# Patient Record
Sex: FEMALE | Race: WHITE | NOT HISPANIC OR LATINO | Employment: OTHER | URBAN - METROPOLITAN AREA
[De-identification: names, ages, dates, MRNs, and addresses within clinical notes are randomized per-mention and may not be internally consistent; named-entity substitution may affect disease eponyms.]

---

## 2017-04-19 LAB
CHOLEST SERPL-MSCNC: 218 MG/DL (ref 0–200)
HDLC SERPL-MCNC: 57 MG/DL
LDLC SERPL CALC-MCNC: 123 MG/DL (ref 0–129)
TOTAL NON-HDL-C (LDL+VLDL): 161 MG/DL (ref 0–159)
TRIGL SERPL-MCNC: 190 MG/DL (ref 0–149)

## 2017-07-20 ENCOUNTER — TELEPHONE (OUTPATIENT)
Dept: PULMONOLOGY | Facility: CLINIC | Age: 67
End: 2017-07-20

## 2017-07-20 DIAGNOSIS — D86.0 PULMONARY SARCOIDOSIS: Primary | ICD-10-CM

## 2017-07-21 ENCOUNTER — PROCEDURE VISIT (OUTPATIENT)
Dept: PULMONOLOGY | Facility: CLINIC | Age: 67
End: 2017-07-21
Payer: MEDICARE

## 2017-07-21 ENCOUNTER — OFFICE VISIT (OUTPATIENT)
Dept: PULMONOLOGY | Facility: CLINIC | Age: 67
End: 2017-07-21
Payer: MEDICARE

## 2017-07-21 VITALS
HEIGHT: 63 IN | DIASTOLIC BLOOD PRESSURE: 80 MMHG | BODY MASS INDEX: 31.71 KG/M2 | HEART RATE: 67 BPM | RESPIRATION RATE: 18 BRPM | SYSTOLIC BLOOD PRESSURE: 140 MMHG | OXYGEN SATURATION: 96 % | WEIGHT: 179 LBS

## 2017-07-21 DIAGNOSIS — D86.0 PULMONARY SARCOIDOSIS: ICD-10-CM

## 2017-07-21 DIAGNOSIS — D86.0 PULMONARY SARCOIDOSIS: Chronic | ICD-10-CM

## 2017-07-21 DIAGNOSIS — J44.9 COPD, MODERATE: Primary | Chronic | ICD-10-CM

## 2017-07-21 PROCEDURE — 94060 EVALUATION OF WHEEZING: CPT | Mod: S$GLB,,, | Performed by: INTERNAL MEDICINE

## 2017-07-21 PROCEDURE — 1159F MED LIST DOCD IN RCRD: CPT | Mod: S$GLB,,, | Performed by: INTERNAL MEDICINE

## 2017-07-21 PROCEDURE — 99999 PR PBB SHADOW E&M-EST. PATIENT-LVL III: CPT | Mod: PBBFAC,,, | Performed by: INTERNAL MEDICINE

## 2017-07-21 PROCEDURE — 99214 OFFICE O/P EST MOD 30 MIN: CPT | Mod: S$GLB,,, | Performed by: INTERNAL MEDICINE

## 2017-07-21 NOTE — PROGRESS NOTES
Subjective:      Established patient    Patient ID: Eve Long is a 67 y.o. female.    Patient Active Problem List   Diagnosis    Pulmonary sarcoidosis    Hypothyroidism    COPD, moderate       Problem list has been reviewed.    Chief Complaint: Pulmonary srcodosis      HPI    CXR and Spirometry reviewed with patient who expressed and voiced understanding. All questions were answered to the patients satisfaction. The patient does not currently have symptoms / an exacerbation. She states that she is at her respiratory baseline and denies any new onset specific pulmonary complaints. She denies cough sputum, hemoptysis,  pain with breathing, wheezing, asthma. Her current respiratory therapy regimen is albuterol inhaler or albuterol nebulizer which provides  relief. She is  adherent with her regimen.    A full  review of systems, past , family  and social histories was performed except as mentioned in the note above, these are non contributory to the main issues discussed today.      Previous Report Reviewed: office notes     The following portions of the patient's history were reviewed and updated as appropriate: She  has a past medical history of Sarcoidosis and Thyroid disease.  She  has a past surgical history that includes Hysterectomy and Cholecystectomy.  Her family history is not on file.  She  reports that she has never smoked. She does not have any smokeless tobacco history on file. She reports that she does not drink alcohol. Her drug history is not on file.  She has a current medication list which includes the following prescription(s): albuterol, albuterol, amlodipine, and cyanocobalamin (vitamin b-12).  She is allergic to beclomethasone dipropionate; doxycycline; fluticasone-salmeterol; umeclidinium-vilanterol; levothyroxine; and red dye..    Review of Systems   Constitutional: Positive for fatigue. Negative for fever, chills and night sweats.   HENT: Negative for nosebleeds, rhinorrhea, sinus  "pressure, sore throat, congestion and ear pain.    Eyes: Positive for itching.   Respiratory: Positive for cough, wheezing and somnolence. Negative for snoring, choking, chest tightness, orthopnea, use of rescue inhaler and Paroxysmal Nocturnal Dyspnea.    Cardiovascular: Negative for chest pain, palpitations and leg swelling.   Genitourinary: Negative for difficulty urinating.   Endocrine: Positive for heat intolerance and polyuria. Negative for polydipsia and cold intolerance.    Musculoskeletal: Positive for arthralgias. Negative for back pain.   Skin: Negative for rash.   Gastrointestinal: Negative for nausea, vomiting, abdominal pain, abdominal distention and acid reflux.   Neurological: Negative for dizziness, syncope, light-headedness and headaches.   Hematological: Excessive bruising. Does not bruise/bleed easily.   Psychiatric/Behavioral: The patient is not nervous/anxious.    All other systems reviewed and are negative.       Objective:     BP (!) 140/80   Pulse 67   Resp 18   Ht 5' 3" (1.6 m)   Wt 81.2 kg (179 lb 0.2 oz)   SpO2 96%   BMI 31.71 kg/m²   Body mass index is 31.71 kg/m².     Physical Exam   Constitutional: She is oriented to person, place, and time. She appears well-developed and well-nourished.   HENT:   Head: Normocephalic and atraumatic.   Eyes: EOM are normal. Pupils are equal, round, and reactive to light.   Neck: Normal range of motion. Neck supple.   Cardiovascular: Normal rate and regular rhythm.    Pulmonary/Chest: Effort normal and breath sounds normal.   Abdominal: Soft. Bowel sounds are normal.   Musculoskeletal: Normal range of motion.   Neurological: She is alert and oriented to person, place, and time.   Skin: Skin is warm.   Psychiatric: She has a normal mood and affect.   Nursing note and vitals reviewed.      Personal Diagnostic Review    ABG: pH: 7.386   PaO2: 87 PaCO2 34   SaO2 97% : Mixed respiratory alkalosis / metabolic acidosis.    Pulmonary function tests: FEV1: " 1.53 (68 % predicted), FVC:  2.52 (86 % predicted), FEV1/FVC:  60%   Moderate obstruction. Airflow is not clearly improved after bronchodilator.     CT of chest performed on 06/14/16 with contrast : There is a densely calcified 15 mm pleural-based granuloma in the lateral left lower lobe.  There is focal partially calcified lobular pleural plaque measuring 5-6 mm thickness in the lateral right lower lobe, felt likely to be benign.  No suspicious pulmonary lesions are seen.  No pathologically enlarged hilar or mediastinal lymph nodes.  No pleural effusion.  No abnormality of the airway.     CXR: Stable    Assessment:     1. COPD, moderate Stable   2. Pulmonary sarcoidosis Stable     Outpatient Encounter Prescriptions as of 7/21/2017   Medication Sig Dispense Refill    albuterol (PROVENTIL) 2.5 mg /3 mL (0.083 %) nebulizer solution 2.5 mg.      albuterol (VENTOLIN HFA) 90 mcg/actuation inhaler Inhale 2 puffs into the lungs every 4 (four) hours as needed for Wheezing. 18 g 11    amlodipine (NORVASC) 5 MG tablet Take 5 mg by mouth.      CYANOCOBALAMIN, VITAMIN B-12, ORAL Take 1 tablet by mouth.      [DISCONTINUED] clonazePAM (KLONOPIN) 0.5 MG tablet Take 0.5 mg by mouth.      [DISCONTINUED] estradiol (ESTRACE) 0.5 MG tablet Take 0.5 mg by mouth.      [DISCONTINUED] levothyroxine (SYNTHROID) 50 MCG tablet        No facility-administered encounter medications on file as of 7/21/2017.      Orders Placed This Encounter   Procedures    X-Ray Chest PA And Lateral     Standing Status:   Future     Standing Expiration Date:   8/31/2018    Complete PFT with bronchodilator     Standing Status:   Future     Standing Expiration Date:   8/31/2018     Plan:     Discussed diagnosis, its evaluation, treatment and usual course. All questions answered.    Pulmonary sarcoidosis  BURNT OUT PULMONARY SARCOIDOSIS. Stable and controlled. No changes to current therapeutic plan. Continue all previously prescribed medications.        COPD, moderate  COPD ROS: taking medications as instructed, no medication side effects noted, no significant ongoing wheezing or shortness of breath, using bronchodilator MDI less than twice a week.   New concerns: None.   Exam: appears well, vitals normal, no respiratory distress, acyanotic, normal RR, chest clear, no wheezing, crepitations, rhonchi, normal symmetric air entry.   Assessment:  COPD well controlled.   Plan: ALBUTEROL PFT AND NEBS. current treatment plan is effective, no change in therapy. PFT and CXR in 1 year.       TIME SPENT WITH PATIENT: Time spent: 35 minutes in face to face  discussion concerning diagnosis, prognosis, review of lab and test results, benefits of treatment as well as management of disease, counseling of patient and coordination of care between various health  care providers . Greater than half the time spent was used for coordination of care and counseling of patient.        Return in about 1 year (around 7/21/2018) for COPD, Sarcoidosis.

## 2017-07-21 NOTE — ASSESSMENT & PLAN NOTE
BURNT OUT PULMONARY SARCOIDOSIS. Stable and controlled. No changes to current therapeutic plan. Continue all previously prescribed medications.

## 2017-07-21 NOTE — ASSESSMENT & PLAN NOTE
COPD ROS: taking medications as instructed, no medication side effects noted, no significant ongoing wheezing or shortness of breath, using bronchodilator MDI less than twice a week.   New concerns: None.   Exam: appears well, vitals normal, no respiratory distress, acyanotic, normal RR, chest clear, no wheezing, crepitations, rhonchi, normal symmetric air entry.   Assessment:  COPD well controlled.   Plan: ALBUTEROL PFT AND NEBS. current treatment plan is effective, no change in therapy. PFT and CXR in 1 year.

## 2017-07-21 NOTE — PATIENT INSTRUCTIONS
Lung Anatomy  Your lungs take air in to give your body oxygen, which the body needs to work. Your lungs, like all the tissues in your body, are made up of billions of tiny specialized cells. Old lung cells die and are replaced by new, identical lung cells. This natural process helps ensure healthy lungs.    Date Last Reviewed: 11/1/2016  © 4577-2329 [employer]. [address] [place] [address]. All rights reserved. This information is not intended as a substitute for professional medical care. Always follow your healthcare professional's instructions.

## 2017-07-23 LAB
PRE FEV1 FVC: 60.47 % (ref 66.56–86.15)
PRE FEV1: 1.53 L (ref 1.67–2.8)
PRE FVC: 2.52 L (ref 2.27–3.61)
PRE PEF: 4.27 L/S (ref 3.98–7.3)

## 2017-10-03 ENCOUNTER — OFFICE VISIT (OUTPATIENT)
Dept: FAMILY MEDICINE | Facility: CLINIC | Age: 67
End: 2017-10-03
Payer: MEDICARE

## 2017-10-03 ENCOUNTER — LAB VISIT (OUTPATIENT)
Dept: LAB | Facility: HOSPITAL | Age: 67
End: 2017-10-03
Attending: FAMILY MEDICINE
Payer: MEDICARE

## 2017-10-03 VITALS
DIASTOLIC BLOOD PRESSURE: 82 MMHG | BODY MASS INDEX: 30.88 KG/M2 | TEMPERATURE: 98 F | SYSTOLIC BLOOD PRESSURE: 130 MMHG | HEIGHT: 63 IN | OXYGEN SATURATION: 98 % | HEART RATE: 86 BPM | WEIGHT: 174.25 LBS

## 2017-10-03 DIAGNOSIS — K29.70 GASTRITIS, PRESENCE OF BLEEDING UNSPECIFIED, UNSPECIFIED CHRONICITY, UNSPECIFIED GASTRITIS TYPE: ICD-10-CM

## 2017-10-03 DIAGNOSIS — Z12.39 BREAST CANCER SCREENING: ICD-10-CM

## 2017-10-03 DIAGNOSIS — D52.8 OTHER FOLATE DEFICIENCY ANEMIAS: ICD-10-CM

## 2017-10-03 DIAGNOSIS — K29.70 GASTRITIS, PRESENCE OF BLEEDING UNSPECIFIED, UNSPECIFIED CHRONICITY, UNSPECIFIED GASTRITIS TYPE: Primary | ICD-10-CM

## 2017-10-03 LAB
BASOPHILS # BLD AUTO: 0.02 K/UL
BASOPHILS NFR BLD: 0.2 %
DIFFERENTIAL METHOD: ABNORMAL
EOSINOPHIL # BLD AUTO: 0 K/UL
EOSINOPHIL NFR BLD: 0 %
ERYTHROCYTE [DISTWIDTH] IN BLOOD BY AUTOMATED COUNT: 13.7 %
HCT VFR BLD AUTO: 41 %
HGB BLD-MCNC: 13.6 G/DL
LYMPHOCYTES # BLD AUTO: 1.4 K/UL
LYMPHOCYTES NFR BLD: 17.3 %
MCH RBC QN AUTO: 28 PG
MCHC RBC AUTO-ENTMCNC: 33.2 G/DL
MCV RBC AUTO: 85 FL
MONOCYTES # BLD AUTO: 0.7 K/UL
MONOCYTES NFR BLD: 7.9 %
NEUTROPHILS # BLD AUTO: 6.1 K/UL
NEUTROPHILS NFR BLD: 74.2 %
PLATELET # BLD AUTO: 279 K/UL
PMV BLD AUTO: 10.5 FL
RBC # BLD AUTO: 4.85 M/UL
WBC # BLD AUTO: 8.27 K/UL

## 2017-10-03 PROCEDURE — 36415 COLL VENOUS BLD VENIPUNCTURE: CPT | Mod: PO

## 2017-10-03 PROCEDURE — 86256 FLUORESCENT ANTIBODY TITER: CPT

## 2017-10-03 PROCEDURE — 82607 VITAMIN B-12: CPT

## 2017-10-03 PROCEDURE — 99204 OFFICE O/P NEW MOD 45 MIN: CPT | Mod: S$GLB,,, | Performed by: FAMILY MEDICINE

## 2017-10-03 PROCEDURE — 85025 COMPLETE CBC W/AUTO DIFF WBC: CPT

## 2017-10-03 PROCEDURE — 99999 PR PBB SHADOW E&M-EST. PATIENT-LVL III: CPT | Mod: PBBFAC,,, | Performed by: FAMILY MEDICINE

## 2017-10-03 RX ORDER — ALBUTEROL SULFATE 90 UG/1
2 AEROSOL, METERED RESPIRATORY (INHALATION) EVERY 6 HOURS PRN
COMMUNITY

## 2017-10-03 RX ORDER — ALBUTEROL SULFATE 0.83 MG/ML
2.5 SOLUTION RESPIRATORY (INHALATION) DAILY PRN
COMMUNITY
End: 2019-10-18 | Stop reason: SDUPTHER

## 2017-10-03 RX ORDER — CLONIDINE HYDROCHLORIDE 0.1 MG/1
0.1 TABLET ORAL 3 TIMES DAILY PRN
Status: ON HOLD | COMMUNITY
Start: 2017-07-18 | End: 2018-08-15 | Stop reason: HOSPADM

## 2017-10-03 NOTE — PROGRESS NOTES
Subjective:       Patient ID: Eve Long is a 67 y.o. female.    Chief Complaint: low vitamin b 12    67 y old female with abdominal bloating , epigastralgia and nausea over the last  3 m . Previously under the care of Dr Lemuel servin refer her to Gastro(Ezra Jensen)   She  Has endoscopy done on 8 /17 that  showed  findings  consistent  with metaplastic atrophic gastritis. She has not been able to tolerate PPI since some cause urinary retention ? SHe has been on oral B12 for years       Review of Systems   Constitutional: Positive for activity change. Negative for unexpected weight change.   HENT: Negative for hearing loss, rhinorrhea and trouble swallowing.    Eyes: Positive for visual disturbance. Negative for discharge.   Respiratory: Negative for chest tightness and wheezing.    Cardiovascular: Negative for chest pain and palpitations.   Gastrointestinal: Positive for abdominal distention, abdominal pain, blood in stool, diarrhea and vomiting. Negative for constipation.   Endocrine: Negative for polydipsia and polyuria.   Genitourinary: Negative.  Negative for difficulty urinating, dysuria, hematuria and menstrual problem.   Musculoskeletal: Negative for arthralgias, joint swelling and neck pain.   Neurological: Negative for weakness and headaches.   Psychiatric/Behavioral: Negative for confusion and dysphoric mood.       Objective:      Physical Exam   Constitutional: She is oriented to person, place, and time. She appears well-developed and well-nourished. No distress.   HENT:   Head: Normocephalic and atraumatic.   Right Ear: External ear normal.   Left Ear: External ear normal.   Mouth/Throat: No oropharyngeal exudate.   Eyes: Conjunctivae and EOM are normal. Pupils are equal, round, and reactive to light. Right eye exhibits no discharge. Left eye exhibits no discharge. No scleral icterus.   Neck: Normal range of motion. Neck supple. No JVD present. No tracheal deviation present. No thyromegaly  present.   Cardiovascular: Normal rate, regular rhythm and normal heart sounds.  Exam reveals no gallop and no friction rub.    No murmur heard.  Pulmonary/Chest: Effort normal and breath sounds normal. No stridor. No respiratory distress. She has no wheezes. She has no rales. She exhibits no tenderness.   Abdominal: Soft. Bowel sounds are normal. She exhibits no distension. There is tenderness in the epigastric area. There is no rebound and no guarding.   Musculoskeletal: Normal range of motion.   Lymphadenopathy:     She has no cervical adenopathy.   Neurological: She is alert and oriented to person, place, and time.   Skin: Skin is warm and dry. She is not diaphoretic.   Psychiatric: She has a normal mood and affect. Her behavior is normal. Judgment and thought content normal.       Assessment:     Eve was seen today for low vitamin b 12.    Diagnoses and all orders for this visit:    Gastritis, presence of bleeding unspecified, unspecified chronicity, unspecified gastritis type  -     ANTI-PARIETAL ANTIBODY; Future  -     Vitamin B12; Future  -     CBC auto differential; Future    Other folate deficiency anemias   -     Vitamin B12; Future    Breast cancer screening  -     Mammo Digital Screening Bilateral With CAD; Future      Plan:   We will cont to follow up  Path report was reviewed  And will be scan   Will also obtain rec from last GI visit     Time spent: 25 minutes in face to face discussion concerning diagnosis, prognosis, review of lab and test results, benefits of treatment as well as management of disease, counseling of patient and coordination of care between various health care providers . Greater than half the time spent was used for coordination of care and counseling of patient.

## 2017-10-04 ENCOUNTER — PATIENT MESSAGE (OUTPATIENT)
Dept: FAMILY MEDICINE | Facility: CLINIC | Age: 67
End: 2017-10-04

## 2017-10-04 LAB — VIT B12 SERPL-MCNC: 1483 PG/ML

## 2017-10-05 ENCOUNTER — PATIENT MESSAGE (OUTPATIENT)
Dept: FAMILY MEDICINE | Facility: CLINIC | Age: 67
End: 2017-10-05

## 2017-10-05 ENCOUNTER — TELEPHONE (OUTPATIENT)
Dept: FAMILY MEDICINE | Facility: CLINIC | Age: 67
End: 2017-10-05

## 2017-10-05 DIAGNOSIS — R74.8 ELEVATED VITAMIN B12 LEVEL: Primary | ICD-10-CM

## 2017-10-05 DIAGNOSIS — D53.9 NUTRITIONAL ANEMIA: ICD-10-CM

## 2017-10-05 LAB — PCA AB TITR SER IF: ABNORMAL {TITER}

## 2017-10-06 ENCOUNTER — PATIENT MESSAGE (OUTPATIENT)
Dept: PULMONOLOGY | Facility: CLINIC | Age: 67
End: 2017-10-06

## 2017-10-16 ENCOUNTER — HOSPITAL ENCOUNTER (OUTPATIENT)
Dept: RADIOLOGY | Facility: HOSPITAL | Age: 67
Discharge: HOME | End: 2017-10-16
Attending: FAMILY MEDICINE
Payer: MEDICARE

## 2017-10-16 DIAGNOSIS — Z12.39 BREAST CANCER SCREENING: ICD-10-CM

## 2017-10-19 ENCOUNTER — PATIENT MESSAGE (OUTPATIENT)
Dept: FAMILY MEDICINE | Facility: CLINIC | Age: 67
End: 2017-10-19

## 2017-10-19 ENCOUNTER — TELEPHONE (OUTPATIENT)
Dept: FAMILY MEDICINE | Facility: CLINIC | Age: 67
End: 2017-10-19

## 2017-10-19 DIAGNOSIS — D51.0 PERNICIOUS ANEMIA: ICD-10-CM

## 2017-10-19 DIAGNOSIS — R74.8 ELEVATED VITAMIN B12 LEVEL: Primary | ICD-10-CM

## 2017-10-31 ENCOUNTER — OFFICE VISIT (OUTPATIENT)
Dept: HEMATOLOGY/ONCOLOGY | Facility: CLINIC | Age: 67
End: 2017-10-31
Payer: MEDICARE

## 2017-10-31 ENCOUNTER — PATIENT MESSAGE (OUTPATIENT)
Dept: FAMILY MEDICINE | Facility: CLINIC | Age: 67
End: 2017-10-31

## 2017-10-31 VITALS
DIASTOLIC BLOOD PRESSURE: 86 MMHG | HEART RATE: 72 BPM | TEMPERATURE: 97 F | WEIGHT: 172.63 LBS | SYSTOLIC BLOOD PRESSURE: 153 MMHG | OXYGEN SATURATION: 96 % | BODY MASS INDEX: 30.59 KG/M2 | HEIGHT: 63 IN

## 2017-10-31 DIAGNOSIS — E53.8 VITAMIN B12 DEFICIENCY WITHOUT ANEMIA: Primary | ICD-10-CM

## 2017-10-31 PROCEDURE — 99204 OFFICE O/P NEW MOD 45 MIN: CPT | Mod: S$GLB,,, | Performed by: INTERNAL MEDICINE

## 2017-10-31 PROCEDURE — 99999 PR PBB SHADOW E&M-EST. PATIENT-LVL III: CPT | Mod: PBBFAC,,, | Performed by: INTERNAL MEDICINE

## 2017-10-31 NOTE — LETTER
November 2, 2017      Chaya Morelos MD  [address] [facility] [address]           [place] - Hematology Oncology  [id number] [place] [id number]  Phone: [phone]  Fax: [phone]          Patient: Eve Long   MR Number: [id number]   YOB: 1950   Date of Visit: 10/31/2017       Dear Dr. Chaya Morleos:    Thank you for referring Eve Long to me for evaluation. Attached you will find relevant portions of my assessment and plan of care.    If you have questions, please do not hesitate to call me. I look forward to following Eve Long along with you.    Sincerely,    Ji Duran MD    Enclosure  CC:  No Recipients    If you would like to receive this communication electronically, please contact [email or web address] or [phone] to request more information on [employer] access.    For providers and/or their staff who would like to refer a patient to [facility], please contact us through our one-stop-shop provider referral line, [facility], at [phone].    If you feel you have received this communication in error or would no longer like to receive these types of communications, please e-mail [email or web address]

## 2017-11-03 ENCOUNTER — HOSPITAL ENCOUNTER (OUTPATIENT)
Dept: RADIOLOGY | Facility: HOSPITAL | Age: 67
Discharge: HOME | End: 2017-11-03
Attending: FAMILY MEDICINE
Payer: MEDICARE

## 2017-11-03 DIAGNOSIS — Z12.39 BREAST CANCER SCREENING: ICD-10-CM

## 2017-11-03 PROCEDURE — 77063 BREAST TOMOSYNTHESIS BI: CPT | Mod: 26,,, | Performed by: RADIOLOGY

## 2017-11-03 PROCEDURE — 77067 SCR MAMMO BI INCL CAD: CPT | Mod: 26,,, | Performed by: RADIOLOGY

## 2017-11-03 PROCEDURE — 77067 SCR MAMMO BI INCL CAD: CPT | Mod: TC

## 2017-11-28 ENCOUNTER — PATIENT MESSAGE (OUTPATIENT)
Dept: FAMILY MEDICINE | Facility: CLINIC | Age: 67
End: 2017-11-28

## 2017-11-29 PROBLEM — E53.8 VITAMIN B12 DEFICIENCY WITHOUT ANEMIA: Status: ACTIVE | Noted: 2017-11-29

## 2017-11-29 NOTE — PROGRESS NOTES
Provider requesting consultation: Dr. Chaya Lora with family medicine  Reason for Consult: Vitamin B12 deficiency and positive antiparietal antibody    HPI:   The patient is a 67-year-old  female who presents to the hematology oncology clinic to discuss further evaluation and management recommendations for vitamin B12 deficiency and positive antiparietal antibody.  The patient has been referred to me by Dr. Chaya Lora with [facility] medicine.  I have reviewed all of the patient's relevant clinical history available in the medical record and have utilized this in my evaluation and management recommendations today.  Today the patient reports overall she feels well and has no specific complaints.  She denies any fevers, chills or night sweats.  She denies any loss of appetite or unintentional weight loss.  She denies any melena, hematochezia, hematemesis or hemoptysis.  She denies any chest pain or shortness of breath.  She denies any bowel or urinary complaints.    PAST MEDICAL HISTORY:   1.  Vitamin B12 deficiency/pernicious anemia/chronic autoimmune gastritis  2.  Hypertension  3.  Pulmonary sarcoidosis  4.  History of histoplasmosis  5.  Endometriosis    SURGICAL HISTORY:   1.  Hysterectomy  2.  Cholecystectomy  3.  Lung biopsy    FAMILY HISTORY: She denies any immediate family members with cancer or bleeding/clotting disorders.    SOCIAL HISTORY: She has never smoked cigarettes.  She does not drink alcohol.  She has never used any recreational drugs.  She is  and does not have any children.  She lives in [place].    ALLERGIES: Reviewed on medication card.    MEDICATIONS: [Medcard has been reviewed and/or reconciled.]    REVIEW OF SYSTEMS:   GENERAL: [No fevers, chills or sweats. No fatigue, weight loss or loss of appetite.]  HEENT: [No blurred vision, tinnitus, nasal discharge, sorethroat or dysphagia.]  HEART: [No chest pain, palpitations or shortness of breath.]     LUNGS: [No cough, hemoptysis or breathing problems.]  ABDOMEN: [No abdominal pain, nausea, vomiting, diarrhea, constipation or melena.]  GENITOURINARY: [No dysuria, bleeding or malodorous discharge.]  NEURO: [No headache, dizziness or vertigo.]  HEMATOLOGY: [No easy bruising, spontaneous bleeding or blood clots in the past].  MUSCULOSKELETAL: [No arthralgias, myalgias or bone pains.]  SKIN: [No rashes or skin lesions.]  PSYCHIATRY: [No depression or anxiety.]    PHYSICAL EXAMINATION:   VS: Reviewed on nurse's notes.  APPEARANCE: The patient is a well-developed, well-nourished and well-groomed  female who appears in no acute distress.  HEENT: No scleral icterus. Both external auditory canals clear. No oral ulcers, lesions. Throat clear  HEAD: No sinus tenderness.  NECK: Supple. No palpable lymphadenopathy. Thyroid non-tender, no palpable masses  CHEST: Breath sounds clear bilaterally. No rales. No rhonchi. Unlabored respirations.  CARDIOVASCULAR: Normal S1, S2. Normal rate. Regular rhythm.  ABDOMEN: Bowel sounds normal. No tenderness. No abdominal distention. No hepatomegaly. No splenomegaly.  LYMPHATIC: No palpable supraclavicular, axillary nodes  EXTREMITIES: No clubbing, cyanosis, edema  SKIN: No lesions. No petechiae. No ecchymoses. No induration or nodules  NEUROLOGIC: No focal findings. Alert & Oriented x 3. Mood appropriate to affect    LABS:   Reviewed    IMAGING:  Reviewed    IMPRESSION:  1.  Vitamin B12 deficiency associated with pernicious anemia/chronic autoimmune gastritis  2.  Positive antiparietal antibody    PLAN:  1.  I had a detailed discussion with the patient today with regard to her current clinical situation.  We discussed about her vitamin B12 deficiency associated with pernicious anemia/chronic autoimmune gastritis with positive) antibody in detail today.  2.  The patient appears to have had excellent response to oral vitamin B12 supplementation and will continue on this as her most  recent vitamin B12 level was normal.  This will have to be monitored periodically at close intervals going forward and she understands that she'll have to transition to vitamin B12 injections if she stops responding to oral vitamin B-12 supplementation.  3.  I have discussed with the patient positive antiparietal antibody test is nonspecific for pernicious anemia.  4.  I have recommended that she continue routine/recommended follow-up with her gastroenterologist as she may need periodic surveillance with EGD especially if she has any new symptoms.  She expressed understanding.    Follow-up in 3 months.  She knows to call sooner if any new problems or questions.    Ji Duran MD

## 2017-12-01 ENCOUNTER — OFFICE VISIT (OUTPATIENT)
Dept: FAMILY MEDICINE | Facility: CLINIC | Age: 67
End: 2017-12-01
Payer: MEDICARE

## 2017-12-01 ENCOUNTER — LAB VISIT (OUTPATIENT)
Dept: LAB | Facility: HOSPITAL | Age: 67
End: 2017-12-01
Attending: FAMILY MEDICINE
Payer: MEDICARE

## 2017-12-01 VITALS
BODY MASS INDEX: 31 KG/M2 | HEART RATE: 79 BPM | OXYGEN SATURATION: 97 % | HEIGHT: 63 IN | SYSTOLIC BLOOD PRESSURE: 138 MMHG | TEMPERATURE: 97 F | WEIGHT: 174.94 LBS | DIASTOLIC BLOOD PRESSURE: 82 MMHG

## 2017-12-01 DIAGNOSIS — R20.9 COLD HAND WITHOUT PERIPHERAL VASCULAR DISEASE: ICD-10-CM

## 2017-12-01 DIAGNOSIS — R20.0 HAND NUMBNESS: ICD-10-CM

## 2017-12-01 DIAGNOSIS — K12.1 MOUTH ULCERS: ICD-10-CM

## 2017-12-01 DIAGNOSIS — R20.0 HAND NUMBNESS: Primary | ICD-10-CM

## 2017-12-01 LAB
TSH SERPL DL<=0.005 MIU/L-ACNC: 3.33 UIU/ML
VIT B12 SERPL-MCNC: 1392 PG/ML

## 2017-12-01 PROCEDURE — 99999 PR PBB SHADOW E&M-EST. PATIENT-LVL III: CPT | Mod: PBBFAC,,, | Performed by: FAMILY MEDICINE

## 2017-12-01 PROCEDURE — 83516 IMMUNOASSAY NONANTIBODY: CPT | Mod: 59

## 2017-12-01 PROCEDURE — 83520 IMMUNOASSAY QUANT NOS NONAB: CPT | Mod: 59

## 2017-12-01 PROCEDURE — 86235 NUCLEAR ANTIGEN ANTIBODY: CPT | Mod: 59

## 2017-12-01 PROCEDURE — 86038 ANTINUCLEAR ANTIBODIES: CPT

## 2017-12-01 PROCEDURE — 99214 OFFICE O/P EST MOD 30 MIN: CPT | Mod: S$GLB,,, | Performed by: FAMILY MEDICINE

## 2017-12-01 PROCEDURE — 83520 IMMUNOASSAY QUANT NOS NONAB: CPT

## 2017-12-01 PROCEDURE — 86235 NUCLEAR ANTIGEN ANTIBODY: CPT

## 2017-12-01 PROCEDURE — 82607 VITAMIN B-12: CPT

## 2017-12-01 PROCEDURE — 84443 ASSAY THYROID STIM HORMONE: CPT

## 2017-12-01 RX ORDER — TRIAMCINOLONE ACETONIDE 1 MG/G
PASTE DENTAL
Qty: 5 G | Refills: 12 | Status: SHIPPED | OUTPATIENT
Start: 2017-12-01 | End: 2018-06-04

## 2017-12-02 LAB
ANTI SM/RNP ANTIBODY: 3.47 EU
ANTI-SM/RNP INTERPRETATION: NEGATIVE

## 2017-12-04 LAB
ANA SER QL IF: NORMAL
ENA SCL70 AB SER-ACNC: 7 UNITS
ENA SCL70 IGG SER IA-ACNC: <0.2 U

## 2017-12-06 ENCOUNTER — PATIENT MESSAGE (OUTPATIENT)
Dept: FAMILY MEDICINE | Facility: CLINIC | Age: 67
End: 2017-12-06

## 2017-12-06 LAB — RNA POLYMERASE III ANTIBODIES, IGG, SERUM: <10 U

## 2017-12-08 ENCOUNTER — PATIENT MESSAGE (OUTPATIENT)
Dept: FAMILY MEDICINE | Facility: CLINIC | Age: 67
End: 2017-12-08

## 2017-12-08 ENCOUNTER — HOSPITAL ENCOUNTER (OUTPATIENT)
Dept: RADIOLOGY | Facility: HOSPITAL | Age: 67
Discharge: HOME | End: 2017-12-08
Attending: FAMILY MEDICINE
Payer: MEDICARE

## 2017-12-08 ENCOUNTER — TELEPHONE (OUTPATIENT)
Dept: FAMILY MEDICINE | Facility: CLINIC | Age: 67
End: 2017-12-08

## 2017-12-08 ENCOUNTER — OFFICE VISIT (OUTPATIENT)
Dept: FAMILY MEDICINE | Facility: CLINIC | Age: 67
End: 2017-12-08
Payer: MEDICARE

## 2017-12-08 VITALS
TEMPERATURE: 97 F | WEIGHT: 175 LBS | HEART RATE: 75 BPM | OXYGEN SATURATION: 95 % | DIASTOLIC BLOOD PRESSURE: 78 MMHG | BODY MASS INDEX: 31.01 KG/M2 | HEIGHT: 63 IN | SYSTOLIC BLOOD PRESSURE: 148 MMHG

## 2017-12-08 DIAGNOSIS — R10.9 FLANK PAIN: Primary | ICD-10-CM

## 2017-12-08 DIAGNOSIS — R10.9 FLANK PAIN: ICD-10-CM

## 2017-12-08 DIAGNOSIS — R30.0 DYSURIA: ICD-10-CM

## 2017-12-08 PROCEDURE — 74000 XR ABDOMEN AP 1 VIEW: CPT | Mod: 26,,, | Performed by: RADIOLOGY

## 2017-12-08 PROCEDURE — 87086 URINE CULTURE/COLONY COUNT: CPT

## 2017-12-08 PROCEDURE — 99214 OFFICE O/P EST MOD 30 MIN: CPT | Mod: 25,S$GLB,, | Performed by: FAMILY MEDICINE

## 2017-12-08 PROCEDURE — 74000 XR ABDOMEN AP 1 VIEW: CPT | Mod: TC,PO

## 2017-12-08 PROCEDURE — 99999 PR PBB SHADOW E&M-EST. PATIENT-LVL III: CPT | Mod: PBBFAC,,, | Performed by: FAMILY MEDICINE

## 2017-12-08 PROCEDURE — 96372 THER/PROPH/DIAG INJ SC/IM: CPT | Mod: S$GLB,,, | Performed by: FAMILY MEDICINE

## 2017-12-08 RX ORDER — PROMETHAZINE HYDROCHLORIDE 12.5 MG/1
12.5 TABLET ORAL EVERY 6 HOURS PRN
Qty: 12 TABLET | Refills: 0 | Status: SHIPPED | OUTPATIENT
Start: 2017-12-08 | End: 2019-06-26 | Stop reason: SDUPTHER

## 2017-12-08 RX ORDER — CIPROFLOXACIN 500 MG/1
500 TABLET ORAL 2 TIMES DAILY
Qty: 20 TABLET | Refills: 0 | Status: SHIPPED | OUTPATIENT
Start: 2017-12-08 | End: 2018-06-04

## 2017-12-08 RX ORDER — CEFTRIAXONE 1 G/1
1 INJECTION, POWDER, FOR SOLUTION INTRAMUSCULAR; INTRAVENOUS
Status: DISCONTINUED | OUTPATIENT
Start: 2017-12-08 | End: 2017-12-08

## 2017-12-08 RX ORDER — CEFTRIAXONE 500 MG/1
500 INJECTION, POWDER, FOR SOLUTION INTRAMUSCULAR; INTRAVENOUS
Status: COMPLETED | OUTPATIENT
Start: 2017-12-08 | End: 2017-12-08

## 2017-12-08 RX ADMIN — CEFTRIAXONE 500 MG: 500 INJECTION, POWDER, FOR SOLUTION INTRAMUSCULAR; INTRAVENOUS at 12:12

## 2017-12-08 NOTE — TELEPHONE ENCOUNTER
----- Message from Eulogio Cordon sent at 12/8/2017 11:13 AM CST -----  Pt is requesting a call from nurse to discuss an e mail for her bladder infection.        Please call pt back at [phone]

## 2017-12-08 NOTE — PROGRESS NOTES
Subjective:       Patient ID: Eve Long is a 67 y.o. female.    Chief Complaint: No chief complaint on file.    67 y old female with Dysuria, urinary frequency and l flank pain since last night . Pain is constant , she is nauseasou , Taking Pyridium , No fever  .      Review of Systems   Constitutional: Negative.    HENT: Negative.    Respiratory: Negative.    Cardiovascular: Negative.    Gastrointestinal: Negative.    Genitourinary: Positive for decreased urine volume, dysuria, flank pain and frequency.   Hematological: Negative.        Objective:      Physical Exam   Constitutional: She is oriented to person, place, and time. She appears well-developed and well-nourished. She appears distressed.   HENT:   Head: Normocephalic and atraumatic.   Right Ear: External ear normal.   Left Ear: External ear normal.   Mouth/Throat: No oropharyngeal exudate.   Eyes: Conjunctivae and EOM are normal. Pupils are equal, round, and reactive to light. Right eye exhibits no discharge. Left eye exhibits no discharge. No scleral icterus.   Neck: Normal range of motion. Neck supple. No JVD present. No tracheal deviation present. No thyromegaly present.   Cardiovascular: Normal rate, regular rhythm and normal heart sounds.  Exam reveals no gallop and no friction rub.    No murmur heard.  Pulmonary/Chest: Effort normal and breath sounds normal. No stridor. No respiratory distress. She has no wheezes. She has no rales. She exhibits no tenderness.   Abdominal: Soft. Bowel sounds are normal. She exhibits no distension. There is tenderness in the suprapubic area. There is CVA tenderness. There is no rebound and no guarding.   Musculoskeletal: Normal range of motion.   Lymphadenopathy:     She has no cervical adenopathy.   Neurological: She is alert and oriented to person, place, and time.   Skin: Skin is warm and dry. She is not diaphoretic.   Psychiatric: She has a normal mood and affect. Her behavior is normal. Judgment and  thought content normal.       Assessment:     Diagnoses and all orders for this visit:    Flank pain  -     X-Ray Abdomen AP 1 View; Future    Dysuria  -     POCT URINE DIPSTICK WITHOUT MICROSCOPE  -     Urine culture    Other orders  -     cefTRIAXone injection 1 g; Inject 1 g into the muscle one time.  -     cefTRIAXone injection 500 mg; Inject 0.5 g (500 mg total) into the muscle one time.  -     cefTRIAXone injection 500 mg; Inject 0.5 g (500 mg total) into the muscle one time.  -     promethazine (PHENERGAN) 12.5 MG Tab; Take 1 tablet (12.5 mg total) by mouth every 6 (six) hours as needed.  -     ciprofloxacin HCl (CIPRO) 500 MG tablet; Take 1 tablet (500 mg total) by mouth 2 (two) times daily.      Plan:   Pt understands that she might have pyelonephritis which will require  inpatient management  . WS discussed.

## 2017-12-09 ENCOUNTER — PATIENT MESSAGE (OUTPATIENT)
Dept: FAMILY MEDICINE | Facility: CLINIC | Age: 67
End: 2017-12-09

## 2017-12-10 LAB — BACTERIA UR CULT: NO GROWTH

## 2017-12-11 ENCOUNTER — PATIENT MESSAGE (OUTPATIENT)
Dept: FAMILY MEDICINE | Facility: CLINIC | Age: 67
End: 2017-12-11

## 2017-12-18 ENCOUNTER — OFFICE VISIT (OUTPATIENT)
Dept: FAMILY MEDICINE | Facility: CLINIC | Age: 67
End: 2017-12-18
Payer: MEDICARE

## 2017-12-18 VITALS
WEIGHT: 173.75 LBS | HEIGHT: 63 IN | DIASTOLIC BLOOD PRESSURE: 78 MMHG | OXYGEN SATURATION: 97 % | HEART RATE: 88 BPM | BODY MASS INDEX: 30.79 KG/M2 | SYSTOLIC BLOOD PRESSURE: 126 MMHG | TEMPERATURE: 98 F

## 2017-12-18 DIAGNOSIS — J44.9 COPD, MODERATE: ICD-10-CM

## 2017-12-18 DIAGNOSIS — E53.8 VITAMIN B12 DEFICIENCY WITHOUT ANEMIA: ICD-10-CM

## 2017-12-18 DIAGNOSIS — E78.2 MIXED HYPERLIPIDEMIA: ICD-10-CM

## 2017-12-18 DIAGNOSIS — D86.0 PULMONARY SARCOIDOSIS: Primary | ICD-10-CM

## 2017-12-18 DIAGNOSIS — J84.10 CALCIFIED GRANULOMA OF LUNG: ICD-10-CM

## 2017-12-18 DIAGNOSIS — E03.9 ACQUIRED HYPOTHYROIDISM: ICD-10-CM

## 2017-12-18 PROCEDURE — 99999 PR PBB SHADOW E&M-EST. PATIENT-LVL III: CPT | Mod: PBBFAC,,, | Performed by: NURSE PRACTITIONER

## 2017-12-18 PROCEDURE — 96160 PT-FOCUSED HLTH RISK ASSMT: CPT | Mod: S$GLB,,, | Performed by: NURSE PRACTITIONER

## 2017-12-18 NOTE — PROGRESS NOTES
"Eve Long presented for a  Medicare AWV and comprehensive Health Risk Assessment today. The following components were reviewed and updated:    · Medical history  · Family History  · Social history  · Allergies and Current Medications  · Health Risk Assessment  · Health Maintenance  · Care Team     ** See Completed Assessments for Annual Wellness Visit within the encounter summary.**       The following assessments were completed:  · Living Situation  · CAGE  · Depression Screening  · Timed Get Up and Go  · Whisper Test  · Cognitive Function Screening  · Nutrition Screening  · ADL Screening  · PAQ Screening    Vitals:    12/18/17 1303   BP: 126/78   BP Location: Right arm   Patient Position: Sitting   BP Method: Large (Automatic)   Pulse: 88   Temp: 97.6 °F (36.4 °C)   TempSrc: Tympanic   SpO2: 97%   Weight: 78.8 kg (173 lb 11.6 oz)   Height: 5' 3" (1.6 m)     Body mass index is 30.77 kg/m².  Physical Exam      Diagnoses and health risks identified today and associated recommendations/orders:    1. Pulmonary sarcoidosis  Stable. Follow up with pulmo    2. Acquired hypothyroidism  Stable continue current treatment plan    3. COPD, moderate  Stable. Continue current treatment follow up with pulmo    4. Vitamin B12 deficiency without anemia  12/01/2017   Vitamin B-12 210 - 950 pg/mL 1392       Follow up with heme/onc    5. Calcified granuloma of lung  Stable. Follow up with pulmo    6. Mixed hyperlipidemia  Stable. Refuses statin therapy.  Needs low fat diet and exercsie      Provided Eve with a 5-10 year written screening schedule and personal prevention plan. Recommendations were developed using the USPSTF age appropriate recommendations. Education, counseling, and referrals were provided as needed. After Visit Summary printed and given to patient which includes a list of additional screenings\tests needed.    No Follow-up on file.    Soniya Gaffney NP  "

## 2017-12-19 ENCOUNTER — PATIENT MESSAGE (OUTPATIENT)
Dept: FAMILY MEDICINE | Facility: CLINIC | Age: 67
End: 2017-12-19

## 2017-12-21 ENCOUNTER — PATIENT MESSAGE (OUTPATIENT)
Dept: FAMILY MEDICINE | Facility: CLINIC | Age: 67
End: 2017-12-21

## 2017-12-21 LAB

## 2017-12-27 ENCOUNTER — PATIENT MESSAGE (OUTPATIENT)
Dept: HEMATOLOGY/ONCOLOGY | Facility: CLINIC | Age: 67
End: 2017-12-27

## 2017-12-29 ENCOUNTER — PATIENT MESSAGE (OUTPATIENT)
Dept: FAMILY MEDICINE | Facility: CLINIC | Age: 67
End: 2017-12-29

## 2018-01-01 ENCOUNTER — PATIENT MESSAGE (OUTPATIENT)
Dept: FAMILY MEDICINE | Facility: CLINIC | Age: 68
End: 2018-01-01

## 2018-01-17 ENCOUNTER — PATIENT MESSAGE (OUTPATIENT)
Dept: FAMILY MEDICINE | Facility: CLINIC | Age: 68
End: 2018-01-17

## 2018-01-17 LAB — TH/TO: NORMAL

## 2018-01-24 ENCOUNTER — OFFICE VISIT (OUTPATIENT)
Dept: HEMATOLOGY/ONCOLOGY | Facility: CLINIC | Age: 68
End: 2018-01-24
Payer: MEDICARE

## 2018-01-24 VITALS
TEMPERATURE: 98 F | HEART RATE: 74 BPM | DIASTOLIC BLOOD PRESSURE: 75 MMHG | HEIGHT: 63 IN | BODY MASS INDEX: 31.1 KG/M2 | WEIGHT: 175.5 LBS | OXYGEN SATURATION: 98 % | SYSTOLIC BLOOD PRESSURE: 161 MMHG

## 2018-01-24 DIAGNOSIS — E53.8 VITAMIN B12 DEFICIENCY WITHOUT ANEMIA: Primary | ICD-10-CM

## 2018-01-24 PROCEDURE — 99214 OFFICE O/P EST MOD 30 MIN: CPT | Mod: S$GLB,,, | Performed by: INTERNAL MEDICINE

## 2018-01-24 PROCEDURE — 99999 PR PBB SHADOW E&M-EST. PATIENT-LVL III: CPT | Mod: PBBFAC,,, | Performed by: INTERNAL MEDICINE

## 2018-01-24 NOTE — PROGRESS NOTES
Reason for visit: Vitamin B12 deficiency and positive antiparietal antibody    HPI:   The patient is a 67-year-old  female who presents to the hematology oncology clinic to discuss further evaluation and management recommendations for vitamin B12 deficiency and positive antiparietal antibody.  The patient has been referred to me by Dr. Chaya Lora with family medicine.  I have reviewed all of the patient's relevant clinical history available in the medical record and have utilized this in my evaluation and management recommendations today.  Today the patient reports that she has chronic episodes of muscle pains in her limbs usually more on the right side.  She did have abnormal results on myomarker panel and is awaiting rheumatology consultation.  She reports that her symptoms are currently well relieved with using Aleve as needed.  She reports that she has been taking her oral vitamin B12 supplementation and has been tolerating this well without any significant side effects. She denies any fevers, chills or night sweats.  She denies any loss of appetite or unintentional weight loss.  She denies any melena, hematochezia, hematemesis or hemoptysis.  She denies any chest pain or shortness of breath.  She denies any bowel or urinary complaints.    PAST MEDICAL HISTORY:   1.  Vitamin B12 deficiency/pernicious anemia/chronic autoimmune gastritis  2.  Hypertension  3.  Pulmonary sarcoidosis  4.  History of histoplasmosis  5.  Endometriosis    SURGICAL HISTORY:   1.  Hysterectomy  2.  Cholecystectomy  3.  Lung biopsy    FAMILY HISTORY: She denies any immediate family members with cancer or bleeding/clotting disorders.    SOCIAL HISTORY: She has never smoked cigarettes.  She does not drink alcohol.  She has never used any recreational drugs.  She is  and does not have any children.  She lives in [place].    ALLERGIES: Reviewed on medication card.    MEDICATIONS: [Medcard has been reviewed  and/or reconciled.]    REVIEW OF SYSTEMS:   GENERAL: [No fevers, chills or sweats. No fatigue, weight loss or loss of appetite.]  HEENT: [No blurred vision, tinnitus, nasal discharge, sorethroat or dysphagia.]  HEART: [No chest pain, palpitations or shortness of breath.]    LUNGS: [No cough, hemoptysis or breathing problems.]  ABDOMEN: [No abdominal pain, nausea, vomiting, diarrhea, constipation or melena.]  GENITOURINARY: [No dysuria, bleeding or malodorous discharge.]  NEURO: [No headache, dizziness or vertigo.]  HEMATOLOGY: [No easy bruising, spontaneous bleeding or blood clots in the past].  MUSCULOSKELETAL: [No arthralgias. Reports occasional myalgias. Denies bone pains.]  SKIN: [No rashes or skin lesions.]  PSYCHIATRY: [No depression or anxiety.]    PHYSICAL EXAMINATION:   VS: Reviewed on nurse's notes.  APPEARANCE: The patient is a well-developed, well-nourished and well-groomed  female who appears in no acute distress.  HEENT: No scleral icterus. Both external auditory canals clear. No oral ulcers, lesions. Throat clear  HEAD: No sinus tenderness.  NECK: Supple. No palpable lymphadenopathy. Thyroid non-tender, no palpable masses  CHEST: Breath sounds clear bilaterally. No rales. No rhonchi. Unlabored respirations.  CARDIOVASCULAR: Normal S1, S2. Normal rate. Regular rhythm.  ABDOMEN: Bowel sounds normal. No tenderness. No abdominal distention. No hepatomegaly. No splenomegaly.  LYMPHATIC: No palpable supraclavicular, axillary nodes  EXTREMITIES: No clubbing, cyanosis, edema  SKIN: No lesions. No petechiae. No ecchymoses. No induration or nodules  NEUROLOGIC: No focal findings. Alert & Oriented x 3. Mood appropriate to affect    LABS:   Reviewed    IMAGING:  Reviewed    IMPRESSION:  1.  Vitamin B12 deficiency associated with pernicious anemia/chronic autoimmune gastritis  2.  Positive antiparietal antibody    PLAN:  1.  I had a detailed discussion with the patient today with regard to her current  clinical situation.  We discussed about her vitamin B12 deficiency associated with pernicious anemia/chronic autoimmune gastritis with positive) antibody in detail today.  2.  The patient appears to have had excellent response to oral vitamin B12 supplementation and will continue on this as her most recent vitamin B12 level was normal.  This will have to be monitored periodically at close intervals going forward and she understands that she'll have to transition to vitamin B12 injections if she stops responding to oral vitamin B-12 supplementation.  3.  I have discussed with the patient positive antiparietal antibody test is nonspecific for pernicious anemia.  4.  I have recommended that she continue routine/recommended follow-up with her gastroenterologist as she may need periodic surveillance with EGD especially if she has any new symptoms.  She expressed understanding.  5.  She will continue follow-up with her primary care physician Dr. Brandon for all routine health care.    Follow-up with me as needed.  She knows to contact me if she has any additional questions.     Ji Duran MD

## 2018-02-04 ENCOUNTER — PATIENT MESSAGE (OUTPATIENT)
Dept: FAMILY MEDICINE | Facility: CLINIC | Age: 68
End: 2018-02-04

## 2018-02-05 ENCOUNTER — PATIENT MESSAGE (OUTPATIENT)
Dept: FAMILY MEDICINE | Facility: CLINIC | Age: 68
End: 2018-02-05

## 2018-02-05 DIAGNOSIS — M79.10 MYALGIA: Primary | ICD-10-CM

## 2018-03-03 ENCOUNTER — PATIENT MESSAGE (OUTPATIENT)
Dept: FAMILY MEDICINE | Facility: CLINIC | Age: 68
End: 2018-03-03

## 2018-03-08 ENCOUNTER — OFFICE VISIT (OUTPATIENT)
Dept: FAMILY MEDICINE | Facility: CLINIC | Age: 68
End: 2018-03-08
Payer: MEDICARE

## 2018-03-08 ENCOUNTER — PATIENT OUTREACH (OUTPATIENT)
Dept: ADMINISTRATIVE | Facility: HOSPITAL | Age: 68
End: 2018-03-08

## 2018-03-08 ENCOUNTER — HOSPITAL ENCOUNTER (OUTPATIENT)
Dept: RADIOLOGY | Facility: HOSPITAL | Age: 68
Discharge: HOME | End: 2018-03-08
Attending: FAMILY MEDICINE
Payer: MEDICARE

## 2018-03-08 VITALS
TEMPERATURE: 96 F | WEIGHT: 172.75 LBS | OXYGEN SATURATION: 95 % | HEART RATE: 77 BPM | BODY MASS INDEX: 30.61 KG/M2 | DIASTOLIC BLOOD PRESSURE: 82 MMHG | SYSTOLIC BLOOD PRESSURE: 136 MMHG | HEIGHT: 63 IN

## 2018-03-08 DIAGNOSIS — M25.50 ARTHRALGIA, UNSPECIFIED JOINT: ICD-10-CM

## 2018-03-08 DIAGNOSIS — Z11.59 NEED FOR HEPATITIS C SCREENING TEST: Primary | ICD-10-CM

## 2018-03-08 DIAGNOSIS — M79.10 MYALGIA: ICD-10-CM

## 2018-03-08 PROCEDURE — 73130 X-RAY EXAM OF HAND: CPT | Mod: 50,TC,PO

## 2018-03-08 PROCEDURE — 73030 X-RAY EXAM OF SHOULDER: CPT | Mod: 26,RT,, | Performed by: RADIOLOGY

## 2018-03-08 PROCEDURE — 73030 X-RAY EXAM OF SHOULDER: CPT | Mod: TC,PO,RT

## 2018-03-08 PROCEDURE — 99214 OFFICE O/P EST MOD 30 MIN: CPT | Mod: S$GLB,,, | Performed by: FAMILY MEDICINE

## 2018-03-08 PROCEDURE — 73562 X-RAY EXAM OF KNEE 3: CPT | Mod: 26,50,, | Performed by: RADIOLOGY

## 2018-03-08 PROCEDURE — 73130 X-RAY EXAM OF HAND: CPT | Mod: 26,50,, | Performed by: RADIOLOGY

## 2018-03-08 PROCEDURE — 73562 X-RAY EXAM OF KNEE 3: CPT | Mod: TC,50,PO

## 2018-03-08 PROCEDURE — 99999 PR PBB SHADOW E&M-EST. PATIENT-LVL IV: CPT | Mod: PBBFAC,,, | Performed by: FAMILY MEDICINE

## 2018-03-08 RX ORDER — IBUPROFEN 200 MG
200 TABLET ORAL EVERY 6 HOURS PRN
COMMUNITY
End: 2018-08-14 | Stop reason: ALTCHOICE

## 2018-03-08 RX ORDER — CYCLOBENZAPRINE HCL 10 MG
10 TABLET ORAL 3 TIMES DAILY PRN
Qty: 30 TABLET | Refills: 0 | Status: SHIPPED | OUTPATIENT
Start: 2018-03-08 | End: 2018-03-18

## 2018-03-08 NOTE — PROGRESS NOTES
Subjective:       Patient ID: Eve Long is a 67 y.o. female.    Chief Complaint: Generalized Body Aches    67 y old female with R Shoulder / arm  , neck and bilateral knee pain for  Days . Taking NSAID with no relief . Standing and sitting down makes knee  pain worst . Pain on R shoulder gets worst with R arm elevation . She has also  noted that R thumb gets swollen   and red at times . She feels that muscles  are weak . She feels the   same way that  she did when she was told she had viral myositis       Review of Systems   Constitutional: Positive for activity change. Negative for unexpected weight change.   HENT: Negative.  Negative for hearing loss, rhinorrhea and trouble swallowing.    Eyes: Negative.  Negative for discharge and visual disturbance.   Respiratory: Negative.  Negative for chest tightness and wheezing.    Cardiovascular: Negative.  Negative for chest pain and palpitations.   Gastrointestinal: Negative.  Negative for blood in stool, constipation, diarrhea and vomiting.   Endocrine: Negative for polydipsia and polyuria.   Genitourinary: Negative.  Negative for difficulty urinating, dysuria, hematuria and menstrual problem.   Musculoskeletal: Positive for arthralgias and joint swelling. Negative for neck pain.   Skin: Negative.    Neurological: Positive for weakness. Negative for headaches.   Hematological: Negative.    Psychiatric/Behavioral: Negative for confusion and dysphoric mood.       Objective:      Physical Exam   Constitutional: She is oriented to person, place, and time. She appears well-developed and well-nourished. No distress.   HENT:   Head: Normocephalic and atraumatic.   Right Ear: External ear normal.   Left Ear: External ear normal.   Mouth/Throat: No oropharyngeal exudate.   Eyes: Conjunctivae and EOM are normal. Pupils are equal, round, and reactive to light. Right eye exhibits no discharge. Left eye exhibits no discharge. No scleral icterus.   Neck: Normal range of  motion. Neck supple. No JVD present. No tracheal deviation present. No thyromegaly present.   Cardiovascular: Normal rate, regular rhythm and normal heart sounds.  Exam reveals no gallop and no friction rub.    No murmur heard.  Pulmonary/Chest: Effort normal and breath sounds normal. No stridor. No respiratory distress. She has no wheezes. She has no rales. She exhibits no tenderness.   Abdominal: Soft. Bowel sounds are normal. She exhibits no distension. There is no tenderness. There is no rebound and no guarding.   Musculoskeletal:        Right shoulder: She exhibits decreased range of motion and tenderness.        Right knee: She exhibits decreased range of motion. Tenderness found. Lateral joint line tenderness noted.        Left knee: She exhibits decreased range of motion. Tenderness found. Lateral joint line tenderness noted.        Right hand: She exhibits tenderness.        Hands:  Lymphadenopathy:     She has no cervical adenopathy.   Neurological: She is alert and oriented to person, place, and time.   Skin: Skin is warm and dry. She is not diaphoretic.   Psychiatric: She has a normal mood and affect. Her behavior is normal. Judgment and thought content normal.       Assessment:       Eve was seen today for generalized body aches.    Diagnoses and all orders for this visit:    Need for hepatitis C screening test    Myalgia  -     CBC auto differential; Future  -     C-reactive protein; Future  -     SANCHO; Future  -     Rheumatoid factor; Future  -     CYCLIC CITRUL PEPTIDE ANTIBODY, IGG; Future  -     CK; Future    Arthralgia, unspecified joint  -     X-ray Shoulder 2 or More Views Right; Future  -     X-ray Knee Ortho Bilateral; Future  -     X-Ray Hand 3 View Bilateral; Future    Other orders  -     Cancel: Hepatitis C antibody; Future  -     cyclobenzaprine (FLEXERIL) 10 MG tablet; Take 1 tablet (10 mg total) by mouth 3 (three) times daily as needed for Muscle spasms.      Plan:     Eve was  seen today for generalized body aches.    Diagnoses and all orders for this visit:    Need for hepatitis C screening test    Other orders  -     Cancel: Hepatitis C antibody; Future     We will cont to fjeir Millan

## 2018-03-09 ENCOUNTER — PATIENT MESSAGE (OUTPATIENT)
Dept: FAMILY MEDICINE | Facility: CLINIC | Age: 68
End: 2018-03-09

## 2018-03-21 ENCOUNTER — PATIENT MESSAGE (OUTPATIENT)
Dept: FAMILY MEDICINE | Facility: CLINIC | Age: 68
End: 2018-03-21

## 2018-03-25 ENCOUNTER — PATIENT MESSAGE (OUTPATIENT)
Dept: FAMILY MEDICINE | Facility: CLINIC | Age: 68
End: 2018-03-25

## 2018-03-26 ENCOUNTER — TELEPHONE (OUTPATIENT)
Dept: FAMILY MEDICINE | Facility: CLINIC | Age: 68
End: 2018-03-26

## 2018-03-26 ENCOUNTER — PATIENT MESSAGE (OUTPATIENT)
Dept: HEMATOLOGY/ONCOLOGY | Facility: CLINIC | Age: 68
End: 2018-03-26

## 2018-03-26 DIAGNOSIS — M79.10 MYALGIA: Primary | ICD-10-CM

## 2018-03-26 NOTE — TELEPHONE ENCOUNTER
Reg my o msg : ref to see rheum in . I don't have access to her most recent labs . Sed rate is high (per her report)

## 2018-05-11 ENCOUNTER — PATIENT MESSAGE (OUTPATIENT)
Dept: FAMILY MEDICINE | Facility: CLINIC | Age: 68
End: 2018-05-11

## 2018-06-04 ENCOUNTER — HOSPITAL ENCOUNTER (OUTPATIENT)
Dept: RADIOLOGY | Facility: HOSPITAL | Age: 68
Discharge: HOME | End: 2018-06-04
Attending: FAMILY MEDICINE
Payer: MEDICARE

## 2018-06-04 ENCOUNTER — OFFICE VISIT (OUTPATIENT)
Dept: FAMILY MEDICINE | Facility: CLINIC | Age: 68
End: 2018-06-04
Payer: MEDICARE

## 2018-06-04 ENCOUNTER — PATIENT MESSAGE (OUTPATIENT)
Dept: FAMILY MEDICINE | Facility: CLINIC | Age: 68
End: 2018-06-04

## 2018-06-04 VITALS
BODY MASS INDEX: 30.56 KG/M2 | DIASTOLIC BLOOD PRESSURE: 84 MMHG | HEART RATE: 86 BPM | HEIGHT: 63 IN | SYSTOLIC BLOOD PRESSURE: 132 MMHG | OXYGEN SATURATION: 96 % | TEMPERATURE: 97 F | RESPIRATION RATE: 20 BRPM | WEIGHT: 172.5 LBS

## 2018-06-04 DIAGNOSIS — G44.019 EPISODIC CLUSTER HEADACHE, NOT INTRACTABLE: Primary | ICD-10-CM

## 2018-06-04 DIAGNOSIS — G44.019 EPISODIC CLUSTER HEADACHE, NOT INTRACTABLE: ICD-10-CM

## 2018-06-04 DIAGNOSIS — D86.0 PULMONARY SARCOIDOSIS: ICD-10-CM

## 2018-06-04 DIAGNOSIS — J44.9 COPD, MODERATE: ICD-10-CM

## 2018-06-04 PROCEDURE — 70450 CT HEAD/BRAIN W/O DYE: CPT | Mod: TC

## 2018-06-04 PROCEDURE — 99999 PR PBB SHADOW E&M-EST. PATIENT-LVL III: CPT | Mod: PBBFAC,,, | Performed by: FAMILY MEDICINE

## 2018-06-04 PROCEDURE — 99214 OFFICE O/P EST MOD 30 MIN: CPT | Mod: S$GLB,,, | Performed by: FAMILY MEDICINE

## 2018-06-04 RX ORDER — BUTALBITAL, ACETAMINOPHEN AND CAFFEINE 50; 325; 40 MG/1; MG/1; MG/1
1 TABLET ORAL EVERY 4 HOURS PRN
Qty: 20 TABLET | Refills: 0 | Status: SHIPPED | OUTPATIENT
Start: 2018-06-04 | End: 2018-06-05 | Stop reason: SDUPTHER

## 2018-06-04 RX ORDER — CYCLOBENZAPRINE HCL 10 MG
10 TABLET ORAL
COMMUNITY
End: 2018-08-28 | Stop reason: SDUPTHER

## 2018-06-04 RX ORDER — AMLODIPINE BESYLATE 5 MG/1
5 TABLET ORAL DAILY
COMMUNITY
Start: 2018-05-12 | End: 2018-09-11

## 2018-06-04 NOTE — PROGRESS NOTES
Subjective:       Patient ID: Eve Long is a 68 y.o. female.    Chief Complaint: No chief complaint on file.    68 y old female with COPD, pulm sarcoidosis  Who had a short lasting  episode of   Numbness on L side of face over the weekend  Now with  Severe headaches since  , generalized. Throbbing . No rhinorrhea, no sob , Vision got also blurry . This lasted few minutes . No issues with balance , nausea, vomit  , no photophobia , no phonophobia . Rates it 7 /10 . Took OTC Excedrin with no relief       Review of Systems   Constitutional: Negative.    HENT: Negative.    Eyes: Negative.    Respiratory: Negative.    Cardiovascular: Negative.    Gastrointestinal: Negative.    Genitourinary: Negative.    Musculoskeletal: Negative.    Skin: Negative.    Neurological: Positive for headaches. Negative for dizziness, tremors, seizures, syncope, facial asymmetry, light-headedness and numbness.   Hematological: Negative.        Objective:      Physical Exam   Constitutional: She is oriented to person, place, and time. She appears well-developed and well-nourished. No distress.   HENT:   Head: Normocephalic and atraumatic.   Right Ear: External ear normal.   Left Ear: External ear normal.   Mouth/Throat: No oropharyngeal exudate.   Eyes: Conjunctivae and EOM are normal. Pupils are equal, round, and reactive to light. Right eye exhibits no discharge. Left eye exhibits no discharge. No scleral icterus.   Neck: Normal range of motion. Neck supple. No JVD present. No tracheal deviation present. No thyromegaly present.   Cardiovascular: Normal rate, regular rhythm and normal heart sounds.  Exam reveals no gallop and no friction rub.    No murmur heard.  Pulmonary/Chest: Effort normal and breath sounds normal. No stridor. No respiratory distress. She has no wheezes. She has no rales. She exhibits no tenderness.   Abdominal: Soft. Bowel sounds are normal. She exhibits no distension. There is no tenderness. There is no  rebound and no guarding.   Musculoskeletal: Normal range of motion.   Lymphadenopathy:     She has no cervical adenopathy.   Neurological: She is alert and oriented to person, place, and time. She has normal strength. No cranial nerve deficit or sensory deficit. She displays a negative Romberg sign. GCS eye subscore is 4. GCS verbal subscore is 5. GCS motor subscore is 6.   Skin: Skin is warm and dry. She is not diaphoretic.   Psychiatric: She has a normal mood and affect. Her behavior is normal. Judgment and thought content normal.       Assessment:       Diagnoses and all orders for this visit:    Episodic cluster headache, not intractable  -     CT Head Without Contrast; Future  -     Sedimentation rate; Future    Pulmonary sarcoidosis    COPD, moderate    Other orders  -     butalbital-acetaminophen-caffeine -40 mg (FIORICET, ESGIC) -40 mg per tablet; Take 1 tablet by mouth every 4 (four) hours as needed for Pain.      Plan:   WS discussed  We will continue to f.u

## 2018-06-05 ENCOUNTER — TELEPHONE (OUTPATIENT)
Dept: FAMILY MEDICINE | Facility: CLINIC | Age: 68
End: 2018-06-05

## 2018-06-05 ENCOUNTER — PATIENT MESSAGE (OUTPATIENT)
Dept: FAMILY MEDICINE | Facility: CLINIC | Age: 68
End: 2018-06-05

## 2018-06-05 DIAGNOSIS — R93.0 ABNORMAL HEAD CT: Primary | ICD-10-CM

## 2018-06-05 PROBLEM — I10 HTN (HYPERTENSION): Status: ACTIVE | Noted: 2018-06-05

## 2018-06-05 RX ORDER — PREDNISONE 20 MG/1
TABLET ORAL
Qty: 20 TABLET | Refills: 0 | Status: SHIPPED | OUTPATIENT
Start: 2018-06-05 | End: 2018-07-16 | Stop reason: ALTCHOICE

## 2018-06-05 RX ORDER — BUTALBITAL, ACETAMINOPHEN AND CAFFEINE 50; 325; 40 MG/1; MG/1; MG/1
1 TABLET ORAL EVERY 4 HOURS PRN
Qty: 20 TABLET | Refills: 0 | Status: SHIPPED | OUTPATIENT
Start: 2018-06-05 | End: 2018-07-05

## 2018-06-05 NOTE — TELEPHONE ENCOUNTER
----- Message from Gloria Mcmahone sent at 6/5/2018  8:18 AM CDT -----  Contact: pt  She's calling stating that her Rx was sent to [facility] yesterday after her visit but it was supposed to be sent to [facility] in [place], please advise [phone] (home)

## 2018-06-05 NOTE — TELEPHONE ENCOUNTER
Patient states med sent to [facility] and she needs med sent to [facility]. Wants to know if you can please resend med to [facility].

## 2018-06-06 ENCOUNTER — PATIENT MESSAGE (OUTPATIENT)
Dept: FAMILY MEDICINE | Facility: CLINIC | Age: 68
End: 2018-06-06

## 2018-06-07 ENCOUNTER — PATIENT MESSAGE (OUTPATIENT)
Dept: FAMILY MEDICINE | Facility: CLINIC | Age: 68
End: 2018-06-07

## 2018-06-08 ENCOUNTER — TELEPHONE (OUTPATIENT)
Dept: FAMILY MEDICINE | Facility: CLINIC | Age: 68
End: 2018-06-08

## 2018-06-08 ENCOUNTER — PATIENT MESSAGE (OUTPATIENT)
Dept: FAMILY MEDICINE | Facility: CLINIC | Age: 68
End: 2018-06-08

## 2018-06-08 ENCOUNTER — HOSPITAL ENCOUNTER (EMERGENCY)
Facility: HOSPITAL | Age: 68
Discharge: HOME | End: 2018-06-08
Payer: MEDICARE

## 2018-06-08 VITALS
SYSTOLIC BLOOD PRESSURE: 148 MMHG | BODY MASS INDEX: 30.65 KG/M2 | WEIGHT: 173 LBS | OXYGEN SATURATION: 98 % | DIASTOLIC BLOOD PRESSURE: 81 MMHG | TEMPERATURE: 98 F | RESPIRATION RATE: 18 BRPM | HEART RATE: 88 BPM | HEIGHT: 63 IN

## 2018-06-08 DIAGNOSIS — M54.2 NECK PAIN: ICD-10-CM

## 2018-06-08 DIAGNOSIS — M54.2 CERVICALGIA: Primary | ICD-10-CM

## 2018-06-08 PROCEDURE — 99284 EMERGENCY DEPT VISIT MOD MDM: CPT | Mod: 25

## 2018-06-08 PROCEDURE — 63600175 PHARM REV CODE 636 W HCPCS: Performed by: PHYSICIAN ASSISTANT

## 2018-06-08 PROCEDURE — 96372 THER/PROPH/DIAG INJ SC/IM: CPT

## 2018-06-08 RX ORDER — METHOCARBAMOL 500 MG/1
1000 TABLET, FILM COATED ORAL 3 TIMES DAILY
Qty: 30 TABLET | Refills: 0 | Status: SHIPPED | OUTPATIENT
Start: 2018-06-08 | End: 2018-06-13

## 2018-06-08 RX ORDER — HYDROMORPHONE HYDROCHLORIDE 1 MG/ML
1 INJECTION, SOLUTION INTRAMUSCULAR; INTRAVENOUS; SUBCUTANEOUS
Status: COMPLETED | OUTPATIENT
Start: 2018-06-08 | End: 2018-06-08

## 2018-06-08 RX ORDER — PROMETHAZINE HYDROCHLORIDE 25 MG/ML
12.5 INJECTION, SOLUTION INTRAMUSCULAR; INTRAVENOUS
Status: COMPLETED | OUTPATIENT
Start: 2018-06-08 | End: 2018-06-08

## 2018-06-08 RX ADMIN — PROMETHAZINE HYDROCHLORIDE 12.5 MG: 25 INJECTION INTRAMUSCULAR; INTRAVENOUS at 02:06

## 2018-06-08 RX ADMIN — HYDROMORPHONE HYDROCHLORIDE 1 MG: 1 INJECTION, SOLUTION INTRAMUSCULAR; INTRAVENOUS; SUBCUTANEOUS at 02:06

## 2018-06-08 NOTE — TELEPHONE ENCOUNTER
Spoke with patient and she states that she believes that she might have a blood clot in her neck, patient states that it is painful and would like to have an ultrasound done, explained to patient that its likely that she should go to ER to be evaluated. She states that she cannot get into neuro doctor until July. She kept saying that she doesn't want to go to the ER right away unless necessary.

## 2018-06-08 NOTE — ED PROVIDER NOTES
Encounter Date: 6/8/2018       History     Chief Complaint   Patient presents with    Neck Pain     ongoing for couple weeks. Received CTs of head/neck.      The history is provided by the patient.   Neck Pain    This is a new problem. The current episode started several weeks ago. The problem occurs most days. The problem has been unchanged. The pain is associated with nothing. The pain is present in the left side. The quality of the pain is described as shooting. The pain radiates to the left shoulder (headache). The pain is at a severity of 2/10. The symptoms are aggravated by bending. The pain is the same all the time. Pertinent negatives include no photophobia, no visual change, no chest pain, no syncope, no numbness, no weight loss, no headaches, no bowel incontinence, no bladder incontinence, no leg pain, no paresis, no tingling and no weakness. She has tried nothing for the symptoms.     Review of patient's allergies indicates:   Allergen Reactions    Beclomethasone dipropionate      Causes mouth Sores    Doxycycline Hives, Itching and Swelling    Fluticasone-salmeterol      Elevates Blood Pressure    Umeclidinium-vilanterol      bronchitis    Levothyroxine Rash    Red dye Rash     Past Medical History:   Diagnosis Date    Anemia     Asthma     Hyperlipidemia     Hypertension     Hypothyroidism     Immune deficiency disorder     Pneumonia     Sarcoidosis     Thyroid disease      Past Surgical History:   Procedure Laterality Date    CHOLECYSTECTOMY      COSMETIC SURGERY      blepharplasty    FRACTURE SURGERY Left     wrist ORIF    HYSTERECTOMY      LUNG BIOPSY      OOPHORECTOMY       Family History   Problem Relation Age of Onset    Myasthenia gravis Brother     Stroke Mother     Heart disease Father         CHF    Stroke Sister     Thyroid disease Sister      Social History   Substance Use Topics    Smoking status: Never Smoker    Smokeless tobacco: Never Used    Alcohol use 0.6  oz/week     1 Glasses of wine per week      Comment: yearly     Review of Systems   Constitutional: Negative for chills, fever and weight loss.   HENT: Negative for sore throat.    Eyes: Negative for photophobia.   Respiratory: Negative for cough and shortness of breath.    Cardiovascular: Negative for chest pain and syncope.   Gastrointestinal: Negative for abdominal pain, bowel incontinence, diarrhea and nausea.   Endocrine: Negative for polydipsia and polyphagia.   Genitourinary: Negative for bladder incontinence and dysuria.   Musculoskeletal: Positive for neck pain. Negative for arthralgias, back pain and myalgias.   Skin: Negative for rash.   Neurological: Negative for tingling, weakness, numbness and headaches.   Hematological: Does not bruise/bleed easily.   Psychiatric/Behavioral: The patient is not nervous/anxious.    All other systems reviewed and are negative.      Physical Exam     Initial Vitals [06/08/18 1231]   BP Pulse Resp Temp SpO2   (!) 169/72 91 18 98 °F (36.7 °C) 98 %      MAP       104.33         Physical Exam    Nursing note and vitals reviewed.  Constitutional: Vital signs are normal. She appears well-developed and well-nourished. No distress.   HENT:   Head: Normocephalic and atraumatic.   Right Ear: External ear normal.   Left Ear: External ear normal.   Nose: Nose normal.   Mouth/Throat: Oropharynx is clear and moist.   Eyes: Conjunctivae, EOM and lids are normal. Pupils are equal, round, and reactive to light.   Neck: Normal range of motion and full passive range of motion without pain. Neck supple.   Cardiovascular: Normal rate, regular rhythm, S1 normal, S2 normal, normal heart sounds, intact distal pulses and normal pulses.   Pulmonary/Chest: Breath sounds normal. No respiratory distress. She has no wheezes. She has no rales.   Abdominal: Soft. Normal appearance and bowel sounds are normal. She exhibits no distension. There is no tenderness.   Musculoskeletal:        Cervical back:  She exhibits decreased range of motion, tenderness, pain and spasm. She exhibits no bony tenderness, no swelling, no edema, no deformity, no laceration and normal pulse.        Back:    Lymphadenopathy:     She has no cervical adenopathy.   Neurological: She is alert and oriented to person, place, and time. She has normal strength. No cranial nerve deficit or sensory deficit. Coordination and gait normal.   Skin: Skin is warm, dry and intact.   Psychiatric: She has a normal mood and affect. Her speech is normal and behavior is normal. Judgment and thought content normal. Cognition and memory are normal.         ED Course   Procedures  Labs Reviewed - No data to display       CT Cervical Spine Without Contrast   Final Result      1. There are mild degenerative changes between C4 and C5.   2. There is a mild amount of atherosclerosis.   All CT scans at this facility use dose modulation, iterative reconstruction, and/or weight base dosing when appropriate to reduce radiation dose when appropriate to reduce radiation dose to as low as reasonably achievable.         Electronically signed by: Marlon Wiley MD   Date:    06/08/2018   Time:    14:07      US Carotid Bilateral   Final Result      No significant stenosis.      **Categories of stenosis (0-15%, 16-49%, 50-69% and 70-99% ) are based on published criteria that have been internally validated.  Degree of stenosis is based on NASCET criteria and refers to the degree of luminal diameter narrowing as a percentage of the normal ICA distal to the stenosis and any area of post stenotic dilation.         Electronically signed by: Eric Ventura MD   Date:    06/08/2018   Time:    13:37                                Clinical Impression:   The primary encounter diagnosis was Cervicalgia. A diagnosis of Neck pain was also pertinent to this visit.      Disposition:   Disposition: Discharged  Condition: Stable                        STAR Bravo  06/14/18 4951

## 2018-06-08 NOTE — TELEPHONE ENCOUNTER
Returned call. Spoke with pt. She stated that she went to ER and found out all the problems she has been having are coming from her back. Went to [facility] so all info available in chart.

## 2018-06-08 NOTE — TELEPHONE ENCOUNTER
----- Message from Karen Vazquez sent at 6/8/2018  3:46 PM CDT -----  Contact: pt  Please give pt a call at ..[phone] (home) she was returning the nurse call.

## 2018-06-08 NOTE — TELEPHONE ENCOUNTER
----- Message from Toño Cantrell sent at 6/8/2018  9:42 AM CDT -----  Contact: [phone]  Pt would like to consult with the nurse about getting a doppler done on the left side of her neck.  Please call back at [phone]. Thx-AH

## 2018-06-09 ENCOUNTER — PATIENT MESSAGE (OUTPATIENT)
Dept: FAMILY MEDICINE | Facility: CLINIC | Age: 68
End: 2018-06-09

## 2018-06-11 ENCOUNTER — TELEPHONE (OUTPATIENT)
Dept: FAMILY MEDICINE | Facility: CLINIC | Age: 68
End: 2018-06-11

## 2018-06-11 NOTE — TELEPHONE ENCOUNTER
Per pharmacy, insurance will not cover patients Fiorcet. They will cover Sumatrip, Naratrip, Zatrip, Naproxen and IBU,. Please advise.

## 2018-06-12 ENCOUNTER — NURSE TRIAGE (OUTPATIENT)
Dept: ADMINISTRATIVE | Facility: CLINIC | Age: 68
End: 2018-06-12

## 2018-06-12 NOTE — LETTER
June 13, 2018                 [facility]  [address]  [facility] [address]  Phone: [phone]  Fax: [phone] June 13, 2018     Patient: Eve Long    YOB: 1950   Date of Visit: 6/12/2018       To whom It May Concern     We are seeing Eve Long, 1950, at [facility]. JAZZY CALABRESE MD is their primary care physician. To help with our clarissa maintenance records could you please send the following:        Please send office visit from 6/13/2018.         Please send fax to [phone], Attention Maya Jaime LPN.    Thank-you in advance for your assistance in the care of our mutual patient. If you have any questions or concerns, please don't hesitate to contact our office by phone at [phone] ext. [phone] or by fax at [phone].         Sincerely,      STEPHANIE Eng Dr.  [facility]

## 2018-06-12 NOTE — TELEPHONE ENCOUNTER
"    Reason for Disposition   [1] SEVERE headache AND [2] fever     Head pain (she will not call it a headache), throbbing, all on the left side, and rated 10/10.  Oral temp of 100.3, age 68. She states "I can't stand the pain anymore." Nothing relieves the pain.  She also states she now has "sores all down my neck and in my mouth."   Recommended she go to the ED now for evaluation.  Her  is with her.  She will ask him to take her now.  Message to JAZZY CALABRESE, pcp.  Please contact caller directly with any additional care advice.    Protocols used: ST HEADACHE-A-AH      "

## 2018-06-13 NOTE — TELEPHONE ENCOUNTER
Called and spoke with pt. She states that she seen Dr. Jaguar Macias today and she has shingles on the side of her face now. He gave her pain medication to help with the pain. Requested office notes from her visit with them today as well.

## 2018-06-13 NOTE — TELEPHONE ENCOUNTER
"Soniya Gaffney, NP   You 2 hours ago (11:21 AM)      Please call and check on patient. Did she go to ER (Routing comment)          You routed conversation to Chaya Morelos MD 3 hours ago (10:29 AM)      KELLIE Harrington Staff 19 hours ago (6:10 PM)      Head pain (she will not call it a headache), throbbing, all on the left side, and rated 10/10.  Oral temp of 100.3, age 68. She states "I can't stand the pain anymore." Nothing relieves the pain.  She also states she now has "sores all down my neck and in my mouth."   Recommended she go to the ED now for evaluation.  Her  is with her.  She will ask him to take her now.  Message to CHAYA MORELOS, pcp.  Please contact caller directly with any additional care advice. (Routing comment)          "

## 2018-06-15 ENCOUNTER — PATIENT MESSAGE (OUTPATIENT)
Dept: HEMATOLOGY/ONCOLOGY | Facility: CLINIC | Age: 68
End: 2018-06-15

## 2018-06-18 ENCOUNTER — PATIENT MESSAGE (OUTPATIENT)
Dept: FAMILY MEDICINE | Facility: CLINIC | Age: 68
End: 2018-06-18

## 2018-06-18 DIAGNOSIS — E53.8 VITAMIN B12 DEFICIENCY WITHOUT ANEMIA: Primary | ICD-10-CM

## 2018-06-19 ENCOUNTER — LAB VISIT (OUTPATIENT)
Dept: LAB | Facility: HOSPITAL | Age: 68
End: 2018-06-19
Attending: INTERNAL MEDICINE
Payer: MEDICARE

## 2018-06-19 DIAGNOSIS — E53.8 VITAMIN B12 DEFICIENCY WITHOUT ANEMIA: ICD-10-CM

## 2018-06-19 LAB
ALBUMIN SERPL BCP-MCNC: 3.9 G/DL
ALP SERPL-CCNC: 75 U/L
ALT SERPL W/O P-5'-P-CCNC: 12 U/L
ANION GAP SERPL CALC-SCNC: 4 MMOL/L
AST SERPL-CCNC: 12 U/L
BASOPHILS # BLD AUTO: 0.02 K/UL
BASOPHILS NFR BLD: 0.2 %
BILIRUB SERPL-MCNC: 0.2 MG/DL
BUN SERPL-MCNC: 16 MG/DL
CALCIUM SERPL-MCNC: 9.3 MG/DL
CHLORIDE SERPL-SCNC: 100 MMOL/L
CO2 SERPL-SCNC: 29 MMOL/L
CREAT SERPL-MCNC: 0.8 MG/DL
DIFFERENTIAL METHOD: ABNORMAL
EOSINOPHIL # BLD AUTO: 0 K/UL
EOSINOPHIL NFR BLD: 0 %
ERYTHROCYTE [DISTWIDTH] IN BLOOD BY AUTOMATED COUNT: 13.5 %
EST. GFR  (AFRICAN AMERICAN): >60 ML/MIN/1.73 M^2
EST. GFR  (NON AFRICAN AMERICAN): >60 ML/MIN/1.73 M^2
GLUCOSE SERPL-MCNC: 117 MG/DL
HCT VFR BLD AUTO: 40.4 %
HGB BLD-MCNC: 13.1 G/DL
LYMPHOCYTES # BLD AUTO: 1.3 K/UL
LYMPHOCYTES NFR BLD: 13.6 %
MCH RBC QN AUTO: 28.5 PG
MCHC RBC AUTO-ENTMCNC: 32.4 G/DL
MCV RBC AUTO: 88 FL
MONOCYTES # BLD AUTO: 0.7 K/UL
MONOCYTES NFR BLD: 7.8 %
NEUTROPHILS # BLD AUTO: 7.3 K/UL
NEUTROPHILS NFR BLD: 78.1 %
NRBC BLD-RTO: 0 /100 WBC
PLATELET # BLD AUTO: 214 K/UL
PMV BLD AUTO: 12.2 FL
POTASSIUM SERPL-SCNC: 4.2 MMOL/L
PROT SERPL-MCNC: 7.2 G/DL
RBC # BLD AUTO: 4.59 M/UL
SODIUM SERPL-SCNC: 133 MMOL/L
VIT B12 SERPL-MCNC: >2000 PG/ML
WBC # BLD AUTO: 9.37 K/UL

## 2018-06-19 PROCEDURE — 36415 COLL VENOUS BLD VENIPUNCTURE: CPT | Mod: PO

## 2018-06-19 PROCEDURE — 82607 VITAMIN B-12: CPT

## 2018-06-19 PROCEDURE — 80053 COMPREHEN METABOLIC PANEL: CPT

## 2018-06-19 PROCEDURE — 85025 COMPLETE CBC W/AUTO DIFF WBC: CPT

## 2018-06-24 ENCOUNTER — PATIENT MESSAGE (OUTPATIENT)
Dept: PULMONOLOGY | Facility: CLINIC | Age: 68
End: 2018-06-24

## 2018-07-05 ENCOUNTER — PATIENT MESSAGE (OUTPATIENT)
Dept: FAMILY MEDICINE | Facility: CLINIC | Age: 68
End: 2018-07-05

## 2018-07-16 ENCOUNTER — OFFICE VISIT (OUTPATIENT)
Dept: HEMATOLOGY/ONCOLOGY | Facility: CLINIC | Age: 68
End: 2018-07-16
Payer: MEDICARE

## 2018-07-16 VITALS
BODY MASS INDEX: 29.49 KG/M2 | DIASTOLIC BLOOD PRESSURE: 76 MMHG | OXYGEN SATURATION: 97 % | WEIGHT: 166.44 LBS | HEART RATE: 75 BPM | SYSTOLIC BLOOD PRESSURE: 158 MMHG | HEIGHT: 63 IN | TEMPERATURE: 99 F

## 2018-07-16 DIAGNOSIS — E53.8 VITAMIN B12 DEFICIENCY WITHOUT ANEMIA: Primary | ICD-10-CM

## 2018-07-16 PROCEDURE — 99214 OFFICE O/P EST MOD 30 MIN: CPT | Mod: S$GLB,,, | Performed by: INTERNAL MEDICINE

## 2018-07-16 PROCEDURE — 99999 PR PBB SHADOW E&M-EST. PATIENT-LVL III: CPT | Mod: PBBFAC,,, | Performed by: INTERNAL MEDICINE

## 2018-07-16 RX ORDER — CYANOCOBALAMIN 1000 UG/ML
1000 INJECTION, SOLUTION INTRAMUSCULAR; SUBCUTANEOUS
Qty: 10 ML | Refills: 11 | Status: SHIPPED | OUTPATIENT
Start: 2018-07-16 | End: 2019-07-26

## 2018-07-16 NOTE — PROGRESS NOTES
Reason for visit: Vitamin B12 deficiency and positive antiparietal antibody    HPI:   The patient is a 68-year-old  female who presents to the hematology oncology clinic to discuss further evaluation and management recommendations for vitamin B12 deficiency and positive antiparietal antibody.  The patient has been referred to me by Dr. Chaya Lora with family medicine.  I have reviewed all of the patient's relevant clinical history available in the medical record and have utilized this in my evaluation and management recommendations today.  Today the patient reports that she has been feeling poorly for the past several weeks.  Extensive evaluation for workup of this including imaging studies were reviewed in the medical record.  She reports multiple symptoms including fatigue, shortness of breath with exertion, dizziness, pale skin, irregular heartbeats with episodes of low blood pressure, weight loss, numbness or tingling in the hands and feet, muscle weakness, personality changes, iron study movements and mental confusion or forgetfulness.  She is concerned that her oral vitamin B12 is not working and would like to be switched to monthly vitamin B12 injections.  She reports that she continues to follow up with multiple specialists including her cardiologist and rheumatologist.  She reports that she is planning to see a neurologist in the near future as well for further evaluation.     PAST MEDICAL HISTORY:   1.  Vitamin B12 deficiency/pernicious anemia/chronic autoimmune gastritis  2.  Hypertension  3.  Pulmonary sarcoidosis  4.  History of histoplasmosis  5.  Endometriosis  6.  Herpes zoster    SURGICAL HISTORY:   1.  Hysterectomy  2.  Cholecystectomy  3.  Lung biopsy    FAMILY HISTORY: She denies any immediate family members with cancer or bleeding/clotting disorders.    SOCIAL HISTORY: She has never smoked cigarettes.  She does not drink alcohol.  She has never used any recreational drugs.   She is  and does not have any children.  She lives in [place].    ALLERGIES: Reviewed on medication card.    MEDICATIONS: [Medcard has been reviewed and/or reconciled.]    REVIEW OF SYSTEMS:   GENERAL: [No fevers, chills or sweats. Reports fatigue, weight loss and loss of appetite.]  HEENT: [No blurred vision, tinnitus, nasal discharge, sorethroat or dysphagia.]  HEART: [No chest pain, palpitations. Reports shortness of breath.]    LUNGS: [No cough, hemoptysis or breathing problems.]  ABDOMEN: [No abdominal pain, nausea, vomiting, diarrhea, constipation or melena.]  GENITOURINARY: [No dysuria, bleeding or malodorous discharge.]  NEURO: [No headache, dizziness or vertigo.]  HEMATOLOGY: [No easy bruising, spontaneous bleeding or blood clots in the past].  MUSCULOSKELETAL: [No arthralgias. Reports occasional myalgias. Denies bone pains.]  SKIN: [No rashes or skin lesions.]  PSYCHIATRY: [No depression. Reports anxiety.]    PHYSICAL EXAMINATION:   VS: Reviewed on nurse's notes.  APPEARANCE: The patient is a well-developed, well-nourished and well-groomed  female who appears in no acute distress. She appears anxious.  She was accompanied to this clinic visit by her .  HEENT: No scleral icterus. Both external auditory canals clear. No oral ulcers, lesions. Throat clear  HEAD: No sinus tenderness.  NECK: Supple. No palpable lymphadenopathy. Thyroid non-tender, no palpable masses  CHEST: Breath sounds clear bilaterally. No rales. No rhonchi. Unlabored respirations.  CARDIOVASCULAR: Normal S1, S2. Normal rate. Regular rhythm.  ABDOMEN: Bowel sounds normal. No tenderness. No abdominal distention. No hepatomegaly. No splenomegaly.  LYMPHATIC: No palpable supraclavicular, axillary nodes  EXTREMITIES: No clubbing, cyanosis, edema  SKIN: No lesions. No petechiae. No ecchymoses. No induration or nodules  NEUROLOGIC: No focal findings. Alert & Oriented x 3. Mood appropriate to affect    LABS:    Reviewed    IMAGING:  Reviewed    IMPRESSION:  1.  Vitamin B12 deficiency associated with pernicious anemia/chronic autoimmune gastritis  2.  Positive antiparietal antibody    PLAN:  1.  I had a detailed discussion with the patient today with regard to her current clinical situation.  We discussed about her vitamin B12 deficiency associated with pernicious anemia/chronic autoimmune gastritis with positive) antibody in detail today.  2.  I have discussed with the patient positive antiparietal antibody test is nonspecific for pernicious anemia.  3.  I have recommended that she continue routine/recommended follow-up with her gastroenterologist as she may need periodic surveillance with EGD especially if she has any new symptoms.  She expressed understanding.  4. She will continue follow-up with her primary care physician Dr. Brandon for all routine health care.  5.  We discussed the results of all of her recent lab work.  No acute abnormalities are noted. Vitamin B12 levels appear to be adequate with oral repletion.  However at this time because of the patient's concerns I will stop oral vitamin B12 and transition the patient to Q 4 weekly vitamin B12 injections.  The patient stated that she can self administer these injections.  Prescription has been sent to her requested pharmacy.    Follow-up with me as needed.  She knows to contact me if she has any additional questions or if any new problems.     Ji Duran MD

## 2018-08-08 ENCOUNTER — OFFICE VISIT (OUTPATIENT)
Dept: FAMILY MEDICINE | Facility: CLINIC | Age: 68
End: 2018-08-08
Payer: MEDICARE

## 2018-08-08 ENCOUNTER — LAB VISIT (OUTPATIENT)
Dept: LAB | Facility: HOSPITAL | Age: 68
End: 2018-08-08
Attending: FAMILY MEDICINE
Payer: MEDICARE

## 2018-08-08 VITALS
HEIGHT: 63 IN | HEART RATE: 90 BPM | BODY MASS INDEX: 29.12 KG/M2 | DIASTOLIC BLOOD PRESSURE: 80 MMHG | WEIGHT: 164.38 LBS | OXYGEN SATURATION: 95 % | TEMPERATURE: 96 F | SYSTOLIC BLOOD PRESSURE: 130 MMHG

## 2018-08-08 DIAGNOSIS — R61 UNEXPLAINED NIGHT SWEATS: ICD-10-CM

## 2018-08-08 DIAGNOSIS — R61 UNEXPLAINED NIGHT SWEATS: Primary | ICD-10-CM

## 2018-08-08 DIAGNOSIS — Z72.89 OTHER PROBLEMS RELATED TO LIFESTYLE: ICD-10-CM

## 2018-08-08 LAB
ALBUMIN SERPL BCP-MCNC: 4.3 G/DL
ALP SERPL-CCNC: 89 U/L
ALT SERPL W/O P-5'-P-CCNC: 22 U/L
ANION GAP SERPL CALC-SCNC: 10 MMOL/L
AST SERPL-CCNC: 20 U/L
BASOPHILS # BLD AUTO: 0.03 K/UL
BASOPHILS NFR BLD: 0.4 %
BILIRUB SERPL-MCNC: 0.4 MG/DL
BUN SERPL-MCNC: 20 MG/DL
CALCIUM SERPL-MCNC: 10 MG/DL
CHLORIDE SERPL-SCNC: 104 MMOL/L
CO2 SERPL-SCNC: 26 MMOL/L
CREAT SERPL-MCNC: 0.8 MG/DL
DIFFERENTIAL METHOD: NORMAL
EOSINOPHIL # BLD AUTO: 0 K/UL
EOSINOPHIL NFR BLD: 0 %
ERYTHROCYTE [DISTWIDTH] IN BLOOD BY AUTOMATED COUNT: 13.3 %
EST. GFR  (AFRICAN AMERICAN): >60 ML/MIN/1.73 M^2
EST. GFR  (NON AFRICAN AMERICAN): >60 ML/MIN/1.73 M^2
GLUCOSE SERPL-MCNC: 94 MG/DL
HCT VFR BLD AUTO: 43 %
HGB BLD-MCNC: 13.9 G/DL
IMM GRANULOCYTES # BLD AUTO: 0.02 K/UL
IMM GRANULOCYTES NFR BLD AUTO: 0.3 %
LYMPHOCYTES # BLD AUTO: 1.5 K/UL
LYMPHOCYTES NFR BLD: 20.5 %
MCH RBC QN AUTO: 28.3 PG
MCHC RBC AUTO-ENTMCNC: 32.3 G/DL
MCV RBC AUTO: 88 FL
MONOCYTES # BLD AUTO: 0.6 K/UL
MONOCYTES NFR BLD: 7.7 %
NEUTROPHILS # BLD AUTO: 5.1 K/UL
NEUTROPHILS NFR BLD: 71.1 %
NRBC BLD-RTO: 0 /100 WBC
PLATELET # BLD AUTO: 265 K/UL
PMV BLD AUTO: 12.2 FL
POTASSIUM SERPL-SCNC: 4.6 MMOL/L
PROT SERPL-MCNC: 7.5 G/DL
RBC # BLD AUTO: 4.91 M/UL
SODIUM SERPL-SCNC: 140 MMOL/L
TSH SERPL DL<=0.005 MIU/L-ACNC: 3.44 UIU/ML
WBC # BLD AUTO: 7.23 K/UL

## 2018-08-08 PROCEDURE — 80053 COMPREHEN METABOLIC PANEL: CPT

## 2018-08-08 PROCEDURE — 86703 HIV-1/HIV-2 1 RESULT ANTBDY: CPT

## 2018-08-08 PROCEDURE — 99214 OFFICE O/P EST MOD 30 MIN: CPT | Mod: S$GLB,,, | Performed by: FAMILY MEDICINE

## 2018-08-08 PROCEDURE — 85025 COMPLETE CBC W/AUTO DIFF WBC: CPT

## 2018-08-08 PROCEDURE — 99999 PR PBB SHADOW E&M-EST. PATIENT-LVL III: CPT | Mod: PBBFAC,,, | Performed by: FAMILY MEDICINE

## 2018-08-08 PROCEDURE — 84443 ASSAY THYROID STIM HORMONE: CPT

## 2018-08-08 NOTE — PROGRESS NOTES
Subjective:       Patient ID: Eve Long is a 68 y.o. female.    Chief Complaint: BP Medication Questions and Night Sweats    68 y old female with sarcoidosis , surgical menopause , copd and hypothyroidism  With short lasting episodes of profuse sweats , losing weight also  About 11lbs  (has started exercising) . Not worsening  cough , no abdominal pain .She has checked  Temp and has noted it stays at 99 F .       Review of Systems   Constitutional: Positive for unexpected weight change. Negative for activity change.   HENT: Negative.  Negative for hearing loss, rhinorrhea and trouble swallowing.    Eyes: Negative.  Negative for discharge and visual disturbance.   Respiratory: Negative.  Negative for chest tightness and wheezing.    Cardiovascular: Negative.  Negative for chest pain and palpitations.   Gastrointestinal: Negative.  Negative for blood in stool, constipation, diarrhea and vomiting.   Endocrine: Negative for polydipsia and polyuria.   Genitourinary: Negative.  Negative for difficulty urinating, dysuria, hematuria and menstrual problem.   Musculoskeletal: Negative.  Negative for arthralgias, joint swelling and neck pain.   Skin: Negative.    Neurological: Negative for weakness and headaches.   Hematological: Negative.    Psychiatric/Behavioral: Negative for confusion and dysphoric mood.       Objective:      Physical Exam   Constitutional: She is oriented to person, place, and time. She appears well-developed and well-nourished. No distress.   HENT:   Head: Normocephalic and atraumatic.   Right Ear: External ear normal.   Left Ear: External ear normal.   Mouth/Throat: No oropharyngeal exudate.   Eyes: Conjunctivae and EOM are normal. Pupils are equal, round, and reactive to light. Right eye exhibits no discharge. Left eye exhibits no discharge. No scleral icterus.   Neck: Normal range of motion. Neck supple. No JVD present. No tracheal deviation present. No thyromegaly present.    Cardiovascular: Normal rate, regular rhythm and normal heart sounds.  Exam reveals no gallop and no friction rub.    No murmur heard.  Pulmonary/Chest: Effort normal and breath sounds normal. No stridor. No respiratory distress. She has no wheezes. She has no rales. She exhibits no tenderness.   Abdominal: Soft. Bowel sounds are normal. She exhibits no distension. There is no tenderness. There is no rebound and no guarding.   Musculoskeletal: Normal range of motion.   Lymphadenopathy:     She has no cervical adenopathy.   Neurological: She is alert and oriented to person, place, and time.   Skin: Skin is warm and dry. She is not diaphoretic.   Psychiatric: She has a normal mood and affect. Her behavior is normal. Judgment and thought content normal.       Assessment:       1. Unexplained night sweats    2. Other problems related to lifestyle         Plan:     Eve was seen today for bp medication questions and night sweats.    Diagnoses and all orders for this visit:    Unexplained night sweats  -     CBC auto differential; Future  -     Comprehensive metabolic panel; Future  -     QUANTIFERON GOLD TB; Future  -     HIV-1 and HIV-2 antibodies; Future  -     TSH; Future    Other problems related to lifestyle   -     HIV-1 and HIV-2 antibodies; Future

## 2018-08-09 LAB — HIV 1+2 AB+HIV1 P24 AG SERPL QL IA: NEGATIVE

## 2018-08-10 ENCOUNTER — PATIENT MESSAGE (OUTPATIENT)
Dept: FAMILY MEDICINE | Facility: CLINIC | Age: 68
End: 2018-08-10

## 2018-08-14 ENCOUNTER — PATIENT MESSAGE (OUTPATIENT)
Dept: FAMILY MEDICINE | Facility: CLINIC | Age: 68
End: 2018-08-14

## 2018-08-14 ENCOUNTER — HOSPITAL ENCOUNTER (OUTPATIENT)
Facility: HOSPITAL | Age: 68
Discharge: HOME | End: 2018-08-15
Attending: EMERGENCY MEDICINE | Admitting: INTERNAL MEDICINE
Payer: MEDICARE

## 2018-08-14 DIAGNOSIS — G45.9 TRANSIENT CEREBRAL ISCHEMIA, UNSPECIFIED TYPE: Primary | ICD-10-CM

## 2018-08-14 DIAGNOSIS — R55 NEAR SYNCOPE: ICD-10-CM

## 2018-08-14 DIAGNOSIS — I63.9 STROKE: ICD-10-CM

## 2018-08-14 DIAGNOSIS — R51.9 NONINTRACTABLE EPISODIC HEADACHE, UNSPECIFIED HEADACHE TYPE: ICD-10-CM

## 2018-08-14 DIAGNOSIS — R26.89 IMBALANCE: ICD-10-CM

## 2018-08-14 DIAGNOSIS — G45.9 TIA (TRANSIENT ISCHEMIC ATTACK): ICD-10-CM

## 2018-08-14 LAB
ALBUMIN SERPL BCP-MCNC: 4 G/DL
ALP SERPL-CCNC: 75 U/L
ALT SERPL W/O P-5'-P-CCNC: 15 U/L
ANION GAP SERPL CALC-SCNC: 8 MMOL/L
APTT BLDCRRT: 25.8 SEC
AST SERPL-CCNC: 16 U/L
BASOPHILS # BLD AUTO: 0.02 K/UL
BASOPHILS NFR BLD: 0.3 %
BILIRUB SERPL-MCNC: 0.4 MG/DL
BUN SERPL-MCNC: 23 MG/DL
CALCIUM SERPL-MCNC: 9.5 MG/DL
CHLORIDE SERPL-SCNC: 107 MMOL/L
CHOLEST SERPL-MCNC: 190 MG/DL
CHOLEST/HDLC SERPL: 3.5 {RATIO}
CO2 SERPL-SCNC: 23 MMOL/L
CREAT SERPL-MCNC: 0.9 MG/DL
DIFFERENTIAL METHOD: ABNORMAL
EOSINOPHIL # BLD AUTO: 0 K/UL
EOSINOPHIL NFR BLD: 0 %
ERYTHROCYTE [DISTWIDTH] IN BLOOD BY AUTOMATED COUNT: 13.8 %
EST. GFR  (AFRICAN AMERICAN): >60 ML/MIN/1.73 M^2
EST. GFR  (NON AFRICAN AMERICAN): >60 ML/MIN/1.73 M^2
GLUCOSE SERPL-MCNC: 112 MG/DL
HCT VFR BLD AUTO: 39 %
HDLC SERPL-MCNC: 54 MG/DL
HDLC SERPL: 28.4 %
HGB BLD-MCNC: 13.4 G/DL
INR PPP: 1
LDLC SERPL CALC-MCNC: 108.6 MG/DL
LYMPHOCYTES # BLD AUTO: 2 K/UL
LYMPHOCYTES NFR BLD: 24.8 %
MCH RBC QN AUTO: 28.8 PG
MCHC RBC AUTO-ENTMCNC: 34.4 G/DL
MCV RBC AUTO: 84 FL
MONOCYTES # BLD AUTO: 0.6 K/UL
MONOCYTES NFR BLD: 7.5 %
NEUTROPHILS # BLD AUTO: 5.4 K/UL
NEUTROPHILS NFR BLD: 67.4 %
NONHDLC SERPL-MCNC: 136 MG/DL
PLATELET # BLD AUTO: 214 K/UL
PMV BLD AUTO: 9 FL
POCT GLUCOSE: 109 MG/DL (ref 70–110)
POTASSIUM SERPL-SCNC: 4.1 MMOL/L
PROT SERPL-MCNC: 7 G/DL
PROTHROMBIN TIME: 10.3 SEC
RBC # BLD AUTO: 4.66 M/UL
SODIUM SERPL-SCNC: 138 MMOL/L
TRIGL SERPL-MCNC: 137 MG/DL
TROPONIN I SERPL DL<=0.01 NG/ML-MCNC: <0.006 NG/ML
TSH SERPL DL<=0.005 MIU/L-ACNC: 2.62 UIU/ML
WBC # BLD AUTO: 7.95 K/UL

## 2018-08-14 PROCEDURE — 84443 ASSAY THYROID STIM HORMONE: CPT

## 2018-08-14 PROCEDURE — 80053 COMPREHEN METABOLIC PANEL: CPT

## 2018-08-14 PROCEDURE — 99285 EMERGENCY DEPT VISIT HI MDM: CPT | Mod: 25

## 2018-08-14 PROCEDURE — G0378 HOSPITAL OBSERVATION PER HR: HCPCS

## 2018-08-14 PROCEDURE — 85730 THROMBOPLASTIN TIME PARTIAL: CPT

## 2018-08-14 PROCEDURE — 36000 PLACE NEEDLE IN VEIN: CPT

## 2018-08-14 PROCEDURE — 84484 ASSAY OF TROPONIN QUANT: CPT

## 2018-08-14 PROCEDURE — 80061 LIPID PANEL: CPT

## 2018-08-14 PROCEDURE — 25000003 PHARM REV CODE 250: Performed by: EMERGENCY MEDICINE

## 2018-08-14 PROCEDURE — 82962 GLUCOSE BLOOD TEST: CPT

## 2018-08-14 PROCEDURE — 25500020 PHARM REV CODE 255: Performed by: INTERNAL MEDICINE

## 2018-08-14 PROCEDURE — 25000003 PHARM REV CODE 250: Performed by: INTERNAL MEDICINE

## 2018-08-14 PROCEDURE — 85610 PROTHROMBIN TIME: CPT

## 2018-08-14 PROCEDURE — 93010 ELECTROCARDIOGRAM REPORT: CPT | Mod: ,,, | Performed by: INTERNAL MEDICINE

## 2018-08-14 PROCEDURE — 85025 COMPLETE CBC W/AUTO DIFF WBC: CPT

## 2018-08-14 RX ORDER — AMLODIPINE BESYLATE 5 MG/1
5 TABLET ORAL NIGHTLY
Status: DISCONTINUED | OUTPATIENT
Start: 2018-08-14 | End: 2018-08-15 | Stop reason: HOSPADM

## 2018-08-14 RX ORDER — CLONIDINE HYDROCHLORIDE 0.1 MG/1
0.1 TABLET ORAL 3 TIMES DAILY
Status: DISCONTINUED | OUTPATIENT
Start: 2018-08-14 | End: 2018-08-14

## 2018-08-14 RX ORDER — ONDANSETRON 2 MG/ML
4 INJECTION INTRAMUSCULAR; INTRAVENOUS EVERY 8 HOURS PRN
Status: DISCONTINUED | OUTPATIENT
Start: 2018-08-14 | End: 2018-08-15 | Stop reason: HOSPADM

## 2018-08-14 RX ORDER — SODIUM CHLORIDE 9 MG/ML
INJECTION, SOLUTION INTRAVENOUS CONTINUOUS
Status: DISCONTINUED | OUTPATIENT
Start: 2018-08-14 | End: 2018-08-15 | Stop reason: HOSPADM

## 2018-08-14 RX ORDER — AMLODIPINE BESYLATE 5 MG/1
5 TABLET ORAL DAILY
Status: DISCONTINUED | OUTPATIENT
Start: 2018-08-15 | End: 2018-08-14

## 2018-08-14 RX ORDER — FAMOTIDINE 20 MG/1
20 TABLET, FILM COATED ORAL 2 TIMES DAILY
Status: DISCONTINUED | OUTPATIENT
Start: 2018-08-14 | End: 2018-08-15 | Stop reason: HOSPADM

## 2018-08-14 RX ORDER — ASPIRIN 325 MG
325 TABLET ORAL DAILY
Status: DISCONTINUED | OUTPATIENT
Start: 2018-08-15 | End: 2018-08-15 | Stop reason: HOSPADM

## 2018-08-14 RX ADMIN — SODIUM CHLORIDE: 0.9 INJECTION, SOLUTION INTRAVENOUS at 09:08

## 2018-08-14 RX ADMIN — IOHEXOL 100 ML: 350 INJECTION, SOLUTION INTRAVENOUS at 11:08

## 2018-08-14 RX ADMIN — AMLODIPINE BESYLATE 5 MG: 5 TABLET ORAL at 09:08

## 2018-08-14 RX ADMIN — FAMOTIDINE 20 MG: 20 TABLET ORAL at 09:08

## 2018-08-14 NOTE — ED PROVIDER NOTES
"SCRIBE #1 NOTE: I, Claudia Moya, am scribing for, and in the presence of, Micheal Graff MD. I have scribed the entire note.      History      Chief Complaint   Patient presents with    Weakness     Pt reports hx of TIA and reports feeling foggy and unsteady gait and left arm weakness when waking up around 7 this morning; symptoms have improved but "still doesnt feel normal"       Review of patient's allergies indicates:   Allergen Reactions    Beclomethasone dipropionate      Causes mouth Sores    Doxycycline Hives, Itching and Swelling    Fluticasone-salmeterol      Elevates Blood Pressure    Umeclidinium-vilanterol      bronchitis    Levothyroxine Rash    Red dye Rash        HPI   HPI    8/14/2018, 4:04 PM   History obtained from the patient      History of Present Illness: Eve Long is a 68 y.o. female patient who presents to the Emergency Department for loss of balance which onset when pt woke up this AM. Pt states she woke up and her R eye was hurting. She attempted to get out of bed but was very unbalanced and felt "foggy." Symptoms have resolved and were moderate in severity. No mitigating or exacerbating factors reported. Associated sxs include focal LUE weakness, R eye pain, and being disoriented. Patient denies any fever, chills, N/V, LOC, slurred speech, facial assymetry, and all other sxs at this time. Prior Tx includes one dose of ASA. No further complaints or concerns at this time.       Arrival mode: AASI    PCP: Chaya Morelos MD       Past Medical History:  Past Medical History:   Diagnosis Date    Anemia     Asthma     Hyperlipidemia     Hypertension     Hypothyroidism     Immune deficiency disorder     Pneumonia     Sarcoidosis     Thyroid disease     TIA (transient ischemic attack)        Past Surgical History:  Past Surgical History:   Procedure Laterality Date    CHOLECYSTECTOMY      COSMETIC SURGERY      blepharplasty    FRACTURE SURGERY Left     wrist " ORIF    HYSTERECTOMY      LUNG BIOPSY      OOPHORECTOMY           Family History:  Family History   Problem Relation Age of Onset    Myasthenia gravis Brother     Stroke Mother     Heart disease Father         CHF    Stroke Sister     Thyroid disease Sister        Social History:  Social History     Tobacco Use    Smoking status: Never Smoker    Smokeless tobacco: Never Used   Substance and Sexual Activity    Alcohol use: Yes     Alcohol/week: 0.6 oz     Types: 1 Glasses of wine per week     Comment: yearly    Drug use: No    Sexual activity: Not Currently       ROS   Review of Systems   Constitutional: Negative for chills, diaphoresis and fever.   HENT: Negative for congestion, rhinorrhea and sore throat.    Eyes: Positive for pain (R).   Respiratory: Negative for cough and shortness of breath.    Cardiovascular: Negative for chest pain.   Gastrointestinal: Negative for abdominal pain, diarrhea, nausea and vomiting.   Genitourinary: Negative for dysuria, flank pain and hematuria.   Musculoskeletal: Positive for gait problem (Unsteady). Negative for back pain and neck pain.   Skin: Negative for rash.   Neurological: Positive for weakness (LUE). Negative for syncope, facial asymmetry and speech difficulty.   Hematological: Does not bruise/bleed easily.   Psychiatric/Behavioral: Positive for confusion.     Physical Exam      Initial Vitals [08/14/18 1552]   BP Pulse Resp Temp SpO2   (!) 173/93 85 18 98.6 °F (37 °C) 96 %      MAP       --          Physical Exam  Nursing Notes and Vital Signs Reviewed.  Constitutional: Patient is in no acute distress. Well-developed and well-nourished.  Head: Atraumatic. Normocephalic.  Eyes: PERRL. EOM intact. Conjunctivae are not pale. No scleral icterus.  ENT: Mucous membranes are moist. Oropharynx is clear and symmetric.    Neck: Supple. Full ROM. No lymphadenopathy.  Cardiovascular: Regular rate. Regular rhythm. No murmurs, rubs, or gallops. Distal pulses are 2+ and  symmetric.  Pulmonary/Chest: No respiratory distress. Clear to auscultation bilaterally. No wheezing or rales.  Abdominal: Soft and non-distended.  There is no tenderness.  No rebound, guarding, or rigidity.  Musculoskeletal: Moves all extremities. No obvious deformities. No edema.  Skin: Warm and dry.  Neurological: Patient is alert and oriented to person, place and time. Pupils ERRL and EOM normal. Cranial nerves II-XII are intact. Strength is full bilaterally; it is equal and 5/5 in bilateral upper and lower extremities. There is no pronator drift of outstretched arms. Light touch sense is intact. Speech is clear and normal. No acute focal neurological deficits noted.  Psychiatric: Normal affect. Good eye contact. Appropriate in content.    ED Course    Procedures  ED Vital Signs:  Vitals:    08/14/18 1552 08/14/18 1617 08/14/18 1734 08/14/18 1818   BP: (!) 173/93  (!) 182/80    Pulse: 85 86 78 69   Resp: 18   17   Temp: 98.6 °F (37 °C)      TempSrc: Oral      SpO2: 96%  98% 97%   Weight: 75 kg (165 lb 3.8 oz)          Abnormal Lab Results:  Labs Reviewed   CBC W/ AUTO DIFFERENTIAL - Abnormal; Notable for the following components:       Result Value    MPV 9.0 (*)     All other components within normal limits   COMPREHENSIVE METABOLIC PANEL - Abnormal; Notable for the following components:    Glucose 112 (*)     All other components within normal limits   PROTIME-INR   TSH   APTT   TROPONIN I   LIPID PANEL   POCT GLUCOSE   POCT GLUCOSE        All Lab Results:  Results for orders placed or performed during the hospital encounter of 08/14/18   CBC W/ AUTO DIFFERENTIAL   Result Value Ref Range    WBC 7.95 3.90 - 12.70 K/uL    RBC 4.66 4.00 - 5.40 M/uL    Hemoglobin 13.4 12.0 - 16.0 g/dL    Hematocrit 39.0 37.0 - 48.5 %    MCV 84 82 - 98 fL    MCH 28.8 27.0 - 31.0 pg    MCHC 34.4 32.0 - 36.0 g/dL    RDW 13.8 11.5 - 14.5 %    Platelets 214 150 - 350 K/uL    MPV 9.0 (L) 9.2 - 12.9 fL    Gran # (ANC) 5.4 1.8 - 7.7 K/uL     Lymph # 2.0 1.0 - 4.8 K/uL    Mono # 0.6 0.3 - 1.0 K/uL    Eos # 0.0 0.0 - 0.5 K/uL    Baso # 0.02 0.00 - 0.20 K/uL    Gran% 67.4 38.0 - 73.0 %    Lymph% 24.8 18.0 - 48.0 %    Mono% 7.5 4.0 - 15.0 %    Eosinophil% 0.0 0.0 - 8.0 %    Basophil% 0.3 0.0 - 1.9 %    Differential Method Automated    Comprehensive metabolic panel   Result Value Ref Range    Sodium 138 136 - 145 mmol/L    Potassium 4.1 3.5 - 5.1 mmol/L    Chloride 107 95 - 110 mmol/L    CO2 23 23 - 29 mmol/L    Glucose 112 (H) 70 - 110 mg/dL    BUN, Bld 23 8 - 23 mg/dL    Creatinine 0.9 0.5 - 1.4 mg/dL    Calcium 9.5 8.7 - 10.5 mg/dL    Total Protein 7.0 6.0 - 8.4 g/dL    Albumin 4.0 3.5 - 5.2 g/dL    Total Bilirubin 0.4 0.1 - 1.0 mg/dL    Alkaline Phosphatase 75 55 - 135 U/L    AST 16 10 - 40 U/L    ALT 15 10 - 44 U/L    Anion Gap 8 8 - 16 mmol/L    eGFR if African American >60 >60 mL/min/1.73 m^2    eGFR if non African American >60 >60 mL/min/1.73 m^2   Protime-INR   Result Value Ref Range    Prothrombin Time 10.3 9.0 - 12.5 sec    INR 1.0 0.8 - 1.2   TSH   Result Value Ref Range    TSH 2.615 0.400 - 4.000 uIU/mL   APTT   Result Value Ref Range    aPTT 25.8 21.0 - 32.0 sec   Troponin I   Result Value Ref Range    Troponin I <0.006 0.000 - 0.026 ng/mL   POCT glucose   Result Value Ref Range    POCT Glucose 109 70 - 110 mg/dL         Imaging Results:  Imaging Results          X-Ray Chest AP Portable (Final result)  Result time 08/14/18 16:42:20    Final result by Feroz Muller MD (08/14/18 16:42:20)                 Impression:      Suggestion of small left-sided pleural effusion.      Electronically signed by: Feroz Muller  Date:    08/14/2018  Time:    16:42             Narrative:    EXAMINATION:  XR CHEST AP PORTABLE    CLINICAL HISTORY:  Stroke;    TECHNIQUE:  Single frontal view of the chest was performed.    COMPARISON:  06/14/2016 chest CT    FINDINGS:  Calcified nodule left peripheral lower lobe again noted.  Suggestion of small left-sided  pleural effusion.  Otherwise no focus of consolidation or pneumothorax.  Cardiomediastinal silhouette within normal limits.  Cholecystectomy clips present.                               CT Head Without Contrast (Final result)  Result time 08/14/18 16:09:30    Final result by Feroz Muller MD (08/14/18 16:09:30)                 Impression:      No acute abnormality.  Stable exam.      Electronically signed by: Feroz Muller  Date:    08/14/2018  Time:    16:09             Narrative:    EXAMINATION:  CT HEAD WITHOUT CONTRAST    CLINICAL HISTORY:  weakness;    TECHNIQUE:  Low dose axial CT images obtained throughout the head without intravenous contrast. Sagittal and coronal reconstructions were performed.    COMPARISON:  06/04/2018    FINDINGS:  Intracranial compartment:    Ventricles and sulci are normal in size for age without evidence of hydrocephalus. No extra-axial blood or fluid collections.    The brain parenchyma appears normal. No parenchymal mass, hemorrhage, edema or major vascular distribution infarct.    Skull/extracranial contents (limited evaluation): No fracture. Mastoid air cells and paranasal sinuses are essentially clear.                                        The EKG was ordered, reviewed, and independently interpreted by the ED provider.  Interpretation time: 16:22  Rate: 76 BPM  Rhythm: normal sinus rhythm with occasional PVC  Interpretation: No ST&T abnormalities. No STEMI. Normal ECG.      The Emergency Provider reviewed the vital signs and test results, which are outlined above.    ED Discussion     7:31 PM: Re-evaluated pt. Pt's  states he wants to leave with the pt. Upon further discussion, pt would like to remain overnight for further evaluation in the morning. I have discussed test results, shared treatment plan, and the need for admission with patient and family at bedside. Pt and family express understanding at this time and agree with all information. All questions answered. Pt  and family have no further questions or concerns at this time. Pt is ready for admit.    7:32 PM: Discussed case with Susanne Najera NP (Hospital Medicine). Dr. Rangel agrees with current care and management of pt and accepts admission.   Admitting Service: Hospital medicine   Admitting Physician: Dr. Rangel  Admit to: Obs-Tele    ED Medication(s):  Medications - No data to display    New Prescriptions    No medications on file             Medical Decision Making    Medical Decision Making:   Clinical Tests:   Lab Tests: Reviewed and Ordered  Radiological Study: Reviewed and Ordered  Medical Tests: Reviewed and Ordered           Scribe Attestation:   Scribe #1: I performed the above scribed service and the documentation accurately describes the services I performed. I attest to the accuracy of the note.    Attending:   Physician Attestation Statement for Scribe #1: I, Micheal Graff MD, personally performed the services described in this documentation, as scribed by Claudia Moya, in my presence, and it is both accurate and complete.          Clinical Impression       ICD-10-CM ICD-9-CM   1. Stroke I63.9 434.91       ICD-10-CM ICD-9-CM   1. Transient cerebral ischemia, unspecified type G45.9 435.9   2. Stroke I63.9 434.91   3. TIA (transient ischemic attack) G45.9 435.9   4. Near syncope R55 780.2   5. Nonintractable episodic headache, unspecified headache type R51 784.0   6. Imbalance R26.89 781.2       Disposition:   Disposition: Placed in Observation  Condition: Fair         Micheal Graff MD  08/16/18 [id number]

## 2018-08-15 VITALS
WEIGHT: 165.25 LBS | RESPIRATION RATE: 18 BRPM | HEART RATE: 61 BPM | TEMPERATURE: 96 F | DIASTOLIC BLOOD PRESSURE: 75 MMHG | SYSTOLIC BLOOD PRESSURE: 140 MMHG | BODY MASS INDEX: 29.27 KG/M2 | OXYGEN SATURATION: 95 %

## 2018-08-15 PROBLEM — R26.89 IMBALANCE: Status: ACTIVE | Noted: 2018-08-14

## 2018-08-15 LAB
ALBUMIN SERPL BCP-MCNC: 3.7 G/DL
ALP SERPL-CCNC: 72 U/L
ALT SERPL W/O P-5'-P-CCNC: 13 U/L
ANION GAP SERPL CALC-SCNC: 7 MMOL/L
AST SERPL-CCNC: 13 U/L
BASOPHILS # BLD AUTO: 0.01 K/UL
BASOPHILS NFR BLD: 0.1 %
BILIRUB SERPL-MCNC: 0.5 MG/DL
BUN SERPL-MCNC: 17 MG/DL
CALCIUM SERPL-MCNC: 9.3 MG/DL
CHLORIDE SERPL-SCNC: 105 MMOL/L
CO2 SERPL-SCNC: 27 MMOL/L
CREAT SERPL-MCNC: 0.7 MG/DL
DIASTOLIC DYSFUNCTION: NO
DIFFERENTIAL METHOD: NORMAL
EOSINOPHIL # BLD AUTO: 0 K/UL
EOSINOPHIL NFR BLD: 0 %
ERYTHROCYTE [DISTWIDTH] IN BLOOD BY AUTOMATED COUNT: 13.8 %
EST. GFR  (AFRICAN AMERICAN): >60 ML/MIN/1.73 M^2
EST. GFR  (NON AFRICAN AMERICAN): >60 ML/MIN/1.73 M^2
ESTIMATED PA SYSTOLIC PRESSURE: 31.3
GLUCOSE SERPL-MCNC: 102 MG/DL
HCT VFR BLD AUTO: 39 %
HGB BLD-MCNC: 13.2 G/DL
LYMPHOCYTES # BLD AUTO: 1.7 K/UL
LYMPHOCYTES NFR BLD: 25.3 %
MCH RBC QN AUTO: 28.8 PG
MCHC RBC AUTO-ENTMCNC: 33.8 G/DL
MCV RBC AUTO: 85 FL
MONOCYTES # BLD AUTO: 0.5 K/UL
MONOCYTES NFR BLD: 7.9 %
NEUTROPHILS # BLD AUTO: 4.6 K/UL
NEUTROPHILS NFR BLD: 66.7 %
PLATELET # BLD AUTO: 202 K/UL
PMV BLD AUTO: 10 FL
POTASSIUM SERPL-SCNC: 4 MMOL/L
PROT SERPL-MCNC: 6.5 G/DL
RBC # BLD AUTO: 4.59 M/UL
RETIRED EF AND QEF - SEE NOTES: 60 (ref 55–65)
SODIUM SERPL-SCNC: 139 MMOL/L
WBC # BLD AUTO: 6.87 K/UL

## 2018-08-15 PROCEDURE — 25000003 PHARM REV CODE 250: Performed by: INTERNAL MEDICINE

## 2018-08-15 PROCEDURE — 36415 COLL VENOUS BLD VENIPUNCTURE: CPT

## 2018-08-15 PROCEDURE — 85025 COMPLETE CBC W/AUTO DIFF WBC: CPT

## 2018-08-15 PROCEDURE — 80053 COMPREHEN METABOLIC PANEL: CPT

## 2018-08-15 PROCEDURE — 25000003 PHARM REV CODE 250: Performed by: EMERGENCY MEDICINE

## 2018-08-15 PROCEDURE — 93307 TTE W/O DOPPLER COMPLETE: CPT

## 2018-08-15 PROCEDURE — 93307 TTE W/O DOPPLER COMPLETE: CPT | Mod: 26,,, | Performed by: INTERNAL MEDICINE

## 2018-08-15 PROCEDURE — G0378 HOSPITAL OBSERVATION PER HR: HCPCS

## 2018-08-15 RX ORDER — NAPROXEN SODIUM 220 MG/1
81 TABLET, FILM COATED ORAL DAILY
Qty: 30 TABLET | Refills: 0 | Status: ON HOLD | COMMUNITY
Start: 2018-08-15 | End: 2018-11-20 | Stop reason: HOSPADM

## 2018-08-15 RX ADMIN — SODIUM CHLORIDE: 0.9 INJECTION, SOLUTION INTRAVENOUS at 08:08

## 2018-08-15 RX ADMIN — FAMOTIDINE 20 MG: 20 TABLET ORAL at 08:08

## 2018-08-15 RX ADMIN — ASPIRIN 325 MG ORAL TABLET 325 MG: 325 PILL ORAL at 08:08

## 2018-08-15 NOTE — PROGRESS NOTES
Discharge instructions given, and patient verbalized understanding.  Patient remained free of falls, accidents, and trama during the day shift.  IV and cardiac monitor has been d/c.  Cardiac monitor returned to the cardiac monitoring room.  Patient discharged to a private vehicle via a wheel chair.

## 2018-08-15 NOTE — ASSESSMENT & PLAN NOTE
Controlled. Will continue Amlodipine. Ratient states she stopped taking the twice daily clonidine

## 2018-08-15 NOTE — ASSESSMENT & PLAN NOTE
Shows signs suggesting large vessel with the combined right ocular and left upper extremity symptoms today and the prior symptoms 4 months ago. Will get CTA with BUN/Cr normal. To complete CVA workup and consultation with neurology

## 2018-08-15 NOTE — SUBJECTIVE & OBJECTIVE
"Past Medical History:   Diagnosis Date    Anemia     Asthma     Hyperlipidemia     Hypertension     Hypothyroidism     Immune deficiency disorder     Pneumonia     Sarcoidosis     Thyroid disease     TIA (transient ischemic attack)        Past Surgical History:   Procedure Laterality Date    CHOLECYSTECTOMY      COSMETIC SURGERY      blepharplasty    FRACTURE SURGERY Left     wrist ORIF    HYSTERECTOMY      LUNG BIOPSY      OOPHORECTOMY         Review of patient's allergies indicates:   Allergen Reactions    Beclomethasone dipropionate      Causes mouth Sores    Doxycycline Hives, Itching and Swelling    Fluticasone-salmeterol      Elevates Blood Pressure    Umeclidinium-vilanterol      bronchitis    Levothyroxine Rash    Red dye Rash       No current facility-administered medications on file prior to encounter.      Current Outpatient Medications on File Prior to Encounter   Medication Sig    amLODIPine (NORVASC) 5 MG tablet     cyanocobalamin 1,000 mcg/mL injection Inject 1 mL (1,000 mcg total) into the skin every 28 days.    syringe with needle (SYRINGE 3CC/25GX1") 3 mL 25 gauge x 1" Syrg For monthly Vit b12 injections    albuterol (PROVENTIL) 2.5 mg /3 mL (0.083 %) nebulizer solution 2.5 mg daily as needed. Every 6 hours inhale 2 puffs as needed for  wheezing    albuterol 90 mcg/actuation inhaler Inhale 2 puffs into the lungs every 6 (six) hours as needed.     aspirin-acetaminophen-caffeine 250-250-65 mg (EXCEDRIN MIGRAINE) 250-250-65 mg per tablet     cloNIDine (CATAPRES) 0.1 MG tablet Take 0.1 mg by mouth 3 (three) times daily as needed.     cyclobenzaprine (FLEXERIL) 10 MG tablet Take 10 mg by mouth.    promethazine (PHENERGAN) 12.5 MG Tab Take 1 tablet (12.5 mg total) by mouth every 6 (six) hours as needed.    [DISCONTINUED] ibuprofen (ADVIL,MOTRIN) 200 MG tablet Take 200 mg by mouth every 6 (six) hours as needed for Pain.    [DISCONTINUED] sodium chloride (CHILDREN'S SALINE " NASAL SPRAY NASL)      Family History     Problem Relation (Age of Onset)    Heart disease Father    Myasthenia gravis Brother    Stroke Mother, Sister    Thyroid disease Sister        Tobacco Use    Smoking status: Never Smoker    Smokeless tobacco: Never Used   Substance and Sexual Activity    Alcohol use: Yes     Alcohol/week: 0.6 oz     Types: 1 Glasses of wine per week     Comment: yearly    Drug use: No    Sexual activity: Not Currently     Review of Systems   Constitutional: Negative for activity change, appetite change, chills, diaphoresis, fatigue, fever and unexpected weight change.   HENT: Negative for congestion, dental problem, ear discharge, ear pain, hearing loss, sinus pressure, sinus pain, sore throat, trouble swallowing and voice change.    Eyes: Positive for visual disturbance. Negative for photophobia, pain, discharge, redness and itching.   Respiratory: Negative for apnea, choking, chest tightness, shortness of breath and wheezing.    Cardiovascular: Negative for chest pain, palpitations and leg swelling.   Gastrointestinal: Positive for abdominal distention. Negative for anal bleeding, constipation, diarrhea, nausea and vomiting.   Genitourinary: Negative for decreased urine volume, difficulty urinating, dysuria, flank pain and hematuria.   Musculoskeletal: Negative for arthralgias, back pain, gait problem, joint swelling, myalgias, neck pain and neck stiffness.   Skin: Negative for color change, pallor, rash and wound.   Allergic/Immunologic: Negative for environmental allergies, food allergies and immunocompromised state.   Neurological: Positive for weakness. Negative for dizziness, tremors, seizures, syncope, facial asymmetry, speech difficulty, light-headedness, numbness and headaches.   Hematological: Negative for adenopathy. Does not bruise/bleed easily.   Psychiatric/Behavioral: Negative for agitation, behavioral problems, confusion, decreased concentration, dysphoric mood,  self-injury, sleep disturbance and suicidal ideas.     Objective:     Vital Signs (Most Recent):  Temp: 98.5 °F (36.9 °C) (08/14/18 2101)  Pulse: 74 (08/14/18 2131)  Resp: (!) 22 (08/14/18 2131)  BP: (!) 163/75 (08/14/18 2131)  SpO2: 96 % (08/14/18 2131) Vital Signs (24h Range):  Temp:  [98.5 °F (36.9 °C)-98.6 °F (37 °C)] 98.5 °F (36.9 °C)  Pulse:  [69-86] 74  Resp:  [17-22] 22  SpO2:  [96 %-98 %] 96 %  BP: (160-190)/(72-93) 163/75     Weight: 75 kg (165 lb 3.8 oz)  Body mass index is 29.27 kg/m².    Physical Exam   Constitutional: She is oriented to person, place, and time. She appears well-developed and well-nourished. No distress.   HENT:   Head: Normocephalic and atraumatic.   Right Ear: External ear normal.   Left Ear: External ear normal.   Nose: Nose normal.   Mouth/Throat: Oropharynx is clear and moist. No oropharyngeal exudate.   Eyes: Conjunctivae and EOM are normal. Pupils are equal, round, and reactive to light. Right eye exhibits no discharge. Left eye exhibits no discharge. No scleral icterus.   Neck: Normal range of motion. Neck supple. No JVD present. No tracheal deviation present. No thyromegaly present.   Cardiovascular: Normal rate, regular rhythm, normal heart sounds and intact distal pulses. Exam reveals no gallop and no friction rub.   No murmur heard.  Pulmonary/Chest: Effort normal and breath sounds normal. No stridor. No respiratory distress. She has no wheezes. She has no rales. She exhibits no tenderness.   Abdominal: Soft. Bowel sounds are normal. She exhibits no distension and no mass. There is no tenderness. There is no rebound and no guarding. No hernia.   Musculoskeletal: Normal range of motion. She exhibits no edema, tenderness or deformity.   Lymphadenopathy:     She has no cervical adenopathy.   Neurological: She is alert and oriented to person, place, and time. She displays normal reflexes. No cranial nerve deficit or sensory deficit. She exhibits normal muscle tone. Coordination  normal.   Skin: Skin is warm and dry. Capillary refill takes less than 2 seconds. No rash noted. She is not diaphoretic. No erythema.   Psychiatric: She has a normal mood and affect. Her behavior is normal. Judgment and thought content normal.   Nursing note and vitals reviewed.        CRANIAL NERVES     CN III, IV, VI   Pupils are equal, round, and reactive to light.  Extraocular motions are normal.        Significant Labs:   Recent Lab Results       08/14/18  1615 08/14/18  1611      Albumin 4.0      Alkaline Phosphatase 75      ALT 15      Anion Gap 8      aPTT 25.8  Comment:  aPTT therapeutic range = 39-69 seconds      AST 16      Baso # 0.02      Basophil% 0.3      Total Bilirubin 0.4  Comment:  For infants and newborns, interpretation of results should be based  on gestational age, weight and in agreement with clinical  observations.  Premature Infant recommended reference ranges:  Up to 24 hours.............<8.0 mg/dL  Up to 48 hours............<12.0 mg/dL  3-5 days..................<15.0 mg/dL  6-29 days.................<15.0 mg/dL        BUN, Bld 23      Calcium 9.5      Chloride 107      CO2 23      Creatinine 0.9      Differential Method Automated      eGFR if  >60      eGFR if non  >60  Comment:  Calculation used to obtain the estimated glomerular filtration  rate (eGFR) is the CKD-EPI equation.         Eos # 0.0      Eosinophil% 0.0      Glucose 112      Gran # (ANC) 5.4      Gran% 67.4      Hematocrit 39.0      Hemoglobin 13.4      Coumadin Monitoring INR 1.0  Comment:  Coumadin Therapy:  2.0 - 3.0 for INR for all indicators except mechanical heart valves  and antiphospholipid syndromes which should use 2.5 - 3.5.        Lymph # 2.0      Lymph% 24.8      MCH 28.8      MCHC 34.4      MCV 84      Mono # 0.6      Mono% 7.5      MPV 9.0      Platelets 214      POCT Glucose  109     Potassium 4.1      Total Protein 7.0      Protime 10.3      RBC 4.66      RDW 13.8      Sodium  138      Troponin I <0.006  Comment:  The reference interval for Troponin I represents the 99th percentile   cutoff   for our facility and is consistent with 3rd generation assay   performance.        TSH 2.615      WBC 7.95            Significant Imaging:   Imaging Results          X-Ray Chest AP Portable (Final result)  Result time 08/14/18 16:42:20    Final result by Feroz Muller MD (08/14/18 16:42:20)                 Impression:      Suggestion of small left-sided pleural effusion.      Electronically signed by: Feroz Muller  Date:    08/14/2018  Time:    16:42             Narrative:    EXAMINATION:  XR CHEST AP PORTABLE    CLINICAL HISTORY:  Stroke;    TECHNIQUE:  Single frontal view of the chest was performed.    COMPARISON:  06/14/2016 chest CT    FINDINGS:  Calcified nodule left peripheral lower lobe again noted.  Suggestion of small left-sided pleural effusion.  Otherwise no focus of consolidation or pneumothorax.  Cardiomediastinal silhouette within normal limits.  Cholecystectomy clips present.                               CT Head Without Contrast (Final result)  Result time 08/14/18 16:09:30    Final result by Feroz Muller MD (08/14/18 16:09:30)                 Impression:      No acute abnormality.  Stable exam.      Electronically signed by: Feroz Muller  Date:    08/14/2018  Time:    16:09             Narrative:    EXAMINATION:  CT HEAD WITHOUT CONTRAST    CLINICAL HISTORY:  weakness;    TECHNIQUE:  Low dose axial CT images obtained throughout the head without intravenous contrast. Sagittal and coronal reconstructions were performed.    COMPARISON:  06/04/2018    FINDINGS:  Intracranial compartment:    Ventricles and sulci are normal in size for age without evidence of hydrocephalus. No extra-axial blood or fluid collections.    The brain parenchyma appears normal. No parenchymal mass, hemorrhage, edema or major vascular distribution infarct.    Skull/extracranial contents (limited evaluation):  No fracture. Mastoid air cells and paranasal sinuses are essentially clear.

## 2018-08-15 NOTE — PLAN OF CARE
Problem: Patient Care Overview  Goal: Plan of Care Review  Outcome: Outcome(s) achieved Date Met: 08/15/18  Patient is ready for discharge - preparing discharge paperwork.

## 2018-08-15 NOTE — PLAN OF CARE
Problem: Patient Care Overview  Goal: Plan of Care Review  Pt  denies and demonstrated no difficulty with motor function, mobility, and use of extremities at present time. Pt gait is steady with equal coordination, ROM, and no changes in vision. Pt denies numbness, tingling, or loss of sensation. Pt states she feels well. VS stable, with continuous telemetry monitoring. Will continue to monitor. Ayesha Chi RN

## 2018-08-15 NOTE — HOSPITAL COURSE
"The pt reports she presented to ED for episode she had after waking up in am with right eye pain, imbalance, and left arm "feeling funny"-no obvious weakness. Symptoms lasted approximately 20 minutes and resolved. Pt reports during episode, she checked her BP and noted . Pt notified PCP of symptoms and was told to go to ED. Pt arrived to ED at approx 8pm. Symptoms were resolved. CT head showed nothing acute. BP in the /81. The pt was placed in observation on ASA. Differential diagnosis was TIA, Near syncope, Hypertensive encephalopathy, or migraine symptoms. CTA head and neck showed  bilateral common carotid artery calcified plaque, more extensive on the right than left-no significant stenosis. ICA calcified plaque.  No intracranial vascular occlusion or large vessel occlusion.  Pt restarted on amlodipine. BP improved. Lipid panel done, however, pt reports she can not take Statins due to allergy. Instructed pt to follow up with Neurology. She also needs to follow up with Pulmonology for outpatient sleep study. Pt will need to follow up with cardiology for holter monitor. Pt will need BP recheck with PCP. Pt did not require Rehab due to resolution of symptoms.   "

## 2018-08-15 NOTE — H&P
"[facility] Hospital Medicine  History & Physical    Patient Name: Eve Long  MRN: [id number]  Admission Date: 8/14/2018  Attending Physician: Nishant Rangel MD  Primary Care Provider: Chaya Morelos MD         Patient information was obtained from patient and ER records.     Subjective:     Principal Problem:Transient cerebral ischemia    Chief Complaint:   Chief Complaint   Patient presents with    Weakness     Pt reports hx of TIA and reports feeling foggy and unsteady gait and left arm weakness when waking up around 7 this morning; symptoms have improved but "still doesnt feel normal"        HPI: The patient is a 68-year old patient with a histoy of hypertension and significant family history of CVA who woke up this morning feeling foggy with poor vision in the right eye and left forearm weakness about 7am. She states she went back to sleep after the symptoms persisted for about 20 minutes and woke up about 8:30am by which time the symptoms of visual fogginess and left forearm weakness had resolved. Patient states that four months ago she had symptoms related to extremely high blood pressure with an occipital headache and vomiting and [place] 230/100+. She had a cardiac evaluation at the time that was negative.     Past Medical History:   Diagnosis Date    Anemia     Asthma     Hyperlipidemia     Hypertension     Hypothyroidism     Immune deficiency disorder     Pneumonia     Sarcoidosis     Thyroid disease     TIA (transient ischemic attack)        Past Surgical History:   Procedure Laterality Date    CHOLECYSTECTOMY      COSMETIC SURGERY      blepharplasty    FRACTURE SURGERY Left     wrist ORIF    HYSTERECTOMY      LUNG BIOPSY      OOPHORECTOMY         Review of patient's allergies indicates:   Allergen Reactions    Beclomethasone dipropionate      Causes mouth Sores    Doxycycline Hives, Itching and Swelling    " "Fluticasone-salmeterol      Elevates Blood Pressure    Umeclidinium-vilanterol      bronchitis    Levothyroxine Rash    Red dye Rash       No current facility-administered medications on file prior to encounter.      Current Outpatient Medications on File Prior to Encounter   Medication Sig    amLODIPine (NORVASC) 5 MG tablet     cyanocobalamin 1,000 mcg/mL injection Inject 1 mL (1,000 mcg total) into the skin every 28 days.    syringe with needle (SYRINGE 3CC/25GX1") 3 mL 25 gauge x 1" Syrg For monthly Vit b12 injections    albuterol (PROVENTIL) 2.5 mg /3 mL (0.083 %) nebulizer solution 2.5 mg daily as needed. Every 6 hours inhale 2 puffs as needed for  wheezing    albuterol 90 mcg/actuation inhaler Inhale 2 puffs into the lungs every 6 (six) hours as needed.     aspirin-acetaminophen-caffeine 250-250-65 mg (EXCEDRIN MIGRAINE) 250-250-65 mg per tablet     cloNIDine (CATAPRES) 0.1 MG tablet Take 0.1 mg by mouth 3 (three) times daily as needed.     cyclobenzaprine (FLEXERIL) 10 MG tablet Take 10 mg by mouth.    promethazine (PHENERGAN) 12.5 MG Tab Take 1 tablet (12.5 mg total) by mouth every 6 (six) hours as needed.    [DISCONTINUED] ibuprofen (ADVIL,MOTRIN) 200 MG tablet Take 200 mg by mouth every 6 (six) hours as needed for Pain.    [DISCONTINUED] sodium chloride (CHILDREN'S SALINE NASAL SPRAY NASL)      Family History     Problem Relation (Age of Onset)    Heart disease Father    Myasthenia gravis Brother    Stroke Mother, Sister    Thyroid disease Sister        Tobacco Use    Smoking status: Never Smoker    Smokeless tobacco: Never Used   Substance and Sexual Activity    Alcohol use: Yes     Alcohol/week: 0.6 oz     Types: 1 Glasses of wine per week     Comment: yearly    Drug use: No    Sexual activity: Not Currently     Review of Systems   Constitutional: Negative for activity change, appetite change, chills, diaphoresis, fatigue, fever and unexpected weight change.   HENT: Negative for " congestion, dental problem, ear discharge, ear pain, hearing loss, sinus pressure, sinus pain, sore throat, trouble swallowing and voice change.    Eyes: Positive for visual disturbance. Negative for photophobia, pain, discharge, redness and itching.   Respiratory: Negative for apnea, choking, chest tightness, shortness of breath and wheezing.    Cardiovascular: Negative for chest pain, palpitations and leg swelling.   Gastrointestinal: Positive for abdominal distention. Negative for anal bleeding, constipation, diarrhea, nausea and vomiting.   Genitourinary: Negative for decreased urine volume, difficulty urinating, dysuria, flank pain and hematuria.   Musculoskeletal: Negative for arthralgias, back pain, gait problem, joint swelling, myalgias, neck pain and neck stiffness.   Skin: Negative for color change, pallor, rash and wound.   Allergic/Immunologic: Negative for environmental allergies, food allergies and immunocompromised state.   Neurological: Positive for weakness. Negative for dizziness, tremors, seizures, syncope, facial asymmetry, speech difficulty, light-headedness, numbness and headaches.   Hematological: Negative for adenopathy. Does not bruise/bleed easily.   Psychiatric/Behavioral: Negative for agitation, behavioral problems, confusion, decreased concentration, dysphoric mood, self-injury, sleep disturbance and suicidal ideas.     Objective:     Vital Signs (Most Recent):  Temp: 98.5 °F (36.9 °C) (08/14/18 2101)  Pulse: 74 (08/14/18 2131)  Resp: (!) 22 (08/14/18 2131)  BP: (!) 163/75 (08/14/18 2131)  SpO2: 96 % (08/14/18 2131) Vital Signs (24h Range):  Temp:  [98.5 °F (36.9 °C)-98.6 °F (37 °C)] 98.5 °F (36.9 °C)  Pulse:  [69-86] 74  Resp:  [17-22] 22  SpO2:  [96 %-98 %] 96 %  BP: (160-190)/(72-93) 163/75     Weight: 75 kg (165 lb 3.8 oz)  Body mass index is 29.27 kg/m².    Physical Exam   Constitutional: She is oriented to person, place, and time. She appears well-developed and well-nourished. No  distress.   HENT:   Head: Normocephalic and atraumatic.   Right Ear: External ear normal.   Left Ear: External ear normal.   Nose: Nose normal.   Mouth/Throat: Oropharynx is clear and moist. No oropharyngeal exudate.   Eyes: Conjunctivae and EOM are normal. Pupils are equal, round, and reactive to light. Right eye exhibits no discharge. Left eye exhibits no discharge. No scleral icterus.   Neck: Normal range of motion. Neck supple. No JVD present. No tracheal deviation present. No thyromegaly present.   Cardiovascular: Normal rate, regular rhythm, normal heart sounds and intact distal pulses. Exam reveals no gallop and no friction rub.   No murmur heard.  Pulmonary/Chest: Effort normal and breath sounds normal. No stridor. No respiratory distress. She has no wheezes. She has no rales. She exhibits no tenderness.   Abdominal: Soft. Bowel sounds are normal. She exhibits no distension and no mass. There is no tenderness. There is no rebound and no guarding. No hernia.   Musculoskeletal: Normal range of motion. She exhibits no edema, tenderness or deformity.   Lymphadenopathy:     She has no cervical adenopathy.   Neurological: She is alert and oriented to person, place, and time. She displays normal reflexes. No cranial nerve deficit or sensory deficit. She exhibits normal muscle tone. Coordination normal.   Skin: Skin is warm and dry. Capillary refill takes less than 2 seconds. No rash noted. She is not diaphoretic. No erythema.   Psychiatric: She has a normal mood and affect. Her behavior is normal. Judgment and thought content normal.   Nursing note and vitals reviewed.        CRANIAL NERVES     CN III, IV, VI   Pupils are equal, round, and reactive to light.  Extraocular motions are normal.        Significant Labs:   Recent Lab Results       08/14/18  1615 08/14/18  1611      Albumin 4.0      Alkaline Phosphatase 75      ALT 15      Anion Gap 8      aPTT 25.8  Comment:  aPTT therapeutic range = 39-69 seconds       AST 16      Baso # 0.02      Basophil% 0.3      Total Bilirubin 0.4  Comment:  For infants and newborns, interpretation of results should be based  on gestational age, weight and in agreement with clinical  observations.  Premature Infant recommended reference ranges:  Up to 24 hours.............<8.0 mg/dL  Up to 48 hours............<12.0 mg/dL  3-5 days..................<15.0 mg/dL  6-29 days.................<15.0 mg/dL        BUN, Bld 23      Calcium 9.5      Chloride 107      CO2 23      Creatinine 0.9      Differential Method Automated      eGFR if  >60      eGFR if non  >60  Comment:  Calculation used to obtain the estimated glomerular filtration  rate (eGFR) is the CKD-EPI equation.         Eos # 0.0      Eosinophil% 0.0      Glucose 112      Gran # (ANC) 5.4      Gran% 67.4      Hematocrit 39.0      Hemoglobin 13.4      Coumadin Monitoring INR 1.0  Comment:  Coumadin Therapy:  2.0 - 3.0 for INR for all indicators except mechanical heart valves  and antiphospholipid syndromes which should use 2.5 - 3.5.        Lymph # 2.0      Lymph% 24.8      MCH 28.8      MCHC 34.4      MCV 84      Mono # 0.6      Mono% 7.5      MPV 9.0      Platelets 214      POCT Glucose  109     Potassium 4.1      Total Protein 7.0      Protime 10.3      RBC 4.66      RDW 13.8      Sodium 138      Troponin I <0.006  Comment:  The reference interval for Troponin I represents the 99th percentile   cutoff   for our facility and is consistent with 3rd generation assay   performance.        TSH 2.615      WBC 7.95            Significant Imaging:   Imaging Results          X-Ray Chest AP Portable (Final result)  Result time 08/14/18 16:42:20    Final result by Feroz Muller MD (08/14/18 16:42:20)                 Impression:      Suggestion of small left-sided pleural effusion.      Electronically signed by: Feroz Muller  Date:    08/14/2018  Time:    16:42             Narrative:    EXAMINATION:  XR CHEST AP  PORTABLE    CLINICAL HISTORY:  Stroke;    TECHNIQUE:  Single frontal view of the chest was performed.    COMPARISON:  06/14/2016 chest CT    FINDINGS:  Calcified nodule left peripheral lower lobe again noted.  Suggestion of small left-sided pleural effusion.  Otherwise no focus of consolidation or pneumothorax.  Cardiomediastinal silhouette within normal limits.  Cholecystectomy clips present.                               CT Head Without Contrast (Final result)  Result time 08/14/18 16:09:30    Final result by Feroz Muller MD (08/14/18 16:09:30)                 Impression:      No acute abnormality.  Stable exam.      Electronically signed by: Feroz Muller  Date:    08/14/2018  Time:    16:09             Narrative:    EXAMINATION:  CT HEAD WITHOUT CONTRAST    CLINICAL HISTORY:  weakness;    TECHNIQUE:  Low dose axial CT images obtained throughout the head without intravenous contrast. Sagittal and coronal reconstructions were performed.    COMPARISON:  06/04/2018    FINDINGS:  Intracranial compartment:    Ventricles and sulci are normal in size for age without evidence of hydrocephalus. No extra-axial blood or fluid collections.    The brain parenchyma appears normal. No parenchymal mass, hemorrhage, edema or major vascular distribution infarct.    Skull/extracranial contents (limited evaluation): No fracture. Mastoid air cells and paranasal sinuses are essentially clear.                              Assessment/Plan:     * Transient cerebral ischemia    Shows signs suggesting large vessel with the combined right ocular and left upper extremity symptoms today and the prior symptoms 4 months ago. Will get CTA with BUN/Cr normal. To complete CVA workup and consultation with neurology          HTN (hypertension)    Controlled. Will continue Amlodipine. Ratient states she stopped taking the twice daily clonidine              Mixed hyperlipidemia    To recheck lipid panel. Iniiate statins if no contraindications.              VTE Risk Mitigation (From admission, onward)        Ordered     IP VTE LOW RISK PATIENT  Once      08/14/18 2040     Place LIUDMILA hose  Until discontinued      08/14/18 2040     Place sequential compression device  Until discontinued      08/14/18 2040             Nishant Rangel MD  Department of Hospital Medicine   [facility]

## 2018-08-15 NOTE — HPI
The patient is a 68-year old patient with a histoy of hypertension and significant family history of CVA who woke up this morning feeling foggy with poor vision in the right eye and left forearm weakness about 7am. She states she went back to sleep after the symptoms persisted for about 20 minutes and woke up about 8:30am by which time the symptoms of visual fogginess and left forearm weakness had resolved. Patient states that four months ago she had symptoms related to extremely high blood pressure with an occipital headache and vomiting and found her bp [facility] to be 230/100+. She had a cardiac evaluation at the time that was negative.

## 2018-08-15 NOTE — DISCHARGE SUMMARY
"[facility] Hospital Medicine  Discharge Summary      Patient Name: Eve Long  MRN: [id number]  Admission Date: 8/14/2018  Hospital Length of Stay: 0 days  Discharge Date and Time:  08/15/2018 2:51 PM  Attending Physician:Dr. Pacheco  Discharging Provider: Vianey Marsh NP  Primary Care Provider: Chaya Morelos MD      HPI:   The patient is a 68-year old patient with a histoy of hypertension and significant family history of CVA who woke up this morning feeling foggy with poor vision in the right eye and left forearm weakness about 7am. She states she went back to sleep after the symptoms persisted for about 20 minutes and woke up about 8:30am by which time the symptoms of visual fogginess and left forearm weakness had resolved. Patient states that four months ago she had symptoms related to extremely high blood pressure with an occipital headache and vomiting and found her bp [facility] to be 230/100+. She had a cardiac evaluation at the time that was negative.     * No surgery found *      Hospital Course:   The pt reports she presented to ED for episode she had after waking up in am with right eye pain, imbalance, and left arm "feeling funny"-no obvious weakness. Symptoms lasted approximately 20 minutes and resolved. Pt reports during episode, she checked her BP and noted . Pt notified PCP of symptoms and was told to go to ED. Pt arrived to ED at approx 8pm. Symptoms were resolved. CT head showed nothing acute. BP in the /81. The pt was placed in observation on ASA. Differential diagnosis was TIA, Near syncope, Hypertensive encephalopathy, or migraine symptoms. CTA head and neck showed  bilateral common carotid artery calcified plaque, more extensive on the right than left-no significant stenosis. ICA calcified plaque.  No intracranial vascular occlusion or large vessel occlusion.  Pt restarted on amlodipine. BP improved. Lipid panel done, however, pt reports she " can not take Statins due to allergy. Instructed pt to follow up with Neurology. She also needs to follow up with Pulmonology for outpatient sleep study. Pt will need to follow up with cardiology for holter monitor. Pt will need BP recheck with PCP. Pt did not require Rehab due to resolution of symptoms.        Final Active Diagnoses:    Diagnosis Date Noted POA    PRINCIPAL PROBLEM:  Imbalance [R26.89] 08/14/2018 Yes    HTN (hypertension) [I10] 06/05/2018 Yes    Mixed hyperlipidemia [E78.2] 12/18/2017 Yes    Hypothyroidism [E03.9]  Yes      Problems Resolved During this Admission:       Discharged Condition: stable    Disposition: Home or Self Care    Follow Up:  Follow-up Information     Chaya Morelos MD In 5 days.    Specialty:  Family Medicine  Contact information:  [address] [facility] [address] [phone]             Prabhjot Bolaños MD.    Specialty:  Neurology  Why:  as scheduled  Contact information:  [address]  [place] [address] [phone]             Please follow up.    Why:  pt needs outpatient sleep study            [facility].    Specialty:  Cardiology  Why:  Pt request cardiac screening   Contact information:  [address] [facility] [address] [phone]               Patient Instructions:      Ambulatory Referral to Cardiology   Referral Priority: Routine Referral Type: Consultation   Referral Reason: Specialty Services Required   Requested Specialty: Cardiology   Number of Visits Requested: 1     Diet Cardiac     Activity as tolerated       Significant Diagnostic Studies:   Imaging Results          CTA Head and Neck (xpd) (Final result)  Result time 08/15/18 08:40:29    Final result by Kale Mercer MD (08/15/18 08:40:29)                 Impression:      1. Tortuous left common carotid artery.  Bilateral common carotid artery calcified plaque, more extensive on the right than left.  However no significant  stenosis.  Dominant left vertebral artery.  2. Bilateral skullbase ICA calcified plaque.  No intracranial vascular occlusion or large vessel occlusion.  All CT scans at this facility use dose modulation, iterative reconstruction and/or weight based dosing when appropriate to reduce radiation dose to as low as reasonably achievable.      Electronically signed by: Kale Mercer MD  Date:    08/15/2018  Time:    08:40             Narrative:    EXAMINATION:  CTA HEAD AND NECK (XPD)    CLINICAL HISTORY:  Visual disturbance.  Extremity weakness.  TIA.  Acute visual deficit;Neuro deficit(s), subacute;    TECHNIQUE:  Standard contrast enhanced CTA of brain with 100ml of Omnipaque 350 with 3D MIP reformations.    COMPARISON:  Noncontrast CT brain 08/14/2018    FINDINGS:  CTA neck: The aortic arch is normal.  The right brachiocephalic artery is patent.  Portions of the right common carotid artery are obscured by adjacent high dense contrast in the venous system.  The right common carotid artery bifurcation reveals calcified plaque without significant stenosis.  The left common carotid artery is tortuous.  The left bifurcation reveals minimal calcified plaque without significant stenosis.    The vertebral arteries are patent with the left larger than the right.    CTA brain: In the posterior circulation the left vertebral artery is dominant.  The basilar artery is patent.  There are posterior communicating arteries bilaterally partly supplying the posterior cerebral arteries.  The anterior circulation reveals mild calcified plaque of the cavernous sinus segment of the internal carotid arteries.  The anterior and middle cerebral artery branches appear normal with no large vessel occlusion.  The dural venous sinuses are patent.    Incidentally cervical spondylosis is noted.    Measurements based on NASCET criteria.                               X-Ray Chest AP Portable (Final result)  Result time 08/14/18 16:42:20    Final  result by Feroz Muller MD (08/14/18 16:42:20)                 Impression:      Suggestion of small left-sided pleural effusion.      Electronically signed by: Feroz Muller  Date:    08/14/2018  Time:    16:42             Narrative:    EXAMINATION:  XR CHEST AP PORTABLE    CLINICAL HISTORY:  Stroke;    TECHNIQUE:  Single frontal view of the chest was performed.    COMPARISON:  06/14/2016 chest CT    FINDINGS:  Calcified nodule left peripheral lower lobe again noted.  Suggestion of small left-sided pleural effusion.  Otherwise no focus of consolidation or pneumothorax.  Cardiomediastinal silhouette within normal limits.  Cholecystectomy clips present.                               CT Head Without Contrast (Final result)  Result time 08/14/18 16:09:30    Final result by Feroz Muller MD (08/14/18 16:09:30)                 Impression:      No acute abnormality.  Stable exam.      Electronically signed by: Feroz Muller  Date:    08/14/2018  Time:    16:09             Narrative:    EXAMINATION:  CT HEAD WITHOUT CONTRAST    CLINICAL HISTORY:  weakness;    TECHNIQUE:  Low dose axial CT images obtained throughout the head without intravenous contrast. Sagittal and coronal reconstructions were performed.    COMPARISON:  06/04/2018    FINDINGS:  Intracranial compartment:    Ventricles and sulci are normal in size for age without evidence of hydrocephalus. No extra-axial blood or fluid collections.    The brain parenchyma appears normal. No parenchymal mass, hemorrhage, edema or major vascular distribution infarct.    Skull/extracranial contents (limited evaluation): No fracture. Mastoid air cells and paranasal sinuses are essentially clear.                                Pending Diagnostic Studies:     None         Medications:  Reconciled Home Medications:      Medication List      START taking these medications    aspirin 81 MG Chew  Take 1 tablet (81 mg total) by mouth once daily.        CONTINUE taking these medications  "   * albuterol 2.5 mg /3 mL (0.083 %) nebulizer solution  Commonly known as:  PROVENTIL  2.5 mg daily as needed. Every 6 hours inhale 2 puffs as needed for  wheezing     * albuterol 90 mcg/actuation inhaler  Inhale 2 puffs into the lungs every 6 (six) hours as needed.     amLODIPine 5 MG tablet  Commonly known as:  NORVASC     aspirin-acetaminophen-caffeine 250-250-65 mg 250-250-65 mg per tablet  Commonly known as:  EXCEDRIN MIGRAINE     cyanocobalamin 1,000 mcg/mL injection  Inject 1 mL (1,000 mcg total) into the skin every 28 days.     cyclobenzaprine 10 MG tablet  Commonly known as:  FLEXERIL  Take 10 mg by mouth.     promethazine 12.5 MG Tab  Commonly known as:  PHENERGAN  Take 1 tablet (12.5 mg total) by mouth every 6 (six) hours as needed.         * This list has 2 medication(s) that are the same as other medications prescribed for you. Read the directions carefully, and ask your doctor or other care provider to review them with you.            STOP taking these medications    cloNIDine 0.1 MG tablet  Commonly known as:  CATAPRES     syringe with needle 3 mL 25 gauge x 1" Syrg  Commonly known as:  SYRINGE 3CC/25GX1"            Indwelling Lines/Drains at time of discharge:   Lines/Drains/Airways          None          Time spent on the discharge of patient: 42 minutes  Patient was seen and examined on the date of discharge and determined to be suitable for discharge.         Vianey Marsh NP  Department of Hospital Medicine  [facility]  "

## 2018-08-16 ENCOUNTER — OFFICE VISIT (OUTPATIENT)
Dept: CARDIOLOGY | Facility: CLINIC | Age: 68
End: 2018-08-16
Payer: MEDICARE

## 2018-08-16 VITALS
HEIGHT: 63 IN | WEIGHT: 166 LBS | SYSTOLIC BLOOD PRESSURE: 144 MMHG | DIASTOLIC BLOOD PRESSURE: 88 MMHG | HEART RATE: 76 BPM | BODY MASS INDEX: 29.41 KG/M2

## 2018-08-16 DIAGNOSIS — G45.9 TRANSIENT CEREBRAL ISCHEMIA, UNSPECIFIED TYPE: Primary | ICD-10-CM

## 2018-08-16 DIAGNOSIS — J44.9 COPD, MODERATE: ICD-10-CM

## 2018-08-16 DIAGNOSIS — I10 HYPERTENSION, UNSPECIFIED TYPE: ICD-10-CM

## 2018-08-16 DIAGNOSIS — E78.2 MIXED HYPERLIPIDEMIA: ICD-10-CM

## 2018-08-16 DIAGNOSIS — E03.9 ACQUIRED HYPOTHYROIDISM: ICD-10-CM

## 2018-08-16 PROCEDURE — 99204 OFFICE O/P NEW MOD 45 MIN: CPT | Mod: S$GLB,,, | Performed by: INTERNAL MEDICINE

## 2018-08-16 PROCEDURE — 99999 PR PBB SHADOW E&M-EST. PATIENT-LVL III: CPT | Mod: PBBFAC,,, | Performed by: INTERNAL MEDICINE

## 2018-08-16 RX ORDER — EZETIMIBE 10 MG/1
10 TABLET ORAL DAILY
Qty: 90 TABLET | Refills: 3 | Status: SHIPPED | OUTPATIENT
Start: 2018-08-16 | End: 2018-10-18

## 2018-08-16 NOTE — PROGRESS NOTES
"Subjective:   Patient ID:  Eve Long is a 68 y.o. female who presents for cardiac consult of Hyperlipidemia and Hypertension      HPI  The patient came in today for cardiac consult of Hyperlipidemia and Hypertension    Referring provider: Vianey Marsh NP  Reason for consult: TIA, CV workup    This is a 68 year old female pt with recently diagnosed TIA x 2, HTN, HLD, COPD, pulm sarcoid presents for initial CV evaluation after hospital DC after TIA episode.     8/16/18  68-year old patient with a histoy of hypertension and significant family history of CVA who woke up yesterday morning feeling foggy with poor vision in the right eye and left forearm weakness about 7am. She states she went back to sleep after the symptoms persisted for about 20 minutes and woke up about 8:30am by which time the symptoms of visual fogginess and left forearm weakness had resolved. Patient states that four months ago she had symptoms related to extremely high blood pressure with an occipital headache and vomiting and found her bp [facility] to be 230/100+. She had a cardiac evaluation at the time that was negative.     She presented to ED for episode she had after waking up in am with right eye pain, imbalance, and left arm "feeling funny"-no obvious weakness. Symptoms lasted approximately 20 minutes and resolved. Pt reports during episode, she checked her BP and noted . Pt notified PCP of symptoms and was told to go to ED. Pt arrived to ED at approx 8pm. Symptoms were resolved. CT head showed nothing acute. BP in the /81. The pt was placed in observation on ASA. Differential diagnosis was TIA, Near syncope, Hypertensive encephalopathy, or migraine symptoms. CTA head and neck showed  bilateral common carotid artery calcified plaque, more extensive on the right than left-no significant stenosis. ICA calcified plaque.  No intracranial vascular occlusion or large vessel occlusion.    Pt restarted on amlodipine. BP " improved. Lipid panel done, however, pt reports she can not take Statins due to allergy. Instructed pt to follow up with Neurology. She also needs to follow up with Pulmonology for outpatient sleep study.  Pt had a 2D ECHO which revealed normal Bi V function. She will need a Holter/Zio patch to r/o Afib.     This is the second time she had TIA episodes. Bp at times 99/55 but now mildly elevated, overall -140/79-80.     Patient feels no chest pain, no sob, no leg swelling, no PND, no palpitation.     Patient has fairly good exercise tolerance.    Patient is compliant with medications.    2D ECHO  CONCLUSIONS     1 - Concentric hypertrophy.     2 - No wall motion abnormalities.     3 - Normal left ventricular systolic function (EF 60-65%).     4 - Normal left ventricular diastolic function.     5 - Normal right ventricular systolic function .     6 - The estimated PA systolic pressure is 31 mmHg.     This document has been electronically    SIGNED BY: Judy Rodríguez MD On: 08/15/2018 14:59    Past Medical History:   Diagnosis Date    Anemia     Asthma     Hyperlipidemia     Hypertension     Hypothyroidism     Immune deficiency disorder     Pneumonia     Sarcoidosis     Thyroid disease     TIA (transient ischemic attack)        Past Surgical History:   Procedure Laterality Date    CHOLECYSTECTOMY      COSMETIC SURGERY      blepharplasty    FRACTURE SURGERY Left     wrist ORIF    HYSTERECTOMY      LUNG BIOPSY      OOPHORECTOMY         Social History     Tobacco Use    Smoking status: Never Smoker    Smokeless tobacco: Never Used   Substance Use Topics    Alcohol use: Yes     Alcohol/week: 0.6 oz     Types: 1 Glasses of wine per week     Comment: yearly    Drug use: No       Family History   Problem Relation Age of Onset    Myasthenia gravis Brother     Stroke Mother     Heart disease Father         CHF    Stroke Sister     Thyroid disease Sister        Patient's Medications   New  Prescriptions    EZETIMIBE (ZETIA) 10 MG TABLET    Take 1 tablet (10 mg total) by mouth once daily.   Previous Medications    ALBUTEROL (PROVENTIL) 2.5 MG /3 ML (0.083 %) NEBULIZER SOLUTION    2.5 mg daily as needed. Every 6 hours inhale 2 puffs as needed for  wheezing    ALBUTEROL 90 MCG/ACTUATION INHALER    Inhale 2 puffs into the lungs every 6 (six) hours as needed.     AMLODIPINE (NORVASC) 5 MG TABLET    Take 5 mg by mouth once daily.     ASPIRIN 81 MG CHEW    Take 1 tablet (81 mg total) by mouth once daily.    ASPIRIN-ACETAMINOPHEN-CAFFEINE 250-250-65 MG (EXCEDRIN MIGRAINE) 250-250-65 MG PER TABLET        CYANOCOBALAMIN 1,000 MCG/ML INJECTION    Inject 1 mL (1,000 mcg total) into the skin every 28 days.    CYCLOBENZAPRINE (FLEXERIL) 10 MG TABLET    Take 10 mg by mouth.    PROMETHAZINE (PHENERGAN) 12.5 MG TAB    Take 1 tablet (12.5 mg total) by mouth every 6 (six) hours as needed.   Modified Medications    No medications on file   Discontinued Medications    No medications on file       Review of Systems   Constitutional: Positive for malaise/fatigue.   HENT: Negative.    Eyes: Negative.    Respiratory: Negative.    Cardiovascular: Negative.    Gastrointestinal: Negative.    Genitourinary: Negative.    Musculoskeletal: Negative.    Skin: Negative.    Neurological: Positive for tingling, sensory change, focal weakness and weakness.   Endo/Heme/Allergies: Negative.    Psychiatric/Behavioral: Negative.    All 12 systems otherwise negative.      Wt Readings from Last 3 Encounters:   08/16/18 75.3 kg (166 lb 0.1 oz)   08/14/18 75 kg (165 lb 3.8 oz)   08/08/18 74.6 kg (164 lb 5.7 oz)     Temp Readings from Last 3 Encounters:   08/15/18 96.3 °F (35.7 °C) (Axillary)   08/08/18 96.2 °F (35.7 °C) (Tympanic)   07/16/18 98.5 °F (36.9 °C) (Oral)     BP Readings from Last 3 Encounters:   08/16/18 (!) 144/88   08/15/18 (!) 140/75   08/08/18 130/80     Pulse Readings from Last 3 Encounters:   08/16/18 76   08/15/18 61  "  08/08/18 90       BP (!) 144/88 (BP Method: Small (Manual))   Pulse 76   Ht 5' 3" (1.6 m)   Wt 75.3 kg (166 lb 0.1 oz)   BMI 29.41 kg/m²     Objective:   Physical Exam   Constitutional: She is oriented to person, place, and time. She appears well-developed and well-nourished. No distress.   HENT:   Head: Normocephalic and atraumatic.   Nose: Nose normal.   Mouth/Throat: Oropharynx is clear and moist.   Eyes: Conjunctivae and EOM are normal. No scleral icterus.   Neck: Normal range of motion. Neck supple. No JVD present. No thyromegaly present.   Cardiovascular: Normal rate, regular rhythm, S1 normal and S2 normal. Exam reveals no gallop, no S3, no S4 and no friction rub.   No murmur heard.  Pulmonary/Chest: Effort normal and breath sounds normal. No stridor. No respiratory distress. She has no wheezes. She has no rales. She exhibits no tenderness.   Abdominal: Soft. Bowel sounds are normal. She exhibits no distension and no mass. There is no tenderness. There is no rebound.   Genitourinary:   Genitourinary Comments: Deferred   Musculoskeletal: Normal range of motion. She exhibits no edema, tenderness or deformity.   Lymphadenopathy:     She has no cervical adenopathy.   Neurological: She is alert and oriented to person, place, and time. She exhibits normal muscle tone. Coordination normal.   Skin: Skin is warm and dry. No rash noted. She is not diaphoretic. No erythema. No pallor.   Psychiatric: She has a normal mood and affect. Her behavior is normal. Judgment and thought content normal.   Nursing note and vitals reviewed.      Lab Results   Component Value Date     08/15/2018    K 4.0 08/15/2018     08/15/2018    CO2 27 08/15/2018    BUN 17 08/15/2018    CREATININE 0.7 08/15/2018     08/15/2018    AST 13 08/15/2018    ALT 13 08/15/2018    ALBUMIN 3.7 08/15/2018    PROT 6.5 08/15/2018    BILITOT 0.5 08/15/2018    WBC 6.87 08/15/2018    HGB 13.2 08/15/2018    HCT 39.0 08/15/2018    MCV 85 " 08/15/2018     08/15/2018    INR 1.0 08/14/2018    TSH 2.615 08/14/2018    CHOL 190 08/14/2018    HDL 54 08/14/2018    HDL 57 04/19/2017    LDLCALC 108.6 08/14/2018    TRIG 137 08/14/2018    TRIG 190 (H) 04/19/2017     Assessment:      1. Transient cerebral ischemia, unspecified type    2. COPD, moderate    3. Mixed hyperlipidemia    4. Hypertension, unspecified type    5. Acquired hypothyroidism        Plan:   1. TIA x 2  - will need Event monitor x 30 days  - will discuss ILR as well if negative or recurrent TIA  - cont asa, zetia    2. HTN - BP mildly elevated today but overall normal at home  - cont meds    3. HLD  - low aga diet  - add Zetia  - cannot tolerate statin due to myopathy    4. COPD  - cont meds per PCP    Thank you for allowing me to participate in this patient's care. Please do not hesitate to contact me with any questions or concerns. Consult note has been forwarded to the referral physician.

## 2018-08-16 NOTE — LETTER
August 16, 2018      [place]  [address] [place] [address]           [facility]  [address] [place] [address]  Phone: [phone]  Fax: [phone]          Patient: Eve Long   MR Number: [id number]   YOB: 1950   Date of Visit: 8/16/2018       Dear Vianey Marsh:    Thank you for referring Eve Long to me for evaluation. Attached you will find relevant portions of my assessment and plan of care.    If you have questions, please do not hesitate to call me. I look forward to following Eve Long along with you.    Sincerely,    Lebron Jorge MD    Enclosure  CC:  No Recipients    If you would like to receive this communication electronically, please contact [email or web address] or [phone] to request more information on [employer] access.    For providers and/or their staff who would like to refer a patient to [facility], please contact us through our one-stop-shop provider referral line, [facility], at [phone].    If you feel you have received this communication in error or would no longer like to receive these types of communications, please e-mail [email or web address]

## 2018-08-26 ENCOUNTER — PATIENT MESSAGE (OUTPATIENT)
Dept: HEMATOLOGY/ONCOLOGY | Facility: CLINIC | Age: 68
End: 2018-08-26

## 2018-08-28 ENCOUNTER — TELEPHONE (OUTPATIENT)
Dept: FAMILY MEDICINE | Facility: CLINIC | Age: 68
End: 2018-08-28

## 2018-08-28 ENCOUNTER — PATIENT MESSAGE (OUTPATIENT)
Dept: FAMILY MEDICINE | Facility: CLINIC | Age: 68
End: 2018-08-28

## 2018-08-28 RX ORDER — CYCLOBENZAPRINE HCL 10 MG
10 TABLET ORAL 3 TIMES DAILY PRN
Qty: 30 TABLET | Refills: 0 | Status: SHIPPED | OUTPATIENT
Start: 2018-08-28 | End: 2018-11-26

## 2018-08-29 ENCOUNTER — HOSPITAL ENCOUNTER (OUTPATIENT)
Dept: RADIOLOGY | Facility: HOSPITAL | Age: 68
Discharge: HOME | End: 2018-08-29
Attending: INTERNAL MEDICINE
Payer: MEDICARE

## 2018-08-29 ENCOUNTER — OFFICE VISIT (OUTPATIENT)
Dept: PULMONOLOGY | Facility: CLINIC | Age: 68
End: 2018-08-29
Payer: MEDICARE

## 2018-08-29 ENCOUNTER — PROCEDURE VISIT (OUTPATIENT)
Dept: PULMONOLOGY | Facility: CLINIC | Age: 68
End: 2018-08-29
Payer: MEDICARE

## 2018-08-29 ENCOUNTER — PATIENT MESSAGE (OUTPATIENT)
Dept: ADMINISTRATIVE | Facility: OTHER | Age: 68
End: 2018-08-29

## 2018-08-29 VITALS
HEIGHT: 63 IN | SYSTOLIC BLOOD PRESSURE: 132 MMHG | DIASTOLIC BLOOD PRESSURE: 76 MMHG | BODY MASS INDEX: 29.61 KG/M2 | RESPIRATION RATE: 20 BRPM | HEART RATE: 67 BPM | OXYGEN SATURATION: 96 % | WEIGHT: 167.13 LBS

## 2018-08-29 DIAGNOSIS — D86.0 PULMONARY SARCOIDOSIS: ICD-10-CM

## 2018-08-29 DIAGNOSIS — J44.9 COPD, MODERATE: Chronic | ICD-10-CM

## 2018-08-29 DIAGNOSIS — J84.10 CALCIFIED GRANULOMA OF LUNG: ICD-10-CM

## 2018-08-29 DIAGNOSIS — D86.0 PULMONARY SARCOIDOSIS: Chronic | ICD-10-CM

## 2018-08-29 DIAGNOSIS — J44.9 COPD, MODERATE: Primary | ICD-10-CM

## 2018-08-29 LAB
BRPFT: ABNORMAL
DLCO ADJ PRE: 16.31 ML/(MIN*MMHG) (ref 15.07–26.54)
DLCO PRE: 16.2 ML/(MIN*MMHG) (ref 15.07–26.54)
DLCO SINGLE BREATH LLN: 15.07
DLCO SINGLE BREATH PRE REF: 77.9 %
DLCO SINGLE BREATH REF: 20.8
DLCOC SBVA LLN: 2.86
DLCOC SBVA PRE REF: 114.8 %
DLCOC SBVA REF: 4.36
DLCOC SINGLE BREATH LLN: 15.07
DLCOC SINGLE BREATH PRE REF: 78.4 %
DLCOC SINGLE BREATH REF: 20.8
DLCOVA LLN: 2.86
DLCOVA PRE REF: 114.1 %
DLCOVA PRE: 4.98 ML/(MIN*MMHG*L) (ref 2.86–5.87)
DLCOVA REF: 4.36
DLVAADJ PRE: 5.01 ML/(MIN*MMHG*L) (ref 2.86–5.87)
ERV LLN: 0.65
ERV PRE REF: 26 %
ERV PRE: 0.17 L (ref 0.65–0.65)
ERV REF: 0.65
ERVN2 LLN: 0.65
ERVN2 REF: 0.65
FEF 25 75 CHG: -15.7 %
FEF 25 75 LLN: 0.87
FEF 25 75 POST REF: 37.1 %
FEF 25 75 POST: 0.69 L/S (ref 0.87–2.85)
FEF 25 75 PRE REF: 44 %
FEF 25 75 PRE: 0.82 L/S (ref 0.87–2.85)
FEF 25 75 REF: 1.86
FET100 CHG: -3.7 %
FET100 POST: 11.14 SEC
FET100 PRE: 11.57 SEC
FEV1 CHG: -9.6 %
FEV1 FVC CHG: -0.5 %
FEV1 FVC LLN: 65
FEV1 FVC POST REF: 78.1 %
FEV1 FVC POST: 61.22 % (ref 65.15–91.55)
FEV1 FVC PRE REF: 78.6 %
FEV1 FVC PRE: 61.55 % (ref 65.15–91.55)
FEV1 FVC REF: 78
FEV1 LLN: 1.57
FEV1 POST REF: 71.6 %
FEV1 POST: 1.54 L (ref 1.57–2.74)
FEV1 PRE REF: 79.2 %
FEV1 PRE: 1.71 L (ref 1.57–2.74)
FEV1 REF: 2.16
FEV6 CHG: -12.6 %
FEV6 LLN: 2.12
FEV6 POST REF: 82.2 %
FEV6 POST: 2.29 L (ref 2.12–3.44)
FEV6 PRE REF: 94 %
FEV6 PRE: 2.62 L (ref 2.12–3.44)
FEV6 REF: 2.78
FRCN2 LLN: 1.83
FRCN2 REF: 2.65
FRCPL PRE: 1.81 L
FRCPLETH LLN: 1.83
FRCPLETH PREREF: 68.3 %
FRCPLETH REF: 2.65
FVC CHG: -9.1 %
FVC LLN: 2.03
FVC POST REF: 91 %
FVC POST: 2.52 L (ref 2.03–3.51)
FVC PRE REF: 100.2 %
FVC PRE: 2.77 L (ref 2.03–3.51)
FVC REF: 2.77
IVC PRE: 2.28 L (ref 2.03–3.51)
IVC SINGLE BREATH LLN: 2.03
IVC SINGLE BREATH PRE REF: 82.3 %
IVC SINGLE BREATH REF: 2.77
MVV LLN: 68
MVV PRE REF: 86.5 %
MVV PRE: 69 L/MIN (ref 67.83–91.77)
MVV REF: 80
PEF CHG: -14.9 %
PEF LLN: 3.91
PEF POST REF: 85.9 %
PEF POST: 4.78 L/S (ref 3.91–7.22)
PEF PRE REF: 100.9 %
PEF PRE: 5.62 L/S (ref 3.91–7.22)
PEF REF: 5.57
RAW LLN: 3.06
RAW PRE REF: 215.1 %
RAW PRE: 6.58 CMH2O*S/L (ref 3.06–3.06)
RAW REF: 3.06
RV LLN: 1.42
RV PRE REF: 79.4 %
RV PRE: 1.59 L (ref 1.42–2.58)
RV REF: 2
RVN2 LLN: 1.42
RVN2 REF: 2
RVN2TLCN2 LLN: 33
RVN2TLCN2 REF: 42
RVTLC LLN: 33
RVTLC PRE REF: 85.8 %
RVTLC PRE: 36.4 % (ref 32.83–52.01)
RVTLC REF: 42
TLC LLN: 3.78
TLC PRE REF: 91.5 %
TLC PRE: 4.36 L (ref 3.78–5.76)
TLC REF: 4.77
TLCN2 LLN: 3.78
TLCN2 REF: 4.77
VA PRE: 3.26 L (ref 4.62–4.62)
VA SINGLE BREATH LLN: 4.62
VA SINGLE BREATH PRE REF: 70.5 %
VA SINGLE BREATH REF: 4.62
VC LLN: 2.03
VC PRE REF: 100.2 %
VC PRE: 2.77 L (ref 2.03–3.51)
VC REF: 2.77
VCMAXN2 LLN: 2.03
VCMAXN2 REF: 2.77
VTGRAWPRE: 1.94 L

## 2018-08-29 PROCEDURE — 99215 OFFICE O/P EST HI 40 MIN: CPT | Mod: 25,S$GLB,, | Performed by: INTERNAL MEDICINE

## 2018-08-29 PROCEDURE — 71046 X-RAY EXAM CHEST 2 VIEWS: CPT | Mod: 26,,, | Performed by: RADIOLOGY

## 2018-08-29 PROCEDURE — 71046 X-RAY EXAM CHEST 2 VIEWS: CPT | Mod: TC

## 2018-08-29 PROCEDURE — 99999 PR PBB SHADOW E&M-EST. PATIENT-LVL III: CPT | Mod: PBBFAC,,, | Performed by: INTERNAL MEDICINE

## 2018-08-29 RX ORDER — PNV NO.95/FERROUS FUM/FOLIC AC 28MG-0.8MG
1 TABLET ORAL
COMMUNITY
End: 2018-11-13

## 2018-08-29 RX ORDER — CLONAZEPAM 0.5 MG/1
0.5 TABLET ORAL
COMMUNITY
End: 2018-11-26

## 2018-08-29 NOTE — PROGRESS NOTES
Subjective:      Established patient    Patient ID: Eve Long is a 68 y.o. female.    Patient Active Problem List   Diagnosis    Pulmonary sarcoidosis    Hypothyroidism    COPD, moderate    Vitamin B12 deficiency without anemia    Calcified granuloma of lung    Mixed hyperlipidemia    HTN (hypertension)    Imbalance    TIA (transient ischemic attack)       Problem list has been reviewed.      Group Spirometric Classification Exacerbations / year mMR CAT   Group A: Low risk; less symptom   GOLD 1- 2  < = 1 0 - 1 <10     FEV1 = > 1.71 L (79.2 % predicted)       Chief Complaint: COPD      HPI    PFT reviewed with patient who expressed and voiced understanding. All questions were answered to the patients satisfaction.     Patients reports NO dyspnea    The patient does not have currently have symptoms / an exacerbation.     COPD QUESTIONNAIRE  How often do you cough?: Almost never  How often do you have phlegm (mucus) in your chest?: Never  How often does your chest feel tight?: Almost never  When you walk up a hill or one flight of stairs, how often are you breathless?: Never  How often are you limited doing any activities at home?: Some of the time  How often are you confident leaving the house despite your lung condition?: Most of the time  How often do you sleep soundly?: All of the time  How often do you have energy?: All of the time  Total score: 6     Her current respiratory therapy regimen is albuterol inhaler or albuterol nebulizer which provides  relief. She is  adherent with her regimen.      No recent change in breathing.         Previous Report Reviewed: office notes     The following portions of the patient's history were reviewed and updated as appropriate: She  has a past medical history of Anemia, Asthma, Hyperlipidemia, Hypertension, Hypothyroidism, Immune deficiency disorder, Pneumonia, Sarcoidosis, Thyroid disease, and TIA (transient ischemic attack).  She  has a past surgical  history that includes Hysterectomy; Cholecystectomy; Lung biopsy; Oophorectomy; Cosmetic surgery; and Fracture surgery (Left).  Her family history includes Heart disease in her father; Myasthenia gravis in her brother; Stroke in her mother and sister; Thyroid disease in her sister.  She  reports that  has never smoked. she has never used smokeless tobacco. She reports that she drinks about 0.6 oz of alcohol per week. She reports that she does not use drugs.  She has a current medication list which includes the following prescription(s): umeclidinium-vilanterol, albuterol, albuterol, amlodipine, aspirin, aspirin-acetaminophen-caffeine 250-250-65 mg, aspirin-acetaminophen-caffeine 250-250-65 mg, clonazepam, cyanocobalamin, cyanocobalamin, cyclobenzaprine, ezetimibe, and promethazine.  She is allergic to beclomethasone dipropionate; danazol; doxycycline; fluticasone-salmeterol; statins-hmg-coa reductase inhibitors; umeclidinium-vilanterol; influenza virus vaccines; levothyroxine; and red dye..    Review of Systems   Constitutional: Positive for fatigue. Negative for fever, chills and night sweats.   HENT: Negative for nosebleeds, rhinorrhea, sinus pressure, sore throat, congestion and ear pain.    Eyes: Positive for itching.   Respiratory: Positive for cough, wheezing and somnolence. Negative for snoring, choking, chest tightness, orthopnea, use of rescue inhaler and Paroxysmal Nocturnal Dyspnea.    Cardiovascular: Negative for chest pain, palpitations and leg swelling.   Genitourinary: Negative for difficulty urinating.   Endocrine: Positive for heat intolerance and polyuria. Negative for polydipsia and cold intolerance.    Musculoskeletal: Positive for arthralgias. Negative for back pain.   Skin: Negative for rash.   Gastrointestinal: Negative for nausea, vomiting, abdominal pain, abdominal distention and acid reflux.   Neurological: Negative for dizziness, syncope, light-headedness and headaches.   Hematological:  "Excessive bruising. Does not bruise/bleed easily.   Psychiatric/Behavioral: The patient is not nervous/anxious.    All other systems reviewed and are negative.       Objective:     /76   Pulse 67   Resp 20   Ht 5' 3" (1.6 m)   Wt 75.8 kg (167 lb 1.6 oz)   SpO2 96%   BMI 29.60 kg/m²   Body mass index is 29.6 kg/m².     Physical Exam   Constitutional: She is oriented to person, place, and time. She appears well-developed and well-nourished.   HENT:   Head: Normocephalic and atraumatic.   Eyes: EOM are normal. Pupils are equal, round, and reactive to light.   Neck: Normal range of motion. Neck supple.   Cardiovascular: Normal rate and regular rhythm.   Pulmonary/Chest: Effort normal and breath sounds normal.   Abdominal: Soft. Bowel sounds are normal.   Musculoskeletal: Normal range of motion.   Neurological: She is alert and oriented to person, place, and time.   Skin: Skin is warm.   Psychiatric: She has a normal mood and affect.   Nursing note and vitals reviewed.      Personal Diagnostic Review      Pulmonary function tests: FEV1: 1.71  (79.2 % predicted), FVC:  2.78 (100.2 % predicted), FEV1/FVC:  62, T.36 (91.5 % predicted), RV/TLVC: 36 (85.8 % predicted), DLCO: 16.20 (77.9 % predicted)  MIld obstruction. Normla lung volumes. Diffusion capacity is mildly reduced.     CXR: Cardiac silhouette and mediastinal contours are normal.  Lungs demonstrate no acute opacity.  Calcified left lung base nodule noted.  There is resolution of the small left-sided pleural effusion.  Osseous structures are intact      Assessment / plan :     Discussed diagnosis, its evaluation, treatment and usual course. All questions answered.    Problem List Items Addressed This Visit        Pulmonary    COPD, moderate - Primary    Current Assessment & Plan     COPD ROS: taking medications as instructed, no medication side effects noted, no significant ongoing wheezing or shortness of breath, using bronchodilator MDI less than " twice a week.   New concerns: None.   Exam: appears well, vitals normal, no respiratory distress, acyanotic, normal RR, chest clear, no wheezing, crepitations, rhonchi, normal symmetric air entry.   Assessment:  COPD well controlled.   Plan: ALBUTEROL PFT AND NEBS. current treatment plan is effective, no change in therapy. PFT and CXR in 1 year.          Relevant Orders    Complete PFT with bronchodilator    X-Ray Chest PA And Lateral    Calcified granuloma of lung    Overview     calcified granuloma in the left lower lobe.         Current Assessment & Plan     Stable and controlled. Continued radiologic monitoring.               Immunology/Multi System    Pulmonary sarcoidosis    Current Assessment & Plan     BURNT OUT PULMONARY SARCOIDOSIS. Stable and controlled. No changes to current therapeutic plan. Continue all previously prescribed medications.                TIME SPENT WITH PATIENT: Time spent: 40 minutes in face to face  discussion concerning diagnosis, prognosis, review of lab and test results, benefits of treatment as well as management of disease, counseling of patient and coordination of care between various health  care providers . Greater than half the time spent was used for coordination of care and counseling of patient.        Follow-up in about 1 year (around 8/29/2019) for Sarcoidosis, COPD.

## 2018-08-29 NOTE — PATIENT INSTRUCTIONS
Lung Anatomy  Your lungs take air in to give your body oxygen, which the body needs to work. Your lungs, like all the tissues in your body, are made up of billions of tiny specialized cells. Old lung cells die and are replaced by new, identical lung cells. This natural process helps ensure healthy lungs.    Date Last Reviewed: 11/1/2016  © 0352-5494 [employer]. [address] [place] [address], [place] [address]. All rights reserved. This information is not intended as a substitute for professional medical care. Always follow your healthcare professional's instructions.

## 2018-08-29 NOTE — PROGRESS NOTES
Subjective:      Established patient    Patient ID: Eve Long is a 68 y.o. female.    Patient Active Problem List   Diagnosis    Pulmonary sarcoidosis    Hypothyroidism    COPD, moderate    Vitamin B12 deficiency without anemia    Calcified granuloma of lung    Mixed hyperlipidemia    HTN (hypertension)    Imbalance    TIA (transient ischemic attack)       Problem list has been reviewed.    Chief Complaint: COPD      COPD   Associated symptoms include arthralgias, coughing and fatigue. Pertinent negatives include no abdominal pain, chest pain, chills, congestion, fever, headaches, nausea, rash, sore throat or vomiting.       CXR and Spirometry reviewed with patient who expressed and voiced understanding. All questions were answered to the patients satisfaction. The patient does not currently have symptoms / an exacerbation. She states that she is at her respiratory baseline and denies any new onset specific pulmonary complaints. She denies cough sputum, hemoptysis,  pain with breathing, wheezing, asthma. Her current respiratory therapy regimen is albuterol inhaler or albuterol nebulizer which provides  relief. She is  adherent with her regimen.    A full  review of systems, past , family  and social histories was performed except as mentioned in the note above, these are non contributory to the main issues discussed today.      Previous Report Reviewed: office notes     The following portions of the patient's history were reviewed and updated as appropriate: She  has a past medical history of Anemia, Asthma, Hyperlipidemia, Hypertension, Hypothyroidism, Immune deficiency disorder, Pneumonia, Sarcoidosis, Thyroid disease, and TIA (transient ischemic attack).  She  has a past surgical history that includes Hysterectomy; Cholecystectomy; Lung biopsy; Oophorectomy; Cosmetic surgery; and Fracture surgery (Left).  Her family history includes Heart disease in her father; Myasthenia gravis in her  brother; Stroke in her mother and sister; Thyroid disease in her sister.  She  reports that  has never smoked. she has never used smokeless tobacco. She reports that she drinks about 0.6 oz of alcohol per week. She reports that she does not use drugs.  She has a current medication list which includes the following prescription(s): umeclidinium-vilanterol, albuterol, albuterol, amlodipine, aspirin, aspirin-acetaminophen-caffeine 250-250-65 mg, aspirin-acetaminophen-caffeine 250-250-65 mg, clonazepam, cyanocobalamin, cyanocobalamin, cyclobenzaprine, ezetimibe, and promethazine.  She is allergic to beclomethasone dipropionate; danazol; doxycycline; fluticasone-salmeterol; statins-hmg-coa reductase inhibitors; umeclidinium-vilanterol; influenza virus vaccines; levothyroxine; and red dye..    Review of Systems   Constitutional: Positive for fatigue. Negative for fever, chills and night sweats.   HENT: Negative for nosebleeds, rhinorrhea, sinus pressure, sore throat, congestion and ear pain.    Eyes: Positive for itching.   Respiratory: Positive for cough, wheezing and somnolence. Negative for snoring, choking, chest tightness, orthopnea, use of rescue inhaler and Paroxysmal Nocturnal Dyspnea.    Cardiovascular: Negative for chest pain, palpitations and leg swelling.   Genitourinary: Negative for difficulty urinating.   Endocrine: Positive for heat intolerance and polyuria. Negative for polydipsia and cold intolerance.    Musculoskeletal: Positive for arthralgias. Negative for back pain.   Skin: Negative for rash.   Gastrointestinal: Negative for nausea, vomiting, abdominal pain, abdominal distention and acid reflux.   Neurological: Negative for dizziness, syncope, light-headedness and headaches.   Hematological: Excessive bruising. Does not bruise/bleed easily.   Psychiatric/Behavioral: The patient is not nervous/anxious.    All other systems reviewed and are negative.       Objective:     /76   Pulse 67   Resp  "20   Ht 5' 3" (1.6 m)   Wt 75.8 kg (167 lb 1.6 oz)   SpO2 96%   BMI 29.60 kg/m²   Body mass index is 29.6 kg/m².     Physical Exam   Constitutional: She is oriented to person, place, and time. She appears well-developed and well-nourished.   HENT:   Head: Normocephalic and atraumatic.   Eyes: EOM are normal. Pupils are equal, round, and reactive to light.   Neck: Normal range of motion. Neck supple.   Cardiovascular: Normal rate and regular rhythm.   Pulmonary/Chest: Effort normal and breath sounds normal.   Abdominal: Soft. Bowel sounds are normal.   Musculoskeletal: Normal range of motion.   Neurological: She is alert and oriented to person, place, and time.   Skin: Skin is warm.   Psychiatric: She has a normal mood and affect.   Nursing note and vitals reviewed.      Personal Diagnostic Review    ABG: pH: 7.386   PaO2: 87 PaCO2 34   SaO2 97% : Mixed respiratory alkalosis / metabolic acidosis.    Pulmonary function tests: FEV1: 1.53 (68 % predicted), FVC:  2.52 (86 % predicted), FEV1/FVC:  60%   Moderate obstruction. Airflow is not clearly improved after bronchodilator.     CT of chest performed on 06/14/16 with contrast : There is a densely calcified 15 mm pleural-based granuloma in the lateral left lower lobe.  There is focal partially calcified lobular pleural plaque measuring 5-6 mm thickness in the lateral right lower lobe, felt likely to be benign.  No suspicious pulmonary lesions are seen.  No pathologically enlarged hilar or mediastinal lymph nodes.  No pleural effusion.  No abnormality of the airway.     CXR: Stable    Assessment:     No diagnosis found.  Outpatient Encounter Medications as of 8/29/2018   Medication Sig Dispense Refill    umeclidinium-vilanterol 62.5-25 mcg/actuation DsDv Inhale 1 puff into the lungs.      albuterol (PROVENTIL) 2.5 mg /3 mL (0.083 %) nebulizer solution 2.5 mg daily as needed. Every 6 hours inhale 2 puffs as needed for  wheezing      albuterol 90 mcg/actuation " "inhaler Inhale 2 puffs into the lungs every 6 (six) hours as needed.       amLODIPine (NORVASC) 5 MG tablet Take 5 mg by mouth once daily.       aspirin 81 MG Chew Take 1 tablet (81 mg total) by mouth once daily. 30 tablet 0    aspirin-acetaminophen-caffeine 250-250-65 mg (EXCEDRIN MIGRAINE) 250-250-65 mg per tablet       aspirin-acetaminophen-caffeine 250-250-65 mg (EXCEDRIN MIGRAINE) 250-250-65 mg per tablet Take 1 tablet by mouth.      clonazePAM (KLONOPIN) 0.5 MG tablet Take 0.5 mg by mouth.      cyanocobalamin (VITAMIN B-12) 100 MCG tablet Take 1 tablet by mouth.      cyanocobalamin 1,000 mcg/mL injection Inject 1 mL (1,000 mcg total) into the skin every 28 days. 10 mL 11    cyclobenzaprine (FLEXERIL) 10 MG tablet Take 1 tablet (10 mg total) by mouth 3 (three) times daily as needed for Muscle spasms. 30 tablet 0    ezetimibe (ZETIA) 10 mg tablet Take 1 tablet (10 mg total) by mouth once daily. 90 tablet 3    promethazine (PHENERGAN) 12.5 MG Tab Take 1 tablet (12.5 mg total) by mouth every 6 (six) hours as needed. 12 tablet 0    [DISCONTINUED] cloNIDine (CATAPRES) 0.1 MG tablet Take 0.1 mg by mouth 3 (three) times daily as needed.       [DISCONTINUED] cyclobenzaprine (FLEXERIL) 10 MG tablet Take 10 mg by mouth.      [DISCONTINUED] syringe with needle (SYRINGE 3CC/25GX1") 3 mL 25 gauge x 1" Syrg For monthly Vit b12 injections 100 Syringe 0    [DISCONTINUED] 0.9%  NaCl infusion       [DISCONTINUED] amLODIPine tablet 5 mg       [DISCONTINUED] aspirin tablet 325 mg       [DISCONTINUED] famotidine tablet 20 mg       [DISCONTINUED] ondansetron injection 4 mg        No facility-administered encounter medications on file as of 8/29/2018.      No orders of the defined types were placed in this encounter.    Plan:     Discussed diagnosis, its evaluation, treatment and usual course. All questions answered.           TIME SPENT WITH PATIENT: Time spent: 35 minutes in face to face  discussion concerning " diagnosis, prognosis, review of lab and test results, benefits of treatment as well as management of disease, counseling of patient and coordination of care between various health  care providers . Greater than half the time spent was used for coordination of care and counseling of patient.        No Follow-up on file.

## 2018-09-04 ENCOUNTER — CLINICAL SUPPORT (OUTPATIENT)
Dept: CARDIOLOGY | Facility: CLINIC | Age: 68
End: 2018-09-04
Attending: INTERNAL MEDICINE
Payer: MEDICARE

## 2018-09-04 DIAGNOSIS — G45.9 TRANSIENT CEREBRAL ISCHEMIA, UNSPECIFIED TYPE: ICD-10-CM

## 2018-09-04 DIAGNOSIS — E03.9 ACQUIRED HYPOTHYROIDISM: ICD-10-CM

## 2018-09-04 DIAGNOSIS — I10 HYPERTENSION, UNSPECIFIED TYPE: ICD-10-CM

## 2018-09-04 PROCEDURE — 93270 REMOTE 30 DAY ECG REV/REPORT: CPT | Mod: PBBFAC,PO | Performed by: INTERNAL MEDICINE

## 2018-09-11 ENCOUNTER — PATIENT MESSAGE (OUTPATIENT)
Dept: CARDIOLOGY | Facility: CLINIC | Age: 68
End: 2018-09-11

## 2018-09-11 RX ORDER — AMLODIPINE BESYLATE 2.5 MG/1
2.5 TABLET ORAL DAILY
Qty: 90 TABLET | Refills: 3 | Status: SHIPPED | OUTPATIENT
Start: 2018-09-11 | End: 2018-10-18

## 2018-10-05 ENCOUNTER — TELEPHONE (OUTPATIENT)
Dept: FAMILY MEDICINE | Facility: CLINIC | Age: 68
End: 2018-10-05

## 2018-10-05 DIAGNOSIS — Z12.39 BREAST CANCER SCREENING: Primary | ICD-10-CM

## 2018-10-09 ENCOUNTER — LAB VISIT (OUTPATIENT)
Dept: LAB | Facility: HOSPITAL | Age: 68
End: 2018-10-09
Attending: INTERNAL MEDICINE
Payer: MEDICARE

## 2018-10-09 DIAGNOSIS — G45.9 TRANSIENT CEREBRAL ISCHEMIA, UNSPECIFIED TYPE: ICD-10-CM

## 2018-10-09 LAB
ALBUMIN SERPL BCP-MCNC: 3.8 G/DL
ALP SERPL-CCNC: 85 U/L
ALT SERPL W/O P-5'-P-CCNC: 20 U/L
ANION GAP SERPL CALC-SCNC: 5 MMOL/L
AST SERPL-CCNC: 21 U/L
BILIRUB SERPL-MCNC: 0.5 MG/DL
BUN SERPL-MCNC: 20 MG/DL
CALCIUM SERPL-MCNC: 9.7 MG/DL
CHLORIDE SERPL-SCNC: 107 MMOL/L
CHOLEST SERPL-MCNC: 202 MG/DL
CHOLEST/HDLC SERPL: 3.3 {RATIO}
CO2 SERPL-SCNC: 26 MMOL/L
CREAT SERPL-MCNC: 0.8 MG/DL
EST. GFR  (AFRICAN AMERICAN): >60 ML/MIN/1.73 M^2
EST. GFR  (NON AFRICAN AMERICAN): >60 ML/MIN/1.73 M^2
GLUCOSE SERPL-MCNC: 127 MG/DL
HDLC SERPL-MCNC: 62 MG/DL
HDLC SERPL: 30.7 %
LDLC SERPL CALC-MCNC: 112.8 MG/DL
NONHDLC SERPL-MCNC: 140 MG/DL
POTASSIUM SERPL-SCNC: 4.5 MMOL/L
PROT SERPL-MCNC: 6.8 G/DL
SODIUM SERPL-SCNC: 138 MMOL/L
TRIGL SERPL-MCNC: 136 MG/DL

## 2018-10-09 PROCEDURE — 80053 COMPREHEN METABOLIC PANEL: CPT

## 2018-10-09 PROCEDURE — 36415 COLL VENOUS BLD VENIPUNCTURE: CPT

## 2018-10-09 PROCEDURE — 80061 LIPID PANEL: CPT

## 2018-10-18 ENCOUNTER — OFFICE VISIT (OUTPATIENT)
Dept: CARDIOLOGY | Facility: CLINIC | Age: 68
End: 2018-10-18
Payer: MEDICARE

## 2018-10-18 VITALS
HEART RATE: 72 BPM | SYSTOLIC BLOOD PRESSURE: 158 MMHG | BODY MASS INDEX: 29.38 KG/M2 | DIASTOLIC BLOOD PRESSURE: 94 MMHG | WEIGHT: 165.81 LBS | HEIGHT: 63 IN

## 2018-10-18 DIAGNOSIS — I10 ESSENTIAL HYPERTENSION: ICD-10-CM

## 2018-10-18 DIAGNOSIS — D86.0 PULMONARY SARCOIDOSIS: ICD-10-CM

## 2018-10-18 DIAGNOSIS — E03.9 ACQUIRED HYPOTHYROIDISM: ICD-10-CM

## 2018-10-18 DIAGNOSIS — G45.9 TIA (TRANSIENT ISCHEMIC ATTACK): Primary | ICD-10-CM

## 2018-10-18 DIAGNOSIS — J44.9 COPD, MODERATE: ICD-10-CM

## 2018-10-18 DIAGNOSIS — E78.2 MIXED HYPERLIPIDEMIA: ICD-10-CM

## 2018-10-18 PROCEDURE — 99999 PR PBB SHADOW E&M-EST. PATIENT-LVL III: CPT | Mod: PBBFAC,,, | Performed by: INTERNAL MEDICINE

## 2018-10-18 PROCEDURE — 99214 OFFICE O/P EST MOD 30 MIN: CPT | Mod: S$PBB,,, | Performed by: INTERNAL MEDICINE

## 2018-10-18 PROCEDURE — 99213 OFFICE O/P EST LOW 20 MIN: CPT | Mod: PBBFAC | Performed by: INTERNAL MEDICINE

## 2018-10-18 PROCEDURE — 1101F PT FALLS ASSESS-DOCD LE1/YR: CPT | Mod: CPTII,,, | Performed by: INTERNAL MEDICINE

## 2018-10-18 RX ORDER — AMLODIPINE BESYLATE 5 MG/1
5 TABLET ORAL DAILY
Qty: 90 TABLET | Refills: 3 | Status: ON HOLD | OUTPATIENT
Start: 2018-10-18 | End: 2018-11-20 | Stop reason: HOSPADM

## 2018-10-18 NOTE — PROGRESS NOTES
"Subjective:   Patient ID:  Eve Long is a 68 y.o. female who presents for cardiac consult of Hyperlipidemia; Hypertension; and Transient Ischemic Attack      HPI  The patient came in today for cardiac consult of Hyperlipidemia; Hypertension; and Transient Ischemic Attack    Referring provider: Vianey Marsh NP  Reason for consult: TIA, CV workup    This is a 68 year old female pt with recently diagnosed TIA x 2, HTN, HLD, COPD, pulm sarcoid presents for initial CV evaluation after hospital DC after TIA episode.     8/16/18  68-year old patient with a histoy of hypertension and significant family history of CVA who woke up yesterday morning feeling foggy with poor vision in the right eye and left forearm weakness about 7am. She states she went back to sleep after the symptoms persisted for about 20 minutes and woke up about 8:30am by which time the symptoms of visual fogginess and left forearm weakness had resolved. Patient states that four months ago she had symptoms related to extremely high blood pressure with an occipital headache and vomiting and [place] 230/100+. She had a cardiac evaluation at the time that was negative.     She presented to ED for episode she had after waking up in am with right eye pain, imbalance, and left arm "feeling funny"-no obvious weakness. Symptoms lasted approximately 20 minutes and resolved. Pt reports during episode, she checked her BP and noted . Pt notified PCP of symptoms and was told to go to ED. Pt arrived to ED at approx 8pm. Symptoms were resolved. CT head showed nothing acute. BP in the /81. The pt was placed in observation on ASA. Differential diagnosis was TIA, Near syncope, Hypertensive encephalopathy, or migraine symptoms. CTA head and neck showed  bilateral common carotid artery calcified plaque, more extensive on the right than left-no significant stenosis. ICA calcified plaque.  No intracranial vascular occlusion or " large vessel occlusion.  Pt restarted on amlodipine. BP improved. Lipid panel done, however, pt reports she can not take Statins due to allergy. Instructed pt to follow up with Neurology. She also needs to follow up with Pulmonology for outpatient sleep study.  Pt had a 2D ECHO which revealed normal Bi V function. She will need a Holter/Zio patch to r/o Afib.   This is the second time she had TIA episodes. Bp at times 99/55 but now mildly elevated, overall -140/79-80.     10/18/18  PT had event monitor which was neg for arrhythmias, no AFib detected. Added Zetia after last visit - could not tolerate it due to muscle spasm in neck. Today Bp was 120s/70 around 11 AM. In evenings a bit higher 142/81. Occ BP has been low 104/71. Has been doing ok with low salt intake. Has intermittent chest pain, dull pain, once a month, pain is tolerable. Discussed stress test if symptoms worse.     Patient feels no sob, no leg swelling, no PND, no palpitation.     Patient has fairly good exercise tolerance.    Patient is compliant with medications.    Event monitor  CONCLUSIONS   30 DAY EVENT MONITOR 9/4/18 - 10/4/18:    Baseline:  NSR, PVCs.    No activations noted during study.    This document has been electronically    SIGNED BY: Olivier Leach MD On: 10/09/2018 17:50    2D ECHO  CONCLUSIONS     1 - Concentric hypertrophy.     2 - No wall motion abnormalities.     3 - Normal left ventricular systolic function (EF 60-65%).     4 - Normal left ventricular diastolic function.     5 - Normal right ventricular systolic function .     6 - The estimated PA systolic pressure is 31 mmHg.     This document has been electronically    SIGNED BY: Judy Rodríguez MD On: 08/15/2018 14:59    Past Medical History:   Diagnosis Date    Anemia     Asthma     Hyperlipidemia     Hypertension     Hypothyroidism     Immune deficiency disorder     Pneumonia     Sarcoidosis     Thyroid disease     TIA (transient ischemic attack)        Past  Surgical History:   Procedure Laterality Date    CHOLECYSTECTOMY      COSMETIC SURGERY      blepharplasty    FRACTURE SURGERY Left     wrist ORIF    HYSTERECTOMY      LUNG BIOPSY      OOPHORECTOMY         Social History     Tobacco Use    Smoking status: Never Smoker    Smokeless tobacco: Never Used   Substance Use Topics    Alcohol use: Yes     Alcohol/week: 0.6 oz     Types: 1 Glasses of wine per week     Comment: yearly    Drug use: No       Family History   Problem Relation Age of Onset    Myasthenia gravis Brother     Stroke Mother     Heart disease Father         CHF    Stroke Sister     Thyroid disease Sister           Medication List           Accurate as of 10/18/18  1:39 PM. If you have any questions, ask your nurse or doctor.               CHANGE how you take these medications    amLODIPine 5 MG tablet  Commonly known as:  NORVASC  Take 1 tablet (5 mg total) by mouth once daily.  What changed:    · medication strength  · how much to take  Changed by:  Lebron Jorge Md, MD        CONTINUE taking these medications    * albuterol 2.5 mg /3 mL (0.083 %) nebulizer solution  Commonly known as:  PROVENTIL     * albuterol 90 mcg/actuation inhaler  Commonly known as:  PROVENTIL/VENTOLIN HFA     aspirin 81 MG Chew  Take 1 tablet (81 mg total) by mouth once daily.     aspirin-acetaminophen-caffeine 250-250-65 mg 250-250-65 mg per tablet  Commonly known as:  EXCEDRIN MIGRAINE     clonazePAM 0.5 MG tablet  Commonly known as:  KLONOPIN     * cyanocobalamin 100 MCG tablet  Commonly known as:  VITAMIN B-12     * cyanocobalamin 1,000 mcg/mL injection  Inject 1 mL (1,000 mcg total) into the skin every 28 days.     cyclobenzaprine 10 MG tablet  Commonly known as:  FLEXERIL  Take 1 tablet (10 mg total) by mouth 3 (three) times daily as needed for Muscle spasms.     promethazine 12.5 MG Tab  Commonly known as:  PHENERGAN  Take 1 tablet (12.5 mg total) by mouth every 6 (six) hours as needed.    "  umeclidinium-vilanterol 62.5-25 mcg/actuation Dsdv  Commonly known as:  ANORO ELLIPTA         * This list has 4 medication(s) that are the same as other medications prescribed for you. Read the directions carefully, and ask your doctor or other care provider to review them with you.            STOP taking these medications    ezetimibe 10 mg tablet  Commonly known as:  ZETIA  Stopped by:  Lebron Jorge Md, MD           Where to Get Your Medications      These medications were sent to [facility] [place], [place] - [address]  [address], [place] [address]    Phone:  [phone]   · amLODIPine 5 MG tablet         Review of Systems   Constitutional: Positive for malaise/fatigue.   HENT: Negative.    Eyes: Negative.    Respiratory: Negative.    Cardiovascular: Negative.    Gastrointestinal: Negative.    Genitourinary: Negative.    Musculoskeletal: Negative.    Skin: Negative.    Neurological: Positive for tingling, sensory change, focal weakness and weakness.   Endo/Heme/Allergies: Negative.    Psychiatric/Behavioral: Negative.    All 12 systems otherwise negative.      Wt Readings from Last 3 Encounters:   10/18/18 75.2 kg (165 lb 12.6 oz)   08/29/18 75.8 kg (167 lb 1.6 oz)   08/16/18 75.3 kg (166 lb 0.1 oz)     Temp Readings from Last 3 Encounters:   08/15/18 96.3 °F (35.7 °C) (Axillary)   08/08/18 96.2 °F (35.7 °C) (Tympanic)   07/16/18 98.5 °F (36.9 °C) (Oral)     BP Readings from Last 3 Encounters:   10/18/18 (!) 158/94   08/29/18 132/76   08/16/18 (!) 144/88     Pulse Readings from Last 3 Encounters:   10/18/18 72   08/29/18 67   08/16/18 76       BP (!) 158/94 (BP Location: Right arm, Patient Position: Sitting, BP Method: Large (Manual))   Pulse 72   Ht 5' 3" (1.6 m)   Wt 75.2 kg (165 lb 12.6 oz)   BMI 29.37 kg/m²     Objective:   Physical Exam   Constitutional: She is oriented to person, place, and time. She appears well-developed and well-nourished. No distress.   HENT:   Head: " Normocephalic and atraumatic.   Nose: Nose normal.   Mouth/Throat: Oropharynx is clear and moist.   Eyes: Conjunctivae and EOM are normal. No scleral icterus.   Neck: Normal range of motion. Neck supple. No JVD present. No thyromegaly present.   Cardiovascular: Normal rate, regular rhythm, S1 normal and S2 normal. Exam reveals no gallop, no S3, no S4 and no friction rub.   No murmur heard.  Pulmonary/Chest: Effort normal and breath sounds normal. No stridor. No respiratory distress. She has no wheezes. She has no rales. She exhibits no tenderness.   Abdominal: Soft. Bowel sounds are normal. She exhibits no distension and no mass. There is no tenderness. There is no rebound.   Genitourinary:   Genitourinary Comments: Deferred   Musculoskeletal: Normal range of motion. She exhibits no edema, tenderness or deformity.   Lymphadenopathy:     She has no cervical adenopathy.   Neurological: She is alert and oriented to person, place, and time. She exhibits normal muscle tone. Coordination normal.   Skin: Skin is warm and dry. No rash noted. She is not diaphoretic. No erythema. No pallor.   Psychiatric: She has a normal mood and affect. Her behavior is normal. Judgment and thought content normal.   Nursing note and vitals reviewed.      Lab Results   Component Value Date     10/09/2018    K 4.5 10/09/2018     10/09/2018    CO2 26 10/09/2018    BUN 20 10/09/2018    CREATININE 0.8 10/09/2018     (H) 10/09/2018    AST 21 10/09/2018    ALT 20 10/09/2018    ALBUMIN 3.8 10/09/2018    PROT 6.8 10/09/2018    BILITOT 0.5 10/09/2018    WBC 6.87 08/15/2018    HGB 13.2 08/15/2018    HCT 39.0 08/15/2018    MCV 85 08/15/2018     08/15/2018    INR 1.0 08/14/2018    TSH 2.615 08/14/2018    CHOL 202 (H) 10/09/2018    HDL 62 10/09/2018    HDL 57 04/19/2017    LDLCALC 112.8 10/09/2018    TRIG 136 10/09/2018    TRIG 190 (H) 04/19/2017     Assessment:      1. TIA (transient ischemic attack)    2. COPD, moderate    3.  Mixed hyperlipidemia    4. Essential hypertension    5. Pulmonary sarcoidosis    6. Acquired hypothyroidism        Plan:   1. TIA x 2  - Event monitor x 30 days - negative? Will order Zio patch  - will discuss ILR as well if negative or recurrent TIA  - cont asa    2. HTN - BP elevated today  - increase Norvasc to 5 mg    3. HLD  - low aga diet  - cannot tolerate statin due to myopathy  - cant tolerate Zetia    4. COPD/Sarcoid  - cont meds per PCP/Pulm    5. Hypothyroidism  - cont synthroid     Thank you for allowing me to participate in this patient's care. Please do not hesitate to contact me with any questions or concerns. Consult note has been forwarded to the referral physician.

## 2018-10-22 ENCOUNTER — IMMUNIZATION (OUTPATIENT)
Dept: FAMILY MEDICINE | Facility: CLINIC | Age: 68
End: 2018-10-22
Payer: MEDICARE

## 2018-10-22 PROCEDURE — 99999 PR PBB SHADOW E&M-EST. PATIENT-LVL II: CPT | Mod: PBBFAC,,,

## 2018-10-22 PROCEDURE — 99212 OFFICE O/P EST SF 10 MIN: CPT | Mod: PBBFAC,PO,25

## 2018-10-22 PROCEDURE — 90686 IIV4 VACC NO PRSV 0.5 ML IM: CPT | Mod: PBBFAC,PO

## 2018-11-05 ENCOUNTER — HOSPITAL ENCOUNTER (OUTPATIENT)
Dept: RADIOLOGY | Facility: HOSPITAL | Age: 68
Discharge: HOME | End: 2018-11-05
Attending: FAMILY MEDICINE
Payer: MEDICARE

## 2018-11-05 ENCOUNTER — PATIENT MESSAGE (OUTPATIENT)
Dept: FAMILY MEDICINE | Facility: CLINIC | Age: 68
End: 2018-11-05

## 2018-11-05 DIAGNOSIS — Z12.39 BREAST CANCER SCREENING: ICD-10-CM

## 2018-11-05 PROCEDURE — 77063 BREAST TOMOSYNTHESIS BI: CPT | Mod: TC,HCNC

## 2018-11-05 PROCEDURE — 77067 SCR MAMMO BI INCL CAD: CPT | Mod: 26,HCNC,, | Performed by: RADIOLOGY

## 2018-11-05 PROCEDURE — 77067 SCR MAMMO BI INCL CAD: CPT | Mod: TC,HCNC

## 2018-11-05 PROCEDURE — 77063 BREAST TOMOSYNTHESIS BI: CPT | Mod: 26,HCNC,, | Performed by: RADIOLOGY

## 2018-11-13 ENCOUNTER — HOSPITAL ENCOUNTER (INPATIENT)
Facility: HOSPITAL | Age: 68
LOS: 7 days | Discharge: HOME HEALTH | DRG: 233 | End: 2018-11-20
Attending: EMERGENCY MEDICINE | Admitting: INTERNAL MEDICINE
Payer: MEDICARE

## 2018-11-13 DIAGNOSIS — Z98.890 POST-OPERATIVE STATE: ICD-10-CM

## 2018-11-13 DIAGNOSIS — Z99.11 ON MECHANICALLY ASSISTED VENTILATION: ICD-10-CM

## 2018-11-13 DIAGNOSIS — I10 ESSENTIAL HYPERTENSION: ICD-10-CM

## 2018-11-13 DIAGNOSIS — I25.10 CAD (CORONARY ARTERY DISEASE): ICD-10-CM

## 2018-11-13 DIAGNOSIS — D86.0 SARCOIDOSIS OF LUNG: ICD-10-CM

## 2018-11-13 DIAGNOSIS — R07.9 CHEST PAIN: ICD-10-CM

## 2018-11-13 DIAGNOSIS — I25.110 CORONARY ARTERY DISEASE INVOLVING NATIVE CORONARY ARTERY OF NATIVE HEART WITH UNSTABLE ANGINA PECTORIS: ICD-10-CM

## 2018-11-13 DIAGNOSIS — Z01.810 PRE-OPERATIVE CARDIOVASCULAR EXAMINATION: ICD-10-CM

## 2018-11-13 DIAGNOSIS — I21.4 NSTEMI (NON-ST ELEVATED MYOCARDIAL INFARCTION): ICD-10-CM

## 2018-11-13 DIAGNOSIS — Z95.1 S/P CABG X 5: Primary | ICD-10-CM

## 2018-11-13 DIAGNOSIS — E03.9 ACQUIRED HYPOTHYROIDISM: ICD-10-CM

## 2018-11-13 DIAGNOSIS — J44.9 COPD, MODERATE: ICD-10-CM

## 2018-11-13 LAB
ALBUMIN SERPL BCP-MCNC: 3.9 G/DL
ALP SERPL-CCNC: 85 U/L
ALT SERPL W/O P-5'-P-CCNC: 16 U/L
ANION GAP SERPL CALC-SCNC: 9 MMOL/L
APTT BLDCRRT: 28 SEC
AST SERPL-CCNC: 22 U/L
BASOPHILS # BLD AUTO: 0.02 K/UL
BASOPHILS NFR BLD: 0.2 %
BILIRUB SERPL-MCNC: 0.2 MG/DL
BUN SERPL-MCNC: 14 MG/DL
CALCIUM SERPL-MCNC: 9.4 MG/DL
CHLORIDE SERPL-SCNC: 108 MMOL/L
CO2 SERPL-SCNC: 24 MMOL/L
CREAT SERPL-MCNC: 0.8 MG/DL
DIFFERENTIAL METHOD: NORMAL
EOSINOPHIL # BLD AUTO: 0 K/UL
EOSINOPHIL NFR BLD: 0.1 %
ERYTHROCYTE [DISTWIDTH] IN BLOOD BY AUTOMATED COUNT: 13.9 %
EST. GFR  (AFRICAN AMERICAN): >60 ML/MIN/1.73 M^2
EST. GFR  (NON AFRICAN AMERICAN): >60 ML/MIN/1.73 M^2
GLUCOSE SERPL-MCNC: 141 MG/DL
HCT VFR BLD AUTO: 42.3 %
HGB BLD-MCNC: 14.2 G/DL
INR PPP: 0.9
LYMPHOCYTES # BLD AUTO: 1.7 K/UL
LYMPHOCYTES NFR BLD: 18.4 %
MCH RBC QN AUTO: 28.3 PG
MCHC RBC AUTO-ENTMCNC: 33.6 G/DL
MCV RBC AUTO: 84 FL
MONOCYTES # BLD AUTO: 0.8 K/UL
MONOCYTES NFR BLD: 8.7 %
NEUTROPHILS # BLD AUTO: 6.8 K/UL
NEUTROPHILS NFR BLD: 72.6 %
PLATELET # BLD AUTO: 271 K/UL
PMV BLD AUTO: 10 FL
POTASSIUM SERPL-SCNC: 3.7 MMOL/L
PROT SERPL-MCNC: 7.4 G/DL
PROTHROMBIN TIME: 9.8 SEC
RBC # BLD AUTO: 5.02 M/UL
SODIUM SERPL-SCNC: 141 MMOL/L
TROPONIN I SERPL DL<=0.01 NG/ML-MCNC: 0.24 NG/ML
WBC # BLD AUTO: 9.34 K/UL

## 2018-11-13 PROCEDURE — 25000003 PHARM REV CODE 250: Mod: HCNC | Performed by: NURSE PRACTITIONER

## 2018-11-13 PROCEDURE — 85730 THROMBOPLASTIN TIME PARTIAL: CPT | Mod: HCNC

## 2018-11-13 PROCEDURE — 21400001 HC TELEMETRY ROOM: Mod: HCNC

## 2018-11-13 PROCEDURE — 96375 TX/PRO/DX INJ NEW DRUG ADDON: CPT | Mod: HCNC

## 2018-11-13 PROCEDURE — 96374 THER/PROPH/DIAG INJ IV PUSH: CPT | Mod: HCNC

## 2018-11-13 PROCEDURE — 80053 COMPREHEN METABOLIC PANEL: CPT | Mod: HCNC

## 2018-11-13 PROCEDURE — 84484 ASSAY OF TROPONIN QUANT: CPT | Mod: HCNC

## 2018-11-13 PROCEDURE — 84484 ASSAY OF TROPONIN QUANT: CPT | Mod: 91,HCNC

## 2018-11-13 PROCEDURE — 63600175 PHARM REV CODE 636 W HCPCS: Mod: HCNC | Performed by: EMERGENCY MEDICINE

## 2018-11-13 PROCEDURE — 25000003 PHARM REV CODE 250: Mod: HCNC | Performed by: EMERGENCY MEDICINE

## 2018-11-13 PROCEDURE — 36415 COLL VENOUS BLD VENIPUNCTURE: CPT | Mod: HCNC

## 2018-11-13 PROCEDURE — 85025 COMPLETE CBC W/AUTO DIFF WBC: CPT | Mod: HCNC

## 2018-11-13 PROCEDURE — 94761 N-INVAS EAR/PLS OXIMETRY MLT: CPT | Mod: HCNC

## 2018-11-13 PROCEDURE — 99285 EMERGENCY DEPT VISIT HI MDM: CPT | Mod: 25,HCNC

## 2018-11-13 PROCEDURE — 93005 ELECTROCARDIOGRAM TRACING: CPT | Mod: 59,HCNC

## 2018-11-13 PROCEDURE — 85610 PROTHROMBIN TIME: CPT | Mod: HCNC

## 2018-11-13 PROCEDURE — 83036 HEMOGLOBIN GLYCOSYLATED A1C: CPT | Mod: HCNC

## 2018-11-13 RX ORDER — IBUPROFEN 200 MG
16 TABLET ORAL
Status: DISCONTINUED | OUTPATIENT
Start: 2018-11-13 | End: 2018-11-15

## 2018-11-13 RX ORDER — NAPROXEN SODIUM 220 MG/1
81 TABLET, FILM COATED ORAL DAILY
Status: DISCONTINUED | OUTPATIENT
Start: 2018-11-14 | End: 2018-11-15

## 2018-11-13 RX ORDER — GLUCAGON 1 MG
1 KIT INJECTION
Status: DISCONTINUED | OUTPATIENT
Start: 2018-11-13 | End: 2018-11-15

## 2018-11-13 RX ORDER — SODIUM CHLORIDE 0.9 % (FLUSH) 0.9 %
5 SYRINGE (ML) INJECTION
Status: DISCONTINUED | OUTPATIENT
Start: 2018-11-13 | End: 2018-11-15

## 2018-11-13 RX ORDER — HEPARIN SODIUM 5000 [USP'U]/ML
4000 INJECTION, SOLUTION INTRAVENOUS; SUBCUTANEOUS
Status: COMPLETED | OUTPATIENT
Start: 2018-11-13 | End: 2018-11-13

## 2018-11-13 RX ORDER — ONDANSETRON 2 MG/ML
4 INJECTION INTRAMUSCULAR; INTRAVENOUS EVERY 8 HOURS PRN
Status: DISCONTINUED | OUTPATIENT
Start: 2018-11-13 | End: 2018-11-15

## 2018-11-13 RX ORDER — IPRATROPIUM BROMIDE AND ALBUTEROL SULFATE 2.5; .5 MG/3ML; MG/3ML
3 SOLUTION RESPIRATORY (INHALATION) EVERY 4 HOURS PRN
Status: DISCONTINUED | OUTPATIENT
Start: 2018-11-13 | End: 2018-11-15

## 2018-11-13 RX ORDER — NITROGLYCERIN 0.4 MG/1
0.4 TABLET SUBLINGUAL
Status: COMPLETED | OUTPATIENT
Start: 2018-11-13 | End: 2018-11-13

## 2018-11-13 RX ORDER — METOPROLOL TARTRATE 25 MG/1
25 TABLET, FILM COATED ORAL EVERY 12 HOURS
Status: DISCONTINUED | OUTPATIENT
Start: 2018-11-13 | End: 2018-11-15

## 2018-11-13 RX ORDER — METOPROLOL TARTRATE 1 MG/ML
2.5 INJECTION, SOLUTION INTRAVENOUS
Status: COMPLETED | OUTPATIENT
Start: 2018-11-13 | End: 2018-11-13

## 2018-11-13 RX ORDER — ACETAMINOPHEN 325 MG/1
650 TABLET ORAL EVERY 6 HOURS PRN
Status: DISCONTINUED | OUTPATIENT
Start: 2018-11-13 | End: 2018-11-14

## 2018-11-13 RX ORDER — AMLODIPINE BESYLATE 5 MG/1
5 TABLET ORAL DAILY
Status: DISCONTINUED | OUTPATIENT
Start: 2018-11-14 | End: 2018-11-15

## 2018-11-13 RX ORDER — IBUPROFEN 200 MG
24 TABLET ORAL
Status: DISCONTINUED | OUTPATIENT
Start: 2018-11-13 | End: 2018-11-15

## 2018-11-13 RX ORDER — ASPIRIN 325 MG
325 TABLET ORAL
Status: COMPLETED | OUTPATIENT
Start: 2018-11-13 | End: 2018-11-13

## 2018-11-13 RX ADMIN — HEPARIN SODIUM 4000 UNITS: 5000 INJECTION, SOLUTION INTRAVENOUS; SUBCUTANEOUS at 05:11

## 2018-11-13 RX ADMIN — ASPIRIN 325 MG ORAL TABLET 325 MG: 325 PILL ORAL at 04:11

## 2018-11-13 RX ADMIN — NITROGLYCERIN 1 INCH: 20 OINTMENT TOPICAL at 05:11

## 2018-11-13 RX ADMIN — METOPROLOL TARTRATE 2.5 MG: 5 INJECTION, SOLUTION INTRAVENOUS at 05:11

## 2018-11-13 RX ADMIN — NITROGLYCERIN 0.4 MG: 0.4 TABLET, ORALLY DISINTEGRATING SUBLINGUAL at 04:11

## 2018-11-13 RX ADMIN — METOPROLOL TARTRATE 25 MG: 25 TABLET ORAL at 09:11

## 2018-11-13 NOTE — ED PROVIDER NOTES
SCRIBE #1 NOTE: I, Eliezer Yusuf, am scribing for, and in the presence of, Eric Johns MD. I have scribed the entire note.       History     Chief Complaint   Patient presents with    Chest Pain     chest pain x3 days, radiates to left arm and back     Review of patient's allergies indicates:   Allergen Reactions    Beclomethasone dipropionate      Causes mouth Sores    Danazol Hives    Doxycycline Hives, Itching and Swelling    Fluticasone-salmeterol      Elevates Blood Pressure    Statins-hmg-coa reductase inhibitors      Muscle weakness    Umeclidinium-vilanterol      bronchitis    Influenza virus vaccines Rash     Reaction per patient.       Levothyroxine Rash    Red dye Rash         History of Present Illness     HPI    11/13/2018, 4:20 PM  History obtained from the patient      History of Present Illness: Eve Long is a 68 y.o. female patient with a PMHx of anemia, HLD, HTN, pneumonia, and sarcoidosis who presents to the Emergency Department for evaluation of constant left sided chest pain which onset suddenly x2-4 days ago. Pt characterizes her pain as a pressure. Pt reports a strong FMHx of cardiac disease and reports her last stress test was done in 2006. Symptoms are constant and moderate in severity. Exacerbated by exertion and relieved by anti-hypertensive medications. Associated sxs include diaphoresis, left arm pain radiating from chest and lef jaw pain radiating from chest. Patient denies any fever, chills, SOB, leg swelling, palpitations, N/V, dysuria, hematuria, HA, weakness, and all other sxs at this time. No further complaints or concerns at this time.     Arrival mode: Personal vehicle    PCP: Chaya Morelos MD        Past Medical History:  Past Medical History:   Diagnosis Date    Anemia     Asthma     Basal cell carcinoma     COPD (chronic obstructive pulmonary disease)     Hyperlipidemia     Hypertension     Immune deficiency disorder     NSTEMI  (non-ST elevated myocardial infarction) 11/13/2018    Pneumonia     Sarcoidosis     TIA (transient ischemic attack)        Past Surgical History:  Past Surgical History:   Procedure Laterality Date    CHOLECYSTECTOMY      COSMETIC SURGERY      blepharplasty    FRACTURE SURGERY Left     wrist ORIF    HYSTERECTOMY      LUNG BIOPSY      OOPHORECTOMY      SKIN BIOPSY      TONSILLECTOMY           Family History:  Family History   Problem Relation Age of Onset    Myasthenia gravis Brother     Stroke Mother     Heart disease Father         CHF    Stroke Sister     Thyroid disease Sister        Social History:  Social History     Tobacco Use    Smoking status: Never Smoker    Smokeless tobacco: Never Used   Substance and Sexual Activity    Alcohol use: Yes     Alcohol/week: 0.6 oz     Types: 1 Glasses of wine per week     Comment: yearly    Drug use: No    Sexual activity: Not Currently        Review of Systems     Review of Systems   Constitutional: Positive for diaphoresis. Negative for chills, fatigue and fever.        (-) Recent travel  (-) Long car trips   HENT: Negative for congestion, sore throat and trouble swallowing.         (+) Left jaw pain radiating from chest   Respiratory: Negative for cough, chest tightness and shortness of breath.    Cardiovascular: Positive for chest pain (Left sided with radiation). Negative for palpitations and leg swelling.   Gastrointestinal: Negative for abdominal pain, nausea and vomiting.   Genitourinary: Negative for dysuria and hematuria.   Musculoskeletal: Negative for back pain and neck pain.        (+) Left arm pain radiating from chest   Skin: Negative for rash.   Neurological: Negative for dizziness, weakness, light-headedness and headaches.   Hematological: Does not bruise/bleed easily.   All other systems reviewed and are negative.     Physical Exam     Initial Vitals [11/13/18 1544]   BP Pulse Resp Temp SpO2   (!) 157/79 77 17 98.3 °F (36.8 °C) 96 %       MAP       --          Physical Exam  Nursing Notes and Vital Signs Reviewed.  Constitutional: Patient is in no acute distress. Well-developed and well-nourished.  Head: Atraumatic. Normocephalic.  Eyes: PERRL. EOM intact. Conjunctivae are not pale. No scleral icterus.  ENT: Mucous membranes are moist. Oropharynx is clear and symmetric.    Neck: Supple. Full ROM. No lymphadenopathy.  Cardiovascular: Regular rate. Regular rhythm. No murmurs, rubs, or gallops. Distal pulses are 2+ and symmetric.  Pulmonary/Chest: No respiratory distress. Clear to auscultation bilaterally. No wheezing or rales.  Abdominal: Soft and non-distended.  There is no tenderness.  Musculoskeletal: Moves all extremities. No obvious deformities. No edema. No calf tenderness. No unilateral leg width discrepancy/.   Skin: Warm and dry.  Neurological:  Alert, awake, and appropriate.  Normal speech.  No acute focal neurological deficits are appreciated.  Psychiatric: Normal affect. Good eye contact. Appropriate in content.     ED Course   Critical Care  Date/Time: 11/13/2018 5:26 PM  Performed by: Eric Johns MD  Authorized by: Eric Johns MD   Direct patient critical care time: 10 minutes  Additional history critical care time: 5 minutes  Ordering / reviewing critical care time: 5 minutes  Documentation critical care time: 5 minutes  Consulting other physicians critical care time: 5 minutes  Consult with family critical care time: 5 minutes  Total critical care time (exclusive of procedural time) : 35 minutes  Critical care time was exclusive of separately billable procedures and treating other patients.  Critical care was necessary to treat or prevent imminent or life-threatening deterioration of the following conditions: NSTEMI.  Critical care was time spent personally by me on the following activities: blood draw for specimens, development of treatment plan with patient or surrogate, discussions with consultants, interpretation of  "cardiac output measurements, evaluation of patient's response to treatment, examination of patient, obtaining history from patient or surrogate, ordering and performing treatments and interventions, ordering and review of laboratory studies, ordering and review of radiographic studies, pulse oximetry, re-evaluation of patient's condition and review of old charts.        ED Vital Signs:  Vitals:    11/13/18 1544 11/13/18 1641 11/13/18 1715   BP: (!) 157/79 (!) 157/82 135/65   Pulse: 77 87 83   Resp: 17 18 18   Temp: 98.3 °F (36.8 °C)     TempSrc: Oral     SpO2: 96% (!) 94% 96%   Weight: 75.3 kg (166 lb 0.1 oz)     Height: 5' 3" (1.6 m)         Abnormal Lab Results:  Labs Reviewed   COMPREHENSIVE METABOLIC PANEL - Abnormal; Notable for the following components:       Result Value    Glucose 141 (*)     All other components within normal limits   TROPONIN I - Abnormal; Notable for the following components:    Troponin I 0.241 (*)     All other components within normal limits   CBC W/ AUTO DIFFERENTIAL   APTT   PROTIME-INR        All Lab Results:  Results for orders placed or performed during the hospital encounter of 11/13/18   CBC auto differential   Result Value Ref Range    WBC 9.34 3.90 - 12.70 K/uL    RBC 5.02 4.00 - 5.40 M/uL    Hemoglobin 14.2 12.0 - 16.0 g/dL    Hematocrit 42.3 37.0 - 48.5 %    MCV 84 82 - 98 fL    MCH 28.3 27.0 - 31.0 pg    MCHC 33.6 32.0 - 36.0 g/dL    RDW 13.9 11.5 - 14.5 %    Platelets 271 150 - 350 K/uL    MPV 10.0 9.2 - 12.9 fL    Gran # (ANC) 6.8 1.8 - 7.7 K/uL    Lymph # 1.7 1.0 - 4.8 K/uL    Mono # 0.8 0.3 - 1.0 K/uL    Eos # 0.0 0.0 - 0.5 K/uL    Baso # 0.02 0.00 - 0.20 K/uL    Gran% 72.6 38.0 - 73.0 %    Lymph% 18.4 18.0 - 48.0 %    Mono% 8.7 4.0 - 15.0 %    Eosinophil% 0.1 0.0 - 8.0 %    Basophil% 0.2 0.0 - 1.9 %    Differential Method Automated    Comprehensive metabolic panel   Result Value Ref Range    Sodium 141 136 - 145 mmol/L    Potassium 3.7 3.5 - 5.1 mmol/L    Chloride 108 " 95 - 110 mmol/L    CO2 24 23 - 29 mmol/L    Glucose 141 (H) 70 - 110 mg/dL    BUN, Bld 14 8 - 23 mg/dL    Creatinine 0.8 0.5 - 1.4 mg/dL    Calcium 9.4 8.7 - 10.5 mg/dL    Total Protein 7.4 6.0 - 8.4 g/dL    Albumin 3.9 3.5 - 5.2 g/dL    Total Bilirubin 0.2 0.1 - 1.0 mg/dL    Alkaline Phosphatase 85 55 - 135 U/L    AST 22 10 - 40 U/L    ALT 16 10 - 44 U/L    Anion Gap 9 8 - 16 mmol/L    eGFR if African American >60 >60 mL/min/1.73 m^2    eGFR if non African American >60 >60 mL/min/1.73 m^2   Troponin I   Result Value Ref Range    Troponin I 0.241 (H) 0.000 - 0.026 ng/mL         Imaging Results:  Imaging Results          X-Ray Chest PA And Lateral (Final result)  Result time 11/13/18 16:13:43    Final result by Jose Alberto Lang MD (11/13/18 16:13:43)                 Impression:      No acute findings.      Electronically signed by: Jose Alberto Lang MD  Date:    11/13/2018  Time:    16:13             Narrative:    EXAMINATION:  XR CHEST PA AND LATERAL    CLINICAL HISTORY:  Chest pain, unspecified    TECHNIQUE:  PA and lateral views of the chest were performed.    COMPARISON:  08/29/2018    FINDINGS:  The cardiac and mediastinal silhouettes appear within normal limits. Stable calcified granuloma in the left lung base.  Stable pleural parenchymal scarring in the right lower lobe.  Cholecystectomy clips in the right upper quadrant.  No acute osseous findings demonstrated.                                 The EKG was ordered, reviewed, and independently interpreted by the ED provider.    The EKG was ordered, reviewed, and independently interpreted by the ED provider.  Interpretation time: 15:53  Rate: 95 BPM  Rhythm: Normal sinus rhythm with sinus arrhythmia  Interpretation: Normal axis, normal AL interval, normal QRS interval, QTC interval 495, significant T-wave inversions in V1 through V 5, no ST depression, no ST elevation.  . No STEMI.  T-wave inversions are new when compared to prior EKG on August 14, 2018                 The  Emergency Provider reviewed the vital signs and test results, which are outlined above.     ED Discussion     4:50 PM: Re-evaluated pt. Pt is resting comfortably and is in no acute distress.  Pt states she is feeling much better following nitro in ED.  D/w pt all pertinent results. D/w pt any concerns expressed at this time. Answered all questions. Pt expresses understanding at this time.    5:17 PM: Dr. Johns discussed the pt's case with Dr. Rodríguez (Cardiology) who recommends nitro paste, IV heparin, and a beta-blocker. He also recommends admitting to hospital medicine.     5:28 PM: Discussed case with Glenna Capps NP (Hospital Medicine). Dr. White agrees with current care and management of pt and accepts admission.   Admitting Service: Hospital medicine   Admitting Physician: Dr. White  Admit to: Telemetry    5:38 PM: Re-evaluated pt. I have discussed test results, shared treatment plan, and the need for admission with patient and family at bedside. Pt and family express understanding at this time and agree with all information. All questions answered. Pt and family have no further questions or concerns at this time. Pt is ready for admit.        ED Medication(s):  Medications   nitroGLYCERIN 2% TD oint ointment 1 inch (not administered)   metoprolol injection 2.5 mg (not administered)   heparin (porcine) injection 4,000 Units (not administered)   aspirin tablet 325 mg (325 mg Oral Given 11/13/18 1639)   nitroGLYCERIN SL tablet 0.4 mg (0.4 mg Sublingual Given 11/13/18 1640)                   Medical Decision Making:   Clinical Tests:   Lab Tests: Ordered and Reviewed  Radiological Study: Ordered and Reviewed  Medical Tests: Ordered and Reviewed  NSTEMI; treated in consultation with Cardiology with aspirin, nitro paste, beta blockers, heparin; patient admitted to Medicine             Scribe Attestation:   Scribe #1: I performed the above scribed service and the documentation accurately describes the  services I performed. I attest to the accuracy of the note.     Attending:   Physician Attestation Statement for Scribe #1: I, Eric Johns MD, personally performed the services described in this documentation, as scribed by Eliezer Yusuf, in my presence, and it is both accurate and complete.           Clinical Impression       ICD-10-CM ICD-9-CM   1. NSTEMI (non-ST elevated myocardial infarction) I21.4 410.70   2. Chest pain R07.9 786.50       Disposition:   Disposition: Admitted  Condition: Stable         Eric Johns MD  11/14/18 1040

## 2018-11-13 NOTE — HPI
Eve Long is a 68 y.o. female patient with a PMHx of anemia, HLD, HTN, pneumonia, sarcoidosis, who presented to the ER for evaluation of constant left sided chest pain which onset suddenly x2-4 days ago. Pt characterizes her pain as a pressure 10/10 at its worse. Pt reports a strong FMHx of cardiac disease (Father with CABG/MI, all materal uncles with CAD) and reports her last stress test was done in 2006. Symptoms are constant and moderate in severity. Exacerbated by nothing and relieved by anti-hypertensive medications. Associated sxs included diaphoresis, left arm pain radiating from chest and lef jaw pain radiating from chest. Patient denied any fever, chills, SOB, leg swelling, palpitations, N/V, dysuria, hematuria, HA, weakness, and all other sxs at this time. In ER, troponin 0.241. Cardiology was consulted and Dr. Rodríguez recommended IV Heparin Drip. Spouse, Bravo Long, is the surrogate decision maker, HOME [phone]. She was admitted to telemetry Dx NSTEMI.

## 2018-11-14 PROBLEM — I25.110 CORONARY ARTERY DISEASE INVOLVING NATIVE CORONARY ARTERY OF NATIVE HEART WITH UNSTABLE ANGINA PECTORIS: Status: ACTIVE | Noted: 2018-11-14

## 2018-11-14 LAB
ABO + RH BLD: NORMAL
ALBUMIN SERPL BCP-MCNC: 3.4 G/DL
ALBUMIN SERPL BCP-MCNC: 3.4 G/DL
ALP SERPL-CCNC: 71 U/L
ALP SERPL-CCNC: 74 U/L
ALT SERPL W/O P-5'-P-CCNC: 14 U/L
ALT SERPL W/O P-5'-P-CCNC: 15 U/L
ANION GAP SERPL CALC-SCNC: 8 MMOL/L
ANION GAP SERPL CALC-SCNC: 9 MMOL/L
APTT BLDCRRT: 136.9 SEC
APTT BLDCRRT: 27.3 SEC
APTT BLDCRRT: 31 SEC
APTT BLDCRRT: >150 SEC
AST SERPL-CCNC: 20 U/L
AST SERPL-CCNC: 21 U/L
BASOPHILS # BLD AUTO: 0.02 K/UL
BASOPHILS NFR BLD: 0.2 %
BILIRUB SERPL-MCNC: 0.3 MG/DL
BILIRUB SERPL-MCNC: 0.3 MG/DL
BILIRUB UR QL STRIP: NEGATIVE
BLD GP AB SCN CELLS X3 SERPL QL: NORMAL
BNP SERPL-MCNC: 352 PG/ML
BUN SERPL-MCNC: 15 MG/DL
BUN SERPL-MCNC: 19 MG/DL
CALCIUM SERPL-MCNC: 8.7 MG/DL
CALCIUM SERPL-MCNC: 9.3 MG/DL
CHLORIDE SERPL-SCNC: 107 MMOL/L
CHLORIDE SERPL-SCNC: 109 MMOL/L
CLARITY UR: CLEAR
CO2 SERPL-SCNC: 20 MMOL/L
CO2 SERPL-SCNC: 22 MMOL/L
COLOR UR: YELLOW
CORONARY STENOSIS: ABNORMAL
CREAT SERPL-MCNC: 0.7 MG/DL
CREAT SERPL-MCNC: 0.8 MG/DL
DIASTOLIC DYSFUNCTION: YES
DIFFERENTIAL METHOD: ABNORMAL
DIFFERENTIAL METHOD: ABNORMAL
DIFFERENTIAL METHOD: NORMAL
EOSINOPHIL # BLD AUTO: 0 K/UL
EOSINOPHIL NFR BLD: 0 %
ERYTHROCYTE [DISTWIDTH] IN BLOOD BY AUTOMATED COUNT: 14 %
EST. GFR  (AFRICAN AMERICAN): >60 ML/MIN/1.73 M^2
EST. GFR  (AFRICAN AMERICAN): >60 ML/MIN/1.73 M^2
EST. GFR  (NON AFRICAN AMERICAN): >60 ML/MIN/1.73 M^2
EST. GFR  (NON AFRICAN AMERICAN): >60 ML/MIN/1.73 M^2
ESTIMATED AVG GLUCOSE: 111 MG/DL
GLUCOSE SERPL-MCNC: 104 MG/DL
GLUCOSE SERPL-MCNC: 185 MG/DL
GLUCOSE UR QL STRIP: NEGATIVE
HBA1C MFR BLD HPLC: 5.5 %
HCT VFR BLD AUTO: 36.9 %
HCT VFR BLD AUTO: 36.9 %
HCT VFR BLD AUTO: 38.2 %
HGB BLD-MCNC: 12.2 G/DL
HGB BLD-MCNC: 12.2 G/DL
HGB BLD-MCNC: 12.6 G/DL
HGB UR QL STRIP: ABNORMAL
INR PPP: 1
KETONES UR QL STRIP: NEGATIVE
LEUKOCYTE ESTERASE UR QL STRIP: NEGATIVE
LYMPHOCYTES # BLD AUTO: 2 K/UL
LYMPHOCYTES # BLD AUTO: 2 K/UL
LYMPHOCYTES # BLD AUTO: 2.2 K/UL
LYMPHOCYTES NFR BLD: 23.3 %
LYMPHOCYTES NFR BLD: 23.3 %
LYMPHOCYTES NFR BLD: 25.8 %
MAGNESIUM SERPL-MCNC: 2.2 MG/DL
MCH RBC QN AUTO: 28.2 PG
MCH RBC QN AUTO: 28.2 PG
MCH RBC QN AUTO: 28.5 PG
MCHC RBC AUTO-ENTMCNC: 33 G/DL
MCHC RBC AUTO-ENTMCNC: 33.1 G/DL
MCHC RBC AUTO-ENTMCNC: 33.1 G/DL
MCV RBC AUTO: 85 FL
MCV RBC AUTO: 85 FL
MCV RBC AUTO: 86 FL
MONOCYTES # BLD AUTO: 0.7 K/UL
MONOCYTES # BLD AUTO: 0.8 K/UL
MONOCYTES # BLD AUTO: 0.8 K/UL
MONOCYTES NFR BLD: 8.6 %
MONOCYTES NFR BLD: 8.9 %
MONOCYTES NFR BLD: 8.9 %
NEUTROPHILS # BLD AUTO: 5.6 K/UL
NEUTROPHILS # BLD AUTO: 5.8 K/UL
NEUTROPHILS # BLD AUTO: 5.8 K/UL
NEUTROPHILS NFR BLD: 65.4 %
NEUTROPHILS NFR BLD: 67.6 %
NEUTROPHILS NFR BLD: 67.6 %
NITRITE UR QL STRIP: NEGATIVE
PERIPHERAL STENOSIS: ABNORMAL
PH UR STRIP: 5 [PH] (ref 5–8)
PLATELET # BLD AUTO: 224 K/UL
PLATELET # BLD AUTO: 236 K/UL
PLATELET # BLD AUTO: 236 K/UL
PMV BLD AUTO: 9.5 FL
PMV BLD AUTO: 9.8 FL
PMV BLD AUTO: 9.8 FL
POC ACTIVATED CLOTTING TIME K: 197 SEC (ref 74–137)
POTASSIUM SERPL-SCNC: 4.2 MMOL/L
POTASSIUM SERPL-SCNC: 4.2 MMOL/L
PREALB SERPL-MCNC: 15 MG/DL
PROT SERPL-MCNC: 6.2 G/DL
PROT SERPL-MCNC: 6.3 G/DL
PROT UR QL STRIP: NEGATIVE
PROTHROMBIN TIME: 10.7 SEC
RBC # BLD AUTO: 4.32 M/UL
RBC # BLD AUTO: 4.32 M/UL
RBC # BLD AUTO: 4.42 M/UL
RETIRED EF AND QEF - SEE NOTES: 40 (ref 55–65)
SAMPLE: ABNORMAL
SODIUM SERPL-SCNC: 136 MMOL/L
SODIUM SERPL-SCNC: 139 MMOL/L
SP GR UR STRIP: 1.02 (ref 1–1.03)
TROPONIN I SERPL DL<=0.01 NG/ML-MCNC: 0.29 NG/ML
TROPONIN I SERPL DL<=0.01 NG/ML-MCNC: 0.3 NG/ML
TSH SERPL DL<=0.005 MIU/L-ACNC: 3.34 UIU/ML
URN SPEC COLLECT METH UR: ABNORMAL
UROBILINOGEN UR STRIP-ACNC: NEGATIVE EU/DL
WBC # BLD AUTO: 8.52 K/UL
WBC # BLD AUTO: 8.64 K/UL
WBC # BLD AUTO: 8.64 K/UL

## 2018-11-14 PROCEDURE — 25000003 PHARM REV CODE 250: Mod: HCNC | Performed by: NURSE PRACTITIONER

## 2018-11-14 PROCEDURE — B2151ZZ FLUOROSCOPY OF LEFT HEART USING LOW OSMOLAR CONTRAST: ICD-10-PCS | Performed by: INTERNAL MEDICINE

## 2018-11-14 PROCEDURE — 93458 L HRT ARTERY/VENTRICLE ANGIO: CPT | Mod: HCNC

## 2018-11-14 PROCEDURE — 25000003 PHARM REV CODE 250: Mod: HCNC

## 2018-11-14 PROCEDURE — 4A023N7 MEASUREMENT OF CARDIAC SAMPLING AND PRESSURE, LEFT HEART, PERCUTANEOUS APPROACH: ICD-10-PCS | Performed by: INTERNAL MEDICINE

## 2018-11-14 PROCEDURE — 36415 COLL VENOUS BLD VENIPUNCTURE: CPT | Mod: HCNC

## 2018-11-14 PROCEDURE — 93458 L HRT ARTERY/VENTRICLE ANGIO: CPT | Mod: 26,51,HCNC, | Performed by: INTERNAL MEDICINE

## 2018-11-14 PROCEDURE — 83880 ASSAY OF NATRIURETIC PEPTIDE: CPT | Mod: HCNC

## 2018-11-14 PROCEDURE — 25000003 PHARM REV CODE 250: Mod: HCNC | Performed by: INTERNAL MEDICINE

## 2018-11-14 PROCEDURE — 63600175 PHARM REV CODE 636 W HCPCS: Mod: HCNC

## 2018-11-14 PROCEDURE — 94761 N-INVAS EAR/PLS OXIMETRY MLT: CPT | Mod: HCNC

## 2018-11-14 PROCEDURE — 84134 ASSAY OF PREALBUMIN: CPT | Mod: HCNC

## 2018-11-14 PROCEDURE — 94799 UNLISTED PULMONARY SVC/PX: CPT | Mod: HCNC

## 2018-11-14 PROCEDURE — 93010 ELECTROCARDIOGRAM REPORT: CPT | Mod: HCNC,,, | Performed by: INTERNAL MEDICINE

## 2018-11-14 PROCEDURE — 93306 TTE W/DOPPLER COMPLETE: CPT | Mod: HCNC

## 2018-11-14 PROCEDURE — 85025 COMPLETE CBC W/AUTO DIFF WBC: CPT | Mod: HCNC

## 2018-11-14 PROCEDURE — B3121ZZ FLUOROSCOPY OF LEFT SUBCLAVIAN ARTERY USING LOW OSMOLAR CONTRAST: ICD-10-PCS | Performed by: INTERNAL MEDICINE

## 2018-11-14 PROCEDURE — 85730 THROMBOPLASTIN TIME PARTIAL: CPT | Mod: 91,HCNC

## 2018-11-14 PROCEDURE — 93306 TTE W/DOPPLER COMPLETE: CPT | Mod: 26,HCNC,, | Performed by: INTERNAL MEDICINE

## 2018-11-14 PROCEDURE — B41D0ZZ FLUOROSCOPY OF AORTA AND BILATERAL LOWER EXTREMITY ARTERIES USING HIGH OSMOLAR CONTRAST: ICD-10-PCS | Performed by: INTERNAL MEDICINE

## 2018-11-14 PROCEDURE — 92978 ENDOLUMINL IVUS OCT C 1ST: CPT | Mod: 26,HCNC,, | Performed by: INTERNAL MEDICINE

## 2018-11-14 PROCEDURE — 33967 INSERT I-AORT PERCUT DEVICE: CPT | Mod: HCNC,,, | Performed by: INTERNAL MEDICINE

## 2018-11-14 PROCEDURE — 86920 COMPATIBILITY TEST SPIN: CPT | Mod: HCNC

## 2018-11-14 PROCEDURE — 80053 COMPREHEN METABOLIC PANEL: CPT | Mod: HCNC

## 2018-11-14 PROCEDURE — 85610 PROTHROMBIN TIME: CPT | Mod: HCNC

## 2018-11-14 PROCEDURE — 99291 CRITICAL CARE FIRST HOUR: CPT | Mod: HCNC,,, | Performed by: NURSE PRACTITIONER

## 2018-11-14 PROCEDURE — C1887 CATHETER, GUIDING: HCPCS | Mod: HCNC

## 2018-11-14 PROCEDURE — 87081 CULTURE SCREEN ONLY: CPT | Mod: HCNC

## 2018-11-14 PROCEDURE — 80053 COMPREHEN METABOLIC PANEL: CPT | Mod: 91,HCNC

## 2018-11-14 PROCEDURE — 83735 ASSAY OF MAGNESIUM: CPT | Mod: HCNC

## 2018-11-14 PROCEDURE — 93005 ELECTROCARDIOGRAM TRACING: CPT | Mod: HCNC

## 2018-11-14 PROCEDURE — 85347 COAGULATION TIME ACTIVATED: CPT | Mod: HCNC

## 2018-11-14 PROCEDURE — 99152 MOD SED SAME PHYS/QHP 5/>YRS: CPT | Mod: HCNC,,, | Performed by: INTERNAL MEDICINE

## 2018-11-14 PROCEDURE — B240ZZ3 ULTRASONOGRAPHY OF SINGLE CORONARY ARTERY, INTRAVASCULAR: ICD-10-PCS | Performed by: INTERNAL MEDICINE

## 2018-11-14 PROCEDURE — 20000000 HC ICU ROOM: Mod: HCNC

## 2018-11-14 PROCEDURE — 86850 RBC ANTIBODY SCREEN: CPT | Mod: HCNC

## 2018-11-14 PROCEDURE — 5A02210 ASSISTANCE WITH CARDIAC OUTPUT USING BALLOON PUMP, CONTINUOUS: ICD-10-PCS | Performed by: INTERNAL MEDICINE

## 2018-11-14 PROCEDURE — 84443 ASSAY THYROID STIM HORMONE: CPT | Mod: HCNC

## 2018-11-14 PROCEDURE — B2111ZZ FLUOROSCOPY OF MULTIPLE CORONARY ARTERIES USING LOW OSMOLAR CONTRAST: ICD-10-PCS | Performed by: INTERNAL MEDICINE

## 2018-11-14 PROCEDURE — 84484 ASSAY OF TROPONIN QUANT: CPT | Mod: HCNC

## 2018-11-14 PROCEDURE — 99223 1ST HOSP IP/OBS HIGH 75: CPT | Mod: 25,HCNC,, | Performed by: INTERNAL MEDICINE

## 2018-11-14 PROCEDURE — 85730 THROMBOPLASTIN TIME PARTIAL: CPT | Mod: HCNC

## 2018-11-14 PROCEDURE — 63600175 PHARM REV CODE 636 W HCPCS: Mod: HCNC | Performed by: NURSE PRACTITIONER

## 2018-11-14 PROCEDURE — 81003 URINALYSIS AUTO W/O SCOPE: CPT | Mod: HCNC

## 2018-11-14 RX ORDER — NITROGLYCERIN 20 MG/100ML
10 INJECTION INTRAVENOUS CONTINUOUS
Status: DISCONTINUED | OUTPATIENT
Start: 2018-11-14 | End: 2018-11-15

## 2018-11-14 RX ORDER — TRAMADOL HYDROCHLORIDE 50 MG/1
50 TABLET ORAL EVERY 6 HOURS PRN
Status: DISCONTINUED | OUTPATIENT
Start: 2018-11-14 | End: 2018-11-15

## 2018-11-14 RX ORDER — ACETAMINOPHEN 325 MG/1
650 TABLET ORAL EVERY 6 HOURS PRN
Status: DISCONTINUED | OUTPATIENT
Start: 2018-11-14 | End: 2018-11-15

## 2018-11-14 RX ORDER — METOPROLOL TARTRATE 25 MG/1
25 TABLET, FILM COATED ORAL
Status: DISCONTINUED | OUTPATIENT
Start: 2018-11-14 | End: 2018-11-15

## 2018-11-14 RX ORDER — HYDROCODONE BITARTRATE AND ACETAMINOPHEN 500; 5 MG/1; MG/1
TABLET ORAL
Status: DISCONTINUED | OUTPATIENT
Start: 2018-11-14 | End: 2018-11-15

## 2018-11-14 RX ORDER — ALBUTEROL SULFATE 0.83 MG/ML
2.5 SOLUTION RESPIRATORY (INHALATION) EVERY 8 HOURS
Status: DISCONTINUED | OUTPATIENT
Start: 2018-11-15 | End: 2018-11-15

## 2018-11-14 RX ORDER — CHLORHEXIDINE GLUCONATE ORAL RINSE 1.2 MG/ML
10 SOLUTION DENTAL
Status: DISCONTINUED | OUTPATIENT
Start: 2018-11-14 | End: 2018-11-15

## 2018-11-14 RX ORDER — SODIUM CHLORIDE 9 MG/ML
INJECTION, SOLUTION INTRAVENOUS CONTINUOUS
Status: ACTIVE | OUTPATIENT
Start: 2018-11-14 | End: 2018-11-15

## 2018-11-14 RX ORDER — FAMOTIDINE 20 MG/1
20 TABLET, FILM COATED ORAL 2 TIMES DAILY
Status: DISCONTINUED | OUTPATIENT
Start: 2018-11-14 | End: 2018-11-15

## 2018-11-14 RX ORDER — ONDANSETRON 2 MG/ML
4 INJECTION INTRAMUSCULAR; INTRAVENOUS EVERY 12 HOURS PRN
Status: DISCONTINUED | OUTPATIENT
Start: 2018-11-14 | End: 2018-11-14 | Stop reason: SDUPTHER

## 2018-11-14 RX ORDER — ACETAMINOPHEN 325 MG/1
650 TABLET ORAL EVERY 6 HOURS PRN
Status: DISCONTINUED | OUTPATIENT
Start: 2018-11-14 | End: 2018-11-14

## 2018-11-14 RX ORDER — HEPARIN SODIUM,PORCINE/D5W 25000/250
12 INTRAVENOUS SOLUTION INTRAVENOUS CONTINUOUS
Status: DISCONTINUED | OUTPATIENT
Start: 2018-11-14 | End: 2018-11-15

## 2018-11-14 RX ORDER — CEFAZOLIN SODIUM 2 G/50ML
2 SOLUTION INTRAVENOUS
Status: COMPLETED | OUTPATIENT
Start: 2018-11-14 | End: 2018-11-15

## 2018-11-14 RX ADMIN — HEPARIN SODIUM 15 UNITS/KG/HR: 10000 INJECTION, SOLUTION INTRAVENOUS at 07:11

## 2018-11-14 RX ADMIN — NITROGLYCERIN 0.5 INCH: 20 OINTMENT TOPICAL at 12:11

## 2018-11-14 RX ADMIN — FAMOTIDINE 20 MG: 20 TABLET ORAL at 08:11

## 2018-11-14 RX ADMIN — TRAMADOL HYDROCHLORIDE 50 MG: 50 TABLET, COATED ORAL at 05:11

## 2018-11-14 RX ADMIN — SODIUM CHLORIDE: 0.9 INJECTION, SOLUTION INTRAVENOUS at 01:11

## 2018-11-14 RX ADMIN — HEPARIN SODIUM 12 UNITS/KG/HR: 10000 INJECTION, SOLUTION INTRAVENOUS at 12:11

## 2018-11-14 RX ADMIN — METOPROLOL TARTRATE 25 MG: 25 TABLET ORAL at 08:11

## 2018-11-14 RX ADMIN — ACETAMINOPHEN 650 MG: 325 TABLET, FILM COATED ORAL at 10:11

## 2018-11-14 RX ADMIN — ASPIRIN 81 MG CHEWABLE TABLET 81 MG: 81 TABLET CHEWABLE at 09:11

## 2018-11-14 RX ADMIN — METOPROLOL TARTRATE 25 MG: 25 TABLET ORAL at 09:11

## 2018-11-14 RX ADMIN — SODIUM CHLORIDE: 0.9 INJECTION, SOLUTION INTRAVENOUS at 08:11

## 2018-11-14 RX ADMIN — NITROGLYCERIN 0.5 INCH: 20 OINTMENT TOPICAL at 07:11

## 2018-11-14 RX ADMIN — NITROGLYCERIN 10 MCG/MIN: 20 INJECTION INTRAVENOUS at 01:11

## 2018-11-14 RX ADMIN — ACETAMINOPHEN 650 MG: 325 TABLET ORAL at 07:11

## 2018-11-14 NOTE — HPI
The patient is a 68-year-old female who was admitted with an NSTEMI.  The patient was in her usual state of health until about 3 days ago when she developed retrosternal chest pain at low exertion and at rest.  The patient presented to the emergency room and was admitted with a diagnosis of NSTEMI.  The patient denies shortness of breath, nausea, vomiting, palpitations PND orthopnea, cough, fever or chills or ankle swelling. Cardiac catheterization today shows severe multivessel coronary artery disease.

## 2018-11-14 NOTE — NURSING
Started Heparin gtt low intensity per MD order. Next Ptt to be drawn in 6 hours. Will continue to monitor. No order for bolus will start  gtt at 12 units/kg/ml.

## 2018-11-14 NOTE — ASSESSMENT & PLAN NOTE
Chest pain and elevated troponin.  Case Discussed with subspecialities: Cardiology Consulted: Dr. Rodríguez.  Heparin and Nitroglycerin infusions.  [facility] on 14 November showing multi-vessel disease.  IABP placed and post-procedure in ICU.  Evaluated by Cardiac surgery and plan for CABG Thursday 15 November.  Aspirin, Metoprolol, and Supplemental Oxygen.  Cardiac echo pending.

## 2018-11-14 NOTE — SUBJECTIVE & OBJECTIVE
Interval History:  Hemodynamically stable with IABP.  Complains of left chest pressure pulsating and radiating down the left arm.  Denies shortness of breath.    Review of Systems   Constitutional: Negative for chills and fever.   HENT: Negative for congestion and sore throat.    Eyes: Negative for visual disturbance.   Respiratory: Positive for chest tightness. Negative for cough, shortness of breath and wheezing.    Cardiovascular: Negative for chest pain, palpitations and leg swelling.   Gastrointestinal: Negative for abdominal pain, blood in stool, constipation, diarrhea, nausea and vomiting.   Genitourinary: Negative for dysuria and hematuria.   Musculoskeletal: Negative for arthralgias and back pain.   Skin: Negative for rash and wound.   Neurological: Negative for dizziness, weakness, light-headedness and numbness.   Hematological: Negative for adenopathy.     Objective:     Vital Signs (Most Recent):  Temp: 97.9 °F (36.6 °C) (11/14/18 1515)  Pulse: (!) 59 (11/14/18 1600)  Resp: 14 (11/14/18 1600)  BP: (!) 118/45 (11/14/18 1600)  SpO2: 98 % (11/14/18 1600) Vital Signs (24h Range):  Temp:  [97 °F (36.1 °C)-98.4 °F (36.9 °C)] 97.9 °F (36.6 °C)  Pulse:  [52-75] 59  Resp:  [13-22] 14  SpO2:  [93 %-98 %] 98 %  BP: (114-160)/(45-93) 118/45     Weight: 80.2 kg (176 lb 12.9 oz)  Body mass index is 31.32 kg/m².    Intake/Output Summary (Last 24 hours) at 11/14/2018 3006  Last data filed at 11/14/2018 1500  Gross per 24 hour   Intake --   Output 325 ml   Net -325 ml      Physical Exam   Constitutional: She is oriented to person, place, and time. She appears well-developed and well-nourished. No distress.   HENT:   Head: Normocephalic and atraumatic.   Mouth/Throat: Oropharynx is clear and moist.   Eyes: Conjunctivae and EOM are normal. Pupils are equal, round, and reactive to light.   Neck: Neck supple. No JVD present. No thyromegaly present.   Cardiovascular: Normal rate and regular rhythm. Exam reveals no gallop and  no friction rub.   No murmur heard.  Right groin femoral artery access point clean and intact.  IABP catheter in place.   Pulmonary/Chest: Effort normal and breath sounds normal. She has no wheezes. She has no rales.   Abdominal: Soft. Bowel sounds are normal. She exhibits no distension. There is no tenderness. There is no rebound and no guarding.   Musculoskeletal: Normal range of motion. She exhibits no edema or deformity.   Lymphadenopathy:     She has no cervical adenopathy.   Neurological: She is alert and oriented to person, place, and time. She has normal reflexes.   Skin: Skin is warm and dry. No rash noted.   Psychiatric: She has a normal mood and affect. Her behavior is normal. Judgment and thought content normal.   Nursing note and vitals reviewed.      Significant Labs: All pertinent labs within the past 24 hours have been reviewed.    Significant Imaging: I have reviewed all pertinent imaging results/findings within the past 24 hours.

## 2018-11-14 NOTE — HOSPITAL COURSE
11/14/18: Admitted to ICU post [facility] with IABP support, heparin infusion, nitro infusion, and nasal cannula oxygen; awake, alert, oriented and able to verbalize plan for CABG tomorrow after consult with CTS    11/15/18: Seen and examined at bedside. Hospital chart reviewed. No acute interval detrimental events noted. To OR today for CAB.    11/16/18: Seen and examined at bedside. Hospital chart reviewed. No acute interval detrimental events noted. Extubated. IABP discontinued. Leg drains discontinues. Paced  - AAI.  Chest drains to be discontinued later.     11/17/18: Seen and examined at bedside. Hospital chart reviewed. No acute interval detrimental events noted. Extubated. Drains discontinued. Paced  - AAI.

## 2018-11-14 NOTE — SUBJECTIVE & OBJECTIVE
"Past Medical History:   Diagnosis Date    Anemia     Asthma     Basal cell carcinoma     COPD (chronic obstructive pulmonary disease)     Coronary artery disease involving native coronary artery of native heart with unstable angina pectoris 11/14/2018    Hyperlipidemia     Hypertension     Immune deficiency disorder     NSTEMI (non-ST elevated myocardial infarction) 11/13/2018    Pneumonia     Sarcoidosis     TIA (transient ischemic attack)        Past Surgical History:   Procedure Laterality Date    CHOLECYSTECTOMY      COSMETIC SURGERY      blepharplasty    FRACTURE SURGERY Left     wrist ORIF    HYSTERECTOMY      LUNG BIOPSY      OOPHORECTOMY      SKIN BIOPSY      TONSILLECTOMY         Review of patient's allergies indicates:   Allergen Reactions    Beclomethasone dipropionate      Causes mouth Sores    Danazol Hives    Doxycycline Hives, Itching and Swelling    Fluticasone-salmeterol      Elevates Blood Pressure    Statins-hmg-coa reductase inhibitors      Muscle weakness    Umeclidinium-vilanterol      bronchitis    Influenza virus vaccines Rash     Reaction per patient.       Levothyroxine Rash    Red dye Rash       Family History     Problem Relation (Age of Onset)    Heart disease Father    Myasthenia gravis Brother    Stroke Mother, Sister    Thyroid disease Sister        Tobacco Use    Smoking status: Never Smoker    Smokeless tobacco: Never Used   Substance and Sexual Activity    Alcohol use: Yes     Alcohol/week: 0.6 oz     Types: 1 Glasses of wine per week     Comment: yearly    Drug use: No    Sexual activity: Not Currently         Review of Systems   Constitutional: Positive for activity change.   HENT: Negative for congestion.    Respiratory: Negative for cough, shortness of breath and wheezing.    Cardiovascular: Positive for chest pain (currently conrolled) and palpitations ("feel pump (IABP)"). Negative for leg swelling.   Gastrointestinal: Negative.  "   Genitourinary: Negative.    Musculoskeletal: Positive for myalgias (discomfort from lying flat).   Skin: Negative.    Neurological: Negative for tremors and syncope.   Psychiatric/Behavioral: Negative.      Objective:     Vital Signs (Most Recent):  Temp: 97.9 °F (36.6 °C) (11/14/18 1515)  Pulse: (!) 59 (11/14/18 1600)  Resp: 14 (11/14/18 1600)  BP: (!) 118/45 (11/14/18 1600)  SpO2: 98 % (11/14/18 1600) Vital Signs (24h Range):  Temp:  [97 °F (36.1 °C)-98.4 °F (36.9 °C)] 97.9 °F (36.6 °C)  Pulse:  [52-75] 59  Resp:  [13-22] 14  SpO2:  [93 %-98 %] 98 %  BP: (114-160)/(45-93) 118/45     Weight: 80.2 kg (176 lb 12.9 oz)  Body mass index is 31.32 kg/m².      Intake/Output Summary (Last 24 hours) at 11/14/2018 1744  Last data filed at 11/14/2018 1500  Gross per 24 hour   Intake --   Output 325 ml   Net -325 ml       Physical Exam   Constitutional: She is oriented to person, place, and time. She appears well-developed. No distress.   HENT:   Head: Atraumatic.   Eyes: Conjunctivae are normal. Pupils are equal, round, and reactive to light.   Neck: No JVD present. No tracheal deviation present.   Pulmonary/Chest: Effort normal and breath sounds normal. No respiratory distress.   Abdominal: Soft. Bowel sounds are normal. She exhibits no distension.   Musculoskeletal: She exhibits no edema.   Neurological: She is alert and oriented to person, place, and time. GCS eye subscore is 4. GCS verbal subscore is 5. GCS motor subscore is 6.   Skin: Skin is warm and dry. Capillary refill takes 2 to 3 seconds. No cyanosis.            Vents:  Oxygen Concentration (%): 21 (11/14/18 0822)    Lines/Drains/Airways     Drain                 Urethral Catheter 11/14/18 1315 Non-latex 16 Fr. less than 1 day          Line                 IABP 11/14/18 1300 7.5 Fr. 30 mL less than 1 day          Peripheral Intravenous Line                 Peripheral IV - Single Lumen 11/13/18 1635 Left Antecubital 1 day         Peripheral IV - Single Lumen  11/14/18 0920 Left;Posterior Hand less than 1 day                Significant Labs:    CBC/Anemia Profile:  Recent Labs   Lab 11/13/18  1600 11/14/18  0507 11/14/18  1615   WBC 9.34 8.64  8.64 8.52   HGB 14.2 12.2  12.2 12.6   HCT 42.3 36.9*  36.9* 38.2    236  236 224   MCV 84 85  85 86   RDW 13.9 14.0  14.0 14.0        Chemistries:  Recent Labs   Lab 11/13/18  1600 11/14/18  0507 11/14/18  1621    139 136   K 3.7 4.2 4.2    109 107   CO2 24 22* 20*   BUN 14 19 15   CREATININE 0.8 0.7 0.8   CALCIUM 9.4 9.3 8.7   ALBUMIN 3.9 3.4* 3.4*   PROT 7.4 6.2 6.3   BILITOT 0.2 0.3 0.3   ALKPHOS 85 71 74   ALT 16 14 15   AST 22 20 21   MG  --  2.2  --        All pertinent labs within the past 24 hours have been reviewed.    Significant Imaging:   I have reviewed all pertinent imaging results/findings within the past 24 hours.

## 2018-11-14 NOTE — NURSING
Pt called out at this time complaining of pain in her chest. Upon entering pt room she was laying in the bed and NAD noted. I asked pt if she was still having chest pain and she stated that is was starting to subside. Pt stated that when she got up to go the bathroom and was walking back she started feeling pressure on the left side of her chest . And that is was radiating down her arm. Pt rated the pain an 8 out of 10. Pt vitals are stable and she is now rating pain a 3 . Will notify hospital medicine . Awaiting results from 2nd troponin . Will continue to monitor.

## 2018-11-14 NOTE — CONSULTS
[facility] -   Cardiothoracic Surgery  Consult Note    Patient Name: Eve Long  MRN: [id number]  Admission Date: 11/13/2018  Attending Physician: Lane White MD  Referring Provider: Self, Aaareferral    Patient information was obtained from patient and ER records.     Consults  Subjective:     Principal Problem: NSTEMI (non-ST elevated myocardial infarction)    History of Present Illness: The patient is a 68-year-old female who was admitted with an NSTEMI.  The patient was in her usual state of health until about 3 days ago when she developed retrosternal chest pain at low exertion and at rest.  The patient presented to the emergency room and was admitted with a diagnosis of NSTEMI.  The patient denies shortness of breath, nausea, vomiting, palpitations PND orthopnea, cough, fever or chills or ankle swelling. Cardiac catheterization today shows severe multivessel coronary artery disease.    No current facility-administered medications on file prior to encounter.      Current Outpatient Medications on File Prior to Encounter   Medication Sig    albuterol (PROVENTIL) 2.5 mg /3 mL (0.083 %) nebulizer solution 2.5 mg daily as needed. Every 6 hours inhale 2 puffs as needed for  wheezing    albuterol 90 mcg/actuation inhaler Inhale 2 puffs into the lungs every 6 (six) hours as needed.     amLODIPine (NORVASC) 5 MG tablet Take 1 tablet (5 mg total) by mouth once daily.    aspirin 81 MG Chew Take 1 tablet (81 mg total) by mouth once daily.    clonazePAM (KLONOPIN) 0.5 MG tablet Take 0.5 mg by mouth.    cyanocobalamin 1,000 mcg/mL injection Inject 1 mL (1,000 mcg total) into the skin every 28 days.    cyclobenzaprine (FLEXERIL) 10 MG tablet Take 1 tablet (10 mg total) by mouth 3 (three) times daily as needed for Muscle spasms.    promethazine (PHENERGAN) 12.5 MG Tab Take 1 tablet (12.5 mg total) by mouth every 6 (six) hours as needed.       Review of patient's allergies indicates:    Allergen Reactions    Beclomethasone dipropionate      Causes mouth Sores    Danazol Hives    Doxycycline Hives, Itching and Swelling    Fluticasone-salmeterol      Elevates Blood Pressure    Statins-hmg-coa reductase inhibitors      Muscle weakness    Umeclidinium-vilanterol      bronchitis    Influenza virus vaccines Rash     Reaction per patient.       Levothyroxine Rash    Red dye Rash       Past Medical History:   Diagnosis Date    Anemia     Asthma     Basal cell carcinoma     COPD (chronic obstructive pulmonary disease)     Hyperlipidemia     Hypertension     Immune deficiency disorder     NSTEMI (non-ST elevated myocardial infarction) 11/13/2018    Pneumonia     Sarcoidosis     TIA (transient ischemic attack)      Past Surgical History:   Procedure Laterality Date    CHOLECYSTECTOMY      COSMETIC SURGERY      blepharplasty    FRACTURE SURGERY Left     wrist ORIF    HYSTERECTOMY      LUNG BIOPSY      OOPHORECTOMY      SKIN BIOPSY      TONSILLECTOMY       Family History     Problem Relation (Age of Onset)    Heart disease Father    Myasthenia gravis Brother    Stroke Mother, Sister    Thyroid disease Sister        Tobacco Use    Smoking status: Never Smoker    Smokeless tobacco: Never Used   Substance and Sexual Activity    Alcohol use: Yes     Alcohol/week: 0.6 oz     Types: 1 Glasses of wine per week     Comment: yearly    Drug use: No    Sexual activity: Not Currently     Review of Systems   Constitutional: Positive for activity change. Negative for appetite change, chills, diaphoresis and fever.   HENT: Negative for congestion.    Eyes: Negative for visual disturbance.   Respiratory: Negative for apnea, cough and shortness of breath.    Cardiovascular: Positive for chest pain. Negative for palpitations and leg swelling.   Gastrointestinal: Positive for blood in stool. Negative for abdominal distention and abdominal pain.   Endocrine: Negative for cold intolerance and  polyuria.   Genitourinary: Negative for difficulty urinating and dysuria.   Musculoskeletal: Positive for neck pain. Negative for arthralgias and back pain.   Allergic/Immunologic: Positive for environmental allergies.   Neurological: Negative for dizziness, facial asymmetry and headaches.   Hematological: Does not bruise/bleed easily.   Psychiatric/Behavioral: Negative for agitation and behavioral problems.     Objective:     Vital Signs (Most Recent):  Temp: 97.5 °F (36.4 °C) (11/14/18 1400)  Pulse: 61 (11/14/18 1500)  Resp: 16 (11/14/18 1500)  BP: 137/70 (11/14/18 1445)  SpO2: 96 % (11/14/18 1500) Vital Signs (24h Range):  Temp:  [97 °F (36.1 °C)-98.4 °F (36.9 °C)] 97.5 °F (36.4 °C)  Pulse:  [52-87] 61  Resp:  [13-22] 16  SpO2:  [93 %-98 %] 96 %  BP: (114-160)/(51-93) 137/70     Weight: 80.2 kg (176 lb 12.9 oz)  Body mass index is 31.32 kg/m².    SpO2: 96 %  O2 Device (Oxygen Therapy): nasal cannula     Intake/Output - Last 3 Shifts       11/12 0700 - 11/13 0659 11/13 0700 - 11/14 0659 11/14 0700 - 11/15 0659    Urine (mL/kg/hr)   325 (0.5)    Total Output   325    Net   -325           Urine Occurrence  1 x            Lines/Drains/Airways     Drain                 Urethral Catheter 11/14/18 1315 Non-latex 16 Fr. less than 1 day          Line                 IABP 11/14/18 1300 7.5 Fr. 30 mL less than 1 day          Peripheral Intravenous Line                 Peripheral IV - Single Lumen 11/13/18 1635 Left Antecubital less than 1 day         Peripheral IV - Single Lumen 11/14/18 0920 Left;Posterior Hand less than 1 day                 STS Risk Score:  Mortality rate 1.7% and mortality or morbidity 18%    Physical Exam   Constitutional: She is oriented to person, place, and time. She appears well-developed and well-nourished. No distress.   HENT:   Head: Normocephalic and atraumatic.   Mouth/Throat: Oropharynx is clear and moist.   Eyes: EOM are normal. Pupils are equal, round, and reactive to light.   Neck: No JVD  present.   Cardiovascular: Normal rate, regular rhythm and normal heart sounds.   Pulmonary/Chest: Effort normal and breath sounds normal.   Abdominal: Soft. Bowel sounds are normal.   Musculoskeletal: She exhibits no edema or deformity.   Neurological: She is alert and oriented to person, place, and time. No cranial nerve deficit.   Skin: Skin is warm and dry. She is not diaphoretic.   Psychiatric: She has a normal mood and affect.       Significant Labs:  All pertinent labs from the last 24 hours have been reviewed.    Significant Diagnostics:  I have reviewed all pertinent imaging results/findings within the past 24 hours.    Assessment/Plan:     NYHA Score: NYHA IV: inability to carry on any physical activity without discomfort    Coronary artery disease involving native coronary artery of native heart with unstable angina pectoris    The patient is a 68-year-old female with past medical history of COPD, hypertension, pulmonary sarcoidosis who presents with an NSTEMI.  Cardiac catheterization shows severe multivessel coronary artery disease.  An intra-aortic balloon pump was inserted in the cath lab.  The patient is a candidate for urgent coronary artery bypass grafting which has been scheduled for tomorrow 11/15/2018.  The risks and benefits of the procedure were explained to the patient and her .  The patient understands and has agreed to proceed with surgery.         Thank you for your consult. I will follow-up with patient. Please contact us if you have any additional questions.    Ricardo Dorsey MD  Cardiothoracic Surgery  [facility] -

## 2018-11-14 NOTE — HPI
68 year old female with PMH including moderate COPD, sarcoid lung disease, HTN, anemia, HLD, and TIA  Presented to ED on 11/13 with complaint of cp/pressure x 3d with radiation to lt arm and back with associated diaphoresis and jaw pain  Evaluation revealed troponin 0.241  She was admitted with monitoring and heparin infusion for NSTEMI    Troponin jac to 0.297 and she was taken on 11/14 for LHC which revealed subtotal diffuse LAD disease along with multivessel non occlusive disease; IABP was placed for coronary support

## 2018-11-14 NOTE — ASSESSMENT & PLAN NOTE
-Trop 0.241, 0.297, 0.288  -EKG with anterior lead changes   -Chest pain all night improved with nitro paste. Last episode of pain this morning around 9am  -Has been started on Heparin gtt  -Continue ASA, BB.   -No statin due to history of intolerance(myalgia). Had side effects to Zetia when tried   -Recommendation has been made for patient to undergo LHC today   -Dr. Rodríguez explained the risks, benefits and alternatives of the procedure in detail. The patient voices understanding and all questions have been answered. Patient agrees to proceed as planned.   -Keep NPO   -NS at 100/hr   -Further recommendations to follow cath

## 2018-11-14 NOTE — OP NOTE
INPATIENT Operative Note         SUMMARY     Surgery Date: 11/14/2018     Surgeon(s) and Role:     * Judy Rodríguez MD - Primary    ASSISTANT:none    Pre-op Diagnosis:  nstemi      Post-op Diagnosis:  Multivessel cad nstemi    Procedure(s) (LRB):  CATHETERIZATION, HEART, LEFT (Left)  iabp   ivus rca   Iliac angio   subcalavina angio  COMPLICATION:none    Anesthesia: RN IV Sedation    Findings/Key Components:  rca 40-50% distal csa 3.5 mm2 could not perform ifr due to technical issues with console.  Lm ulcerated plaque extending into the lad 50%   Lad subtotal with diffuse disease and timi1 flow.   lcx om1 30% om2 subtotal.  Normal iliac and normal subclavian and lima.     Estimated Blood Loss: < 50 ML.         SPECIMEN: NONE    Devices/Prostetics: None    PLAN:  discsussed case with cardiac surgery will plan in am.

## 2018-11-14 NOTE — HOSPITAL COURSE
Admitted for evaluation and treatment of chest pain with elevated troponin.  Seen by Cardiology and had a left heart angiogram by Dr. Rodríguez 14 November.  Procedure revealed multivessel disease.  IABP placed and post-procedure in the ICU.  Consultation to Cardiac Surgery to plan for CABG.  11/15- pt underwent 5 vessel CABG by Dr. Castellanos today on IABP 1:1, seen post op in the ICU on vent, sedated with Precedex. Getting a dose of Albumin and Ca and is on Insulin drip, remains on IABP along with CT to water seal. Cardiac Index 2.0-- may need a little Epi drip to improve CI.   11/16- doing much better, alert and awake and talking. Extubated around 9 pm last evening. Had to be placed on Epinephrine gtt and Vasopressin to augment CO/CI and both were d/james this am. IABP removed by Dr. Castellanos this am and his Cordis and Chest tubes are coming off soon today. Also got a unit of blood post op. No obvious hemorrhage anywhere, H/H stable. Good urine output.   11/17- looks a little better after all the chest tubes, cordis, chest tubes removed yesterday. Got OT/Pt earlier and then got up by herself and did well. Also has some CHF and getting Lasix, K is being replaced. Also developed Afib w RVR-- getting BB.   11/18- doing much better, sitting up in chair, SOB, leg swelling resolved, still with transvenous pacemaker and occasionally feels the palpitations. Still in apace @90, controlled, now on oral amiodarone.   11/19- sitting up in chair, feels better but still weak overall, needs a little assistance in rising and walking. occasional CP, no SOB, leg swelling improved.   11/20- she has continued to improved, walked with me unassisted in the hallway yesterday and did well, eating drinking well. This am her pacer wire and chest drain were removed by Dr. Dorsey and her chest is clear she is NSR @79 and looks good. She was offered SNF vs Home with Health and she prefers to go home with HH which has been arranged. She was seen and  examined and deemed stable for discharge today.

## 2018-11-14 NOTE — H&P
[facility], [place]   Psychiatric Progress Note      Impression:   This is a 23 year old male admitted for SI, out of control behaviors, aggression and s/p suicide attempt. After he was brought to ER, he became violent, and wrapped bedding around his neck.  We are adjusting medications to target mood.  We are also working with the patient on therapeutic skill building.             Diagnoses and Plan:   Principal Diagnosis: Polysubstance use,   Unspecified Depressive disorder, rule out substance-induced mood disorder  Unit: Station 10  Attending: Memo  Medications: risks/benefits discussed with patient  - Lexapro 10mg daily  -Start buspar 10mg TID for anxiety.   Laboratory/Imaging:  - UDS neg, COMP wnl, CBC wnl, TSH wnl and lipids wnl   - HepC Ab neg, HIV Antigen antibody combo neg, Treponema ab neg, HepB surface Ab neg  -ROBBIE 0.235  Consults  - CD assessment ( finished on 8/20)  - Internal medicine - to assess the rash on right elbow -  Patient will be treated in therapeutic milieu with appropriate individual and group therapies as described.  Secondary psychiatric diagnoses of concern this admission:  Rule out Cluster B personality      Medical diagnoses to be addressed this admission:   Asthma       Legal Status: Voluntary      Safety Assessment:   Checks: Status 15  Precautions: Suicide  Pt has not required locked seclusion or restraints in the past 24 hours to maintain safety, please refer to RN documentation for further details.    The risks, benefits, alternatives and side effects have been discussed and are understood by the patient and other caregivers.   Target symptoms to stabilize: SI, aggression and disordered eating  Target disposition: We are currently awaiting the placement at [facility].     Attestation:  Patient has been seen and evaluated by me,  Wilmer Hampton MD          Interim History:   The patient's care was discussed with the treatment team and chart notes  [facility]  History & Physical    Patient Name: Eve Long  MRN: [id number]  Admission Date: 11/13/2018  Attending Physician: Dr. Lane White   Primary Care Provider: Chaya Morelos MD    Patient information was obtained from patient, past medical records and ER records.     Subjective:     Principal Problem:NSTEMI (non-ST elevated myocardial infarction)    Chief Complaint:   Chief Complaint   Patient presents with    Chest Pain     chest pain x3 days, radiates to left arm and back      HPI: Eve Long is a 68 y.o. female patient with a PMHx of anemia, HLD, HTN, pneumonia, sarcoidosis, who presented to the ER for evaluation of constant left sided chest pain which onset suddenly x2-4 days ago. Pt characterizes her pain as a pressure 10/10 at its worse. Pt reports a strong FMHx of cardiac disease (Father with CABG/MI, all materal uncles with CAD) and reports her last stress test was done in 2006. Symptoms are constant and moderate in severity. Exacerbated by nothing and relieved by anti-hypertensive medications. Associated sxs included diaphoresis, left arm pain radiating from chest and lef jaw pain radiating from chest. Patient denied any fever, chills, SOB, leg swelling, palpitations, N/V, dysuria, hematuria, HA, weakness, and all other sxs at this time. In ER, troponin 0.241. Cardiology was consulted and Dr. Rodríguez recommended IV Heparin Drip. Spouse, Bravo Long, is the surrogate decision maker, HOME [phone]. She was admitted to telemetry Dx NSTEMI.         Past Medical History:   Diagnosis Date    Anemia     Asthma     Basal cell carcinoma     COPD (chronic obstructive pulmonary disease)     Hyperlipidemia     Hypertension     Immune deficiency disorder     NSTEMI (non-ST elevated myocardial infarction) 11/13/2018    Pneumonia     Sarcoidosis     TIA (transient ischemic attack)        Past Surgical History:   Procedure  "were reviewed.    Pt reports that he continues to have anxiety and hydroxyzine is not helping. He request different meds. We discussed Seroquel and buspar, and the patient agreed to try buspar for his remaining anxiety.   He remains cooperative on the unit and awaiting to go to [facility] For substance use  treatment.   He denies SI.  The 10 point Review of Systems is negative other than noted in the HPI         Medications:       busPIRone  10 mg Oral TID     escitalopram  10 mg Oral Daily     haloperidol lactate  5 mg Intramuscular Once     hydrocortisone   Topical BID             Allergies:     Allergies   Allergen Reactions     Animal Dander      Other [Seasonal Allergies]             Psychiatric Examination:   BP (!) 136/91 (BP Location: Left arm)  Pulse 94  Temp 96.5  F (35.8  C) (Oral)  Resp 16  Ht 1.727 m (5' 8\")  Wt 59.6 kg (131 lb 6.4 oz)  SpO2 97%  BMI 19.98 kg/m2  Weight is 131 lbs 6.4 oz  Body mass index is 19.98 kg/(m^2).    Appearance:  awake, alert, adequately groomed, cooperative and no apparent distress  Attitude:  cooperative  Eye Contact:  poor   Mood:  anxious  Affect:  mood congruent  Speech:  clear, coherent  Psychomotor Behavior:  no evidence of tardive dyskinesia, dystonia, or tics and intact station, gait and muscle tone  Thought Process:  linear  Associations:  no loose associations  Thought Content:  no evidence of suicidal ideation or homicidal ideation, no evidence of psychotic thought, no auditory hallucinations present and no visual hallucinations present  Insight:  fair  Judgment:  fair  Oriented to:  time, person, and place  Attention Span and Concentration:  fair  Recent and Remote Memory:  fair  Language: Able to name objects  Fund of Knowledge: appropriate  Muscle Strength and Tone: normal  Gait and Station: Normal         Labs:   No results found for this or any previous visit (from the past 24 hour(s)).  " Laterality Date    CHOLECYSTECTOMY      COSMETIC SURGERY      blepharplasty    FRACTURE SURGERY Left     wrist ORIF    HYSTERECTOMY      LUNG BIOPSY      OOPHORECTOMY      SKIN BIOPSY      TONSILLECTOMY         Review of patient's allergies indicates:   Allergen Reactions    Beclomethasone dipropionate      Causes mouth Sores    Danazol Hives    Doxycycline Hives, Itching and Swelling    Fluticasone-salmeterol      Elevates Blood Pressure    Statins-hmg-coa reductase inhibitors      Muscle weakness    Umeclidinium-vilanterol      bronchitis    Influenza virus vaccines Rash     Reaction per patient.       Levothyroxine Rash    Red dye Rash       No current facility-administered medications on file prior to encounter.      Current Outpatient Medications on File Prior to Encounter   Medication Sig    albuterol (PROVENTIL) 2.5 mg /3 mL (0.083 %) nebulizer solution 2.5 mg daily as needed. Every 6 hours inhale 2 puffs as needed for  wheezing    albuterol 90 mcg/actuation inhaler Inhale 2 puffs into the lungs every 6 (six) hours as needed.     amLODIPine (NORVASC) 5 MG tablet Take 1 tablet (5 mg total) by mouth once daily.    aspirin 81 MG Chew Take 1 tablet (81 mg total) by mouth once daily.    clonazePAM (KLONOPIN) 0.5 MG tablet Take 0.5 mg by mouth.    cyanocobalamin 1,000 mcg/mL injection Inject 1 mL (1,000 mcg total) into the skin every 28 days.    cyclobenzaprine (FLEXERIL) 10 MG tablet Take 1 tablet (10 mg total) by mouth 3 (three) times daily as needed for Muscle spasms.    promethazine (PHENERGAN) 12.5 MG Tab Take 1 tablet (12.5 mg total) by mouth every 6 (six) hours as needed.    [DISCONTINUED] aspirin-acetaminophen-caffeine 250-250-65 mg (EXCEDRIN MIGRAINE) 250-250-65 mg per tablet Take 1 tablet by mouth.    [DISCONTINUED] cyanocobalamin (VITAMIN B-12) 100 MCG tablet Take 1 tablet by mouth.    [DISCONTINUED] umeclidinium-vilanterol 62.5-25 mcg/actuation DsDv Inhale 1 puff into the  lungs.     Family History     Problem Relation (Age of Onset)    Heart disease Father    Myasthenia gravis Brother    Stroke Mother, Sister    Thyroid disease Sister        Tobacco Use    Smoking status: Never Smoker    Smokeless tobacco: Never Used   Substance and Sexual Activity    Alcohol use: Yes     Alcohol/week: 0.6 oz     Types: 1 Glasses of wine per week     Comment: yearly    Drug use: No    Sexual activity: Not Currently     Review of Systems   Constitutional: Positive for fatigue. Negative for appetite change, chills and fever.   HENT: Negative.  Negative for congestion, ear discharge, facial swelling, sore throat and trouble swallowing.    Eyes: Negative.  Negative for photophobia, pain, discharge, redness and visual disturbance.   Respiratory: Positive for wheezing. Negative for cough, chest tightness, shortness of breath and stridor.    Cardiovascular: Positive for chest pain. Negative for palpitations and leg swelling.   Gastrointestinal: Negative for abdominal distention, abdominal pain, anal bleeding, blood in stool, constipation, diarrhea, nausea, rectal pain and vomiting.   Endocrine: Negative.  Negative for cold intolerance, heat intolerance, polydipsia, polyphagia and polyuria.   Genitourinary: Negative.  Negative for difficulty urinating, dysuria, flank pain, frequency, hematuria, pelvic pain, urgency, vaginal bleeding and vaginal discharge.   Musculoskeletal: Negative.  Negative for arthralgias, back pain, gait problem, joint swelling, myalgias and neck pain.   Skin: Negative for color change, pallor, rash and wound.   Allergic/Immunologic: Negative.  Negative for food allergies and immunocompromised state.   Neurological: Positive for weakness. Negative for dizziness, tremors, seizures, syncope, facial asymmetry, speech difficulty, light-headedness, numbness and headaches.   Hematological: Negative.  Negative for adenopathy. Does not bruise/bleed easily.   Psychiatric/Behavioral:  Negative.  Negative for agitation, confusion, hallucinations, sleep disturbance and suicidal ideas. The patient is not nervous/anxious.    All other systems reviewed and are negative.    Objective:     Vital Signs (Most Recent):  Temp: 98.3 °F (36.8 °C) (11/13/18 1544)  Pulse: 75 (11/13/18 1746)  Resp: 18 (11/13/18 1746)  BP: (!) 160/72 (11/13/18 1746)  SpO2: 96 % (11/13/18 1715) Vital Signs (24h Range):  Temp:  [98.3 °F (36.8 °C)] 98.3 °F (36.8 °C)  Pulse:  [75-87] 75  Resp:  [17-18] 18  SpO2:  [94 %-96 %] 96 %  BP: (135-160)/(65-82) 160/72     Weight: 75.3 kg (166 lb 0.1 oz)  Body mass index is 29.41 kg/m².    Physical Exam   Constitutional: She is oriented to person, place, and time. She appears well-developed and well-nourished. No distress.   HENT:   Head: Normocephalic and atraumatic.   Nose: Nose normal.   Eyes: Conjunctivae and EOM are normal. Pupils are equal, round, and reactive to light. Right eye exhibits no discharge. Left eye exhibits no discharge.   Neck: Normal range of motion. Neck supple. No JVD present. No tracheal deviation present. No thyromegaly present.   Cardiovascular: Normal rate, regular rhythm and normal heart sounds. Exam reveals no gallop and no friction rub.   No murmur heard.  Pulmonary/Chest: Effort normal and breath sounds normal. No stridor. No respiratory distress. She has no wheezes. She has no rales. She exhibits no tenderness.   Abdominal: Soft. Bowel sounds are normal. She exhibits no distension and no mass. There is no tenderness. There is no guarding.   Musculoskeletal: Normal range of motion. She exhibits no edema, tenderness or deformity.   Lymphadenopathy:     She has no cervical adenopathy.   Neurological: She is alert and oriented to person, place, and time. She has normal reflexes. No cranial nerve deficit. Coordination normal.   Skin: Skin is warm and dry. No rash noted. She is not diaphoretic. No erythema. No pallor.   Psychiatric: She has a normal mood and affect.  Her behavior is normal. Judgment and thought content normal.   Nursing note and vitals reviewed.        CRANIAL NERVES     CN III, IV, VI   Pupils are equal, round, and reactive to light.  Extraocular motions are normal.        Significant Labs:   CBC:   Recent Labs   Lab 11/13/18  1600   WBC 9.34   HGB 14.2   HCT 42.3        CMP:   Recent Labs   Lab 11/13/18  1600      K 3.7      CO2 24   *   BUN 14   CREATININE 0.8   CALCIUM 9.4   PROT 7.4   ALBUMIN 3.9   BILITOT 0.2   ALKPHOS 85   AST 22   ALT 16   ANIONGAP 9   EGFRNONAA >60     Cardiac Markers: No results for input(s): CKMB, MYOGLOBIN, BNP, TROPISTAT in the last 48 hours.    Significant Imaging: I have reviewed all pertinent imaging results/findings within the past 24 hours.   Imaging Results          X-Ray Chest PA And Lateral (Final result)  Result time 11/13/18 16:13:43    Final result by Jose Alberto Lang MD (11/13/18 16:13:43)                 Impression:      No acute findings.      Electronically signed by: Jose Alberto Lang MD  Date:    11/13/2018  Time:    16:13             Narrative:    EXAMINATION:  XR CHEST PA AND LATERAL    CLINICAL HISTORY:  Chest pain, unspecified    TECHNIQUE:  PA and lateral views of the chest were performed.    COMPARISON:  08/29/2018    FINDINGS:  The cardiac and mediastinal silhouettes appear within normal limits. Stable calcified granuloma in the left lung base.  Stable pleural parenchymal scarring in the right lower lobe.  Cholecystectomy clips in the right upper quadrant.  No acute osseous findings demonstrated.                                  Assessment/Plan:     * NSTEMI (non-ST elevated myocardial infarction)    NSTEMI/Chest Pain  -Case Discussed with subspecialities: Cardiology Consulted: Dr. Rodríguez  -IV Heparin  -ASA  -metoprolol  -O2   -lipid panel and Statin  -ECHO  -Troponin q 6 hours       HTN (hypertension)    Resume home meds       COPD, moderate    PRN Duonebs       Hypothyroidism    Resume Home  Meds         VTE Risk Mitigation (From admission, onward)    None        Ryann Capps, NP  Department of Hospital Medicine   [facility]

## 2018-11-14 NOTE — PLAN OF CARE
Problem: Patient Care Overview  Goal: Plan of Care Review  Outcome: Ongoing (interventions implemented as appropriate)  RT educated patient about IS and its uses. IS X 10 every hour  per patient tolerance at this time. Please reinforce patient education with continued attempts.

## 2018-11-14 NOTE — SUBJECTIVE & OBJECTIVE
Past Medical History:   Diagnosis Date    Anemia     Asthma     Basal cell carcinoma     COPD (chronic obstructive pulmonary disease)     Hyperlipidemia     Hypertension     Immune deficiency disorder     NSTEMI (non-ST elevated myocardial infarction) 11/13/2018    Pneumonia     Sarcoidosis     TIA (transient ischemic attack)        Past Surgical History:   Procedure Laterality Date    CHOLECYSTECTOMY      COSMETIC SURGERY      blepharplasty    FRACTURE SURGERY Left     wrist ORIF    HYSTERECTOMY      LUNG BIOPSY      OOPHORECTOMY      SKIN BIOPSY      TONSILLECTOMY         Review of patient's allergies indicates:   Allergen Reactions    Beclomethasone dipropionate      Causes mouth Sores    Danazol Hives    Doxycycline Hives, Itching and Swelling    Fluticasone-salmeterol      Elevates Blood Pressure    Statins-hmg-coa reductase inhibitors      Muscle weakness    Umeclidinium-vilanterol      bronchitis    Influenza virus vaccines Rash     Reaction per patient.       Levothyroxine Rash    Red dye Rash       No current facility-administered medications on file prior to encounter.      Current Outpatient Medications on File Prior to Encounter   Medication Sig    albuterol (PROVENTIL) 2.5 mg /3 mL (0.083 %) nebulizer solution 2.5 mg daily as needed. Every 6 hours inhale 2 puffs as needed for  wheezing    albuterol 90 mcg/actuation inhaler Inhale 2 puffs into the lungs every 6 (six) hours as needed.     amLODIPine (NORVASC) 5 MG tablet Take 1 tablet (5 mg total) by mouth once daily.    aspirin 81 MG Chew Take 1 tablet (81 mg total) by mouth once daily.    clonazePAM (KLONOPIN) 0.5 MG tablet Take 0.5 mg by mouth.    cyanocobalamin 1,000 mcg/mL injection Inject 1 mL (1,000 mcg total) into the skin every 28 days.    cyclobenzaprine (FLEXERIL) 10 MG tablet Take 1 tablet (10 mg total) by mouth 3 (three) times daily as needed for Muscle spasms.    promethazine (PHENERGAN) 12.5 MG Tab Take 1  tablet (12.5 mg total) by mouth every 6 (six) hours as needed.    [DISCONTINUED] aspirin-acetaminophen-caffeine 250-250-65 mg (EXCEDRIN MIGRAINE) 250-250-65 mg per tablet Take 1 tablet by mouth.    [DISCONTINUED] cyanocobalamin (VITAMIN B-12) 100 MCG tablet Take 1 tablet by mouth.    [DISCONTINUED] umeclidinium-vilanterol 62.5-25 mcg/actuation DsDv Inhale 1 puff into the lungs.     Family History     Problem Relation (Age of Onset)    Heart disease Father    Myasthenia gravis Brother    Stroke Mother, Sister    Thyroid disease Sister        Tobacco Use    Smoking status: Never Smoker    Smokeless tobacco: Never Used   Substance and Sexual Activity    Alcohol use: Yes     Alcohol/week: 0.6 oz     Types: 1 Glasses of wine per week     Comment: yearly    Drug use: No    Sexual activity: Not Currently     Review of Systems   Constitutional: Positive for fatigue. Negative for appetite change, chills and fever.   HENT: Negative.  Negative for congestion, ear discharge, facial swelling, sore throat and trouble swallowing.    Eyes: Negative.  Negative for photophobia, pain, discharge, redness and visual disturbance.   Respiratory: Positive for wheezing. Negative for cough, chest tightness, shortness of breath and stridor.    Cardiovascular: Positive for chest pain. Negative for palpitations and leg swelling.   Gastrointestinal: Negative for abdominal distention, abdominal pain, anal bleeding, blood in stool, constipation, diarrhea, nausea, rectal pain and vomiting.   Endocrine: Negative.  Negative for cold intolerance, heat intolerance, polydipsia, polyphagia and polyuria.   Genitourinary: Negative.  Negative for difficulty urinating, dysuria, flank pain, frequency, hematuria, pelvic pain, urgency, vaginal bleeding and vaginal discharge.   Musculoskeletal: Negative.  Negative for arthralgias, back pain, gait problem, joint swelling, myalgias and neck pain.   Skin: Negative for color change, pallor, rash and wound.    Allergic/Immunologic: Negative.  Negative for food allergies and immunocompromised state.   Neurological: Positive for weakness. Negative for dizziness, tremors, seizures, syncope, facial asymmetry, speech difficulty, light-headedness, numbness and headaches.   Hematological: Negative.  Negative for adenopathy. Does not bruise/bleed easily.   Psychiatric/Behavioral: Negative.  Negative for agitation, confusion, hallucinations, sleep disturbance and suicidal ideas. The patient is not nervous/anxious.    All other systems reviewed and are negative.    Objective:     Vital Signs (Most Recent):  Temp: 98.3 °F (36.8 °C) (11/13/18 1544)  Pulse: 75 (11/13/18 1746)  Resp: 18 (11/13/18 1746)  BP: (!) 160/72 (11/13/18 1746)  SpO2: 96 % (11/13/18 1715) Vital Signs (24h Range):  Temp:  [98.3 °F (36.8 °C)] 98.3 °F (36.8 °C)  Pulse:  [75-87] 75  Resp:  [17-18] 18  SpO2:  [94 %-96 %] 96 %  BP: (135-160)/(65-82) 160/72     Weight: 75.3 kg (166 lb 0.1 oz)  Body mass index is 29.41 kg/m².    Physical Exam   Constitutional: She is oriented to person, place, and time. She appears well-developed and well-nourished. No distress.   HENT:   Head: Normocephalic and atraumatic.   Nose: Nose normal.   Eyes: Conjunctivae and EOM are normal. Pupils are equal, round, and reactive to light. Right eye exhibits no discharge. Left eye exhibits no discharge.   Neck: Normal range of motion. Neck supple. No JVD present. No tracheal deviation present. No thyromegaly present.   Cardiovascular: Normal rate, regular rhythm and normal heart sounds. Exam reveals no gallop and no friction rub.   No murmur heard.  Pulmonary/Chest: Effort normal and breath sounds normal. No stridor. No respiratory distress. She has no wheezes. She has no rales. She exhibits no tenderness.   Abdominal: Soft. Bowel sounds are normal. She exhibits no distension and no mass. There is no tenderness. There is no guarding.   Musculoskeletal: Normal range of motion. She exhibits no  edema, tenderness or deformity.   Lymphadenopathy:     She has no cervical adenopathy.   Neurological: She is alert and oriented to person, place, and time. She has normal reflexes. No cranial nerve deficit. Coordination normal.   Skin: Skin is warm and dry. No rash noted. She is not diaphoretic. No erythema. No pallor.   Psychiatric: She has a normal mood and affect. Her behavior is normal. Judgment and thought content normal.   Nursing note and vitals reviewed.        CRANIAL NERVES     CN III, IV, VI   Pupils are equal, round, and reactive to light.  Extraocular motions are normal.        Significant Labs:   CBC:   Recent Labs   Lab 11/13/18  1600   WBC 9.34   HGB 14.2   HCT 42.3        CMP:   Recent Labs   Lab 11/13/18  1600      K 3.7      CO2 24   *   BUN 14   CREATININE 0.8   CALCIUM 9.4   PROT 7.4   ALBUMIN 3.9   BILITOT 0.2   ALKPHOS 85   AST 22   ALT 16   ANIONGAP 9   EGFRNONAA >60     Cardiac Markers: No results for input(s): CKMB, MYOGLOBIN, BNP, TROPISTAT in the last 48 hours.    Significant Imaging: I have reviewed all pertinent imaging results/findings within the past 24 hours.   Imaging Results          X-Ray Chest PA And Lateral (Final result)  Result time 11/13/18 16:13:43    Final result by Jose Alberto Lang MD (11/13/18 16:13:43)                 Impression:      No acute findings.      Electronically signed by: Jose Alberto Lang MD  Date:    11/13/2018  Time:    16:13             Narrative:    EXAMINATION:  XR CHEST PA AND LATERAL    CLINICAL HISTORY:  Chest pain, unspecified    TECHNIQUE:  PA and lateral views of the chest were performed.    COMPARISON:  08/29/2018    FINDINGS:  The cardiac and mediastinal silhouettes appear within normal limits. Stable calcified granuloma in the left lung base.  Stable pleural parenchymal scarring in the right lower lobe.  Cholecystectomy clips in the right upper quadrant.  No acute osseous findings demonstrated.

## 2018-11-14 NOTE — PROGRESS NOTES
[facility] Hospital Medicine  Progress Note    Patient Name: Eve Long  MRN: [id number]  Patient Class: IP- Inpatient   Admission Date: 11/13/2018  Length of Stay: 1 days  Attending Physician: Lane White MD  Primary Care Provider: Chaya Morelos MD        Subjective:     Principal Problem:NSTEMI (non-ST elevated myocardial infarction)    HPI:  Eve Long is a 68 y.o. female patient with a PMHx of anemia, HLD, HTN, pneumonia, sarcoidosis, who presented to the ER for evaluation of constant left sided chest pain which onset suddenly x2-4 days ago. Pt characterizes her pain as a pressure 10/10 at its worse. Pt reports a strong FMHx of cardiac disease (Father with CABG/MI, all materal uncles with CAD) and reports her last stress test was done in 2006. Symptoms are constant and moderate in severity. Exacerbated by nothing and relieved by anti-hypertensive medications. Associated sxs included diaphoresis, left arm pain radiating from chest and lef jaw pain radiating from chest. Patient denied any fever, chills, SOB, leg swelling, palpitations, N/V, dysuria, hematuria, HA, weakness, and all other sxs at this time. In ER, troponin 0.241. Cardiology was consulted and Dr. Rodríguez recommended IV Heparin Drip. Spouse, Bravo Long, is the surrogate decision maker, HOME [phone]. She was admitted to telemetry Dx NSTEMI.         Hospital Course:  Admitted for evaluation and treatment of chest pain with elevated troponin.  Seen by Cardiology and had a left heart angiogram by Dr. Rodríguez 14 November.  Procedure revealed multivessel disease.  IABP placed and post-procedure in the ICU.  Consultation to Cardiac Surgery to plan for CABG.    Interval History:  Hemodynamically stable with IABP.  Complains of left chest pressure pulsating and radiating down the left arm.  Denies shortness of breath.    Review of Systems   Constitutional: Negative for chills and fever.   HENT:  Negative for congestion and sore throat.    Eyes: Negative for visual disturbance.   Respiratory: Positive for chest tightness. Negative for cough, shortness of breath and wheezing.    Cardiovascular: Negative for chest pain, palpitations and leg swelling.   Gastrointestinal: Negative for abdominal pain, blood in stool, constipation, diarrhea, nausea and vomiting.   Genitourinary: Negative for dysuria and hematuria.   Musculoskeletal: Negative for arthralgias and back pain.   Skin: Negative for rash and wound.   Neurological: Negative for dizziness, weakness, light-headedness and numbness.   Hematological: Negative for adenopathy.     Objective:     Vital Signs (Most Recent):  Temp: 97.9 °F (36.6 °C) (11/14/18 1515)  Pulse: (!) 59 (11/14/18 1600)  Resp: 14 (11/14/18 1600)  BP: (!) 118/45 (11/14/18 1600)  SpO2: 98 % (11/14/18 1600) Vital Signs (24h Range):  Temp:  [97 °F (36.1 °C)-98.4 °F (36.9 °C)] 97.9 °F (36.6 °C)  Pulse:  [52-75] 59  Resp:  [13-22] 14  SpO2:  [93 %-98 %] 98 %  BP: (114-160)/(45-93) 118/45     Weight: 80.2 kg (176 lb 12.9 oz)  Body mass index is 31.32 kg/m².    Intake/Output Summary (Last 24 hours) at 11/14/2018 6434  Last data filed at 11/14/2018 1500  Gross per 24 hour   Intake --   Output 325 ml   Net -325 ml      Physical Exam   Constitutional: She is oriented to person, place, and time. She appears well-developed and well-nourished. No distress.   HENT:   Head: Normocephalic and atraumatic.   Mouth/Throat: Oropharynx is clear and moist.   Eyes: Conjunctivae and EOM are normal. Pupils are equal, round, and reactive to light.   Neck: Neck supple. No JVD present. No thyromegaly present.   Cardiovascular: Normal rate and regular rhythm. Exam reveals no gallop and no friction rub.   No murmur heard.  Right groin femoral artery access point clean and intact.  IABP catheter in place.   Pulmonary/Chest: Effort normal and breath sounds normal. She has no wheezes. She has no rales.   Abdominal: Soft.  Bowel sounds are normal. She exhibits no distension. There is no tenderness. There is no rebound and no guarding.   Musculoskeletal: Normal range of motion. She exhibits no edema or deformity.   Lymphadenopathy:     She has no cervical adenopathy.   Neurological: She is alert and oriented to person, place, and time. She has normal reflexes.   Skin: Skin is warm and dry. No rash noted.   Psychiatric: She has a normal mood and affect. Her behavior is normal. Judgment and thought content normal.   Nursing note and vitals reviewed.      Significant Labs: All pertinent labs within the past 24 hours have been reviewed.    Significant Imaging: I have reviewed all pertinent imaging results/findings within the past 24 hours.    Assessment/Plan:      * NSTEMI (non-ST elevated myocardial infarction)    Chest pain and elevated troponin.  Case Discussed with subspecialities: Cardiology Consulted: Dr. Rodríguez.  Heparin and Nitroglycerin infusions.  [facility] on 14 November showing multi-vessel disease.  IABP placed and post-procedure in ICU.  Evaluated by Cardiac surgery and plan for CABG Thursday 15 November.  Aspirin, Metoprolol, and Supplemental Oxygen.  Cardiac echo pending.     Coronary artery disease involving native coronary artery of native heart with unstable angina pectoris    Multi vessel disease.  Cardiac surgery to perform CABG 15 November.     HTN (hypertension)    Resume home meds.     COPD, moderate    PRN Duonebs and supplemental oxygen.     Hypothyroidism    Resume Home Meds.  TSH pending.       VTE Risk Mitigation (From admission, onward)        Ordered     IP VTE LOW RISK PATIENT  Once      11/14/18 1529     Place LIUDMILA hose  Until discontinued      11/14/18 1529     Place sequential compression device  Until discontinued      11/14/18 1529     heparin 25,000 units in dextrose 5% 250 mL (100 units/mL) infusion LOW INTENSITY nomogram - [facility]  Continuous      11/14/18 0005     heparin 25,000 units in dextrose 5% (100  units/ml) IV bolus from bag - ADDITIONAL PRN BOLUS - 60 units/kg  As needed (PRN)      11/14/18 0005     heparin 25,000 units in dextrose 5% (100 units/ml) IV bolus from bag - ADDITIONAL PRN BOLUS - 30 units/kg  As needed (PRN)      11/14/18 0005     Place sequential compression device  Until discontinued      11/13/18 9453          Critical care time spent on the evaluation and treatment of severe organ dysfunction, review of pertinent labs and imaging studies, discussions with consulting providers and discussions with patient/family: 30 minutes.    Lane White MD  Department of Hospital Medicine   [facility]

## 2018-11-14 NOTE — ASSESSMENT & PLAN NOTE
IABP support with plan CABG tomorrow  CTS following  Continue heparin and nitro infusions overnight

## 2018-11-14 NOTE — PLAN OF CARE
11/14/18 1558   Medicare Message   Important Message from Medicare regarding Discharge Appeal Rights Given to patient/caregiver;Explained to patient/caregiver;Signed/date by patient/caregiver   Date IMM was signed 11/14/18   Time IMM was signed 1245

## 2018-11-14 NOTE — PLAN OF CARE
Problem: Patient Care Overview  Goal: Plan of Care Review  Outcome: Ongoing (interventions implemented as appropriate)  POC reviewed with patient, verbalized understanding. Pt remains free from falls. Fall precautions in place. SB -NS on cardiac monitor. VSS. Pt complaining of intermediate chest pain on exertion. Heparin gtt started at midnight , rate changed after 6 hour ptt . Heparin now at 15units/kg/hr . Next ptt in 6hours. Echo to be done this am.  Call bell w/in reach. Reminded to call for assistance. Arun paste administered every 6 hours for chest pain.

## 2018-11-14 NOTE — SUBJECTIVE & OBJECTIVE
No current facility-administered medications on file prior to encounter.      Current Outpatient Medications on File Prior to Encounter   Medication Sig    albuterol (PROVENTIL) 2.5 mg /3 mL (0.083 %) nebulizer solution 2.5 mg daily as needed. Every 6 hours inhale 2 puffs as needed for  wheezing    albuterol 90 mcg/actuation inhaler Inhale 2 puffs into the lungs every 6 (six) hours as needed.     amLODIPine (NORVASC) 5 MG tablet Take 1 tablet (5 mg total) by mouth once daily.    aspirin 81 MG Chew Take 1 tablet (81 mg total) by mouth once daily.    clonazePAM (KLONOPIN) 0.5 MG tablet Take 0.5 mg by mouth.    cyanocobalamin 1,000 mcg/mL injection Inject 1 mL (1,000 mcg total) into the skin every 28 days.    cyclobenzaprine (FLEXERIL) 10 MG tablet Take 1 tablet (10 mg total) by mouth 3 (three) times daily as needed for Muscle spasms.    promethazine (PHENERGAN) 12.5 MG Tab Take 1 tablet (12.5 mg total) by mouth every 6 (six) hours as needed.       Review of patient's allergies indicates:   Allergen Reactions    Beclomethasone dipropionate      Causes mouth Sores    Danazol Hives    Doxycycline Hives, Itching and Swelling    Fluticasone-salmeterol      Elevates Blood Pressure    Statins-hmg-coa reductase inhibitors      Muscle weakness    Umeclidinium-vilanterol      bronchitis    Influenza virus vaccines Rash     Reaction per patient.       Levothyroxine Rash    Red dye Rash       Past Medical History:   Diagnosis Date    Anemia     Asthma     Basal cell carcinoma     COPD (chronic obstructive pulmonary disease)     Hyperlipidemia     Hypertension     Immune deficiency disorder     NSTEMI (non-ST elevated myocardial infarction) 11/13/2018    Pneumonia     Sarcoidosis     TIA (transient ischemic attack)      Past Surgical History:   Procedure Laterality Date    CHOLECYSTECTOMY      COSMETIC SURGERY      blepharplasty    FRACTURE SURGERY Left     wrist ORIF    HYSTERECTOMY      LUNG  BIOPSY      OOPHORECTOMY      SKIN BIOPSY      TONSILLECTOMY       Family History     Problem Relation (Age of Onset)    Heart disease Father    Myasthenia gravis Brother    Stroke Mother, Sister    Thyroid disease Sister        Tobacco Use    Smoking status: Never Smoker    Smokeless tobacco: Never Used   Substance and Sexual Activity    Alcohol use: Yes     Alcohol/week: 0.6 oz     Types: 1 Glasses of wine per week     Comment: yearly    Drug use: No    Sexual activity: Not Currently     Review of Systems   Constitutional: Positive for activity change. Negative for appetite change, chills, diaphoresis and fever.   HENT: Negative for congestion.    Eyes: Negative for visual disturbance.   Respiratory: Negative for apnea, cough and shortness of breath.    Cardiovascular: Positive for chest pain. Negative for palpitations and leg swelling.   Gastrointestinal: Positive for blood in stool. Negative for abdominal distention and abdominal pain.   Endocrine: Negative for cold intolerance and polyuria.   Genitourinary: Negative for difficulty urinating and dysuria.   Musculoskeletal: Positive for neck pain. Negative for arthralgias and back pain.   Allergic/Immunologic: Positive for environmental allergies.   Neurological: Negative for dizziness, facial asymmetry and headaches.   Hematological: Does not bruise/bleed easily.   Psychiatric/Behavioral: Negative for agitation and behavioral problems.     Objective:     Vital Signs (Most Recent):  Temp: 97.5 °F (36.4 °C) (11/14/18 1400)  Pulse: 61 (11/14/18 1500)  Resp: 16 (11/14/18 1500)  BP: 137/70 (11/14/18 1445)  SpO2: 96 % (11/14/18 1500) Vital Signs (24h Range):  Temp:  [97 °F (36.1 °C)-98.4 °F (36.9 °C)] 97.5 °F (36.4 °C)  Pulse:  [52-87] 61  Resp:  [13-22] 16  SpO2:  [93 %-98 %] 96 %  BP: (114-160)/(51-93) 137/70     Weight: 80.2 kg (176 lb 12.9 oz)  Body mass index is 31.32 kg/m².    SpO2: 96 %  O2 Device (Oxygen Therapy): nasal cannula     Intake/Output - Last  3 Shifts       11/12 0700 - 11/13 0659 11/13 0700 - 11/14 0659 11/14 0700 - 11/15 0659    Urine (mL/kg/hr)   325 (0.5)    Total Output   325    Net   -325           Urine Occurrence  1 x            Lines/Drains/Airways     Drain                 Urethral Catheter 11/14/18 1315 Non-latex 16 Fr. less than 1 day          Line                 IABP 11/14/18 1300 7.5 Fr. 30 mL less than 1 day          Peripheral Intravenous Line                 Peripheral IV - Single Lumen 11/13/18 1635 Left Antecubital less than 1 day         Peripheral IV - Single Lumen 11/14/18 0920 Left;Posterior Hand less than 1 day                 STS Risk Score:  Mortality rate 1.7% and mortality or morbidity 18%    Physical Exam   Constitutional: She is oriented to person, place, and time. She appears well-developed and well-nourished. No distress.   HENT:   Head: Normocephalic and atraumatic.   Mouth/Throat: Oropharynx is clear and moist.   Eyes: EOM are normal. Pupils are equal, round, and reactive to light.   Neck: No JVD present.   Cardiovascular: Normal rate, regular rhythm and normal heart sounds.   Pulmonary/Chest: Effort normal and breath sounds normal.   Abdominal: Soft. Bowel sounds are normal.   Musculoskeletal: She exhibits no edema or deformity.   Neurological: She is alert and oriented to person, place, and time. No cranial nerve deficit.   Skin: Skin is warm and dry. She is not diaphoretic.   Psychiatric: She has a normal mood and affect.       Significant Labs:  All pertinent labs from the last 24 hours have been reviewed.    Significant Diagnostics:  I have reviewed all pertinent imaging results/findings within the past 24 hours.

## 2018-11-14 NOTE — SUBJECTIVE & OBJECTIVE
Past Medical History:   Diagnosis Date    Anemia     Asthma     Basal cell carcinoma     COPD (chronic obstructive pulmonary disease)     Hyperlipidemia     Hypertension     Immune deficiency disorder     NSTEMI (non-ST elevated myocardial infarction) 11/13/2018    Pneumonia     Sarcoidosis     TIA (transient ischemic attack)        Past Surgical History:   Procedure Laterality Date    CHOLECYSTECTOMY      COSMETIC SURGERY      blepharplasty    FRACTURE SURGERY Left     wrist ORIF    HYSTERECTOMY      LUNG BIOPSY      OOPHORECTOMY      SKIN BIOPSY      TONSILLECTOMY         Review of patient's allergies indicates:   Allergen Reactions    Beclomethasone dipropionate      Causes mouth Sores    Danazol Hives    Doxycycline Hives, Itching and Swelling    Fluticasone-salmeterol      Elevates Blood Pressure    Statins-hmg-coa reductase inhibitors      Muscle weakness    Umeclidinium-vilanterol      bronchitis    Influenza virus vaccines Rash     Reaction per patient.       Levothyroxine Rash    Red dye Rash       No current facility-administered medications on file prior to encounter.      Current Outpatient Medications on File Prior to Encounter   Medication Sig    albuterol (PROVENTIL) 2.5 mg /3 mL (0.083 %) nebulizer solution 2.5 mg daily as needed. Every 6 hours inhale 2 puffs as needed for  wheezing    albuterol 90 mcg/actuation inhaler Inhale 2 puffs into the lungs every 6 (six) hours as needed.     amLODIPine (NORVASC) 5 MG tablet Take 1 tablet (5 mg total) by mouth once daily.    aspirin 81 MG Chew Take 1 tablet (81 mg total) by mouth once daily.    clonazePAM (KLONOPIN) 0.5 MG tablet Take 0.5 mg by mouth.    cyanocobalamin 1,000 mcg/mL injection Inject 1 mL (1,000 mcg total) into the skin every 28 days.    cyclobenzaprine (FLEXERIL) 10 MG tablet Take 1 tablet (10 mg total) by mouth 3 (three) times daily as needed for Muscle spasms.    promethazine (PHENERGAN) 12.5 MG Tab Take 1  tablet (12.5 mg total) by mouth every 6 (six) hours as needed.     Family History     Problem Relation (Age of Onset)    Heart disease Father    Myasthenia gravis Brother    Stroke Mother, Sister    Thyroid disease Sister        Tobacco Use    Smoking status: Never Smoker    Smokeless tobacco: Never Used   Substance and Sexual Activity    Alcohol use: Yes     Alcohol/week: 0.6 oz     Types: 1 Glasses of wine per week     Comment: yearly    Drug use: No    Sexual activity: Not Currently     Review of Systems   Constitution: Negative for diaphoresis, weakness, malaise/fatigue, weight gain and weight loss.   HENT: Negative for congestion and nosebleeds.    Cardiovascular: Positive for chest pain ( with radiation to neck, jaw, back and left arm ). Negative for claudication, cyanosis, dyspnea on exertion, irregular heartbeat, leg swelling, near-syncope, orthopnea, palpitations, paroxysmal nocturnal dyspnea and syncope.   Respiratory: Positive for shortness of breath. Negative for cough, hemoptysis, sleep disturbances due to breathing, snoring, sputum production and wheezing.    Hematologic/Lymphatic: Negative for bleeding problem. Does not bruise/bleed easily.   Skin: Negative for rash.   Musculoskeletal: Negative for arthritis, back pain, falls, joint pain, muscle cramps and muscle weakness.   Gastrointestinal: Negative for abdominal pain, constipation, diarrhea, heartburn, hematemesis, hematochezia, melena and nausea.   Genitourinary: Negative for dysuria, hematuria and nocturia.   Neurological: Negative for excessive daytime sleepiness, dizziness, headaches, light-headedness, loss of balance, numbness and vertigo.     Objective:     Vital Signs (Most Recent):  Temp: 97 °F (36.1 °C) (11/14/18 0734)  Pulse: (!) 52 (11/14/18 0950)  Resp: 18 (11/14/18 0734)  BP: 132/67 (11/14/18 0734)  SpO2: (S) 97 %(ambuulating ) (11/14/18 0822) Vital Signs (24h Range):  Temp:  [97 °F (36.1 °C)-98.4 °F (36.9 °C)] 97 °F (36.1  °C)  Pulse:  [52-87] 52  Resp:  [16-18] 18  SpO2:  [93 %-97 %] 97 %  BP: (114-160)/(56-82) 132/67     Weight: 80.2 kg (176 lb 12.9 oz)  Body mass index is 31.32 kg/m².    SpO2: (S) 97 %(ambuulating )  O2 Device (Oxygen Therapy): room air    No intake or output data in the 24 hours ending 11/14/18 1121    Lines/Drains/Airways     Peripheral Intravenous Line                 Peripheral IV - Single Lumen 11/13/18 1635 Left Antecubital less than 1 day         Peripheral IV - Single Lumen 11/14/18 0920 Left;Posterior Hand less than 1 day                Physical Exam   Constitutional: She is oriented to person, place, and time. She appears well-developed and well-nourished.   Neck: Neck supple. No JVD present.   Cardiovascular: Normal rate, regular rhythm, normal heart sounds and normal pulses. Exam reveals no friction rub.   No murmur heard.  Pulmonary/Chest: Effort normal and breath sounds normal. No respiratory distress. She has no wheezes. She has no rales.   Abdominal: Soft. Bowel sounds are normal. She exhibits no distension.   Musculoskeletal: She exhibits no edema or tenderness.   Neurological: She is alert and oriented to person, place, and time.   Skin: Skin is warm and dry. No rash noted.   Psychiatric: She has a normal mood and affect. Her behavior is normal.   Nursing note and vitals reviewed.      Significant Labs:   All pertinent lab results from the last 24 hours have been reviewed. and   Recent Lab Results       11/14/18  0508   11/14/18  0507   11/13/18  2350   11/13/18  1600        Albumin   3.4   3.9     Alkaline Phosphatase   71   85     ALT   14   16     Anion Gap   8   9     aPTT 31.0  Comment:  aPTT therapeutic range = 39-69 seconds     28.0  Comment:  aPTT therapeutic range = 39-69 seconds     AST   20   22     Baso #   0.02   0.02        0.02         Basophil%   0.2   0.2        0.2         Total Bilirubin   0.3  Comment:  For infants and newborns, interpretation of results should be based  on  gestational age, weight and in agreement with clinical  observations.  Premature Infant recommended reference ranges:  Up to 24 hours.............<8.0 mg/dL  Up to 48 hours............<12.0 mg/dL  3-5 days..................<15.0 mg/dL  6-29 days.................<15.0 mg/dL     0.2  Comment:  For infants and newborns, interpretation of results should be based  on gestational age, weight and in agreement with clinical  observations.  Premature Infant recommended reference ranges:  Up to 24 hours.............<8.0 mg/dL  Up to 48 hours............<12.0 mg/dL  3-5 days..................<15.0 mg/dL  6-29 days.................<15.0 mg/dL       BUN, Bld   19   14     Calcium   9.3   9.4     Chloride   109   108     CO2   22   24     Creatinine   0.7   0.8     Differential Method   Automated   Automated        Automated         eGFR if    >60   >60     eGFR if non    >60  Comment:  Calculation used to obtain the estimated glomerular filtration  rate (eGFR) is the CKD-EPI equation.      >60  Comment:  Calculation used to obtain the estimated glomerular filtration  rate (eGFR) is the CKD-EPI equation.        Eos #   0.0   0.0        0.0         Eosinophil%   0.0   0.1        0.0         Estimated Avg Glucose       111     Glucose   104   141     Gran # (ANC)   5.8   6.8        5.8         Gran%   67.6   72.6        67.6         Hematocrit   36.9   42.3        36.9         Hemoglobin   12.2   14.2        12.2         Hemoglobin A1C       5.5  Comment:  ADA Screening Guidelines:  5.7-6.4%  Consistent with prediabetes  >or=6.5%  Consistent with diabetes  High levels of fetal hemoglobin interfere with the HbA1C  assay. Heterozygous hemoglobin variants (HbS, HgC, etc)do  not significantly interfere with this assay.   However, presence of multiple variants may affect accuracy.       Coumadin Monitoring INR       0.9  Comment:  Coumadin Therapy:  2.0 - 3.0 for INR for all indicators except mechanical  heart valves  and antiphospholipid syndromes which should use 2.5 - 3.5.       Lymph #   2.0   1.7        2.0         Lymph%   23.3   18.4        23.3         Magnesium   2.2         MCH   28.2   28.3        28.2         MCHC   33.1   33.6        33.1         MCV   85   84        85         Mono #   0.8   0.8        0.8         Mono%   8.9   8.7        8.9         MPV   9.8   10.0        9.8         Platelets   236   271        236         Potassium   4.2   3.7     Total Protein   6.2   7.4     Protime       9.8     RBC   4.32   5.02        4.32         RDW   14.0   13.9        14.0         Sodium   139   141     Troponin I   0.288  Comment:  The reference interval for Troponin I represents the 99th percentile   cutoff   for our facility and is consistent with 3rd generation assay   performance.   0.297  Comment:  The reference interval for Troponin I represents the 99th percentile   cutoff   for our facility and is consistent with 3rd generation assay   performance.   0.241  Comment:  The reference interval for Troponin I represents the 99th percentile   cutoff   for our facility and is consistent with 3rd generation assay   performance.       WBC   8.64   9.34        8.64               Significant Imaging: Echocardiogram: 2D echo with color flow doppler: No results found for this or any previous visit. and X-Ray: CXR:   Results for orders placed or performed during the hospital encounter of 11/13/18   X-Ray Chest PA And Lateral    Narrative    EXAMINATION:  XR CHEST PA AND LATERAL    CLINICAL HISTORY:  Chest pain, unspecified    TECHNIQUE:  PA and lateral views of the chest were performed.    COMPARISON:  08/29/2018    FINDINGS:  The cardiac and mediastinal silhouettes appear within normal limits. Stable calcified granuloma in the left lung base.  Stable pleural parenchymal scarring in the right lower lobe.  Cholecystectomy clips in the right upper quadrant.  No acute osseous findings demonstrated.      Impression     No acute findings.      Electronically signed by: Jose Alberto Lang MD  Date:    11/13/2018  Time:    16:13

## 2018-11-14 NOTE — ASSESSMENT & PLAN NOTE
NSTEMI/Chest Pain  -Case Discussed with subspecialities: Cardiology Consulted: Dr. Rodríguez  -IV Heparin  -ASA  -metoprolol  -O2   -lipid panel and Statin  -ECHO  -Troponin q 6 hours

## 2018-11-14 NOTE — ASSESSMENT & PLAN NOTE
The patient is a 68-year-old female with past medical history of COPD, hypertension, pulmonary sarcoidosis who presents with an NSTEMI.  Cardiac catheterization shows severe multivessel coronary artery disease.  An intra-aortic balloon pump was inserted in the cath lab.  The patient is a candidate for urgent coronary artery bypass grafting which has been scheduled for tomorrow 11/15/2018.  The risks and benefits of the procedure were explained to the patient and her .  The patient understands and has agreed to proceed with surgery.

## 2018-11-14 NOTE — CONSULTS
[facility] -   Cardiology  Consult Note    Patient Name: Eve Long  MRN: [id number]  Admission Date: 11/13/2018  Hospital Length of Stay: 1 days  Code Status: Full Code   Attending Provider: Lane White MD   Consulting Provider: DAVID Mcclure  Primary Care Physician: Chaya Morelos MD  Principal Problem:NSTEMI (non-ST elevated myocardial infarction)    Patient information was obtained from patient and ER records.     Inpatient consult to Cardiology  Consult performed by: DAVID Mcclure  Consult ordered by: Ryann Capps NP  Reason for consult: NSTEMI        Subjective:     Chief Complaint:  Chest pain      HPI:    Eve Long is a 68 y.o. female patient with a PMHx of anemia, HLD, HTN, TIA,  pneumonia, sarcoidosis, Statin intolerant due to myalgias. Tried Zetia and still had myalgia. Patient presented to the ER for evaluation of constant left sided chest pain which onset approximately 4 days ago. Pt characterizes her pain as a pressure 10/10 at its worse. Pt reports a strong FMHx of cardiac disease (Father with CABG/MI, all materal uncles with CAD) and reports her last stress test was done in 2006.  patient states that she tried to take a walk around her neighborhood and chest pain worsened. Pain associated with diaphoresis, and radiated from chest and left jaw, arm and back.  Patient denied any fever, chills, SOB, leg swelling, palpitations, N/V, dysuria, hematuria, HA, weakness, and all other sxs at this time. In ER, troponin 0.241, 0.297, 0.288. EKG with + anterior changes.  Echo in Aug 2018 showed normal LVF.  Patient has been started on Heparin Drip. Last episode of chest pain was this morning. Had chest pain all night improved with nitro paste.         Past Medical History:   Diagnosis Date    Anemia     Asthma     Basal cell carcinoma     COPD (chronic obstructive pulmonary disease)     Hyperlipidemia     Hypertension      Immune deficiency disorder     NSTEMI (non-ST elevated myocardial infarction) 11/13/2018    Pneumonia     Sarcoidosis     TIA (transient ischemic attack)        Past Surgical History:   Procedure Laterality Date    CHOLECYSTECTOMY      COSMETIC SURGERY      blepharplasty    FRACTURE SURGERY Left     wrist ORIF    HYSTERECTOMY      LUNG BIOPSY      OOPHORECTOMY      SKIN BIOPSY      TONSILLECTOMY         Review of patient's allergies indicates:   Allergen Reactions    Beclomethasone dipropionate      Causes mouth Sores    Danazol Hives    Doxycycline Hives, Itching and Swelling    Fluticasone-salmeterol      Elevates Blood Pressure    Statins-hmg-coa reductase inhibitors      Muscle weakness    Umeclidinium-vilanterol      bronchitis    Influenza virus vaccines Rash     Reaction per patient.       Levothyroxine Rash    Red dye Rash       No current facility-administered medications on file prior to encounter.      Current Outpatient Medications on File Prior to Encounter   Medication Sig    albuterol (PROVENTIL) 2.5 mg /3 mL (0.083 %) nebulizer solution 2.5 mg daily as needed. Every 6 hours inhale 2 puffs as needed for  wheezing    albuterol 90 mcg/actuation inhaler Inhale 2 puffs into the lungs every 6 (six) hours as needed.     amLODIPine (NORVASC) 5 MG tablet Take 1 tablet (5 mg total) by mouth once daily.    aspirin 81 MG Chew Take 1 tablet (81 mg total) by mouth once daily.    clonazePAM (KLONOPIN) 0.5 MG tablet Take 0.5 mg by mouth.    cyanocobalamin 1,000 mcg/mL injection Inject 1 mL (1,000 mcg total) into the skin every 28 days.    cyclobenzaprine (FLEXERIL) 10 MG tablet Take 1 tablet (10 mg total) by mouth 3 (three) times daily as needed for Muscle spasms.    promethazine (PHENERGAN) 12.5 MG Tab Take 1 tablet (12.5 mg total) by mouth every 6 (six) hours as needed.     Family History     Problem Relation (Age of Onset)    Heart disease Father    Myasthenia gravis Brother    Stroke  Mother, Sister    Thyroid disease Sister        Tobacco Use    Smoking status: Never Smoker    Smokeless tobacco: Never Used   Substance and Sexual Activity    Alcohol use: Yes     Alcohol/week: 0.6 oz     Types: 1 Glasses of wine per week     Comment: yearly    Drug use: No    Sexual activity: Not Currently     Review of Systems   Constitution: Negative for diaphoresis, weakness, malaise/fatigue, weight gain and weight loss.   HENT: Negative for congestion and nosebleeds.    Cardiovascular: Positive for chest pain ( with radiation to neck, jaw, back and left arm ). Negative for claudication, cyanosis, dyspnea on exertion, irregular heartbeat, leg swelling, near-syncope, orthopnea, palpitations, paroxysmal nocturnal dyspnea and syncope.   Respiratory: Positive for shortness of breath. Negative for cough, hemoptysis, sleep disturbances due to breathing, snoring, sputum production and wheezing.    Hematologic/Lymphatic: Negative for bleeding problem. Does not bruise/bleed easily.   Skin: Negative for rash.   Musculoskeletal: Negative for arthritis, back pain, falls, joint pain, muscle cramps and muscle weakness.   Gastrointestinal: Negative for abdominal pain, constipation, diarrhea, heartburn, hematemesis, hematochezia, melena and nausea.   Genitourinary: Negative for dysuria, hematuria and nocturia.   Neurological: Negative for excessive daytime sleepiness, dizziness, headaches, light-headedness, loss of balance, numbness and vertigo.     Objective:     Vital Signs (Most Recent):  Temp: 97 °F (36.1 °C) (11/14/18 0734)  Pulse: (!) 52 (11/14/18 0950)  Resp: 18 (11/14/18 0734)  BP: 132/67 (11/14/18 0734)  SpO2: (S) 97 %(ambuulating ) (11/14/18 0822) Vital Signs (24h Range):  Temp:  [97 °F (36.1 °C)-98.4 °F (36.9 °C)] 97 °F (36.1 °C)  Pulse:  [52-87] 52  Resp:  [16-18] 18  SpO2:  [93 %-97 %] 97 %  BP: (114-160)/(56-82) 132/67     Weight: 80.2 kg (176 lb 12.9 oz)  Body mass index is 31.32 kg/m².    SpO2: (S) 97  %(ambuulating )  O2 Device (Oxygen Therapy): room air    No intake or output data in the 24 hours ending 11/14/18 1121    Lines/Drains/Airways     Peripheral Intravenous Line                 Peripheral IV - Single Lumen 11/13/18 1635 Left Antecubital less than 1 day         Peripheral IV - Single Lumen 11/14/18 0920 Left;Posterior Hand less than 1 day                Physical Exam   Constitutional: She is oriented to person, place, and time. She appears well-developed and well-nourished.   Neck: Neck supple. No JVD present.   Cardiovascular: Normal rate, regular rhythm, normal heart sounds and normal pulses. Exam reveals no friction rub.   No murmur heard.  Pulmonary/Chest: Effort normal and breath sounds normal. No respiratory distress. She has no wheezes. She has no rales.   Abdominal: Soft. Bowel sounds are normal. She exhibits no distension.   Musculoskeletal: She exhibits no edema or tenderness.   Neurological: She is alert and oriented to person, place, and time.   Skin: Skin is warm and dry. No rash noted.   Psychiatric: She has a normal mood and affect. Her behavior is normal.   Nursing note and vitals reviewed.      Significant Labs:   All pertinent lab results from the last 24 hours have been reviewed. and   Recent Lab Results       11/14/18  0508   11/14/18  0507   11/13/18  2350   11/13/18  1600        Albumin   3.4   3.9     Alkaline Phosphatase   71   85     ALT   14   16     Anion Gap   8   9     aPTT 31.0  Comment:  aPTT therapeutic range = 39-69 seconds     28.0  Comment:  aPTT therapeutic range = 39-69 seconds     AST   20   22     Baso #   0.02   0.02        0.02         Basophil%   0.2   0.2        0.2         Total Bilirubin   0.3  Comment:  For infants and newborns, interpretation of results should be based  on gestational age, weight and in agreement with clinical  observations.  Premature Infant recommended reference ranges:  Up to 24 hours.............<8.0 mg/dL  Up to 48  hours............<12.0 mg/dL  3-5 days..................<15.0 mg/dL  6-29 days.................<15.0 mg/dL     0.2  Comment:  For infants and newborns, interpretation of results should be based  on gestational age, weight and in agreement with clinical  observations.  Premature Infant recommended reference ranges:  Up to 24 hours.............<8.0 mg/dL  Up to 48 hours............<12.0 mg/dL  3-5 days..................<15.0 mg/dL  6-29 days.................<15.0 mg/dL       BUN, Bld   19   14     Calcium   9.3   9.4     Chloride   109   108     CO2   22   24     Creatinine   0.7   0.8     Differential Method   Automated   Automated        Automated         eGFR if    >60   >60     eGFR if non    >60  Comment:  Calculation used to obtain the estimated glomerular filtration  rate (eGFR) is the CKD-EPI equation.      >60  Comment:  Calculation used to obtain the estimated glomerular filtration  rate (eGFR) is the CKD-EPI equation.        Eos #   0.0   0.0        0.0         Eosinophil%   0.0   0.1        0.0         Estimated Avg Glucose       111     Glucose   104   141     Gran # (ANC)   5.8   6.8        5.8         Gran%   67.6   72.6        67.6         Hematocrit   36.9   42.3        36.9         Hemoglobin   12.2   14.2        12.2         Hemoglobin A1C       5.5  Comment:  ADA Screening Guidelines:  5.7-6.4%  Consistent with prediabetes  >or=6.5%  Consistent with diabetes  High levels of fetal hemoglobin interfere with the HbA1C  assay. Heterozygous hemoglobin variants (HbS, HgC, etc)do  not significantly interfere with this assay.   However, presence of multiple variants may affect accuracy.       Coumadin Monitoring INR       0.9  Comment:  Coumadin Therapy:  2.0 - 3.0 for INR for all indicators except mechanical heart valves  and antiphospholipid syndromes which should use 2.5 - 3.5.       Lymph #   2.0   1.7        2.0         Lymph%   23.3   18.4        23.3         Magnesium    2.2         MCH   28.2   28.3        28.2         MCHC   33.1   33.6        33.1         MCV   85   84        85         Mono #   0.8   0.8        0.8         Mono%   8.9   8.7        8.9         MPV   9.8   10.0        9.8         Platelets   236   271        236         Potassium   4.2   3.7     Total Protein   6.2   7.4     Protime       9.8     RBC   4.32   5.02        4.32         RDW   14.0   13.9        14.0         Sodium   139   141     Troponin I   0.288  Comment:  The reference interval for Troponin I represents the 99th percentile   cutoff   for our facility and is consistent with 3rd generation assay   performance.   0.297  Comment:  The reference interval for Troponin I represents the 99th percentile   cutoff   for our facility and is consistent with 3rd generation assay   performance.   0.241  Comment:  The reference interval for Troponin I represents the 99th percentile   cutoff   for our facility and is consistent with 3rd generation assay   performance.       WBC   8.64   9.34        8.64               Significant Imaging: Echocardiogram: 2D echo with color flow doppler: No results found for this or any previous visit. and X-Ray: CXR:   Results for orders placed or performed during the hospital encounter of 11/13/18   X-Ray Chest PA And Lateral    Narrative    EXAMINATION:  XR CHEST PA AND LATERAL    CLINICAL HISTORY:  Chest pain, unspecified    TECHNIQUE:  PA and lateral views of the chest were performed.    COMPARISON:  08/29/2018    FINDINGS:  The cardiac and mediastinal silhouettes appear within normal limits. Stable calcified granuloma in the left lung base.  Stable pleural parenchymal scarring in the right lower lobe.  Cholecystectomy clips in the right upper quadrant.  No acute osseous findings demonstrated.      Impression    No acute findings.      Electronically signed by: Jose Alberto Lang MD  Date:    11/13/2018  Time:    16:13     Assessment and Plan:     * NSTEMI (non-ST elevated  myocardial infarction)    -Trop 0.241, 0.297, 0.288  -EKG with anterior lead changes   -Chest pain all night improved with nitro paste. Last episode of pain this morning around 9am  -Has been started on Heparin gtt  -Continue ASA, BB.   -No statin due to history of intolerance(myalgia). Had side effects to Zetia when tried   -Recommendation has been made for patient to undergo LHC today   -Dr. Rodríguez explained the risks, benefits and alternatives of the procedure in detail. The patient voices understanding and all questions have been answered. Patient agrees to proceed as planned.   -Keep NPO   -NS at 100/hr   -Further recommendations to follow cath     HTN (hypertension)    -Continue amlodipine and metoprolol          VTE Risk Mitigation (From admission, onward)        Ordered     heparin 25,000 units in dextrose 5% 250 mL (100 units/mL) infusion LOW INTENSITY nomogram - [facility]  Continuous      11/14/18 0005     heparin 25,000 units in dextrose 5% (100 units/ml) IV bolus from bag - ADDITIONAL PRN BOLUS - 60 units/kg  As needed (PRN)      11/14/18 0005     heparin 25,000 units in dextrose 5% (100 units/ml) IV bolus from bag - ADDITIONAL PRN BOLUS - 30 units/kg  As needed (PRN)      11/14/18 0005     IP VTE HIGH RISK PATIENT  Once      11/13/18 1848     Place sequential compression device  Until discontinued      11/13/18 1848        Chart reviewed. Patient examined by Dr. Rodríguez and agrees with plan that has been outlined.     Thank you for your consult. I will follow-up with patient. Please contact us if you have any additional questions.    DAVID Mcclure  Cardiology   [facility] [place]

## 2018-11-14 NOTE — CONSULTS
[facility] -   Critical Care Medicine  Consult Note    Patient Name: Eve Long  MRN: [id number]  Admission Date: 11/13/2018  Hospital Length of Stay: 1 days  Code Status: Full Code  Attending Physician: Lane White MD   Primary Care Provider: Chaya Morelos MD   Principal Problem: NSTEMI (non-ST elevated myocardial infarction)      Subjective:     HPI:  68 year old female with PMH including moderate COPD, sarcoid lung disease, HTN, anemia, HLD, and TIA  Presented to ED on 11/13 with complaint of cp/pressure x 3d with radiation to lt arm and back with associated diaphoresis and jaw pain  Evaluation revealed troponin 0.241  She was admitted with monitoring and heparin infusion for NSTEMI    Troponin jac to 0.297 and she was taken on 11/14 for LHC which revealed subtotal diffuse LAD disease along with multivessel non occlusive disease; IABP was placed for coronary support     Hospital/ICU Course:  Admitted to ICU post LHC with IABP support, heparin infusion, nitro infusion, and nasal cannula oxygen; awake, alert, oriented and able to verbalize plan for CABG tomorrow after consult with CTS    Past Medical History:   Diagnosis Date    Anemia     Asthma     Basal cell carcinoma     COPD (chronic obstructive pulmonary disease)     Coronary artery disease involving native coronary artery of native heart with unstable angina pectoris 11/14/2018    Hyperlipidemia     Hypertension     Immune deficiency disorder     NSTEMI (non-ST elevated myocardial infarction) 11/13/2018    Pneumonia     Sarcoidosis     TIA (transient ischemic attack)        Past Surgical History:   Procedure Laterality Date    CHOLECYSTECTOMY      COSMETIC SURGERY      blepharplasty    FRACTURE SURGERY Left     wrist ORIF    HYSTERECTOMY      LUNG BIOPSY      OOPHORECTOMY      SKIN BIOPSY      TONSILLECTOMY         Review of patient's allergies indicates:   Allergen Reactions    Beclomethasone  "dipropionate      Causes mouth Sores    Danazol Hives    Doxycycline Hives, Itching and Swelling    Fluticasone-salmeterol      Elevates Blood Pressure    Statins-hmg-coa reductase inhibitors      Muscle weakness    Umeclidinium-vilanterol      bronchitis    Influenza virus vaccines Rash     Reaction per patient.       Levothyroxine Rash    Red dye Rash       Family History     Problem Relation (Age of Onset)    Heart disease Father    Myasthenia gravis Brother    Stroke Mother, Sister    Thyroid disease Sister        Tobacco Use    Smoking status: Never Smoker    Smokeless tobacco: Never Used   Substance and Sexual Activity    Alcohol use: Yes     Alcohol/week: 0.6 oz     Types: 1 Glasses of wine per week     Comment: yearly    Drug use: No    Sexual activity: Not Currently         Review of Systems   Constitutional: Positive for activity change.   HENT: Negative for congestion.    Respiratory: Negative for cough, shortness of breath and wheezing.    Cardiovascular: Positive for chest pain (currently conrolled) and palpitations ("feel pump (IABP)"). Negative for leg swelling.   Gastrointestinal: Negative.    Genitourinary: Negative.    Musculoskeletal: Positive for myalgias (discomfort from lying flat).   Skin: Negative.    Neurological: Negative for tremors and syncope.   Psychiatric/Behavioral: Negative.      Objective:     Vital Signs (Most Recent):  Temp: 97.9 °F (36.6 °C) (11/14/18 1515)  Pulse: (!) 59 (11/14/18 1600)  Resp: 14 (11/14/18 1600)  BP: (!) 118/45 (11/14/18 1600)  SpO2: 98 % (11/14/18 1600) Vital Signs (24h Range):  Temp:  [97 °F (36.1 °C)-98.4 °F (36.9 °C)] 97.9 °F (36.6 °C)  Pulse:  [52-75] 59  Resp:  [13-22] 14  SpO2:  [93 %-98 %] 98 %  BP: (114-160)/(45-93) 118/45     Weight: 80.2 kg (176 lb 12.9 oz)  Body mass index is 31.32 kg/m².      Intake/Output Summary (Last 24 hours) at 11/14/2018 7858  Last data filed at 11/14/2018 1500  Gross per 24 hour   Intake --   Output 325 ml   Net " -325 ml       Physical Exam   Constitutional: She is oriented to person, place, and time. She appears well-developed. No distress.   HENT:   Head: Atraumatic.   Eyes: Conjunctivae are normal. Pupils are equal, round, and reactive to light.   Neck: No JVD present. No tracheal deviation present.   Pulmonary/Chest: Effort normal and breath sounds normal. No respiratory distress.   Abdominal: Soft. Bowel sounds are normal. She exhibits no distension.   Musculoskeletal: She exhibits no edema.   Neurological: She is alert and oriented to person, place, and time. GCS eye subscore is 4. GCS verbal subscore is 5. GCS motor subscore is 6.   Skin: Skin is warm and dry. Capillary refill takes 2 to 3 seconds. No cyanosis.            Vents:  Oxygen Concentration (%): 21 (11/14/18 0822)    Lines/Drains/Airways     Drain                 Urethral Catheter 11/14/18 1315 Non-latex 16 Fr. less than 1 day          Line                 IABP 11/14/18 1300 7.5 Fr. 30 mL less than 1 day          Peripheral Intravenous Line                 Peripheral IV - Single Lumen 11/13/18 1635 Left Antecubital 1 day         Peripheral IV - Single Lumen 11/14/18 0920 Left;Posterior Hand less than 1 day                Significant Labs:    CBC/Anemia Profile:  Recent Labs   Lab 11/13/18  1600 11/14/18  0507 11/14/18  1615   WBC 9.34 8.64  8.64 8.52   HGB 14.2 12.2  12.2 12.6   HCT 42.3 36.9*  36.9* 38.2    236  236 224   MCV 84 85  85 86   RDW 13.9 14.0  14.0 14.0        Chemistries:  Recent Labs   Lab 11/13/18  1600 11/14/18  0507 11/14/18  1621    139 136   K 3.7 4.2 4.2    109 107   CO2 24 22* 20*   BUN 14 19 15   CREATININE 0.8 0.7 0.8   CALCIUM 9.4 9.3 8.7   ALBUMIN 3.9 3.4* 3.4*   PROT 7.4 6.2 6.3   BILITOT 0.2 0.3 0.3   ALKPHOS 85 71 74   ALT 16 14 15   AST 22 20 21   MG  --  2.2  --        All pertinent labs within the past 24 hours have been reviewed.    Significant Imaging:   I have reviewed all pertinent imaging  results/findings within the past 24 hours.    Assessment/Plan:     Pulmonary   COPD, moderate    Well controlled  Followed by Dr Keenan in clinic  JENA iyer     Cardiac/Vascular   Coronary artery disease involving native coronary artery of native heart with unstable angina pectoris    IABP support with plan CABG tomorrow  CTS following  Continue heparin and nitro infusions overnight     HTN (hypertension)    Amlodipine, metoprolol per cardiology         Critical Care Daily Checklist:    A: Awake: RASS Goal/Actual Goal:    Actual:     B: Spontaneous Breathing Trial Performed?     C: SAT & SBT Coordinated?  n/a                      D: Delirium: CAM-ICU     E: Early Mobility Performed? ROM as able with IABP in place   F: Feeding Goal:    Status:     Current Diet Order   Procedures    Diet NPO Except for: Sips with Medication     Order Specific Question:   Except for     Answer:   Sips with Medication      AS: Analgesia/Sedation prn   T: Thromboembolic Prophylaxis Heparin infusion   H: HOB > 300 Yes   U: Stress Ulcer Prophylaxis (if needed) pepcid   G: Glucose Control monitor   B: Bowel Function     I: Indwelling Catheter (Lines & Hilton) Necessity reviewed   D: De-escalation of Antimicrobials/Pharmacotherapies reviewed    Plan for the day/ETD Supportive care overnight with CABG tomorrow    Code Status:  Family/Goals of Care: Full Code  Home on discharge   I have discussed case and plan of care in detail with Dr Keenan; Status and plan of care were discussed with team on multidisciplinary rounds.    Critical Care Time: 40 minutes  Critical secondary to NSTEMI with multi vessel CAD on IABP support pending CABG; Critical care was time spent personally by me on the following activities: development of treatment plan with patient or surrogate and bedside caregivers, discussions with consultants, evaluation of patient's response to treatment, examination of patient, ordering and performing treatments and interventions,  ordering and review of laboratory studies, ordering and review of radiographic studies, pulse oximetry, re-evaluation of patient's condition. This critical care time did not overlap with that of any other provider or involve time for any procedures.    Thank you for your consult.     ISAI Daley-BC  Critical Care Medicine  [facility]

## 2018-11-14 NOTE — INTERVAL H&P NOTE
The patient has been examined and the H&P has been reviewed:    I concur with the findings and no changes have occurred since H&P was written.  Patient has nstemi continue to have angina will proceed with lhc/ptca  Anesthesia/Surgery risks, benefits and alternative options discussed and understood by patient/family.  I have explained the risks, benefits , and alternatives of the procedure in detail.the patient voices understanding and all questions have been answered.the patient agrees to proceed as planned.          Active Hospital Problems    Diagnosis  POA    *NSTEMI (non-ST elevated myocardial infarction) [I21.4]  Yes    HTN (hypertension) [I10]  Yes    COPD, moderate [J44.9]  Yes    Hypothyroidism [E03.9]  Yes      Resolved Hospital Problems   No resolved problems to display.

## 2018-11-14 NOTE — NURSING
Spoke to Eric Coughlin NP at this time about pt chest pain. It was noticed that pt was not started on IV heparin per note from the day . So will administer nitro paste per order and start heparin gtt. Will continue to monitor.

## 2018-11-14 NOTE — HPI
Eve Long is a 68 y.o. female patient with a PMHx of anemia, HLD, HTN, TIA,  pneumonia, sarcoidosis, Statin intolerant due to myalgias. Tried Zetia and still had myalgia. Patient presented to the ER for evaluation of constant left sided chest pain which onset approximately 4 days ago. Pt characterizes her pain as a pressure 10/10 at its worse. Pt reports a strong FMHx of cardiac disease (Father with CABG/MI, all materal uncles with CAD) and reports her last stress test was done in 2006.  patient states that she tried to take a walk around her neighborhood and chest pain worsened. Pain associated with diaphoresis, and radiated from chest and left jaw, arm and back.  Patient denied any fever, chills, SOB, leg swelling, palpitations, N/V, dysuria, hematuria, HA, weakness, and all other sxs at this time. In ER, troponin 0.241, 0.297, 0.288. EKG with + anterior changes.  Echo in Aug 2018 showed normal LVF.  Patient has been started on Heparin Drip. Last episode of chest pain was this morning. Had chest pain all night improved with nitro paste.

## 2018-11-14 NOTE — PLAN OF CARE
Assessment completed.  Met with the patient/ family. CM explained and left info in blue transition of care folder regarding Advance Directives, Living Will ( FULL CODE)  Pamphlet on D/C planning on admission and Pharmacy bedside delivery.  The role of CM explained for ICU transitions of care/ discharge planning. Per EMR,  PMHx of anemia, HLD, HTN, pneumonia, sarcoidosis, who presented to the ER for evaluation of constant left sided chest pain which onset suddenly x2-4 days ago. Pt characterizes her pain as a pressure 10/10 at its worse. Pt reports a strong FMHx of cardiac disease (Father with CABG/MI, all materal uncles with CAD) and reports her last stress test was done in 2006. Symptoms are constant and moderate in severity. Exacerbated by nothing and relieved by anti-hypertensive medications. Associated sxs included diaphoresis, left arm pain radiating from chest and lef jaw pain radiating from chest. Patient transferred to ICU with a balloon pump. Patient is sched for CABG surg tomorrow.      Patient has [employer] insurance. Patient has good support at home. Her  is retired. CM explained Care post CABG. CM left Outpatient cardiac rehab resources in the blue transitional folder.  Patient has no needs at this time. CM to f/u for safe transition    Chaya Morelos MD       [facility] [address] [place], [place] - [address]  [address]  [place] [address]  Phone: [phone] Fax: [phone]    [facility] - [facility] [address]  [address] [address]  Phone: [phone] Fax: [phone]         11/14/18 1553   Discharge Assessment   Assessment Type Discharge Planning Assessment   Confirmed/corrected address and phone number on facesheet? Yes   Assessment information obtained from? Patient;Medical Record   Communicated expected length of stay with patient/caregiver yes   Prior to hospitilization cognitive status: Alert/Oriented   Prior  to hospitalization functional status: Independent   Current cognitive status: Alert/Oriented   Current Functional Status: Independent   Lives With spouse   Able to Return to Prior Arrangements yes   Is patient able to care for self after discharge? Yes   Patient's perception of discharge disposition home health   Readmission Within The Last 30 Days no previous admission in last 30 days   Patient currently being followed by outpatient case management? No   Patient currently receives any other outside agency services? No   Equipment Currently Used at Home none   Do you have any problems affording any of your prescribed medications? No   Is the patient taking medications as prescribed? yes   Does the patient have transportation home? Yes   Transportation Available family or friend will provide   Does the patient receive services at the Coumadin Clinic? No   Discharge Plan A Home with family;Home Health   Discharge Plan B Skilled Nursing Facility   Patient/Family In Agreement With Plan yes

## 2018-11-15 ENCOUNTER — ANESTHESIA EVENT (OUTPATIENT)
Dept: SURGERY | Facility: HOSPITAL | Age: 68
DRG: 233 | End: 2018-11-15
Payer: MEDICARE

## 2018-11-15 ENCOUNTER — ANESTHESIA (OUTPATIENT)
Dept: SURGERY | Facility: HOSPITAL | Age: 68
DRG: 233 | End: 2018-11-15
Payer: MEDICARE

## 2018-11-15 PROBLEM — I10 HTN (HYPERTENSION): Status: RESOLVED | Noted: 2018-06-05 | Resolved: 2018-11-15

## 2018-11-15 PROBLEM — Z99.11 ON MECHANICALLY ASSISTED VENTILATION: Status: ACTIVE | Noted: 2018-11-15

## 2018-11-15 PROBLEM — Z95.1 S/P CABG X 5: Status: ACTIVE | Noted: 2018-11-15

## 2018-11-15 LAB
ALLENS TEST: ABNORMAL
ALLENS TEST: ABNORMAL
ANION GAP SERPL CALC-SCNC: 8 MMOL/L
ANION GAP SERPL CALC-SCNC: 8 MMOL/L
ANION GAP SERPL CALC-SCNC: 9 MMOL/L
APTT BLDCRRT: 26.5 SEC
APTT BLDCRRT: 34.4 SEC
BASOPHILS # BLD AUTO: 0.01 K/UL
BASOPHILS # BLD AUTO: 0.02 K/UL
BASOPHILS NFR BLD: 0.1 %
BASOPHILS NFR BLD: 0.2 %
BUN SERPL-MCNC: 11 MG/DL
BUN SERPL-MCNC: 13 MG/DL
BUN SERPL-MCNC: 13 MG/DL
CALCIUM SERPL-MCNC: 8 MG/DL
CALCIUM SERPL-MCNC: 8.6 MG/DL
CALCIUM SERPL-MCNC: 8.7 MG/DL
CHLORIDE SERPL-SCNC: 108 MMOL/L
CHLORIDE SERPL-SCNC: 111 MMOL/L
CHLORIDE SERPL-SCNC: 111 MMOL/L
CO2 SERPL-SCNC: 21 MMOL/L
CO2 SERPL-SCNC: 22 MMOL/L
CO2 SERPL-SCNC: 22 MMOL/L
CREAT SERPL-MCNC: 0.7 MG/DL
CREAT SERPL-MCNC: 0.7 MG/DL
CREAT SERPL-MCNC: 0.8 MG/DL
D DIMER PPP IA.FEU-MCNC: 1.25 MG/L FEU
DELSYS: ABNORMAL
DIFFERENTIAL METHOD: ABNORMAL
DIFFERENTIAL METHOD: ABNORMAL
EOSINOPHIL # BLD AUTO: 0 K/UL
EOSINOPHIL # BLD AUTO: 0 K/UL
EOSINOPHIL NFR BLD: 0 %
EOSINOPHIL NFR BLD: 0.1 %
ERYTHROCYTE [DISTWIDTH] IN BLOOD BY AUTOMATED COUNT: 14 %
ERYTHROCYTE [DISTWIDTH] IN BLOOD BY AUTOMATED COUNT: 14.1 %
ERYTHROCYTE [DISTWIDTH] IN BLOOD BY AUTOMATED COUNT: 14.1 %
EST. GFR  (AFRICAN AMERICAN): >60 ML/MIN/1.73 M^2
EST. GFR  (NON AFRICAN AMERICAN): >60 ML/MIN/1.73 M^2
FIBRINOGEN PPP-MCNC: 164 MG/DL
FIBRINOGEN PPP-MCNC: 194 MG/DL
FIO2: 40
GLUCOSE SERPL-MCNC: 119 MG/DL
GLUCOSE SERPL-MCNC: 179 MG/DL (ref 70–110)
GLUCOSE SERPL-MCNC: 185 MG/DL
GLUCOSE SERPL-MCNC: 243 MG/DL
HCO3 UR-SCNC: 22.5 MMOL/L (ref 24–28)
HCO3 UR-SCNC: 25.8 MMOL/L (ref 24–28)
HCT VFR BLD AUTO: 21.5 %
HCT VFR BLD AUTO: 25.7 %
HCT VFR BLD AUTO: 26.7 %
HCT VFR BLD AUTO: 37.2 %
HCT VFR BLD CALC: 22 %PCV (ref 36–54)
HGB BLD-MCNC: 12.3 G/DL
HGB BLD-MCNC: 7.2 G/DL
HGB BLD-MCNC: 9 G/DL
HGB BLD-MCNC: 9 G/DL
INR PPP: 1.1
INR PPP: 2.1
LYMPHOCYTES # BLD AUTO: 1.1 K/UL
LYMPHOCYTES # BLD AUTO: 1.1 K/UL
LYMPHOCYTES NFR BLD: 11.5 %
LYMPHOCYTES NFR BLD: 5.6 %
MAGNESIUM SERPL-MCNC: 3.9 MG/DL
MAGNESIUM SERPL-MCNC: 4.3 MG/DL
MCH RBC QN AUTO: 28 PG
MCH RBC QN AUTO: 28.1 PG
MCH RBC QN AUTO: 29.2 PG
MCHC RBC AUTO-ENTMCNC: 33.1 G/DL
MCHC RBC AUTO-ENTMCNC: 33.7 G/DL
MCHC RBC AUTO-ENTMCNC: 35 G/DL
MCV RBC AUTO: 83 FL
MCV RBC AUTO: 83 FL
MCV RBC AUTO: 85 FL
MODE: ABNORMAL
MONOCYTES # BLD AUTO: 0.6 K/UL
MONOCYTES # BLD AUTO: 1.7 K/UL
MONOCYTES NFR BLD: 6.4 %
MONOCYTES NFR BLD: 8.6 %
NEUTROPHILS # BLD AUTO: 16.5 K/UL
NEUTROPHILS # BLD AUTO: 7.9 K/UL
NEUTROPHILS NFR BLD: 81.9 %
NEUTROPHILS NFR BLD: 85.6 %
PCO2 BLDA: 40 MMHG (ref 35–45)
PCO2 BLDA: 42.2 MMHG (ref 35–45)
PEEP: 5
PH SMN: 7.36 [PH] (ref 7.35–7.45)
PH SMN: 7.39 [PH] (ref 7.35–7.45)
PLATELET # BLD AUTO: 123 K/UL
PLATELET # BLD AUTO: 143 K/UL
PLATELET # BLD AUTO: 225 K/UL
PLATELET BLD QL SMEAR: NORMAL
PMV BLD AUTO: 10 FL
PMV BLD AUTO: 9.4 FL
PMV BLD AUTO: 9.5 FL
PO2 BLDA: 278 MMHG (ref 80–100)
PO2 BLDA: 78 MMHG (ref 80–100)
POC BE: -3 MMOL/L
POC BE: 1 MMOL/L
POC IONIZED CALCIUM: 1.12 MMOL/L (ref 1.06–1.42)
POC SATURATED O2: 100 % (ref 95–100)
POC SATURATED O2: 95 % (ref 95–100)
POCT GLUCOSE: 184 MG/DL (ref 70–110)
POCT GLUCOSE: 225 MG/DL (ref 70–110)
POCT GLUCOSE: 238 MG/DL (ref 70–110)
POCT GLUCOSE: 247 MG/DL (ref 70–110)
POCT GLUCOSE: 249 MG/DL (ref 70–110)
POCT GLUCOSE: 252 MG/DL (ref 70–110)
POCT GLUCOSE: 263 MG/DL (ref 70–110)
POCT GLUCOSE: 278 MG/DL (ref 70–110)
POTASSIUM BLD-SCNC: 4.9 MMOL/L (ref 3.5–5.1)
POTASSIUM SERPL-SCNC: 4 MMOL/L
POTASSIUM SERPL-SCNC: 4.4 MMOL/L
POTASSIUM SERPL-SCNC: 5 MMOL/L
PROTHROMBIN TIME: 12.4 SEC
PROTHROMBIN TIME: 21.9 SEC
PS: 5
RBC # BLD AUTO: 3.08 M/UL
RBC # BLD AUTO: 3.21 M/UL
RBC # BLD AUTO: 4.37 M/UL
SAMPLE: ABNORMAL
SAMPLE: ABNORMAL
SITE: ABNORMAL
SITE: ABNORMAL
SODIUM BLD-SCNC: 142 MMOL/L (ref 136–145)
SODIUM SERPL-SCNC: 137 MMOL/L
SODIUM SERPL-SCNC: 141 MMOL/L
SODIUM SERPL-SCNC: 142 MMOL/L
WBC # BLD AUTO: 16.94 K/UL
WBC # BLD AUTO: 19.26 K/UL
WBC # BLD AUTO: 9.65 K/UL

## 2018-11-15 PROCEDURE — S0017 INJECTION, AMINOCAPROIC ACID: HCPCS | Mod: HCNC

## 2018-11-15 PROCEDURE — 85014 HEMATOCRIT: CPT | Mod: HCNC

## 2018-11-15 PROCEDURE — 33521 CABG ARTERY-VEIN FOUR: CPT | Mod: HCNC,,, | Performed by: THORACIC SURGERY (CARDIOTHORACIC VASCULAR SURGERY)

## 2018-11-15 PROCEDURE — 36000712 HC OR TIME LEV V 1ST 15 MIN: Mod: HCNC | Performed by: THORACIC SURGERY (CARDIOTHORACIC VASCULAR SURGERY)

## 2018-11-15 PROCEDURE — 82803 BLOOD GASES ANY COMBINATION: CPT | Mod: HCNC

## 2018-11-15 PROCEDURE — 63600175 PHARM REV CODE 636 W HCPCS: Mod: HCNC | Performed by: THORACIC SURGERY (CARDIOTHORACIC VASCULAR SURGERY)

## 2018-11-15 PROCEDURE — 80048 BASIC METABOLIC PNL TOTAL CA: CPT | Mod: HCNC

## 2018-11-15 PROCEDURE — 84132 ASSAY OF SERUM POTASSIUM: CPT | Mod: HCNC

## 2018-11-15 PROCEDURE — 25000003 PHARM REV CODE 250: Mod: HCNC | Performed by: THORACIC SURGERY (CARDIOTHORACIC VASCULAR SURGERY)

## 2018-11-15 PROCEDURE — 85027 COMPLETE CBC AUTOMATED: CPT | Mod: HCNC

## 2018-11-15 PROCEDURE — 37799 UNLISTED PX VASCULAR SURGERY: CPT | Mod: HCNC

## 2018-11-15 PROCEDURE — 63600175 PHARM REV CODE 636 W HCPCS: Mod: HCNC | Performed by: FAMILY MEDICINE

## 2018-11-15 PROCEDURE — 33533 CABG ARTERIAL SINGLE: CPT | Mod: HCNC,,, | Performed by: THORACIC SURGERY (CARDIOTHORACIC VASCULAR SURGERY)

## 2018-11-15 PROCEDURE — S0017 INJECTION, AMINOCAPROIC ACID: HCPCS | Mod: HCNC | Performed by: FAMILY MEDICINE

## 2018-11-15 PROCEDURE — 25000003 PHARM REV CODE 250: Mod: HCNC | Performed by: FAMILY MEDICINE

## 2018-11-15 PROCEDURE — 36600 WITHDRAWAL OF ARTERIAL BLOOD: CPT | Mod: HCNC

## 2018-11-15 PROCEDURE — 85018 HEMOGLOBIN: CPT | Mod: HCNC

## 2018-11-15 PROCEDURE — 94640 AIRWAY INHALATION TREATMENT: CPT | Mod: HCNC

## 2018-11-15 PROCEDURE — 36415 COLL VENOUS BLD VENIPUNCTURE: CPT | Mod: HCNC

## 2018-11-15 PROCEDURE — 82330 ASSAY OF CALCIUM: CPT | Mod: HCNC

## 2018-11-15 PROCEDURE — 85384 FIBRINOGEN ACTIVITY: CPT | Mod: HCNC

## 2018-11-15 PROCEDURE — 27200667 HC PACEMAKER, TEMPORARY MONITORING, PER SHIFT: Mod: HCNC

## 2018-11-15 PROCEDURE — P9045 ALBUMIN (HUMAN), 5%, 250 ML: HCPCS | Mod: JG,HCNC | Performed by: THORACIC SURGERY (CARDIOTHORACIC VASCULAR SURGERY)

## 2018-11-15 PROCEDURE — 94150 VITAL CAPACITY TEST: CPT | Mod: HCNC

## 2018-11-15 PROCEDURE — 85730 THROMBOPLASTIN TIME PARTIAL: CPT | Mod: 91,HCNC

## 2018-11-15 PROCEDURE — 71000028 HC RECOVERY HIGH ACUITY/ PER HOUR: Mod: HCNC

## 2018-11-15 PROCEDURE — P9016 RBC LEUKOCYTES REDUCED: HCPCS | Mod: HCNC

## 2018-11-15 PROCEDURE — 20000000 HC ICU ROOM: Mod: HCNC

## 2018-11-15 PROCEDURE — 25000242 PHARM REV CODE 250 ALT 637 W/ HCPCS: Mod: HCNC | Performed by: NURSE PRACTITIONER

## 2018-11-15 PROCEDURE — 37000009 HC ANESTHESIA EA ADD 15 MINS: Mod: HCNC | Performed by: THORACIC SURGERY (CARDIOTHORACIC VASCULAR SURGERY)

## 2018-11-15 PROCEDURE — C9290 INJ, BUPIVACAINE LIPOSOME: HCPCS | Mod: HCNC | Performed by: THORACIC SURGERY (CARDIOTHORACIC VASCULAR SURGERY)

## 2018-11-15 PROCEDURE — C1729 CATH, DRAINAGE: HCPCS | Mod: HCNC | Performed by: THORACIC SURGERY (CARDIOTHORACIC VASCULAR SURGERY)

## 2018-11-15 PROCEDURE — 99900017 HC EXTUBATION W/PARAMETERS (STAT): Mod: HCNC

## 2018-11-15 PROCEDURE — 80048 BASIC METABOLIC PNL TOTAL CA: CPT | Mod: 91,HCNC

## 2018-11-15 PROCEDURE — 33508 ENDOSCOPIC VEIN HARVEST: CPT | Mod: HCNC,,, | Performed by: THORACIC SURGERY (CARDIOTHORACIC VASCULAR SURGERY)

## 2018-11-15 PROCEDURE — P9047 ALBUMIN (HUMAN), 25%, 50ML: HCPCS | Mod: JG,HCNC

## 2018-11-15 PROCEDURE — 99900035 HC TECH TIME PER 15 MIN (STAT): Mod: HCNC

## 2018-11-15 PROCEDURE — 85379 FIBRIN DEGRADATION QUANT: CPT | Mod: HCNC

## 2018-11-15 PROCEDURE — 02100Z9 BYPASS CORONARY ARTERY, ONE ARTERY FROM LEFT INTERNAL MAMMARY, OPEN APPROACH: ICD-10-PCS | Performed by: THORACIC SURGERY (CARDIOTHORACIC VASCULAR SURGERY)

## 2018-11-15 PROCEDURE — 85730 THROMBOPLASTIN TIME PARTIAL: CPT | Mod: HCNC

## 2018-11-15 PROCEDURE — 06BP4ZZ EXCISION OF RIGHT SAPHENOUS VEIN, PERCUTANEOUS ENDOSCOPIC APPROACH: ICD-10-PCS | Performed by: THORACIC SURGERY (CARDIOTHORACIC VASCULAR SURGERY)

## 2018-11-15 PROCEDURE — 021309W BYPASS CORONARY ARTERY, FOUR OR MORE ARTERIES FROM AORTA WITH AUTOLOGOUS VENOUS TISSUE, OPEN APPROACH: ICD-10-PCS | Performed by: THORACIC SURGERY (CARDIOTHORACIC VASCULAR SURGERY)

## 2018-11-15 PROCEDURE — 63600175 PHARM REV CODE 636 W HCPCS: Mod: HCNC

## 2018-11-15 PROCEDURE — 85610 PROTHROMBIN TIME: CPT | Mod: 91,HCNC

## 2018-11-15 PROCEDURE — 63600175 PHARM REV CODE 636 W HCPCS: Mod: HCNC | Performed by: NURSE PRACTITIONER

## 2018-11-15 PROCEDURE — 99291 CRITICAL CARE FIRST HOUR: CPT | Mod: HCNC,,, | Performed by: INTERNAL MEDICINE

## 2018-11-15 PROCEDURE — 37000008 HC ANESTHESIA 1ST 15 MINUTES: Mod: HCNC | Performed by: THORACIC SURGERY (CARDIOTHORACIC VASCULAR SURGERY)

## 2018-11-15 PROCEDURE — 27201423 OPTIME MED/SURG SUP & DEVICES STERILE SUPPLY: Mod: HCNC | Performed by: THORACIC SURGERY (CARDIOTHORACIC VASCULAR SURGERY)

## 2018-11-15 PROCEDURE — 25000003 PHARM REV CODE 250: Mod: HCNC

## 2018-11-15 PROCEDURE — 83735 ASSAY OF MAGNESIUM: CPT | Mod: 91,HCNC

## 2018-11-15 PROCEDURE — C1751 CATH, INF, PER/CENT/MIDLINE: HCPCS | Mod: HCNC | Performed by: THORACIC SURGERY (CARDIOTHORACIC VASCULAR SURGERY)

## 2018-11-15 PROCEDURE — 5A1221Z PERFORMANCE OF CARDIAC OUTPUT, CONTINUOUS: ICD-10-PCS | Performed by: THORACIC SURGERY (CARDIOTHORACIC VASCULAR SURGERY)

## 2018-11-15 PROCEDURE — 36000713 HC OR TIME LEV V EA ADD 15 MIN: Mod: HCNC | Performed by: THORACIC SURGERY (CARDIOTHORACIC VASCULAR SURGERY)

## 2018-11-15 PROCEDURE — 94002 VENT MGMT INPAT INIT DAY: CPT | Mod: HCNC

## 2018-11-15 PROCEDURE — 27000221 HC OXYGEN, UP TO 24 HOURS: Mod: HCNC

## 2018-11-15 PROCEDURE — 25000242 PHARM REV CODE 250 ALT 637 W/ HCPCS: Mod: HCNC | Performed by: THORACIC SURGERY (CARDIOTHORACIC VASCULAR SURGERY)

## 2018-11-15 PROCEDURE — 85025 COMPLETE CBC W/AUTO DIFF WBC: CPT | Mod: 91,HCNC

## 2018-11-15 PROCEDURE — 85610 PROTHROMBIN TIME: CPT | Mod: HCNC

## 2018-11-15 PROCEDURE — 83735 ASSAY OF MAGNESIUM: CPT | Mod: HCNC

## 2018-11-15 PROCEDURE — P9045 ALBUMIN (HUMAN), 5%, 250 ML: HCPCS | Mod: JG,HCNC | Performed by: FAMILY MEDICINE

## 2018-11-15 PROCEDURE — 84295 ASSAY OF SERUM SODIUM: CPT | Mod: HCNC

## 2018-11-15 PROCEDURE — 25000003 PHARM REV CODE 250: Mod: HCNC | Performed by: INTERNAL MEDICINE

## 2018-11-15 PROCEDURE — 85384 FIBRINOGEN ACTIVITY: CPT | Mod: 91,HCNC

## 2018-11-15 PROCEDURE — 99292 CRITICAL CARE ADDL 30 MIN: CPT | Mod: HCNC,,, | Performed by: INTERNAL MEDICINE

## 2018-11-15 RX ORDER — CHLORHEXIDINE GLUCONATE ORAL RINSE 1.2 MG/ML
10 SOLUTION DENTAL 2 TIMES DAILY
Status: DISCONTINUED | OUTPATIENT
Start: 2018-11-15 | End: 2018-11-17

## 2018-11-15 RX ORDER — PROPOFOL 10 MG/ML
VIAL (ML) INTRAVENOUS
Status: DISCONTINUED | OUTPATIENT
Start: 2018-11-15 | End: 2018-11-15

## 2018-11-15 RX ORDER — ONDANSETRON 2 MG/ML
4 INJECTION INTRAMUSCULAR; INTRAVENOUS EVERY 12 HOURS PRN
Status: DISCONTINUED | OUTPATIENT
Start: 2018-11-15 | End: 2018-11-20 | Stop reason: HOSPADM

## 2018-11-15 RX ORDER — CALCIUM CHLORIDE INJECTION 100 MG/ML
INJECTION, SOLUTION INTRAVENOUS
Status: DISCONTINUED
Start: 2018-11-15 | End: 2018-11-15

## 2018-11-15 RX ORDER — FUROSEMIDE 10 MG/ML
40 INJECTION INTRAMUSCULAR; INTRAVENOUS 2 TIMES DAILY
Status: DISCONTINUED | OUTPATIENT
Start: 2018-11-16 | End: 2018-11-20 | Stop reason: HOSPADM

## 2018-11-15 RX ORDER — SODIUM CHLORIDE, SODIUM LACTATE, POTASSIUM CHLORIDE, CALCIUM CHLORIDE 600; 310; 30; 20 MG/100ML; MG/100ML; MG/100ML; MG/100ML
500 INJECTION, SOLUTION INTRAVENOUS
Status: DISCONTINUED | OUTPATIENT
Start: 2018-11-15 | End: 2018-11-20 | Stop reason: HOSPADM

## 2018-11-15 RX ORDER — MAGNESIUM SULFATE 1 G/100ML
1 INJECTION INTRAVENOUS
Status: DISCONTINUED | OUTPATIENT
Start: 2018-11-15 | End: 2018-11-17 | Stop reason: HOSPADM

## 2018-11-15 RX ORDER — OXYCODONE HYDROCHLORIDE 5 MG/1
10 TABLET ORAL EVERY 4 HOURS PRN
Status: DISCONTINUED | OUTPATIENT
Start: 2018-11-15 | End: 2018-11-20 | Stop reason: HOSPADM

## 2018-11-15 RX ORDER — ALBUTEROL SULFATE 0.83 MG/ML
2.5 SOLUTION RESPIRATORY (INHALATION) EVERY 4 HOURS PRN
Status: DISCONTINUED | OUTPATIENT
Start: 2018-11-15 | End: 2018-11-20 | Stop reason: HOSPADM

## 2018-11-15 RX ORDER — AMINOCAPROIC ACID 250 MG/ML
INJECTION, SOLUTION INTRAVENOUS
Status: DISCONTINUED | OUTPATIENT
Start: 2018-11-15 | End: 2018-11-15

## 2018-11-15 RX ORDER — HEPARIN SODIUM 5000 [USP'U]/ML
INJECTION, SOLUTION INTRAVENOUS; SUBCUTANEOUS
Status: DISCONTINUED | OUTPATIENT
Start: 2018-11-15 | End: 2018-11-15

## 2018-11-15 RX ORDER — DEXMEDETOMIDINE HYDROCHLORIDE 4 UG/ML
0.2 INJECTION, SOLUTION INTRAVENOUS CONTINUOUS
Status: DISCONTINUED | OUTPATIENT
Start: 2018-11-15 | End: 2018-11-17 | Stop reason: HOSPADM

## 2018-11-15 RX ORDER — METHYLPREDNISOLONE SOD SUCC 125 MG
VIAL (EA) INJECTION
Status: DISCONTINUED | OUTPATIENT
Start: 2018-11-15 | End: 2018-11-15

## 2018-11-15 RX ORDER — PAPAVERINE HYDROCHLORIDE 30 MG/ML
INJECTION INTRAMUSCULAR; INTRAVENOUS
Status: DISCONTINUED | OUTPATIENT
Start: 2018-11-15 | End: 2018-11-15

## 2018-11-15 RX ORDER — OXYCODONE HYDROCHLORIDE 5 MG/1
5 TABLET ORAL EVERY 4 HOURS PRN
Status: DISCONTINUED | OUTPATIENT
Start: 2018-11-15 | End: 2018-11-20 | Stop reason: HOSPADM

## 2018-11-15 RX ORDER — CEFAZOLIN SODIUM 2 G/50ML
2 SOLUTION INTRAVENOUS
Status: COMPLETED | OUTPATIENT
Start: 2018-11-15 | End: 2018-11-16

## 2018-11-15 RX ORDER — MIDAZOLAM HYDROCHLORIDE 1 MG/ML
INJECTION, SOLUTION INTRAMUSCULAR; INTRAVENOUS
Status: DISCONTINUED | OUTPATIENT
Start: 2018-11-15 | End: 2018-11-15

## 2018-11-15 RX ORDER — ASPIRIN 81 MG/1
81 TABLET ORAL DAILY
Status: DISCONTINUED | OUTPATIENT
Start: 2018-11-16 | End: 2018-11-20 | Stop reason: HOSPADM

## 2018-11-15 RX ORDER — CLOPIDOGREL BISULFATE 75 MG/1
75 TABLET ORAL DAILY
Status: DISCONTINUED | OUTPATIENT
Start: 2018-11-16 | End: 2018-11-17

## 2018-11-15 RX ORDER — POLYETHYLENE GLYCOL 3350 17 G/17G
17 POWDER, FOR SOLUTION ORAL DAILY
Status: DISCONTINUED | OUTPATIENT
Start: 2018-11-16 | End: 2018-11-20 | Stop reason: HOSPADM

## 2018-11-15 RX ORDER — ROCURONIUM BROMIDE 10 MG/ML
INJECTION, SOLUTION INTRAVENOUS
Status: DISCONTINUED | OUTPATIENT
Start: 2018-11-15 | End: 2018-11-15

## 2018-11-15 RX ORDER — METOPROLOL TARTRATE 25 MG/1
25 TABLET, FILM COATED ORAL 2 TIMES DAILY
Status: DISCONTINUED | OUTPATIENT
Start: 2018-11-16 | End: 2018-11-17

## 2018-11-15 RX ORDER — POTASSIUM CHLORIDE 20 MEQ/1
20 TABLET, EXTENDED RELEASE ORAL EVERY 12 HOURS
Status: DISCONTINUED | OUTPATIENT
Start: 2018-11-16 | End: 2018-11-20 | Stop reason: HOSPADM

## 2018-11-15 RX ORDER — POTASSIUM CHLORIDE 29.8 MG/ML
40 INJECTION INTRAVENOUS
Status: DISCONTINUED | OUTPATIENT
Start: 2018-11-15 | End: 2018-11-17 | Stop reason: HOSPADM

## 2018-11-15 RX ORDER — SODIUM CHLORIDE, SODIUM LACTATE, POTASSIUM CHLORIDE, CALCIUM CHLORIDE 600; 310; 30; 20 MG/100ML; MG/100ML; MG/100ML; MG/100ML
INJECTION, SOLUTION INTRAVENOUS CONTINUOUS PRN
Status: DISCONTINUED | OUTPATIENT
Start: 2018-11-15 | End: 2018-11-15

## 2018-11-15 RX ORDER — POTASSIUM CHLORIDE 14.9 MG/ML
60 INJECTION INTRAVENOUS
Status: DISCONTINUED | OUTPATIENT
Start: 2018-11-15 | End: 2018-11-17 | Stop reason: HOSPADM

## 2018-11-15 RX ORDER — FENTANYL CITRATE 50 UG/ML
25 INJECTION, SOLUTION INTRAMUSCULAR; INTRAVENOUS
Status: DISCONTINUED | OUTPATIENT
Start: 2018-11-15 | End: 2018-11-17 | Stop reason: HOSPADM

## 2018-11-15 RX ORDER — NICARDIPINE HYDROCHLORIDE 0.2 MG/ML
5 INJECTION INTRAVENOUS CONTINUOUS
Status: DISCONTINUED | OUTPATIENT
Start: 2018-11-15 | End: 2018-11-17 | Stop reason: HOSPADM

## 2018-11-15 RX ORDER — HEPARIN SODIUM 1000 [USP'U]/ML
INJECTION, SOLUTION INTRAVENOUS; SUBCUTANEOUS
Status: DISCONTINUED | OUTPATIENT
Start: 2018-11-15 | End: 2018-11-15

## 2018-11-15 RX ORDER — POLYETHYLENE GLYCOL 3350 17 G/17G
17 POWDER, FOR SOLUTION ORAL DAILY
Status: DISCONTINUED | OUTPATIENT
Start: 2018-11-16 | End: 2018-11-15

## 2018-11-15 RX ORDER — METOCLOPRAMIDE HYDROCHLORIDE 5 MG/ML
5 INJECTION INTRAMUSCULAR; INTRAVENOUS EVERY 6 HOURS PRN
Status: DISCONTINUED | OUTPATIENT
Start: 2018-11-15 | End: 2018-11-20 | Stop reason: HOSPADM

## 2018-11-15 RX ORDER — DEXMEDETOMIDINE HYDROCHLORIDE 100 UG/ML
INJECTION, SOLUTION INTRAVENOUS
Status: DISCONTINUED | OUTPATIENT
Start: 2018-11-15 | End: 2018-11-15

## 2018-11-15 RX ORDER — FENTANYL CITRATE 50 UG/ML
50 INJECTION, SOLUTION INTRAMUSCULAR; INTRAVENOUS
Status: DISCONTINUED | OUTPATIENT
Start: 2018-11-15 | End: 2018-11-17 | Stop reason: HOSPADM

## 2018-11-15 RX ORDER — PANTOPRAZOLE SODIUM 40 MG/10ML
40 INJECTION, POWDER, LYOPHILIZED, FOR SOLUTION INTRAVENOUS DAILY
Status: DISCONTINUED | OUTPATIENT
Start: 2018-11-16 | End: 2018-11-16

## 2018-11-15 RX ORDER — CALCIUM CHLORIDE INJECTION 100 MG/ML
1 INJECTION, SOLUTION INTRAVENOUS
Status: DISCONTINUED | OUTPATIENT
Start: 2018-11-15 | End: 2018-11-17 | Stop reason: HOSPADM

## 2018-11-15 RX ORDER — ALBUMIN HUMAN 50 G/1000ML
SOLUTION INTRAVENOUS CONTINUOUS PRN
Status: DISCONTINUED | OUTPATIENT
Start: 2018-11-15 | End: 2018-11-15

## 2018-11-15 RX ORDER — ALBUMIN HUMAN 50 G/1000ML
250 SOLUTION INTRAVENOUS
Status: DISCONTINUED | OUTPATIENT
Start: 2018-11-15 | End: 2018-11-17 | Stop reason: HOSPADM

## 2018-11-15 RX ORDER — FENTANYL CITRATE 50 UG/ML
INJECTION, SOLUTION INTRAMUSCULAR; INTRAVENOUS
Status: DISCONTINUED | OUTPATIENT
Start: 2018-11-15 | End: 2018-11-15

## 2018-11-15 RX ORDER — DEXTROSE, SODIUM CHLORIDE, SODIUM LACTATE, POTASSIUM CHLORIDE, AND CALCIUM CHLORIDE 5; .6; .31; .03; .02 G/100ML; G/100ML; G/100ML; G/100ML; G/100ML
INJECTION, SOLUTION INTRAVENOUS CONTINUOUS
Status: ACTIVE | OUTPATIENT
Start: 2018-11-15 | End: 2018-11-16

## 2018-11-15 RX ORDER — HYDROCODONE BITARTRATE AND ACETAMINOPHEN 5; 325 MG/1; MG/1
1 TABLET ORAL EVERY 4 HOURS PRN
Status: DISCONTINUED | OUTPATIENT
Start: 2018-11-15 | End: 2018-11-20 | Stop reason: HOSPADM

## 2018-11-15 RX ORDER — DOCUSATE SODIUM 100 MG/1
100 CAPSULE, LIQUID FILLED ORAL 2 TIMES DAILY
Status: DISCONTINUED | OUTPATIENT
Start: 2018-11-16 | End: 2018-11-20 | Stop reason: HOSPADM

## 2018-11-15 RX ORDER — POTASSIUM CHLORIDE 14.9 MG/ML
20 INJECTION INTRAVENOUS
Status: DISCONTINUED | OUTPATIENT
Start: 2018-11-15 | End: 2018-11-17 | Stop reason: HOSPADM

## 2018-11-15 RX ORDER — IPRATROPIUM BROMIDE AND ALBUTEROL SULFATE 2.5; .5 MG/3ML; MG/3ML
3 SOLUTION RESPIRATORY (INHALATION) EVERY 4 HOURS
Status: DISPENSED | OUTPATIENT
Start: 2018-11-15 | End: 2018-11-16

## 2018-11-15 RX ADMIN — ALBUMIN HUMAN 250 ML: 0.05 INJECTION, SOLUTION INTRAVENOUS at 08:11

## 2018-11-15 RX ADMIN — SUGAMMADEX 200 MG: 100 INJECTION, SOLUTION INTRAVENOUS at 04:11

## 2018-11-15 RX ADMIN — AMINOCAPROIC ACID 5 G: 250 INJECTION, SOLUTION INTRAVENOUS at 03:11

## 2018-11-15 RX ADMIN — FENTANYL CITRATE 50 MCG: 50 INJECTION INTRAMUSCULAR; INTRAVENOUS at 07:11

## 2018-11-15 RX ADMIN — ALBUTEROL SULFATE 2.5 MG: 2.5 SOLUTION RESPIRATORY (INHALATION) at 12:11

## 2018-11-15 RX ADMIN — BUPIVACAINE 266 MG: 13.3 INJECTION, SUSPENSION, LIPOSOMAL INFILTRATION at 08:11

## 2018-11-15 RX ADMIN — HEPARIN SODIUM 10000 UNITS: 5000 INJECTION INTRAVENOUS; SUBCUTANEOUS at 10:11

## 2018-11-15 RX ADMIN — ALBUMIN HUMAN 250 ML: 0.05 INJECTION, SOLUTION INTRAVENOUS at 05:11

## 2018-11-15 RX ADMIN — IPRATROPIUM BROMIDE AND ALBUTEROL SULFATE 3 ML: .5; 3 SOLUTION RESPIRATORY (INHALATION) at 04:11

## 2018-11-15 RX ADMIN — VASOPRESSIN 0.02 UNITS/MIN: 20 INJECTION INTRAVENOUS at 10:11

## 2018-11-15 RX ADMIN — EPINEPHRINE 0.02 MCG/KG/MIN: 1 INJECTION INTRAMUSCULAR; INTRAVENOUS; SUBCUTANEOUS at 07:11

## 2018-11-15 RX ADMIN — CEFAZOLIN SODIUM 2 G: 2 SOLUTION INTRAVENOUS at 09:11

## 2018-11-15 RX ADMIN — AMINOCAPROIC ACID 5 G: 250 INJECTION, SOLUTION INTRAVENOUS at 09:11

## 2018-11-15 RX ADMIN — ALBUMIN HUMAN 250 ML: 0.05 INJECTION, SOLUTION INTRAVENOUS at 09:11

## 2018-11-15 RX ADMIN — CHLORHEXIDINE GLUCONATE 10 ML: 1.2 RINSE ORAL at 09:11

## 2018-11-15 RX ADMIN — SODIUM CHLORIDE 5 UNITS: 9 INJECTION, SOLUTION INTRAVENOUS at 12:11

## 2018-11-15 RX ADMIN — FENTANYL CITRATE 50 MCG: 50 INJECTION, SOLUTION INTRAMUSCULAR; INTRAVENOUS at 02:11

## 2018-11-15 RX ADMIN — ROCURONIUM BROMIDE 100 MG: 10 INJECTION, SOLUTION INTRAVENOUS at 08:11

## 2018-11-15 RX ADMIN — DEXMEDETOMIDINE HYDROCHLORIDE 1 MCG/KG/HR: 4 INJECTION, SOLUTION INTRAVENOUS at 04:11

## 2018-11-15 RX ADMIN — ALBUMIN HUMAN: 0.05 INJECTION, SOLUTION INTRAVENOUS at 03:11

## 2018-11-15 RX ADMIN — SODIUM CHLORIDE 1 UNITS/HR: 9 INJECTION, SOLUTION INTRAVENOUS at 04:11

## 2018-11-15 RX ADMIN — MIDAZOLAM 2 MG: 1 INJECTION INTRAMUSCULAR; INTRAVENOUS at 08:11

## 2018-11-15 RX ADMIN — HEPARIN SODIUM 32000 UNITS: 5000 INJECTION INTRAVENOUS; SUBCUTANEOUS at 10:11

## 2018-11-15 RX ADMIN — DEXMEDETOMIDINE HYDROCHLORIDE 40 MCG: 100 INJECTION, SOLUTION, CONCENTRATE INTRAVENOUS at 03:11

## 2018-11-15 RX ADMIN — MIDAZOLAM 2 MG: 1 INJECTION INTRAMUSCULAR; INTRAVENOUS at 01:11

## 2018-11-15 RX ADMIN — SODIUM CHLORIDE, SODIUM LACTATE, POTASSIUM CHLORIDE, AND CALCIUM CHLORIDE: 600; 310; 30; 20 INJECTION, SOLUTION INTRAVENOUS at 08:11

## 2018-11-15 RX ADMIN — CALCIUM CHLORIDE 1 G: 100 INJECTION, SOLUTION INTRAVENOUS; INTRAVENTRICULAR at 05:11

## 2018-11-15 RX ADMIN — CEFAZOLIN SODIUM 2 G: 2 SOLUTION INTRAVENOUS at 05:11

## 2018-11-15 RX ADMIN — PROPOFOL 50 MG: 10 INJECTION, EMULSION INTRAVENOUS at 08:11

## 2018-11-15 RX ADMIN — IPRATROPIUM BROMIDE AND ALBUTEROL SULFATE 3 ML: .5; 3 SOLUTION RESPIRATORY (INHALATION) at 07:11

## 2018-11-15 RX ADMIN — FENTANYL CITRATE 50 MCG: 50 INJECTION, SOLUTION INTRAMUSCULAR; INTRAVENOUS at 03:11

## 2018-11-15 RX ADMIN — SODIUM CHLORIDE 3 UNITS: 9 INJECTION, SOLUTION INTRAVENOUS at 11:11

## 2018-11-15 RX ADMIN — TRAMADOL HYDROCHLORIDE 50 MG: 50 TABLET, COATED ORAL at 01:11

## 2018-11-15 RX ADMIN — ONDANSETRON 4 MG: 2 INJECTION INTRAMUSCULAR; INTRAVENOUS at 05:11

## 2018-11-15 RX ADMIN — DEXTROSE, SODIUM CHLORIDE, SODIUM LACTATE, POTASSIUM CHLORIDE, AND CALCIUM CHLORIDE: 5; .6; .31; .03; .02 INJECTION, SOLUTION INTRAVENOUS at 04:11

## 2018-11-15 RX ADMIN — ROCURONIUM BROMIDE 50 MG: 10 INJECTION, SOLUTION INTRAVENOUS at 10:11

## 2018-11-15 RX ADMIN — ROCURONIUM BROMIDE 30 MG: 10 INJECTION, SOLUTION INTRAVENOUS at 01:11

## 2018-11-15 RX ADMIN — METHYLPREDNISOLONE SODIUM SUCCINATE 125 MG: 125 INJECTION, POWDER, FOR SOLUTION INTRAMUSCULAR; INTRAVENOUS at 09:11

## 2018-11-15 RX ADMIN — FENTANYL CITRATE 100 MCG: 50 INJECTION, SOLUTION INTRAMUSCULAR; INTRAVENOUS at 09:11

## 2018-11-15 RX ADMIN — FENTANYL CITRATE 150 MCG: 50 INJECTION, SOLUTION INTRAMUSCULAR; INTRAVENOUS at 08:11

## 2018-11-15 RX ADMIN — VANCOMYCIN HYDROCHLORIDE 2 G: 1 INJECTION, POWDER, LYOPHILIZED, FOR SOLUTION INTRAVENOUS at 09:11

## 2018-11-15 NOTE — ASSESSMENT & PLAN NOTE
Chest pain and elevated troponin.  Case Discussed with subspecialities: Cardiology Consulted: Dr. Rodríguez.  Heparin and Nitroglycerin infusions.  [facility] on 14 November showing multi-vessel disease.  IABP placed and post-procedure in ICU.  Evaluated by Cardiac surgery and plan for CABG Thursday 15 November.  Aspirin, Metoprolol, and Supplemental Oxygen.  Cardiac echo pending.    S/p 5 V CABG, post op doing well.

## 2018-11-15 NOTE — ANESTHESIA PREPROCEDURE EVALUATION
11/15/2018  Eve Long is a 68 y.o., female.    Diagnosis Date    Anemia      Asthma      Basal cell carcinoma      COPD (chronic obstructive pulmonary disease)      Coronary artery disease involving native coronary artery of native heart with unstable angina pectoris 11/14/2018    Hyperlipidemia      Hypertension      Immune deficiency disorder      NSTEMI (non-ST elevated myocardial infarction) 11/13/2018    Pneumonia      Sarcoidosis      TIA (transient ischemic attack)          Anesthesia Evaluation    I have reviewed the Patient Summary Reports.        Review of Systems  Anesthesia Hx:  No problems with previous Anesthesia  Denies Family Hx of Anesthesia complications.   Denies Personal Hx of Anesthesia complications.   Hematology/Oncology:         -- Anemia: Hematology Comments: Heparin drip--> stopped    Cardiovascular:   Hypertension Past MI CAD   Angina PVD hyperlipidemia ECG has been reviewed. CONCLUSIONS     1 - Concentric hypertrophy.     2 - Wall motion abnormalities.     3 - Mildly to moderately depressed left ventricular systolic function (EF 40-45%).     4 - Impaired LV relaxation, normal LAP (grade 1 diastolic dysfunction).     5 - Normal right ventricular systolic function .   Cath: LM /RCA 50 %, LAD 99%, CX 95 % stenosis     IABP/ NTG/Heparin drip   Pulmonary:   COPD, moderate Asthma Sarcoid lung dis  FEV1 1.54 lit   Neurological:   TIA,    Endocrine:   Hypothyroidism        Physical Exam   Airway/Jaw/Neck:  Airway Findings: Mouth Opening: Normal Mallampati: III  Improves to II with phonation.  TM Distance: 4 - 6 cm        Heart/Vascular:  Heart Findings: Rate: Normal        Mental Status:  Mental Status Findings:  Cooperative         Anesthesia Plan  Type of Anesthesia, risks & benefits discussed:  Anesthesia Type:  general  Patient's Preference:   Intra-op Monitoring  Plan: arterial line, central line, [place]-Janine, cardiac output and standard ASA monitors  Intra-op Monitoring Plan Comments: SERAFIN  Post Op Pain Control Plan: per primary service following discharge from PACU  Post Op Pain Control Plan Comments:   Induction:   IV  Beta Blocker:  Patient is not currently on a Beta-Blocker (No further documentation required).       Informed Consent: Patient understands risks and agrees with Anesthesia plan.  Questions answered. Anesthesia consent signed with patient.  ASA Score: 4     Day of Surgery Review of History & Physical:  There are no significant changes.          Ready For Surgery From Anesthesia Perspective.

## 2018-11-15 NOTE — PLAN OF CARE
Problem: Patient Care Overview  Goal: Plan of Care Review  Outcome: Ongoing (interventions implemented as appropriate)  Pt VSS, pt instructed to keep RLE straight throughout the night due to R fem art sheath with IABP.  Pt verbalizes understanding.  Pt chest pain free at present.  Pt is to go for CABG in AM.  All pre-op orders entered by Dr. Dorsey.  Pt to have hibiclens bath and chlorehexedine wipe down in am.  Blood sent to lab for type and screen.  MRSA swap and urinalysis performed.  PFTs unable to be performed due to IABP and pt has to lay flat.  Martha RT notified Dr. Dorsey.

## 2018-11-15 NOTE — ANESTHESIA POSTPROCEDURE EVALUATION
"Anesthesia Post Evaluation    Patient: Eve Long    Procedure(s) Performed: Procedure(s) (LRB):  CORONARY ARTERY BYPASS GRAFT (CABG) (N/A)  SURGICAL PROCUREMENT, VEIN, ENDOSCOPIC (Left)  ECHOCARDIOGRAM,TRANSESOPHAGEAL (N/A)    Final Anesthesia Type: general  Patient location during evaluation: ICU  Patient participation: No - Unable to Participate, Intubation  Level of consciousness: sedated  Post-procedure vital signs: reviewed and stable  Pain management: adequate  Airway patency: patent  PONV status at discharge: No PONV  Anesthetic complications: no      Cardiovascular status: stable  Respiratory status: ETT and ventilator  Hydration status: euvolemic          Visit Vitals  BP (!) 136/47   Pulse 67   Temp 36.6 °C (97.8 °F) (Oral)   Resp 15   Ht 5' 3" (1.6 m)   Wt 81 kg (178 lb 9.2 oz)   SpO2 97%   Breastfeeding? No   BMI 31.63 kg/m²       Pain/Joaquin Score: Pain Assessment Performed: Yes (11/15/2018  7:05 AM)  Presence of Pain: denies (11/15/2018  7:05 AM)  Pain Rating Prior to Med Admin: 6 (11/15/2018  1:07 AM)  Pain Rating Post Med Admin: 0 (11/15/2018  2:07 AM)        "

## 2018-11-15 NOTE — ANESTHESIA PROCEDURE NOTES
Intubation    Diagnosis: CABG  Patient location during procedure: done in OR  Procedure start time: 11/15/2018 8:36 AM  Staffing  Anesthesiologist: Chantelle Jones MD  Resident/CRNA: Miky Cedillo CRNA  Anesthesiologist was present at the time of the procedure.  Preanesthetic Checklist  Completed: patient identified, site marked, surgical consent, pre-op evaluation, timeout performed, IV checked, risks and benefits discussed, monitors and equipment checked and anesthesia consent given  Intubation  Indication: airway protection  Pre-oxygenation. Induction: intravenous, mask ventilation: easy mask.  Intubation: postinduction, Rodriguez 2.  Endotracheal Tube: 8.0 mm ID, cuffed (inflated to minimal occlusive pressure)  Attempts: 1, Grade I - full view of cords  Complicating Factors: none  Tube secured at 23 cm at the lips.  Eye Care: taped closed

## 2018-11-15 NOTE — ANESTHESIA PROCEDURE NOTES
Arterial    Diagnosis: CAD    Patient location during procedure: done in OR  Procedure start time: 11/15/2018 8:38 AM  Timeout: 11/15/2018 8:35 AM  Procedure end time: 11/15/2018 8:43 AM  Staffing  Performed: anesthesiologist   Anesthesiologist was present at the time of the procedure.  Preanesthetic Checklist  Completed: patient identified, site marked, surgical consent, pre-op evaluation, timeout performed, IV checked, risks and benefits discussed, monitors and equipment checked and anesthesia consent givenArterial  Skin Prep: alcohol swabs  Local Infiltration: none  Orientation: left  Location: radial  Catheter Size: 20 G  Catheter placement by Anatomical landmarks. Heme positive aspiration all ports.Insertion Attempts: 1  Assessment  Dressing: sutured in place and taped and tegaderm  Patient: Tolerated well

## 2018-11-15 NOTE — SUBJECTIVE & OBJECTIVE
Interval History: pt underwent 5 vessel CABG by Dr. Castellanos today on IABP 1:1, seen post op in the ICU on vent, sedated with Precedex. Getting a dose of Albumin and Ca and is on Insulin drip, remains on IABP along with CT to water seal. Cardiac Index 2.0-- may need a little Epi drip to improve CI.     Review of Systems   Unable to perform ROS: Intubated     Objective:     Vital Signs (Most Recent):  Temp: (!) 100.5 °F (38.1 °C) (11/15/18 1630)  Pulse: 91 (11/15/18 1657)  Resp: 19 (11/15/18 1657)  BP: 126/72 (11/15/18 1630)  SpO2: 98 % (11/15/18 1657) Vital Signs (24h Range):  Temp:  [97.8 °F (36.6 °C)-100.5 °F (38.1 °C)] 100.5 °F (38.1 °C)  Pulse:  [56-96] 91  Resp:  [10-22] 19  SpO2:  [94 %-99 %] 98 %  BP: ()/() 126/72  Arterial Line BP: (108-109)/(45-47) 109/47     Weight: 81 kg (178 lb 9.2 oz)  Body mass index is 31.63 kg/m².    Intake/Output Summary (Last 24 hours) at 11/15/2018 1748  Last data filed at 11/15/2018 1709  Gross per 24 hour   Intake 5756.21 ml   Output 925 ml   Net 4831.21 ml      Physical Exam   Constitutional: She is oriented to person, place, and time. She appears well-developed. No distress.   Intubated, sedated on vent. OGT, OETT   HENT:   Head: Atraumatic.   Mouth/Throat: Oropharynx is clear and moist.   Eyes: Conjunctivae are normal. Pupils are equal, round, and reactive to light.   Neck: Normal range of motion. Neck supple. No JVD present. No tracheal deviation present.   Cardiovascular: Normal rate and regular rhythm.   Multiple chest tubes, IABP via groin   Pulmonary/Chest: Effort normal. No respiratory distress. She has rales.   Abdominal: Soft. Bowel sounds are normal. She exhibits no distension.   Musculoskeletal: She exhibits no edema.   Neurological: She is alert and oriented to person, place, and time. GCS eye subscore is 4. GCS verbal subscore is 5. GCS motor subscore is 6.   Skin: Skin is warm and dry. Capillary refill takes 2 to 3 seconds. No cyanosis.        Nursing  note and vitals reviewed.      Significant Labs:   ABGs:   Recent Labs   Lab 11/15/18  1621   PH 7.394   PCO2 42.2   HCO3 25.8   POCSATURATED 100   BE 1     Blood Culture: No results for input(s): LABBLOO in the last 48 hours.  BMP:   Recent Labs   Lab 11/15/18  1523 11/15/18  1546   *  --      --    K 5.0  --    *  --    CO2 22*  --    BUN 11  --    CREATININE 0.7  --    CALCIUM 8.0*  --    MG  --  4.3*     CBC:   Recent Labs   Lab 11/15/18  0415 11/15/18  1230 11/15/18  1237 11/15/18  1523 11/15/18  1621   WBC 9.65  --  16.94* 19.26*  --    HGB 12.3 7.2* 9.0* 9.0*  --    HCT 37.2 21.5* 25.7* 26.7* 22*     --  123* 143*  --      CMP:   Recent Labs   Lab 11/14/18  0507 11/14/18  1621 11/15/18  0415 11/15/18  1523    136 137 141   K 4.2 4.2 4.0 5.0    107 108 111*   CO2 22* 20* 21* 22*    185* 119* 185*   BUN 19 15 13 11   CREATININE 0.7 0.8 0.7 0.7   CALCIUM 9.3 8.7 8.7 8.0*   PROT 6.2 6.3  --   --    ALBUMIN 3.4* 3.4*  --   --    BILITOT 0.3 0.3  --   --    ALKPHOS 71 74  --   --    AST 20 21  --   --    ALT 14 15  --   --    ANIONGAP 8 9 8 8   EGFRNONAA >60 >60 >60 >60     Cardiac Markers:   Recent Labs   Lab 11/14/18  1621   *     Coagulation:   Recent Labs   Lab 11/15/18  1523   INR 1.1   APTT 26.5     Lactic Acid:   Magnesium:   Recent Labs   Lab 11/14/18  0507 11/15/18  1546   MG 2.2 4.3*     POCT Glucose:   Recent Labs   Lab 11/15/18  1648   POCTGLUCOSE 184*     Troponin:   Recent Labs   Lab 11/13/18  2350 11/14/18  0507   TROPONINI 0.297* 0.288*     TSH:   Recent Labs   Lab 11/14/18  1621   TSH 3.340     All pertinent labs within the past 24 hours have been reviewed.    Significant Imaging: I have reviewed all pertinent imaging results/findings within the past 24 hours.

## 2018-11-15 NOTE — OP NOTE
[facility] -   Cardiothoracic Surgery  Operative Note    SUMMARY     Date of Procedure: 11/15/2018     Procedure: Procedure(s) (LRB):  CORONARY ARTERY BYPASS GRAFT (CABG) (N/A)  SURGICAL PROCUREMENT, VEIN, ENDOSCOPIC (Left)  ECHOCARDIOGRAM,TRANSESOPHAGEAL (N/A)     Coronary artery bypass grafting x5 with LIMA to LAD and reverse saphenous vein graft to the distal RCA sequence reverse saphenous vein graft to OM2 and OM3 and reverse saphenous vein graft to diagonal.  Surgeon(s) and Role:     * Ricardo Dorsey MD - Primary    Assisting Surgeon: None    Pre-Operative Diagnosis: NSTEMI (non-ST elevated myocardial infarction) [I21.4]    Post-Operative Diagnosis: Post-Op Diagnosis Codes:     * NSTEMI (non-ST elevated myocardial infarction) [I21.4]    Anesthesia: General    Technical Procedures Used:  The patient was prepped and draped sterilely in median sternotomy incision was then made which was carried down sharply to the sternum was encountered.  The sternum was then opened with a sternal saw.  The left after the sternum was elevated with a sternal saw.  The left internal mammary artery was then dissected down as a pedicle.  The patient was then given heparin.  The pericardium was opened.  Pericardial sutures were then placed. While this was going on a 2nd member of the team was harvesting the greater saphenous vein from the patient's right lower extremity.  Using an endoscopic vein harvesting technique. An epiaortic ultrasound was then performed which was negative for intraluminal atherosclerotic plaques.  The aortic cannula pursestring was then placed in the distal ascending aorta.  The venous pursestring was placed in the right atrial appendage.  And the cardioplegia pursestring was placed in the free wall of the right atrium.  And the antegrade cardioplegia cannula was placed in the mid ascending aorta.  Therapeutic ACT was then confirmed.  The patient was then cannulated with an 21 [place] aortic  cannula.  And a 3 way venous cannula was placed in the IVC.  The retrograde cardioplegia cannula was then inserted in the coronary sinus.  And the antegrade cardioplegia cannula placed in the mid ascending aorta.  The patient was then crossclamped.  The heart was then arrested with antegrade warm blood cardioplegia.  Throughout the procedure the patient was perfused with retrograde cold blood which was intermittently supplemented with cold blood cardioplegia.  First attention was drawn to the distal RCA which was identified and dissected out the distal anastomosis of the reverse saphenous vein graft was then performed to the distal RCA using 7 0 continuous Prolene sutures as an end-to-side anastomosis.  Next attention was drawn to the aorta with 4.4 punch aortotomy was performed.  The proximal anastomosis was then performed to the aorta as is a continuous Prolene sutures using 6 0 continuous Prolene sutures.  Next attention was drawn to the OM 2.  The proximal anastomosis of the sequence saphenous vein graft was then performed to the OM 2 as a side to side anastomosis using 7 0 continuous Prolene sutures.  Next attention was drawn to OM3 which was then opened.  The distal anastomosis of the reverse saphenous vein graft was then performed to the OM 3 as an end-to-side anastomosis using 7 0 continuous Prolene sutures.  Next attention was drawn to the aorta with 4.4 punch aortotomy was made the proximal anastomosis was then performed to the aorta using 6 0 continuous Prolene sutures.  Next attention was drawn to the diagonal which was identified and opened the distal anastomosis of the reverse saphenous vein graft were then performed to the diagonal using 7 0 continuous Prolene sutures.  Next attention was drawn to the aorta with 4.4 punch aortotomy was performed the proximal anastomosis of the reverse saphenous vein graft was then performed to the aorta using 6 0 continuous Prolene sutures.  Next attention was drawn  to the LAD which was opened in the mid LAD region the LIMA was then anastomosed to the LAD using 8 0 continuous Prolene sutures. By this time the patient had been rewarming.  Surgical sites were again inspected and the heel of the LIMA to LAD anastomosis was found to be leaking at the heel.  This was then repaired with a 0 Prolene suture. By this time the patient had been rewarming.  The patient was then given hotshot cardioplegia.  The crossclamp was then removed. Atrial and ventricular pacemaker wires were placed. Two mediastinal and a left pleural chest tube were placed. Ventilation was restarted.  The patient was then weaned from cardiopulmonary bypass without any problems.  The patient was then decannulated.  Heparin was then reversed with protamine.  Surgical sites were inspected and found to be free of bleeding. The chest was then closed with figure-of-eight sternal wires.  And this fascia was closed with 1.  Vicryl sutures.  And the skin was closed with 3 0 Monocryl sutures. The patient tolerated the procedure well.    Description of the Findings of the Procedure:     Significant Surgical Tasks Conducted by the Assistant(s), if Applicable:  Endoscopic vein harvesting.    Complications: No    Estimated Blood Loss (EBL):750 ml           Implants: * No implants in log *    Specimens:   Specimen (12h ago, onward)    None                  Condition: Stable    Disposition: ICU - intubated and hemodynamically stable.    Attestation: I performed the procedure.

## 2018-11-15 NOTE — ANESTHESIA PROCEDURE NOTES
SERAFIN    Diagnosis: CAD  Patient location during procedure: OR  Procedure start time: 11/15/2018 9:00 AM  Timeout: 11/15/2018 9:00 AM  Surgery related to: CABG  Staffing  Performed: anesthesiologist   Preanesthetic Checklist  Completed: patient identified, surgical consent, pre-op evaluation, timeout performed, risks and benefits discussed, monitors and equipment checked, anesthesia consent given, oxygen available, suction available, hand hygiene performed and patient being monitored  Setup & Induction  Patient preparation: bite block inserted  Probe Insertion: easy  Exam: complete  Exam         LVAD:no  Estimated Ejection Fraction: 45-54% mild  Regional Wall Abnormalities: RWMA present    Regional Wall Abnormalities    Four Chamber ME  Mid inferoseptal: 3  Apical inferoseptal: 3  Mid anteriolateral: 3  Apical anteriolateral: 3  Two Chamber ME   Apical inferior: 2  Longitudinal ME  TG Transversal MP  TG Transversal Basal        Right Heart  Right Ventricle: normal  Right Ventricle Function: normal    Intra Atrial Septum  PFO: no shunt by color flow doppler  no IAS aneurysm  no lipomatous hypertrophy  no Atrial Septal Defect (Asd)    Right Ventricle  Size: normal, Free Wall Thickness: normal    Aortic Valve:  Stenosis: none  Morphology: trileaflet  Regurgitation: no aortic valve regurgitation     Mitral Valve:  Morphology:normal  Jet Description: mild    Tricuspid Valve:  Morphology: normal        Great Vessels  Ascending Aorta Atherosclerosis: 3=sessile dz (3-5mm)  IABP: yes  Cm from left subclavian: 4  Aorta    Descending aorta IABP: yes    Effusions  Effusions: none    Summary  Findings discussed with surgeon.    Other Findings   Post CAB: IABP 1:1, LV fx seems improved, 1+ 2 MR. rv fx normal. Pt AV paced

## 2018-11-15 NOTE — PROGRESS NOTES
Pt transported to ICU bed 7 by cath lab team.  Pt hooked to telemonitor, SB in the upper 50s, sbp in the 140s.  Heparin, nitroglycerin, and NS infusing to PIVs.  See mar for rates.  IABP noted to R groin art sheath.  No pigtail on sheath, cannot connect pressure bag to sheath.  Pressure bag already connected to IABP setup by cath lab.  Pt denies pain at present.  Will continue to monitor.  Call light within reach.

## 2018-11-15 NOTE — PLAN OF CARE
Problem: Patient Care Overview  Goal: Plan of Care Review  Outcome: Ongoing (interventions implemented as appropriate)  Patient currently resting quietly in bed. VS currently stable. Patient NSR on monitor at this time. Patient currently receiving oxygen via 2L nasal cannula. Patient currently on IABP at this time. Patient right groin site dry and intact. No sign of bleed noted. Patient complained of right groin incisional pain/soreness earlier in shift which has been relieved and managed with prn pain medication. Patient educated and shown videos for CABG surgery. Patient has no questions about procedure. Plan of care updated with patient. There are no further questions after updated on plan of care at this time. Will continue to monitor.

## 2018-11-15 NOTE — PROGRESS NOTES
Dr. Dorsey at bedside discussing plan of care with pt.  Pre-op orders to be entered by Dr. Dorsey.

## 2018-11-15 NOTE — PROGRESS NOTES
Orders from BE Moreno NP to restart heparin at 2000 at 12 Units/kg/hr and repeat ptt 3 hours later.

## 2018-11-15 NOTE — TRANSFER OF CARE
"Anesthesia Transfer of Care Note    Patient: Eve Long    Procedure(s) Performed: Procedure(s) (LRB):  CORONARY ARTERY BYPASS GRAFT (CABG) (N/A)  SURGICAL PROCUREMENT, VEIN, ENDOSCOPIC (Left)  ECHOCARDIOGRAM,TRANSESOPHAGEAL (N/A)    Patient location: ICU    Transport from OR: Continuous ECG monitoring in transport. Transported from OR intubated on 100% O2 by AMBU with adequate controlled ventilation. Continuous SpO2 monitoring in transport. Continuos invasive BP monitoring in transport    Post pain: adequate analgesia    Post assessment: no apparent anesthetic complications    Post vital signs: stable    Level of consciousness: sedated    Nausea/Vomiting: no nausea/vomiting    Complications: none    Transfer of care protocol was followed      Last vitals:   Visit Vitals  BP (!) 136/47   Pulse 67   Temp 36.6 °C (97.8 °F) (Oral)   Resp 15   Ht 5' 3" (1.6 m)   Wt 81 kg (178 lb 9.2 oz)   SpO2 97%   Breastfeeding? No   BMI 31.63 kg/m²     "

## 2018-11-15 NOTE — ANESTHESIA PROCEDURE NOTES
[facility]    Diagnosis: CAD  Patient location during procedure: done in OR  Procedure start time: 11/15/2018 8:46 AM  Timeout: 11/15/2018 8:45 AM  Procedure end time: 11/15/2018 8:57 AM  Staffing  Performed: anesthesiologist   Anesthesiologist was present at the time of the procedure.  Preanesthetic Checklist  Completed: patient identified, site marked, surgical consent, pre-op evaluation, timeout performed, IV checked, risks and benefits discussed, monitors and equipment checked and anesthesia consent given  [facility]  Skin Prep: chlorhexidine gluconate  Local Infiltration: none  Location: right,  internal jugular vein  Vessel Caliber: medium, patent, compressibility normal  Coaxial introducer size: 9 fr double lumen.   Device: standard thermodilution catheter  Catheter Size: 9 Fr  Catheter placement by yes. Heme positive aspiration all ports. PAC floated with balloon up not wedgedSterile sheath used  Locked at: 45 cm.Insertion Attempts: 1  Indication: hemodynamic monitoring, intravenous therapy  Ultrasound Guidance  Needle advanced into vessel with real time Ultrasound guidance.  Guidewire confirmed in vessel.  Sterile sheath used.  Assessment  Central Line Bundle Protocol followed. Hand hygiene before procedure, surgical cap worn, surgical mask worn, sterile surgical gloves worn, large sterile drape used.  Verification: blood return  Dressing: sutured in place and taped, tegaderm and secured with tape and tegaderm  Patient: Tolerated Well

## 2018-11-15 NOTE — ASSESSMENT & PLAN NOTE
Doing well post op on vent and IABP 1:1  Mildly sedated  Continue post op orders per CVT- may need Epi gtt to augment CI

## 2018-11-16 PROBLEM — Z99.11 ON MECHANICALLY ASSISTED VENTILATION: Status: RESOLVED | Noted: 2018-11-15 | Resolved: 2018-11-16

## 2018-11-16 LAB
ANION GAP SERPL CALC-SCNC: 5 MMOL/L
ANION GAP SERPL CALC-SCNC: 6 MMOL/L
APTT BLDCRRT: 29.1 SEC
BASOPHILS # BLD AUTO: 0.01 K/UL
BASOPHILS NFR BLD: 0.1 %
BUN SERPL-MCNC: 17 MG/DL
BUN SERPL-MCNC: 24 MG/DL
CALCIUM SERPL-MCNC: 8.3 MG/DL
CALCIUM SERPL-MCNC: 9.6 MG/DL
CHLORIDE SERPL-SCNC: 109 MMOL/L
CHLORIDE SERPL-SCNC: 111 MMOL/L
CO2 SERPL-SCNC: 25 MMOL/L
CO2 SERPL-SCNC: 26 MMOL/L
CREAT SERPL-MCNC: 0.8 MG/DL
CREAT SERPL-MCNC: 0.9 MG/DL
DIFFERENTIAL METHOD: ABNORMAL
EOSINOPHIL # BLD AUTO: 0 K/UL
EOSINOPHIL NFR BLD: 0 %
ERYTHROCYTE [DISTWIDTH] IN BLOOD BY AUTOMATED COUNT: 15.1 %
EST. GFR  (AFRICAN AMERICAN): >60 ML/MIN/1.73 M^2
EST. GFR  (AFRICAN AMERICAN): >60 ML/MIN/1.73 M^2
EST. GFR  (NON AFRICAN AMERICAN): >60 ML/MIN/1.73 M^2
EST. GFR  (NON AFRICAN AMERICAN): >60 ML/MIN/1.73 M^2
GLUCOSE SERPL-MCNC: 116 MG/DL (ref 70–110)
GLUCOSE SERPL-MCNC: 129 MG/DL
GLUCOSE SERPL-MCNC: 131 MG/DL (ref 70–110)
GLUCOSE SERPL-MCNC: 151 MG/DL
GLUCOSE SERPL-MCNC: 154 MG/DL (ref 70–110)
GLUCOSE SERPL-MCNC: 163 MG/DL (ref 70–110)
GLUCOSE SERPL-MCNC: 166 MG/DL (ref 70–110)
GLUCOSE SERPL-MCNC: 170 MG/DL (ref 70–110)
GLUCOSE SERPL-MCNC: 170 MG/DL (ref 70–110)
GLUCOSE SERPL-MCNC: 172 MG/DL (ref 70–110)
HCO3 UR-SCNC: 18.2 MMOL/L (ref 24–28)
HCO3 UR-SCNC: 23.8 MMOL/L (ref 24–28)
HCO3 UR-SCNC: 25.3 MMOL/L (ref 24–28)
HCO3 UR-SCNC: 25.5 MMOL/L (ref 24–28)
HCO3 UR-SCNC: 25.7 MMOL/L (ref 24–28)
HCO3 UR-SCNC: 26.8 MMOL/L (ref 24–28)
HCO3 UR-SCNC: 27.9 MMOL/L (ref 24–28)
HCO3 UR-SCNC: 27.9 MMOL/L (ref 24–28)
HCT VFR BLD AUTO: 21.9 %
HCT VFR BLD AUTO: 24.8 %
HCT VFR BLD CALC: 19 %PCV (ref 36–54)
HCT VFR BLD CALC: 20 %PCV (ref 36–54)
HCT VFR BLD CALC: 24 %PCV (ref 36–54)
HCT VFR BLD CALC: 26 %PCV (ref 36–54)
HCT VFR BLD CALC: 30 %PCV (ref 36–54)
HGB BLD-MCNC: 7.5 G/DL
HGB BLD-MCNC: 8.6 G/DL
INR PPP: 1.1
LYMPHOCYTES # BLD AUTO: 0.7 K/UL
LYMPHOCYTES NFR BLD: 4.9 %
MAGNESIUM SERPL-MCNC: 2.9 MG/DL
MAGNESIUM SERPL-MCNC: 3.5 MG/DL
MCH RBC QN AUTO: 28.6 PG
MCHC RBC AUTO-ENTMCNC: 34.2 G/DL
MCV RBC AUTO: 84 FL
MONOCYTES # BLD AUTO: 1.3 K/UL
MONOCYTES NFR BLD: 9.1 %
MRSA SPEC QL CULT: NORMAL
NEUTROPHILS # BLD AUTO: 12.3 K/UL
NEUTROPHILS NFR BLD: 85.9 %
PCO2 BLDA: 33.6 MMHG (ref 35–45)
PCO2 BLDA: 33.7 MMHG (ref 35–45)
PCO2 BLDA: 36.3 MMHG (ref 35–45)
PCO2 BLDA: 39.1 MMHG (ref 35–45)
PCO2 BLDA: 40.4 MMHG (ref 35–45)
PCO2 BLDA: 40.5 MMHG (ref 35–45)
PCO2 BLDA: 40.8 MMHG (ref 35–45)
PCO2 BLDA: 44.1 MMHG (ref 35–45)
PH SMN: 7.34 [PH] (ref 7.35–7.45)
PH SMN: 7.39 [PH] (ref 7.35–7.45)
PH SMN: 7.41 [PH] (ref 7.35–7.45)
PH SMN: 7.42 [PH] (ref 7.35–7.45)
PH SMN: 7.42 [PH] (ref 7.35–7.45)
PH SMN: 7.45 [PH] (ref 7.35–7.45)
PH SMN: 7.45 [PH] (ref 7.35–7.45)
PH SMN: 7.49 [PH] (ref 7.35–7.45)
PLATELET # BLD AUTO: 150 K/UL
PMV BLD AUTO: 9.1 FL
PO2 BLDA: 125 MMHG (ref 80–100)
PO2 BLDA: 252 MMHG (ref 80–100)
PO2 BLDA: 283 MMHG (ref 80–100)
PO2 BLDA: 289 MMHG (ref 80–100)
PO2 BLDA: 297 MMHG (ref 80–100)
PO2 BLDA: 411 MMHG (ref 80–100)
PO2 BLDA: 45 MMHG (ref 40–60)
PO2 BLDA: 475 MMHG (ref 80–100)
POC ACTIVATED CLOTTING TIME K: 109 SEC (ref 74–137)
POC ACTIVATED CLOTTING TIME K: 125 SEC (ref 74–137)
POC ACTIVATED CLOTTING TIME K: 433 SEC (ref 74–137)
POC ACTIVATED CLOTTING TIME K: 444 SEC (ref 74–137)
POC ACTIVATED CLOTTING TIME K: 450 SEC (ref 74–137)
POC ACTIVATED CLOTTING TIME K: 461 SEC (ref 74–137)
POC ACTIVATED CLOTTING TIME K: 516 SEC (ref 74–137)
POC ACTIVATED CLOTTING TIME K: 532 SEC (ref 74–137)
POC ACTIVATED CLOTTING TIME K: 588 SEC (ref 74–137)
POC ACTIVATED CLOTTING TIME K: 626 SEC (ref 74–137)
POC BE: -1 MMOL/L
POC BE: -8 MMOL/L
POC BE: 1 MMOL/L
POC BE: 1 MMOL/L
POC BE: 2 MMOL/L
POC BE: 2 MMOL/L
POC BE: 4 MMOL/L
POC BE: 4 MMOL/L
POC IONIZED CALCIUM: 0.95 MMOL/L (ref 1.06–1.42)
POC IONIZED CALCIUM: 1.02 MMOL/L (ref 1.06–1.42)
POC IONIZED CALCIUM: 1.04 MMOL/L (ref 1.06–1.42)
POC IONIZED CALCIUM: 1.04 MMOL/L (ref 1.06–1.42)
POC IONIZED CALCIUM: 1.06 MMOL/L (ref 1.06–1.42)
POC IONIZED CALCIUM: 1.08 MMOL/L (ref 1.06–1.42)
POC IONIZED CALCIUM: 1.16 MMOL/L (ref 1.06–1.42)
POC IONIZED CALCIUM: 1.19 MMOL/L (ref 1.06–1.42)
POC SATURATED O2: 100 % (ref 95–100)
POC SATURATED O2: 82 % (ref 95–100)
POC SATURATED O2: 99 % (ref 95–100)
POCT GLUCOSE: 104 MG/DL (ref 70–110)
POCT GLUCOSE: 118 MG/DL (ref 70–110)
POCT GLUCOSE: 137 MG/DL (ref 70–110)
POCT GLUCOSE: 139 MG/DL (ref 70–110)
POCT GLUCOSE: 140 MG/DL (ref 70–110)
POCT GLUCOSE: 142 MG/DL (ref 70–110)
POCT GLUCOSE: 150 MG/DL (ref 70–110)
POCT GLUCOSE: 151 MG/DL (ref 70–110)
POCT GLUCOSE: 153 MG/DL (ref 70–110)
POCT GLUCOSE: 167 MG/DL (ref 70–110)
POCT GLUCOSE: 184 MG/DL (ref 70–110)
POCT GLUCOSE: 214 MG/DL (ref 70–110)
POCT GLUCOSE: 226 MG/DL (ref 70–110)
POCT GLUCOSE: 78 MG/DL (ref 70–110)
POTASSIUM BLD-SCNC: 3.3 MMOL/L (ref 3.5–5.1)
POTASSIUM BLD-SCNC: 4 MMOL/L (ref 3.5–5.1)
POTASSIUM BLD-SCNC: 4.6 MMOL/L (ref 3.5–5.1)
POTASSIUM BLD-SCNC: 4.9 MMOL/L (ref 3.5–5.1)
POTASSIUM BLD-SCNC: 4.9 MMOL/L (ref 3.5–5.1)
POTASSIUM BLD-SCNC: 5 MMOL/L (ref 3.5–5.1)
POTASSIUM BLD-SCNC: 5.3 MMOL/L (ref 3.5–5.1)
POTASSIUM BLD-SCNC: 5.4 MMOL/L (ref 3.5–5.1)
POTASSIUM SERPL-SCNC: 3.9 MMOL/L
POTASSIUM SERPL-SCNC: 5 MMOL/L
PROTHROMBIN TIME: 11.6 SEC
RBC # BLD AUTO: 2.62 M/UL
SAMPLE: ABNORMAL
SAMPLE: NORMAL
SAMPLE: NORMAL
SODIUM BLD-SCNC: 136 MMOL/L (ref 136–145)
SODIUM BLD-SCNC: 137 MMOL/L (ref 136–145)
SODIUM BLD-SCNC: 137 MMOL/L (ref 136–145)
SODIUM BLD-SCNC: 138 MMOL/L (ref 136–145)
SODIUM BLD-SCNC: 139 MMOL/L (ref 136–145)
SODIUM BLD-SCNC: 140 MMOL/L (ref 136–145)
SODIUM BLD-SCNC: 141 MMOL/L (ref 136–145)
SODIUM BLD-SCNC: 141 MMOL/L (ref 136–145)
SODIUM SERPL-SCNC: 139 MMOL/L
SODIUM SERPL-SCNC: 143 MMOL/L
WBC # BLD AUTO: 14.33 K/UL

## 2018-11-16 PROCEDURE — 85025 COMPLETE CBC W/AUTO DIFF WBC: CPT | Mod: HCNC

## 2018-11-16 PROCEDURE — 99232 SBSQ HOSP IP/OBS MODERATE 35: CPT | Mod: HCNC,,, | Performed by: INTERNAL MEDICINE

## 2018-11-16 PROCEDURE — 97167 OT EVAL HIGH COMPLEX 60 MIN: CPT | Mod: HCNC

## 2018-11-16 PROCEDURE — 83735 ASSAY OF MAGNESIUM: CPT | Mod: HCNC

## 2018-11-16 PROCEDURE — 94640 AIRWAY INHALATION TREATMENT: CPT | Mod: HCNC

## 2018-11-16 PROCEDURE — 85018 HEMOGLOBIN: CPT | Mod: HCNC

## 2018-11-16 PROCEDURE — G8978 MOBILITY CURRENT STATUS: HCPCS | Mod: CM,HCNC

## 2018-11-16 PROCEDURE — 85610 PROTHROMBIN TIME: CPT | Mod: HCNC

## 2018-11-16 PROCEDURE — 85730 THROMBOPLASTIN TIME PARTIAL: CPT | Mod: HCNC

## 2018-11-16 PROCEDURE — 25000003 PHARM REV CODE 250: Mod: HCNC | Performed by: EMERGENCY MEDICINE

## 2018-11-16 PROCEDURE — G8979 MOBILITY GOAL STATUS: HCPCS | Mod: CJ,HCNC

## 2018-11-16 PROCEDURE — 97530 THERAPEUTIC ACTIVITIES: CPT | Mod: HCNC

## 2018-11-16 PROCEDURE — G8988 SELF CARE GOAL STATUS: HCPCS | Mod: CK,HCNC

## 2018-11-16 PROCEDURE — 85014 HEMATOCRIT: CPT | Mod: HCNC

## 2018-11-16 PROCEDURE — 25000003 PHARM REV CODE 250: Mod: HCNC | Performed by: THORACIC SURGERY (CARDIOTHORACIC VASCULAR SURGERY)

## 2018-11-16 PROCEDURE — 02HV33Z INSERTION OF INFUSION DEVICE INTO SUPERIOR VENA CAVA, PERCUTANEOUS APPROACH: ICD-10-PCS

## 2018-11-16 PROCEDURE — 99900037 HC PT THERAPY SCREENING (STAT): Mod: HCNC

## 2018-11-16 PROCEDURE — G8987 SELF CARE CURRENT STATUS: HCPCS | Mod: CN,HCNC

## 2018-11-16 PROCEDURE — 80048 BASIC METABOLIC PNL TOTAL CA: CPT | Mod: 91,HCNC

## 2018-11-16 PROCEDURE — C9113 INJ PANTOPRAZOLE SODIUM, VIA: HCPCS | Mod: HCNC | Performed by: THORACIC SURGERY (CARDIOTHORACIC VASCULAR SURGERY)

## 2018-11-16 PROCEDURE — 63600175 PHARM REV CODE 636 W HCPCS: Mod: JG,HCNC | Performed by: THORACIC SURGERY (CARDIOTHORACIC VASCULAR SURGERY)

## 2018-11-16 PROCEDURE — P9045 ALBUMIN (HUMAN), 5%, 250 ML: HCPCS | Mod: JG,HCNC | Performed by: THORACIC SURGERY (CARDIOTHORACIC VASCULAR SURGERY)

## 2018-11-16 PROCEDURE — 99291 CRITICAL CARE FIRST HOUR: CPT | Mod: HCNC,,, | Performed by: INTERNAL MEDICINE

## 2018-11-16 PROCEDURE — 27200667 HC PACEMAKER, TEMPORARY MONITORING, PER SHIFT: Mod: HCNC

## 2018-11-16 PROCEDURE — 93005 ELECTROCARDIOGRAM TRACING: CPT | Mod: HCNC

## 2018-11-16 PROCEDURE — 27000221 HC OXYGEN, UP TO 24 HOURS: Mod: HCNC

## 2018-11-16 PROCEDURE — 63600175 PHARM REV CODE 636 W HCPCS: Mod: HCNC | Performed by: EMERGENCY MEDICINE

## 2018-11-16 PROCEDURE — S0171 BUMETANIDE 0.5 MG: HCPCS | Mod: HCNC | Performed by: THORACIC SURGERY (CARDIOTHORACIC VASCULAR SURGERY)

## 2018-11-16 PROCEDURE — 20000000 HC ICU ROOM: Mod: HCNC

## 2018-11-16 PROCEDURE — P9016 RBC LEUKOCYTES REDUCED: HCPCS | Mod: HCNC

## 2018-11-16 PROCEDURE — 25000242 PHARM REV CODE 250 ALT 637 W/ HCPCS: Mod: HCNC | Performed by: THORACIC SURGERY (CARDIOTHORACIC VASCULAR SURGERY)

## 2018-11-16 PROCEDURE — 93010 ELECTROCARDIOGRAM REPORT: CPT | Mod: HCNC,,, | Performed by: INTERNAL MEDICINE

## 2018-11-16 PROCEDURE — 94799 UNLISTED PULMONARY SVC/PX: CPT | Mod: HCNC

## 2018-11-16 PROCEDURE — 36430 TRANSFUSION BLD/BLD COMPNT: CPT | Mod: HCNC

## 2018-11-16 PROCEDURE — 97163 PT EVAL HIGH COMPLEX 45 MIN: CPT | Mod: HCNC

## 2018-11-16 RX ORDER — IBUPROFEN 200 MG
24 TABLET ORAL
Status: DISCONTINUED | OUTPATIENT
Start: 2018-11-16 | End: 2018-11-20 | Stop reason: HOSPADM

## 2018-11-16 RX ORDER — METOLAZONE 5 MG/1
5 TABLET ORAL ONCE
Status: COMPLETED | OUTPATIENT
Start: 2018-11-16 | End: 2018-11-16

## 2018-11-16 RX ORDER — BUMETANIDE 0.25 MG/ML
2 INJECTION INTRAMUSCULAR; INTRAVENOUS ONCE
Status: COMPLETED | OUTPATIENT
Start: 2018-11-16 | End: 2018-11-16

## 2018-11-16 RX ORDER — PANTOPRAZOLE SODIUM 40 MG/1
40 TABLET, DELAYED RELEASE ORAL DAILY
Status: DISCONTINUED | OUTPATIENT
Start: 2018-11-17 | End: 2018-11-20 | Stop reason: HOSPADM

## 2018-11-16 RX ORDER — HYDROCODONE BITARTRATE AND ACETAMINOPHEN 500; 5 MG/1; MG/1
TABLET ORAL
Status: DISCONTINUED | OUTPATIENT
Start: 2018-11-16 | End: 2018-11-20 | Stop reason: HOSPADM

## 2018-11-16 RX ORDER — INSULIN ASPART 100 [IU]/ML
1-10 INJECTION, SOLUTION INTRAVENOUS; SUBCUTANEOUS
Status: DISCONTINUED | OUTPATIENT
Start: 2018-11-16 | End: 2018-11-20 | Stop reason: HOSPADM

## 2018-11-16 RX ORDER — GLUCAGON 1 MG
1 KIT INJECTION
Status: DISCONTINUED | OUTPATIENT
Start: 2018-11-16 | End: 2018-11-20 | Stop reason: HOSPADM

## 2018-11-16 RX ORDER — IBUPROFEN 200 MG
16 TABLET ORAL
Status: DISCONTINUED | OUTPATIENT
Start: 2018-11-16 | End: 2018-11-20 | Stop reason: HOSPADM

## 2018-11-16 RX ADMIN — IPRATROPIUM BROMIDE AND ALBUTEROL SULFATE 3 ML: .5; 3 SOLUTION RESPIRATORY (INHALATION) at 12:11

## 2018-11-16 RX ADMIN — FUROSEMIDE 40 MG: 10 INJECTION, SOLUTION INTRAMUSCULAR; INTRAVENOUS at 10:11

## 2018-11-16 RX ADMIN — POTASSIUM CHLORIDE 20 MEQ: 1500 TABLET, EXTENDED RELEASE ORAL at 10:11

## 2018-11-16 RX ADMIN — METOPROLOL TARTRATE 25 MG: 25 TABLET ORAL at 08:11

## 2018-11-16 RX ADMIN — INSULIN ASPART 1 UNITS: 100 INJECTION, SOLUTION INTRAVENOUS; SUBCUTANEOUS at 09:11

## 2018-11-16 RX ADMIN — FUROSEMIDE 40 MG: 10 INJECTION, SOLUTION INTRAMUSCULAR; INTRAVENOUS at 08:11

## 2018-11-16 RX ADMIN — OXYCODONE HYDROCHLORIDE 10 MG: 5 TABLET ORAL at 01:11

## 2018-11-16 RX ADMIN — ASPIRIN 81 MG: 81 TABLET, COATED ORAL at 10:11

## 2018-11-16 RX ADMIN — HYDROCODONE BITARTRATE AND ACETAMINOPHEN 1 TABLET: 5; 325 TABLET ORAL at 04:11

## 2018-11-16 RX ADMIN — FENTANYL CITRATE 50 MCG: 50 INJECTION INTRAMUSCULAR; INTRAVENOUS at 03:11

## 2018-11-16 RX ADMIN — CHLORHEXIDINE GLUCONATE 10 ML: 1.2 RINSE ORAL at 08:11

## 2018-11-16 RX ADMIN — CALCIUM CHLORIDE 1 G: 100 INJECTION, SOLUTION INTRAVENOUS at 03:11

## 2018-11-16 RX ADMIN — METOLAZONE 5 MG: 5 TABLET ORAL at 03:11

## 2018-11-16 RX ADMIN — IPRATROPIUM BROMIDE AND ALBUTEROL SULFATE 3 ML: .5; 3 SOLUTION RESPIRATORY (INHALATION) at 11:11

## 2018-11-16 RX ADMIN — AMIODARONE HYDROCHLORIDE 0.5 MG/MIN: 1.8 INJECTION, SOLUTION INTRAVENOUS at 10:11

## 2018-11-16 RX ADMIN — CLOPIDOGREL 75 MG: 75 TABLET, FILM COATED ORAL at 10:11

## 2018-11-16 RX ADMIN — AMIODARONE HYDROCHLORIDE 0.5 MG/MIN: 1.8 INJECTION, SOLUTION INTRAVENOUS at 08:11

## 2018-11-16 RX ADMIN — ALBUMIN HUMAN 250 ML: 0.05 INJECTION, SOLUTION INTRAVENOUS at 03:11

## 2018-11-16 RX ADMIN — POLYETHYLENE GLYCOL 3350 17 G: 17 POWDER, FOR SOLUTION ORAL at 10:11

## 2018-11-16 RX ADMIN — AMIODARONE HYDROCHLORIDE 150 MG: 1.5 INJECTION, SOLUTION INTRAVENOUS at 04:11

## 2018-11-16 RX ADMIN — DEXMEDETOMIDINE HYDROCHLORIDE 0.3 MCG/KG/HR: 4 INJECTION, SOLUTION INTRAVENOUS at 02:11

## 2018-11-16 RX ADMIN — OXYCODONE HYDROCHLORIDE 5 MG: 5 TABLET ORAL at 09:11

## 2018-11-16 RX ADMIN — CEFAZOLIN SODIUM 2 G: 2 SOLUTION INTRAVENOUS at 12:11

## 2018-11-16 RX ADMIN — IPRATROPIUM BROMIDE AND ALBUTEROL SULFATE 3 ML: .5; 3 SOLUTION RESPIRATORY (INHALATION) at 07:11

## 2018-11-16 RX ADMIN — METOPROLOL TARTRATE 25 MG: 25 TABLET ORAL at 10:11

## 2018-11-16 RX ADMIN — BUMETANIDE 2 MG: 0.25 INJECTION INTRAMUSCULAR; INTRAVENOUS at 05:11

## 2018-11-16 RX ADMIN — AMIODARONE HYDROCHLORIDE 1 MG/MIN: 1.8 INJECTION, SOLUTION INTRAVENOUS at 04:11

## 2018-11-16 RX ADMIN — OXYCODONE HYDROCHLORIDE 10 MG: 5 TABLET ORAL at 06:11

## 2018-11-16 RX ADMIN — POTASSIUM CHLORIDE 20 MEQ: 200 INJECTION, SOLUTION INTRAVENOUS at 04:11

## 2018-11-16 RX ADMIN — DOCUSATE SODIUM 100 MG: 100 CAPSULE, LIQUID FILLED ORAL at 10:11

## 2018-11-16 RX ADMIN — DOCUSATE SODIUM 100 MG: 100 CAPSULE, LIQUID FILLED ORAL at 08:11

## 2018-11-16 RX ADMIN — PANTOPRAZOLE SODIUM 40 MG: 40 INJECTION, POWDER, FOR SOLUTION INTRAVENOUS at 10:11

## 2018-11-16 RX ADMIN — CHLORHEXIDINE GLUCONATE 10 ML: 1.2 RINSE ORAL at 10:11

## 2018-11-16 NOTE — PLAN OF CARE
Problem: Patient Care Overview  Goal: Plan of Care Review  Outcome: Ongoing (interventions implemented as appropriate)  Pt extubated last night. Started on low dose of epi to keep CI >2.2. Vasopressin on and off throughout the night for MAP 65. BP stable. Pt went into A-fib with RVR early this morning. Amiodarone gtt started. Pt converted back to A-paced. H&H low. 1 unit PRBCs infusing. Pt AAO x4 on low dose Precedex. SpO2 stable on 5L NC. Temp pacemaker on DDD with ventricular wire disconnected, 100% A-Paced. CT x3 to suction. RAZIA x2 to bulb suction. Wound vac to midline chest incision. Tmax 100.8. UO decreased throughout the night, 15-30 cc/hr. Pt passed bedside swallow. Insulin gtt on, CBGs <150. IABP in place. K+ replaced. Chlorhexidene bath given. Pt turned Q2H with pressure points protected. Bed in reverse Trendelenburg to due IABP. POC reviewed. All questions and concerns addressed.

## 2018-11-16 NOTE — PT/OT/SLP EVAL
"Occupational Therapy   Evaluation    Name: Eve Long  MRN: [id number]  Admitting Diagnosis:  NSTEMI (non-ST elevated myocardial infarction) 1 Day Post-Op    Recommendations:     Discharge Recommendations: home health OT, rehabilitation facility(HH VS REHAB DEPENDING ON PROGRESS)  Discharge Equipment Recommendations:  bath bench, bedside commode  Barriers to discharge:  None    History:     Occupational Profile:  Living Environment: LIVES WITH  IN 1 STORY HOME, NO STEPS TO ENTER  Previous level of function: INDEPENDENT WITH ADLS AND FUNCTIONAL MOBILITY  Roles and Routines: DRIVES, RETIRED  Equipment Used at Home:  none(OWNS RW AND CANE USED BY  IN PAST)  Assistance upon Discharge:  CAN PROVIDE 24 HR CARE/ASSISTANCE    Past Medical History:   Diagnosis Date    Anemia     Asthma     Basal cell carcinoma     COPD (chronic obstructive pulmonary disease)     Coronary artery disease involving native coronary artery of native heart with unstable angina pectoris 11/14/2018    Hyperlipidemia     Hypertension     Immune deficiency disorder     NSTEMI (non-ST elevated myocardial infarction) 11/13/2018    Pneumonia     Sarcoidosis     TIA (transient ischemic attack)        Past Surgical History:   Procedure Laterality Date    CHOLECYSTECTOMY      COSMETIC SURGERY      blepharplasty    FRACTURE SURGERY Left     wrist ORIF    HYSTERECTOMY      LUNG BIOPSY      OOPHORECTOMY      SKIN BIOPSY      TONSILLECTOMY         Subjective     Chief Complaint: "TIRED"  Patient/Family Comments/goals: RETURN TO PLOF    Pain/Comfort:  · Pain Rating 1: 0/10("just anxiety")  · Pain Rating Post-Intervention 1: 0/10    Patients cultural, spiritual, L[employer] conflicts given the current situation:      Objective:     Communicated with: NURSE AMOR/TRISTON prior to session.  Patient found with: all lines intact, call button in reach and NURSE GOLDSTEIN present and arterial line, blood pressure cuff, pulse ox " (continuous), peripheral IV, chest tube, fang catheter, oxygen, telemetry, SCD, wound vac(external pacer) upon OT entry to room.    General Precautions: Standard, fall, sternal   Orthopedic Precautions:N/A   Braces: N/A     Occupational Performance:    Bed Mobility:    · Patient completed Rolling/Turning to Right with total assistance and 2 persons  · Patient completed Scooting/Bridging with total assistance and 2 persons  · Patient completed Supine to Sit with total assistance and 2 persons    Functional Mobility/Transfers:  · Patient completed Sit <> Stand Transfer with maximal assistance and of 2 persons  with  hand-held assist   · Patient completed Bed <> Chair Transfer using Stand Pivot technique with maximal assistance and of 2 persons with hand-held assist  · Functional Mobility: MAX A X2 FOR ~3 STEPS TO TRANSFER TO BEDSIDE CHAIR    Activities of Daily Living:  · Lower Body Dressing: total assistance SOCKS    Cognitive/Visual Perceptual:  Cognitive/Psychosocial Skills:     -       Oriented to: Person and Situation   -       Follows Commands/attention:Follows two-step commands  -       Memory: No Deficits noted  -       Safety awareness/insight to disability: impaired     Physical Exam:  Balance:    -       POOR  Upper Extremity Range of Motion:     -       Right Upper Extremity: WFL WITHIN STERNAL PRECAUTIONS  -       Left Upper Extremity: WFL WITHIN STERNAL PRECAUTIONS  Upper Extremity Strength:    -       Right Upper Extremity: NT  -       Left Upper Extremity: NT    AMPAC 6 Click ADL:  AMPAC Total Score: 6    Treatment & Education:  PT PARTICIPATED IN INITIAL OT EVALUATION TO ASSESS CURRENT LEVEL OF FUNCTION. PLEASE SEE ABOVE FOR FINDINGS. PT VERY TIRED AND QUICK TO FATIGUE. TOTAL A FOR ALL BED MOBILITY. STOOD MAX A X2 WITH INCREASED DIFFICULTY SUSTAINING UPRIGHT POSTURE. PT WILL BENEFIT FROM SKILLED OT SERVICES TO INCREASE FUNCTIONAL INDEPENDENCE AND SAFETY AWARENESS.   Education:    Patient left up in  "chair with all lines intact, call button in reach and NURSING present    Assessment:     Eve Long is a 68 y.o. female with a medical diagnosis of NSTEMI (non-ST elevated myocardial infarction).  She presents with the following performance deficits affecting function: weakness, impaired endurance, gait instability, impaired functional mobilty, impaired self care skills, impaired balance, decreased safety awareness, decreased upper extremity function, orthopedic precautions.      Rehab Prognosis: Fair; patient would benefit from acute skilled OT services to address these deficits and reach maximum level of function.         Clinical Decision Making:     3.  OT High:  "Pt evaluation falls under high complexity for evaluation category due to 5+ performance deficits identified with comprehensive assessments and significant modifications/assistance required. An expanded review of history and occupational profile completed in addition to expanded review of physical, cognitive and psychosocial history. Several treatment options considered in care."     Plan:     Patient to be seen 3 x/week to address the above listed problems via self-care/home management, therapeutic activities, therapeutic exercises  · Plan of Care Expires: 11/23/18  · Plan of Care Reviewed with: patient    This Plan of care has been discussed with the patient who was involved in its development and understands and is in agreement with the identified goals and treatment plan    GOALS:   Multidisciplinary Problems     Occupational Therapy Goals        Problem: Occupational Therapy Goal    Goal Priority Disciplines Outcome Interventions   Occupational Therapy Goal     OT, PT/OT     Description:  Goals to be met by: 11/23/18     Patient will increase functional independence with ADLs by performing:    UE Dressing with Moderate Assistance.  LE Dressing with Maximum Assistance.  Grooming while EOB with Contact Guard Assistance.  Toileting from " bedside commode with Moderate Assistance for hygiene and clothing management.   Toilet transfer to bedside commode with Moderate Assistance.  Upper extremity exercise program x10 reps per handout, with assistance as needed.                      Time Tracking:     OT Date of Treatment: 11/16/18  OT Start Time: 1245  OT Stop Time: 1310  OT Total Time (min): 25 min    Billable Minutes:Evaluation 15  Therapeutic Activity 10    Nora Bassett, OT  11/16/2018

## 2018-11-16 NOTE — ASSESSMENT & PLAN NOTE
SHORT TERM SEDATION:     []        Midazolam  []        Propofol  [x]        Dexmedetomidine     ANALGESIA:     []        Morphine   [x]        Fentanyl  [x]        Hydrocodone / APAP.   [x]        Oxycodone       RASS  => - 2     Neurochecks per routine.  Daily sedation vacation

## 2018-11-16 NOTE — SUBJECTIVE & OBJECTIVE
Interval History: Seen and examined at bedside. Hospital chart reviewed. No acute interval detrimental events noted. She reports that she  has moderately improved. To OR today for CAB. Tolerated procedure well. Returned to the ICU intubated.     Review of Systems   Unable to perform ROS: Intubated     Scheduled Meds:   albuterol-ipratropium  3 mL Nebulization Q4H    [START ON 11/16/2018] aspirin  81 mg Oral Daily    ceFAZolin (ANCEF) IVPB  2 g Intravenous Q8H    chlorhexidine  10 mL Mouth/Throat BID    [START ON 11/16/2018] clopidogrel  75 mg Oral Daily    [START ON 11/16/2018] docusate sodium  100 mg Oral BID    [START ON 11/16/2018] furosemide  40 mg Intravenous BID    [START ON 11/16/2018] metoprolol tartrate  25 mg Oral BID    nozaseptin   Each Nare BID    [START ON 11/16/2018] pantoprazole  40 mg Intravenous Daily    [START ON 11/16/2018] polyethylene glycol  17 g Oral Daily    [START ON 11/16/2018] potassium chloride  20 mEq Oral Q12H     Continuous Infusions:   dexmedetomidine (PRECEDEX) infusion 1 mcg/kg/hr (11/15/18 1630)    dextrose 5% lactated ringers 25 mL/hr at 11/15/18 1900    epinephrine infusion 0.02 mcg/kg/min (11/15/18 1905)    insulin (HUMAN R) infusion (adults) 1.8 Units/hr (11/15/18 1845)    niCARdipine Stopped (11/15/18 1545)    vasopressin (PITRESSIN) infusion       PRN Meds:.albumin human 5%, albuterol **AND** Inhalation Treatment Q6H PRN, calcium chloride, dextrose 50%, dextrose 50%, fentaNYL, fentaNYL, HYDROcodone-acetaminophen, insulin (HUMAN R) infusion (adults), lactated ringers, magnesium sulfate IVPB, metoclopramide HCl, nozaseptin, ondansetron, oxyCODONE, oxyCODONE, potassium chloride in water **AND** potassium chloride in water **AND** potassium chloride in water    Objective:     Vital Signs (Most Recent):  Temp: (!) 100.8 °F (38.2 °C) (11/15/18 1905)  Pulse: 91 (11/15/18 1925)  Resp: 20 (11/15/18 1925)  BP: 108/62 (11/15/18 1905)  SpO2: 98 % (11/15/18 1925) Vital  Signs (24h Range):  Temp:  [97.8 °F (36.6 °C)-100.8 °F (38.2 °C)] 100.8 °F (38.2 °C)  Pulse:  [56-96] 91  Resp:  [10-22] 20  SpO2:  [94 %-99 %] 98 %  BP: ()/() 108/62  Arterial Line BP: ()/(44-55) 101/54     Weight: 81 kg (178 lb 9.2 oz)  Body mass index is 31.63 kg/m².      Intake/Output Summary (Last 24 hours) at 11/15/2018 1944  Last data filed at 11/15/2018 1845  Gross per 24 hour   Intake 6206.11 ml   Output 1785 ml   Net 4421.11 ml       Physical Exam   Constitutional: She is sedated and intubated.   HENT:   Head: Normocephalic and atraumatic.   Eyes: Conjunctivae are normal. Pupils are equal, round, and reactive to light.   Neck: Normal range of motion. Neck supple.   Cardiovascular: Normal rate, regular rhythm, intact distal pulses and normal pulses.   Chest tubes and Mediastinal tubes in place.  IABP in place   Pulmonary/Chest: Effort normal and breath sounds normal. She is intubated.   Abdominal: Soft. Bowel sounds are normal.   Musculoskeletal: Normal range of motion.   Neurological: GCS eye subscore is 1. GCS verbal subscore is 1. GCS motor subscore is 1.   Skin: Skin is warm and dry. Capillary refill takes less than 2 seconds.   Vitals reviewed.      Vents:  Vent Mode: A/C (11/15/18 1657)  Ventilator Initiated: Yes (11/15/18 1555)  Set Rate: 20 bmp (11/15/18 1657)  Vt Set: 440 mL (11/15/18 1657)  Pressure Support: 0 cmH20 (11/15/18 1657)  PEEP/CPAP: 5 cmH20 (11/15/18 1657)  Oxygen Concentration (%): 50 (11/15/18 1925)  Peak Airway Pressure: 29 cmH2O (11/15/18 1657)  Plateau Pressure: 0 cmH20 (11/15/18 1657)  Total Ve: 19.1 mL (11/15/18 1657)  F/VT Ratio<105 (RSBI): (!) 19.96 (11/15/18 1657)    Lines/Drains/Airways     Central Venous Catheter Line                 Introducer 11/15/18 0813 less than 1 day         Pulmonary Artery Catheter Assessment  11/15/18 0846 right internal jugular less than 1 day          Drain                 Closed/Suction Drain 11/15/18 1134 Left Leg Bulb 19 Fr.  less than 1 day         Closed/Suction Drain 11/15/18 1136 Left Leg Bulb 19 Fr. less than 1 day         Urethral Catheter 11/15/18 0840 Latex;Straight-tip 16 Fr. less than 1 day         Y Chest Tube 1 and 2 11/15/18 1137 1 Left Mediastinal 24 Fr. 2 Right Mediastinal 24 Fr. less than 1 day         Y Chest Tube 3 and 4 11/15/18 1139 3 Left Pleural 24 Fr. 24 Fr. less than 1 day          Airway                 Airway - Non-Surgical 11/15/18 0836 less than 1 day          Arterial Line                 Arterial Line 11/15/18 0838 Left Radial less than 1 day          Line                 IABP 11/14/18 1300 7.5 Fr. 30 mL 1 day         Pacer Wires 11/15/18 1600 less than 1 day          Pressure Ulcer                 Negative Pressure Wound Therapy  less than 1 day          Peripheral Intravenous Line                 Peripheral IV - Single Lumen 11/13/18 1635 Left Antecubital 2 days         Peripheral IV - Single Lumen 11/14/18 0920 Left;Posterior Hand 1 day                Significant Labs:    CBC/Anemia Profile:  Recent Labs   Lab 11/15/18  0415 11/15/18  1230 11/15/18  1237 11/15/18  1523 11/15/18  1621   WBC 9.65  --  16.94* 19.26*  --    HGB 12.3 7.2* 9.0* 9.0*  --    HCT 37.2 21.5* 25.7* 26.7* 22*     --  123* 143*  --    MCV 85  --  83 83  --    RDW 14.0  --  14.1 14.1  --         Chemistries:  Recent Labs   Lab 11/14/18  0507 11/14/18  1621 11/15/18  0415 11/15/18  1523 11/15/18  1546    136 137 141  --    K 4.2 4.2 4.0 5.0  --     107 108 111*  --    CO2 22* 20* 21* 22*  --    BUN 19 15 13 11  --    CREATININE 0.7 0.8 0.7 0.7  --    CALCIUM 9.3 8.7 8.7 8.0*  --    ALBUMIN 3.4* 3.4*  --   --   --    PROT 6.2 6.3  --   --   --    BILITOT 0.3 0.3  --   --   --    ALKPHOS 71 74  --   --   --    ALT 14 15  --   --   --    AST 20 21  --   --   --    MG 2.2  --   --   --  4.3*       ABGs:   Recent Labs   Lab 11/15/18  1621   PH 7.394   PCO2 42.2   HCO3 25.8   POCSATURATED 100   BE 1     Coagulation:    Recent Labs   Lab 11/15/18  1523   INR 1.1   APTT 26.5     Troponin:   Recent Labs   Lab 11/13/18  2350 11/14/18  0507   TROPONINI 0.297* 0.288*     Urine Studies:   Recent Labs   Lab 11/14/18  1748   COLORU Yellow   APPEARANCEUA Clear   PHUR 5.0   SPECGRAV 1.020   PROTEINUA Negative   GLUCUA Negative   KETONESU Negative   BILIRUBINUA Negative   OCCULTUA Trace*   NITRITE Negative   UROBILINOGEN Negative   LEUKOCYTESUR Negative     All pertinent labs within the past 24 hours have been reviewed.    Significant Imaging:      X-Ray Chest AP Portable: 11/15/18:      Interval postoperative changes are present.  Endotracheal tube is in place with the distal tip located approximately 1.5 cm above the reynaldo.  Condon-Janine catheter is in place with the distal tip overlying the distal right main pulmonary artery.  Mediastinal drains identified.  Left chest tube is detected.  No left pneumothorax.  Mild atelectasis identified at the left lung base.  Otherwise lungs are clear.  Heart size is normal with mediastinum wires in place.

## 2018-11-16 NOTE — OP NOTE
The LUE was sterilely prepped and draped.  Lidocaine was used as a local anesthetic.  Using u/s guidance a 5 Faro[place] 27 cm picc was placed into the basilic vein into the SVC/right atrium junction.  Placement was verified using X Ray.  PICC was secured in place with attachment device and is ready to use.  Pt tolerated proc well without immediate complications.

## 2018-11-16 NOTE — PROGRESS NOTES
Pt noted to be in afib, rvr.  Respiratory called for stat 12 lead to confirm placement.  MD called for orders and orders given for amio gtt with loading dose.  Labs to be run downstairs now.

## 2018-11-16 NOTE — PT/OT/SLP PROGRESS
Physical Therapy      Patient Name:  Eve Long   MRN:  [id number]    P.T. OBINNA INITIATED THIS AM, PT JUST HAD BALLOON PUMP PULLED SO ON 2 HOUR BEDREST, WILL ASSESS PT LATER THIS AM     Sharyn Varghese, PT   11/16/2018  0007

## 2018-11-16 NOTE — HOSPITAL COURSE
11/16/2018  Postop day 1.  Status post coronary artery bypass grafting x5.    11/17/2018  Postop day 2.  Status post coronary artery bypass grafting x5.    11/18/2018  The patient is postop day 3 status post coronary artery bypass grafting x5.    11/19/2018  The patient is postop day 4.  Status post coronary artery bypass grafting x5.    11/20/2018  Patient is postop day 5.  Status post coronary artery bypass grafting x5.

## 2018-11-16 NOTE — SUBJECTIVE & OBJECTIVE
Review of Systems   Constitution: Positive for weakness and malaise/fatigue. Negative for diaphoresis, weight gain and weight loss.   HENT: Negative for congestion and nosebleeds.    Cardiovascular: Negative for chest pain, claudication, cyanosis, dyspnea on exertion, irregular heartbeat, leg swelling, near-syncope, orthopnea, palpitations, paroxysmal nocturnal dyspnea and syncope.        Incisional pain    Respiratory: Negative for cough, hemoptysis, shortness of breath, sleep disturbances due to breathing, snoring, sputum production and wheezing.    Hematologic/Lymphatic: Negative for bleeding problem. Does not bruise/bleed easily.   Skin: Negative for rash.   Musculoskeletal: Negative for arthritis, back pain, falls, joint pain, muscle cramps and muscle weakness.   Gastrointestinal: Negative for abdominal pain, constipation, diarrhea, heartburn, hematemesis, hematochezia, melena and nausea.   Genitourinary: Negative for dysuria, hematuria and nocturia.   Neurological: Negative for excessive daytime sleepiness, dizziness, headaches, light-headedness, loss of balance, numbness and vertigo.     Objective:     Vital Signs (Most Recent):  Temp: 98.9 °F (37.2 °C) (11/16/18 0745)  Pulse: 91 (11/16/18 1345)  Resp: (!) 36 (11/16/18 1345)  BP: (!) 98/56 (11/16/18 1230)  SpO2: 97 % (11/16/18 1345) Vital Signs (24h Range):  Temp:  [98.4 °F (36.9 °C)-100.8 °F (38.2 °C)] 98.9 °F (37.2 °C)  Pulse:  [] 91  Resp:  [12-36] 36  SpO2:  [88 %-100 %] 97 %  BP: ()/() 98/56  Arterial Line BP: ()/(38-61) 100/51     Weight: 74.4 kg (164 lb 0.4 oz)  Body mass index is 29.06 kg/m².     SpO2: 97 %  O2 Device (Oxygen Therapy): nasal cannula      Intake/Output Summary (Last 24 hours) at 11/16/2018 1359  Last data filed at 11/16/2018 1200  Gross per 24 hour   Intake 7469.29 ml   Output 2321 ml   Net 5148.29 ml       Lines/Drains/Airways     Central Venous Catheter Line                 Introducer 11/15/18 0813 1 day          Pulmonary Artery Catheter Assessment  11/15/18 0846 right internal jugular 1 day          Drain                 Closed/Suction Drain 11/15/18 1134 Left Leg Bulb 19 Fr. 1 day         Closed/Suction Drain 11/15/18 1136 Left Leg Bulb 19 Fr. 1 day         Urethral Catheter 11/15/18 0840 Latex;Straight-tip 16 Fr. 1 day         Y Chest Tube 1 and 2 11/15/18 1137 1 Left Mediastinal 24 Fr. 2 Right Mediastinal 24 Fr. 1 day         Y Chest Tube 3 and 4 11/15/18 1139 3 Left Pleural 24 Fr. 24 Fr. 1 day          Arterial Line                 Arterial Line 11/15/18 0838 Left Radial 1 day          Line                 IABP 11/14/18 1300 7.5 Fr. 30 mL 2 days         Pacer Wires 11/15/18 1600 less than 1 day          Pressure Ulcer                 Negative Pressure Wound Therapy  1 day          Peripheral Intravenous Line                 Peripheral IV - Single Lumen 11/13/18 1635 Left Antecubital 2 days         Peripheral IV - Single Lumen 11/14/18 0920 Left;Posterior Hand 2 days                Physical Exam   Constitutional: She is oriented to person, place, and time. She appears well-developed and well-nourished.   Neck: Neck supple. No JVD present.   Cardiovascular: Normal rate, regular rhythm, normal heart sounds and normal pulses. Exam reveals no friction rub.   No murmur heard.  Pulmonary/Chest: Effort normal and breath sounds normal. No respiratory distress. She has no wheezes. She has no rales.   Sternal incision with wound vac noted  Pacer wires in use   CT x3    Abdominal: Soft. Bowel sounds are normal. She exhibits no distension.   Musculoskeletal: She exhibits no edema or tenderness.   Neurological: She is alert and oriented to person, place, and time.   Skin: Skin is warm and dry. No rash noted.   RAZIA x 2   Right groin access site without redness, tenderness, swelling, or drainage. There is no active external bleeding or hematoma. Femoral and DP pulse 2+. Skin warm/dry/pink.   Right femoral IABP    Psychiatric:  She has a normal mood and affect. Her behavior is normal.   Nursing note and vitals reviewed.      Significant Labs:   All pertinent lab results from the last 24 hours have been reviewed. and   Recent Lab Results  (Last 25 results in the past 24 hours)      11/16/18  1357   11/16/18  1215   11/16/18  1128   11/16/18  0919   11/16/18  0707        Allens Test               Anion Gap               aPTT               Baso #               Basophil%               Site               BUN, Bld               Calcium               Chloride               CO2               Creatinine               DelSys               Differential Method               eGFR if                eGFR if non                Eos #               Eosinophil%               Fibrinogen               FiO2               Glucose               Gran # (ANC)               Gran%               Hematocrit               Hemoglobin               Coumadin Monitoring INR               Lymph #               Lymph%               Magnesium               MCH               MCHC               MCV               Mode               Mono #               Mono%               MPV               PEEP               Platelets               POC BE               POC Glucose               POC HCO3               POC Hematocrit               POC Ionized Calcium               POC PCO2               POC PH               POC PO2               POC Potassium               POC SATURATED O2               POC Sodium               POCT Glucose 151 104 78 118 150     Potassium               Protime               PS               RBC               RDW               Sample               Sodium               WBC                                11/16/18  0554   11/16/18  0452   11/16/18  0354   11/16/18  0350   11/16/18  0254        Allens Test               Anion Gap       6       aPTT       29.1  Comment:  aPTT therapeutic range = 39-69 seconds       Baso #       0.01        Basophil%       0.1       Site               BUN, Bld       17       Calcium       8.3       Chloride       111       CO2       26       Creatinine       0.8       DelSys               Differential Method       Automated       eGFR if        >60       eGFR if non        >60  Comment:  Calculation used to obtain the estimated glomerular filtration  rate (eGFR) is the CKD-EPI equation.          Eos #       0.0       Eosinophil%       0.0       Fibrinogen               FiO2               Glucose       129       Gran # (ANC)       12.3       Gran%       85.9       Hematocrit       21.9       Hemoglobin       7.5       Coumadin Monitoring INR       1.1  Comment:  Coumadin Therapy:  2.0 - 3.0 for INR for all indicators except mechanical heart valves  and antiphospholipid syndromes which should use 2.5 - 3.5.         Lymph #       0.7       Lymph%       4.9       Magnesium       3.5       MCH       28.6       MCHC       34.2       MCV       84       Mode               Mono #       1.3       Mono%       9.1       MPV       9.1       PEEP               Platelets       150       POC BE               POC Glucose               POC HCO3               POC Hematocrit               POC Ionized Calcium               POC PCO2               POC PH               POC PO2               POC Potassium               POC SATURATED O2               POC Sodium               POCT Glucose 140 139 137   184     Potassium       3.9       Protime       11.6       PS               RBC       2.62       RDW       15.1       Sample               Sodium       143       WBC       14.33                        11/16/18  0152   11/16/18  0055   11/15/18  2356   11/15/18  2256   11/15/18  2154        Allens Test               Anion Gap               aPTT               Baso #               Basophil%               Site               BUN, Bld               Calcium               Chloride               CO2               Creatinine                DelSys               Differential Method               eGFR if                eGFR if non                Eos #               Eosinophil%               Fibrinogen               FiO2               Glucose               Gran # (ANC)               Gran%               Hematocrit               Hemoglobin               Coumadin Monitoring INR               Lymph #               Lymph%               Magnesium               MCH               MCHC               MCV               Mode               Mono #               Mono%               MPV               PEEP               Platelets               POC BE               POC Glucose               POC HCO3               POC Hematocrit               POC Ionized Calcium               POC PCO2               POC PH               POC PO2               POC Potassium               POC SATURATED O2               POC Sodium               POCT Glucose 214 226 249 263 278     Potassium               Protime               PS               RBC               RDW               Sample               Sodium               WBC                                11/15/18  2116   11/15/18  2055   11/15/18  1958   11/15/18  1952   11/15/18  1847        Allens Test N/A             Anion Gap       9       aPTT               Baso #               Basophil%               Site Ronal/UAC             BUN, Bld       13       Calcium       8.6       Chloride       111       CO2       22       Creatinine       0.8       DelSys Adult Vent             Differential Method               eGFR if        >60       eGFR if non        >60  Comment:  Calculation used to obtain the estimated glomerular filtration  rate (eGFR) is the CKD-EPI equation.          Eos #               Eosinophil%               Fibrinogen               FiO2 40             Glucose       243       Gran # (ANC)               Gran%               Hematocrit                Hemoglobin               Coumadin Monitoring INR               Lymph #               Lymph%               Magnesium       3.9       MCH               MCHC               MCV               Mode PSV             Mono #               Mono%               MPV               PEEP 5             Platelets               POC BE -3             POC Glucose               POC HCO3 22.5             POC Hematocrit               POC Ionized Calcium               POC PCO2 40.0             POC PH 7.358             POC PO2 78             POC Potassium               POC SATURATED O2 95             POC Sodium               POCT Glucose   252 225   238     Potassium       4.4       Protime               PS 5             RBC               RDW               Sample ARTERIAL             Sodium       142       WBC                                11/15/18  1755   11/15/18  1648   11/15/18  1621   11/15/18  1546   11/15/18  1523        Allens Test     Pass         Anion Gap         8     aPTT         26.5  Comment:  aPTT therapeutic range = 39-69 seconds     Baso #         0.01     Basophil%         0.1     Site     Ronal/UAC         BUN, Bld         11     Calcium         8.0     Chloride         111     CO2         22     Creatinine         0.7     DelSys               Differential Method         Automated     eGFR if          >60     eGFR if non          >60  Comment:  Calculation used to obtain the estimated glomerular filtration  rate (eGFR) is the CKD-EPI equation.        Eos #         0.0     Eosinophil%         0.1     Fibrinogen       194       FiO2               Glucose         185     Gran # (ANC)         16.5     Gran%         85.6     Hematocrit         26.7     Hemoglobin         9.0     Coumadin Monitoring INR         1.1  Comment:  Coumadin Therapy:  2.0 - 3.0 for INR for all indicators except mechanical heart valves  and antiphospholipid syndromes which should use 2.5 - 3.5.       Lymph #         1.1      Lymph%         5.6     Magnesium       4.3       MCH         28.0     MCHC         33.7     MCV         83     Mode               Mono #         1.7     Mono%         8.6     MPV         10.0     PEEP               Platelets         143     POC BE     1         POC Glucose     179         POC HCO3     25.8         POC Hematocrit     22         POC Ionized Calcium     1.12         POC PCO2     42.2         POC PH     7.394         POC PO2     278         POC Potassium     4.9         POC SATURATED O2     100         POC Sodium     142         POCT Glucose 247 184           Potassium         5.0     Protime         12.4     PS               RBC         3.21     RDW         14.1     Sample     ARTERIAL         Sodium         141     WBC         19.26                          Significant Imaging: Echocardiogram:   2D echo with color flow doppler:   Results for orders placed or performed during the hospital encounter of 11/13/18   2D echo with color flow doppler   Result Value Ref Range    QEF 40 (A) 55 - 65    Diastolic Dysfunction Yes (A)

## 2018-11-16 NOTE — ASSESSMENT & PLAN NOTE
Chest pain and elevated troponin.  Case Discussed with subspecialities: Cardiology Consulted: Dr. Rodríguez.  Heparin and Nitroglycerin infusions.  [facility] on 14 November showing multi-vessel disease.  IABP placed and post-procedure in ICU.  Evaluated by Cardiac surgery and plan for CABG Thursday 15 November.  Aspirin, Metoprolol, and Supplemental Oxygen.  Cardiac echo pending.    S/p 5 V CABG, post op doing well.  Continue to remove baloon pump, Chest tubes, cordis, temp pacer etc  OT/PT eval, cont resp rx

## 2018-11-16 NOTE — PROGRESS NOTES
[facility] Hospital Medicine  Progress Note    Patient Name: Eve Long  MRN: [id number]  Patient Class: IP- Inpatient   Admission Date: 11/13/2018  Length of Stay: 3 days  Attending Physician: Eloise Pacheco MD  Primary Care Provider: Chaya Morelos MD        Subjective:     Principal Problem:NSTEMI (non-ST elevated myocardial infarction)    HPI:  Eve Long is a 68 y.o. female patient with a PMHx of anemia, HLD, HTN, pneumonia, sarcoidosis, who presented to the ER for evaluation of constant left sided chest pain which onset suddenly x2-4 days ago. Pt characterizes her pain as a pressure 10/10 at its worse. Pt reports a strong FMHx of cardiac disease (Father with CABG/MI, all materal uncles with CAD) and reports her last stress test was done in 2006. Symptoms are constant and moderate in severity. Exacerbated by nothing and relieved by anti-hypertensive medications. Associated sxs included diaphoresis, left arm pain radiating from chest and lef jaw pain radiating from chest. Patient denied any fever, chills, SOB, leg swelling, palpitations, N/V, dysuria, hematuria, HA, weakness, and all other sxs at this time. In ER, troponin 0.241. Cardiology was consulted and Dr. Rodríguez recommended IV Heparin Drip. Spouse, Bravo Long, is the surrogate decision maker, HOME [phone]. She was admitted to telemetry Dx NSTEMI.         Hospital Course:  Admitted for evaluation and treatment of chest pain with elevated troponin.  Seen by Cardiology and had a left heart angiogram by Dr. Rodríguez 14 November.  Procedure revealed multivessel disease.  IABP placed and post-procedure in the ICU.  Consultation to Cardiac Surgery to plan for CABG.  11/15- pt underwent 5 vessel CABG by Dr. Castellanos today on IABP 1:1, seen post op in the ICU on vent, sedated with Precedex. Getting a dose of Albumin and Ca and is on Insulin drip, remains on IABP along with CT to water seal. Cardiac Index 2.0--  may need a little Epi drip to improve CI.   11/16- doing much better, alert and awake and talking. Extubated around 9 pm last evening. Had to be placed on Epinephrine gtt and Vasopressin to augment CO/CI and both were d/james this am. IABP removed by Dr. Castellanos this am and his Cordis and Chest tubes are coming off soon today. Also got a unit of blood post op. No obvious hemorrhage anywhere, H/H stable. Good urine output.     Interval History: doing much better, alert and awake and talking. Extubated around 9 pm last evening. Had to be placed on Epinephrine gtt and Vasopressin to augment CO/CI and both were d/james this am. IABP removed by Dr. Castellanos this am and his Cordis and Chest tubes are coming off soon today. Also got a unit of blood post op. No obvious hemorrhage anywhere, H/H stable. Good urine output.     Review of Systems   Constitutional: Positive for activity change and appetite change.   HENT: Positive for congestion.    Eyes: Negative.    Respiratory: Positive for shortness of breath.    Cardiovascular: Positive for chest pain and leg swelling. Negative for palpitations.   Gastrointestinal: Negative.    Genitourinary: Negative.    Musculoskeletal: Positive for myalgias.   Skin: Positive for color change and pallor.   Neurological: Positive for weakness.   Psychiatric/Behavioral: Negative.      Objective:     Vital Signs (Most Recent):  Temp: 98.9 °F (37.2 °C) (11/16/18 0745)  Pulse: 91 (11/16/18 1139)  Resp: (!) 22 (11/16/18 1139)  BP: (!) 114/59 (11/16/18 1030)  SpO2: 97 % (11/16/18 1139) Vital Signs (24h Range):  Temp:  [98.4 °F (36.9 °C)-100.8 °F (38.2 °C)] 98.9 °F (37.2 °C)  Pulse:  [] 91  Resp:  [12-36] 22  SpO2:  [88 %-100 %] 97 %  BP: ()/() 114/59  Arterial Line BP: ()/(38-61) 107/52     Weight: 74.4 kg (164 lb 0.4 oz)  Body mass index is 29.06 kg/m².    Intake/Output Summary (Last 24 hours) at 11/16/2018 1143  Last data filed at 11/16/2018 0730  Gross per 24 hour   Intake  7469.29 ml   Output 2091 ml   Net 5378.29 ml      Physical Exam   Constitutional: She is oriented to person, place, and time. She appears well-developed and well-nourished. No distress.   AAOx3, speech clear, extubated this am   HENT:   Head: Normocephalic and atraumatic.   Cardiovascular: Normal rate and normal heart sounds.   Pulmonary/Chest: She has rales.   Chest tubes in place   Abdominal: Soft. Bowel sounds are normal.   Musculoskeletal: She exhibits no edema.   IABP removed   Neurological: She is alert and oriented to person, place, and time.   Skin: Skin is warm and dry. She is not diaphoretic.   Nursing note and vitals reviewed.      Significant Labs:   ABGs:   Recent Labs   Lab 11/15/18  2116   PH 7.358   PCO2 40.0   HCO3 22.5*   POCSATURATED 95   BE -3     Blood Culture:   BMP:   Recent Labs   Lab 11/16/18  0350   *      K 3.9   *   CO2 26   BUN 17   CREATININE 0.8   CALCIUM 8.3*   MG 3.5*     CBC:   Recent Labs   Lab 11/15/18  1237 11/15/18  1523 11/15/18  1621 11/16/18  0350   WBC 16.94* 19.26*  --  14.33*   HGB 9.0* 9.0*  --  7.5*   HCT 25.7* 26.7* 22* 21.9*   * 143*  --  150     CMP:   Recent Labs   Lab 11/14/18  1621  11/15/18  1523 11/15/18  1952 11/16/18  0350      < > 141 142 143   K 4.2   < > 5.0 4.4 3.9      < > 111* 111* 111*   CO2 20*   < > 22* 22* 26   *   < > 185* 243* 129*   BUN 15   < > 11 13 17   CREATININE 0.8   < > 0.7 0.8 0.8   CALCIUM 8.7   < > 8.0* 8.6* 8.3*   PROT 6.3  --   --   --   --    ALBUMIN 3.4*  --   --   --   --    BILITOT 0.3  --   --   --   --    ALKPHOS 74  --   --   --   --    AST 21  --   --   --   --    ALT 15  --   --   --   --    ANIONGAP 9   < > 8 9 6*   EGFRNONAA >60   < > >60 >60 >60    < > = values in this interval not displayed.     Cardiac Markers:   Recent Labs   Lab 11/14/18  1621   *     Coagulation:   Recent Labs   Lab 11/16/18  0350   INR 1.1   APTT 29.1     Lactic Acid:   Lipase:   Lipid Panel:    Magnesium:   Recent Labs   Lab 11/15/18  1546 11/15/18  1952 11/16/18  0350   MG 4.3* 3.9* 3.5*     POCT Glucose:   Recent Labs   Lab 11/16/18  0707 11/16/18  0919 11/16/18  1128   POCTGLUCOSE 150* 118* 78     Prealbumin:   Recent Labs   Lab 11/14/18  1621   PREALBUMIN 15*     Respiratory Culture:   Troponin:   TSH:   Recent Labs   Lab 11/14/18  1621   TSH 3.340     All pertinent labs within the past 24 hours have been reviewed.    Significant Imaging: I have reviewed all pertinent imaging results/findings within the past 24 hours.    Assessment/Plan:      * NSTEMI (non-ST elevated myocardial infarction) on Intraaortic Baloon pump    Chest pain and elevated troponin.  Case Discussed with subspecialities: Cardiology Consulted: Dr. Rodríguez.  Heparin and Nitroglycerin infusions.  [facility] on 14 November showing multi-vessel disease.  IABP placed and post-procedure in ICU.  Evaluated by Cardiac surgery and plan for CABG Thursday 15 November.  Aspirin, Metoprolol, and Supplemental Oxygen.  Cardiac echo pending.    S/p 5 V CABG, post op doing well.  Continue to remove baloon pump, Chest tubes, cordis, temp pacer etc  OT/PT eval, cont resp rx     S/P CABG x 5    Doing well post op on vent and IABP 1:1  Mildly sedated  Continue post op orders per CVT- may need Epi gtt to augment CI    See above     COPD, moderate    PRN Duonebs and supplemental oxygen.  Continue vent support    Extubated, now getting nebs, IS  Doing well     Hypothyroidism    Resume Home Meds.  TSH pending.  Normal TSH     Pulmonary sarcoidosis    stable         VTE Risk Mitigation (From admission, onward)        Ordered     IP VTE LOW RISK PATIENT  Once      11/14/18 1529     Place LIUDMILA hose  Until discontinued      11/14/18 1529     Place sequential compression device  Until discontinued      11/14/18 1529      Seen and discussed with Dr. Castellanos and the ICU team  Condition: fair  Prognosis: fair      Critical care time spent on the evaluation and treatment of  severe organ dysfunction, review of pertinent labs and imaging studies, discussions with consulting providers and discussions with patient/family: 43 minutes.    Eloise Pacheco MD  Department of Hospital Medicine   [facility]

## 2018-11-16 NOTE — SUBJECTIVE & OBJECTIVE
Interval History: doing much better, alert and awake and talking. Extubated around 9 pm last evening. Had to be placed on Epinephrine gtt and Vasopressin to augment CO/CI and both were d/james this am. IABP removed by Dr. Castellanos this am and his Cordis and Chest tubes are coming off soon today. Also got a unit of blood post op. No obvious hemorrhage anywhere, H/H stable. Good urine output.     Review of Systems   Constitutional: Positive for activity change and appetite change.   HENT: Positive for congestion.    Eyes: Negative.    Respiratory: Positive for shortness of breath.    Cardiovascular: Positive for chest pain and leg swelling. Negative for palpitations.   Gastrointestinal: Negative.    Genitourinary: Negative.    Musculoskeletal: Positive for myalgias.   Skin: Positive for color change and pallor.   Neurological: Positive for weakness.   Psychiatric/Behavioral: Negative.      Objective:     Vital Signs (Most Recent):  Temp: 98.9 °F (37.2 °C) (11/16/18 0745)  Pulse: 91 (11/16/18 1139)  Resp: (!) 22 (11/16/18 1139)  BP: (!) 114/59 (11/16/18 1030)  SpO2: 97 % (11/16/18 1139) Vital Signs (24h Range):  Temp:  [98.4 °F (36.9 °C)-100.8 °F (38.2 °C)] 98.9 °F (37.2 °C)  Pulse:  [] 91  Resp:  [12-36] 22  SpO2:  [88 %-100 %] 97 %  BP: ()/() 114/59  Arterial Line BP: ()/(38-61) 107/52     Weight: 74.4 kg (164 lb 0.4 oz)  Body mass index is 29.06 kg/m².    Intake/Output Summary (Last 24 hours) at 11/16/2018 1143  Last data filed at 11/16/2018 0730  Gross per 24 hour   Intake 7469.29 ml   Output 2091 ml   Net 5378.29 ml      Physical Exam   Constitutional: She is oriented to person, place, and time. She appears well-developed and well-nourished. No distress.   AAOx3, speech clear, extubated this am   HENT:   Head: Normocephalic and atraumatic.   Cardiovascular: Normal rate and normal heart sounds.   Pulmonary/Chest: She has rales.   Chest tubes in place   Abdominal: Soft. Bowel sounds are normal.    Musculoskeletal: She exhibits no edema.   IABP removed   Neurological: She is alert and oriented to person, place, and time.   Skin: Skin is warm and dry. She is not diaphoretic.   Nursing note and vitals reviewed.      Significant Labs:   ABGs:   Recent Labs   Lab 11/15/18  2116   PH 7.358   PCO2 40.0   HCO3 22.5*   POCSATURATED 95   BE -3     Blood Culture:   BMP:   Recent Labs   Lab 11/16/18  0350   *      K 3.9   *   CO2 26   BUN 17   CREATININE 0.8   CALCIUM 8.3*   MG 3.5*     CBC:   Recent Labs   Lab 11/15/18  1237 11/15/18  1523 11/15/18  1621 11/16/18  0350   WBC 16.94* 19.26*  --  14.33*   HGB 9.0* 9.0*  --  7.5*   HCT 25.7* 26.7* 22* 21.9*   * 143*  --  150     CMP:   Recent Labs   Lab 11/14/18  1621  11/15/18  1523 11/15/18  1952 11/16/18  0350      < > 141 142 143   K 4.2   < > 5.0 4.4 3.9      < > 111* 111* 111*   CO2 20*   < > 22* 22* 26   *   < > 185* 243* 129*   BUN 15   < > 11 13 17   CREATININE 0.8   < > 0.7 0.8 0.8   CALCIUM 8.7   < > 8.0* 8.6* 8.3*   PROT 6.3  --   --   --   --    ALBUMIN 3.4*  --   --   --   --    BILITOT 0.3  --   --   --   --    ALKPHOS 74  --   --   --   --    AST 21  --   --   --   --    ALT 15  --   --   --   --    ANIONGAP 9   < > 8 9 6*   EGFRNONAA >60   < > >60 >60 >60    < > = values in this interval not displayed.     Cardiac Markers:   Recent Labs   Lab 11/14/18 1621   *     Coagulation:   Recent Labs   Lab 11/16/18  0350   INR 1.1   APTT 29.1     Lactic Acid:   Lipase:   Lipid Panel:   Magnesium:   Recent Labs   Lab 11/15/18  1546 11/15/18  1952 11/16/18  0350   MG 4.3* 3.9* 3.5*     POCT Glucose:   Recent Labs   Lab 11/16/18  0707 11/16/18  0919 11/16/18  1128   POCTGLUCOSE 150* 118* 78     Prealbumin:   Recent Labs   Lab 11/14/18  1621   PREALBUMIN 15*     Respiratory Culture:   Troponin:   TSH:   Recent Labs   Lab 11/14/18  1621   TSH 3.340     All pertinent labs within the past 24 hours have been  reviewed.    Significant Imaging: I have reviewed all pertinent imaging results/findings within the past 24 hours.

## 2018-11-16 NOTE — ASSESSMENT & PLAN NOTE
PRN Duonebs and supplemental oxygen.  Continue vent support    Extubated, now getting nebs, IS  Doing well

## 2018-11-16 NOTE — PROGRESS NOTES
[facility] Hospital Medicine  Progress Note    Patient Name: Eve Long  MRN: [id number]  Patient Class: IP- Inpatient   Admission Date: 11/13/2018  Length of Stay: 2 days  Attending Physician: Eloise Pacheco MD  Primary Care Provider: Chaya Morelos MD        Subjective:     Principal Problem:NSTEMI (non-ST elevated myocardial infarction)    HPI:  Eve Long is a 68 y.o. female patient with a PMHx of anemia, HLD, HTN, pneumonia, sarcoidosis, who presented to the ER for evaluation of constant left sided chest pain which onset suddenly x2-4 days ago. Pt characterizes her pain as a pressure 10/10 at its worse. Pt reports a strong FMHx of cardiac disease (Father with CABG/MI, all materal uncles with CAD) and reports her last stress test was done in 2006. Symptoms are constant and moderate in severity. Exacerbated by nothing and relieved by anti-hypertensive medications. Associated sxs included diaphoresis, left arm pain radiating from chest and lef jaw pain radiating from chest. Patient denied any fever, chills, SOB, leg swelling, palpitations, N/V, dysuria, hematuria, HA, weakness, and all other sxs at this time. In ER, troponin 0.241. Cardiology was consulted and Dr. Rodríguez recommended IV Heparin Drip. Spouse, Bravo Long, is the surrogate decision maker, HOME [phone]. She was admitted to telemetry Dx NSTEMI.         Hospital Course:  Admitted for evaluation and treatment of chest pain with elevated troponin.  Seen by Cardiology and had a left heart angiogram by Dr. Rodríguez 14 November.  Procedure revealed multivessel disease.  IABP placed and post-procedure in the ICU.  Consultation to Cardiac Surgery to plan for CABG.  11/15- pt underwent 5 vessel CABG by Dr. Castellanos today on IABP 1:1, seen post op in the ICU on vent, sedated with Precedex. Getting a dose of Albumin and Ca and is on Insulin drip, remains on IABP along with CT to water seal. Cardiac Index 2.0--  may need a little Epi drip to improve CI.     Interval History: pt underwent 5 vessel CABG by Dr. Castellanos today on IABP 1:1, seen post op in the ICU on vent, sedated with Precedex. Getting a dose of Albumin and Ca and is on Insulin drip, remains on IABP along with CT to water seal. Cardiac Index 2.0-- may need a little Epi drip to improve CI.     Review of Systems   Unable to perform ROS: Intubated     Objective:     Vital Signs (Most Recent):  Temp: (!) 100.5 °F (38.1 °C) (11/15/18 1630)  Pulse: 91 (11/15/18 1657)  Resp: 19 (11/15/18 1657)  BP: 126/72 (11/15/18 1630)  SpO2: 98 % (11/15/18 1657) Vital Signs (24h Range):  Temp:  [97.8 °F (36.6 °C)-100.5 °F (38.1 °C)] 100.5 °F (38.1 °C)  Pulse:  [56-96] 91  Resp:  [10-22] 19  SpO2:  [94 %-99 %] 98 %  BP: ()/() 126/72  Arterial Line BP: (108-109)/(45-47) 109/47     Weight: 81 kg (178 lb 9.2 oz)  Body mass index is 31.63 kg/m².    Intake/Output Summary (Last 24 hours) at 11/15/2018 1748  Last data filed at 11/15/2018 1709  Gross per 24 hour   Intake 5756.21 ml   Output 925 ml   Net 4831.21 ml      Physical Exam   Constitutional: She is oriented to person, place, and time. She appears well-developed. No distress.   Intubated, sedated on vent. OGT, OETT   HENT:   Head: Atraumatic.   Mouth/Throat: Oropharynx is clear and moist.   Eyes: Conjunctivae are normal. Pupils are equal, round, and reactive to light.   Neck: Normal range of motion. Neck supple. No JVD present. No tracheal deviation present.   Cardiovascular: Normal rate and regular rhythm.   Multiple chest tubes, IABP via groin   Pulmonary/Chest: Effort normal. No respiratory distress. She has rales.   Abdominal: Soft. Bowel sounds are normal. She exhibits no distension.   Musculoskeletal: She exhibits no edema.   Neurological: She is alert and oriented to person, place, and time. GCS eye subscore is 4. GCS verbal subscore is 5. GCS motor subscore is 6.   Skin: Skin is warm and dry. Capillary refill  takes 2 to 3 seconds. No cyanosis.        Nursing note and vitals reviewed.      Significant Labs:   ABGs:   Recent Labs   Lab 11/15/18  1621   PH 7.394   PCO2 42.2   HCO3 25.8   POCSATURATED 100   BE 1     Blood Culture: No results for input(s): LABBLOO in the last 48 hours.  BMP:   Recent Labs   Lab 11/15/18  1523 11/15/18  1546   *  --      --    K 5.0  --    *  --    CO2 22*  --    BUN 11  --    CREATININE 0.7  --    CALCIUM 8.0*  --    MG  --  4.3*     CBC:   Recent Labs   Lab 11/15/18  0415 11/15/18  1230 11/15/18  1237 11/15/18  1523 11/15/18  1621   WBC 9.65  --  16.94* 19.26*  --    HGB 12.3 7.2* 9.0* 9.0*  --    HCT 37.2 21.5* 25.7* 26.7* 22*     --  123* 143*  --      CMP:   Recent Labs   Lab 11/14/18  0507 11/14/18  1621 11/15/18  0415 11/15/18  1523    136 137 141   K 4.2 4.2 4.0 5.0    107 108 111*   CO2 22* 20* 21* 22*    185* 119* 185*   BUN 19 15 13 11   CREATININE 0.7 0.8 0.7 0.7   CALCIUM 9.3 8.7 8.7 8.0*   PROT 6.2 6.3  --   --    ALBUMIN 3.4* 3.4*  --   --    BILITOT 0.3 0.3  --   --    ALKPHOS 71 74  --   --    AST 20 21  --   --    ALT 14 15  --   --    ANIONGAP 8 9 8 8   EGFRNONAA >60 >60 >60 >60     Cardiac Markers:   Recent Labs   Lab 11/14/18  1621   *     Coagulation:   Recent Labs   Lab 11/15/18  1523   INR 1.1   APTT 26.5     Lactic Acid:   Magnesium:   Recent Labs   Lab 11/14/18  0507 11/15/18  1546   MG 2.2 4.3*     POCT Glucose:   Recent Labs   Lab 11/15/18  1648   POCTGLUCOSE 184*     Troponin:   Recent Labs   Lab 11/13/18  2350 11/14/18  0507   TROPONINI 0.297* 0.288*     TSH:   Recent Labs   Lab 11/14/18  1621   TSH 3.340     All pertinent labs within the past 24 hours have been reviewed.    Significant Imaging: I have reviewed all pertinent imaging results/findings within the past 24 hours.    Assessment/Plan:      * NSTEMI (non-ST elevated myocardial infarction) on Intraaortic Baloon pump    Chest pain and elevated troponin.   Case Discussed with subspecialities: Cardiology Consulted: Dr. Rodríguez.  Heparin and Nitroglycerin infusions.  [facility] on 14 November showing multi-vessel disease.  IABP placed and post-procedure in ICU.  Evaluated by Cardiac surgery and plan for CABG Thursday 15 November.  Aspirin, Metoprolol, and Supplemental Oxygen.  Cardiac echo pending.    S/p 5 V CABG, post op doing well.     S/P CABG x 5    Doing well post op on vent and IABP 1:1  Mildly sedated  Continue post op orders per CVT- may need Epi gtt to augment CI       On mechanically assisted ventilation    See above       Coronary artery disease involving native coronary artery of native heart with unstable angina pectoris    Multi vessel disease.  Cardiac surgery to perform CABG 15 November.    See above     HTN (hypertension)    Resume home meds.    BP low- getting Albumin     COPD, moderate    PRN Duonebs and supplemental oxygen.  Continue vent support     Hypothyroidism    Resume Home Meds.  TSH pending.  Normal TSH       VTE Risk Mitigation (From admission, onward)        Ordered     IP VTE LOW RISK PATIENT  Once      11/14/18 1529     Place LIUDMILA hose  Until discontinued      11/14/18 1529     Place sequential compression device  Until discontinued      11/14/18 1529      Seen and discussed with Dr. Castellanos/ Shlomo and the ICU team  Condition: Critical  Prognosis: Guarded     Critical care time spent on the evaluation and treatment of severe organ dysfunction, review of pertinent labs and imaging studies, discussions with consulting providers and discussions with patient/family: 47 minutes.    Eloise Pacheco MD  Department of Hospital Medicine   [facility]

## 2018-11-16 NOTE — ASSESSMENT & PLAN NOTE
SHORT TERM SEDATION:     []        Midazolam  []        Propofol  []        Dexmedetomidine     ANALGESIA:     []        Morphine   [x]        Fentanyl  [x]        Hydrocodone / APAP.   [x]        Oxycodone       RASS  => 0     Neurochecks per routine.  Daily sedation vacation

## 2018-11-16 NOTE — ASSESSMENT & PLAN NOTE
Coronary artery bypass grafting x5 with LIMA to LAD and reverse saphenous vein graft to the distal RCA sequence reverse saphenous vein graft to OM2 and OM3 and reverse saphenous vein graft to diagonal    POD # 0:     Paced: Mode DDD, Rate 90, A mA 20, V mA 25. Normal pacemaker function and stable pacing and sensing thresholds ;Multiple drains: Monitor output. STERNAL INCISION: staples in place; wound clean, dry, and intact, AIBP IN PLACE. STABLE HEMODYNAMICS.    CARDIAC STERNAL PRECAUTIONS.     Monitor hemodynamics and  monitor for dysrhythmias MAP goal of  65 mmHg.    CARDIOACTIVE MEDS:   EPINEPHRINE, NICARDIPINE, VASOPRESSIN    DIURETICS:  FUROSEMIDE

## 2018-11-16 NOTE — PT/OT/SLP EVAL
Physical Therapy Evaluation    Patient Name:  Eve Long   MRN:  [id number]    Recommendations:     Discharge Recommendations:  home health PT   Discharge Equipment Recommendations: bath bench   Barriers to discharge: None    Assessment:     Eve Long is a 68 y.o. female admitted with a medical diagnosis of NSTEMI (non-ST elevated myocardial infarction).  She presents with the following impairments/functional limitations:  impaired endurance, weakness, impaired functional mobilty, gait instability, impaired balance, decreased coordination, decreased safety awareness.    Rehab Prognosis:  GOOD; patient would benefit from acute skilled PT services to address these deficits and reach maximum level of function.      Recent Surgery: Procedure(s) (LRB):  CORONARY ARTERY BYPASS GRAFT (CABG) (N/A)  SURGICAL PROCUREMENT, VEIN, ENDOSCOPIC (Left)  ECHOCARDIOGRAM,TRANSESOPHAGEAL (N/A) 1 Day Post-Op    Plan:     During this hospitalization, patient to be seen 5 x/week to address the above listed problems via therapeutic activities, therapeutic exercises, gait training  · Plan of Care Expires:  11/23/18   Plan of Care Reviewed with: patient    Subjective     Communicated with NURSE TRISTON/ZULEYMA prior to session.  Patient found SUPINE upon PT entry to room, agreeable to evaluation.      Chief Complaint: ANXIETY  Patient comments/goals:   Pain/Comfort:  · Pain Rating 1: 0/10(PT C/O ANXIETY)    Patients cultural, spiritual, Jehovah's witness conflicts given the current situation:     Living Environment:  PT LIVES WITH  WHO IS ABLE TO ASSIST AND PROVIDE 24 HOUR CARE, 1 STORY HOUSE NO STEPS, PT WAS AN INDEP COMMUNITY AMBULATOR PTA, STILL DRIVES, RETIRED, INDEP WITH ADL'S, NO O2 USED AT HOME  Prior to admission, patients level of function was INDEP.  Patient has the following equipment: none.  DME owned (not currently used): rolling walker and single point cane.  Upon discharge, patient will have assistance from  .    Objective:     Patient found with: telemetry, peripheral IV, oxygen, wound vac, arterial line, blood pressure cuff, chest tube, fang catheter(EXTERNAL PACER)     General Precautions: Standard, fall, sternal, respiratory   Orthopedic Precautions:N/A   Braces: N/A     Exams:  · Cognitive Exam:  Patient is oriented to Person, Situation and PT VERY LETHARGIC, C/O DIFFICULTY WAKING UP  · Postural Exam:  Patient presented with the following abnormalities:    · -       Rounded shoulders  · -       Forward head  · Sensation:    · -       Intact  · RLE ROM: WFL  · RLE Strength: GROSSLY 3+/5  · LLE ROM: WFL  · LLE Strength: GROSSLY 3+/5    Functional Mobility:  · Bed Mobility:     · Rolling Left:  total assistance  · Scooting: total assistance  · Supine to Sit: total assistance  · Transfers:     · Sit to Stand:  maximal assistance and of 2 persons with no AD  · Bed to Chair: maximal assistance and of 2 persons with  no AD  using  Stand Pivot  · Gait: PT TOOK SEVERAL SMALL STEPS WITH MAXA X 2 TO TF FROM BED TO CHAIR, B KNEE'S BUCKLING, MAX VERBAL AND TACTILE CUES FOR UPRIGHT POSTURE  · Balance: POOR    AM-PAC 6 CLICK MOBILITY  Total Score:10     Therapeutic Activities and Exercises:   PT EDUCATED IN ROLE OF P.T AND POC, PT EDUCATED IN STERNAL PRECAUTIONS, SAFETY CUES REQUIRED THROUGHOUT EVAL AND TX FOR PRECAUTIONS, PT EDUCATED IN BLE THEREX TO PERFORM WHILE SEATED IN CHAIR, MOVEMENT LIMITED THIS VISIT MAINLY BY PT'S LETHARGY AND ICU LINES, ALL LINES IN TOW DURING BED TO CHAIR TF WITH ASSIST OF 1 EXTRA STAFF    Patient left up in chair with all lines intact, call button in reach, NURSE notified and NURSE present.    GOALS:   Multidisciplinary Problems     Physical Therapy Goals        Problem: Physical Therapy Goal    Goal Priority Disciplines Outcome Goal Variances Interventions   Physical Therapy Goal     PT, PT/OT      Description:  LTG'S TO BE MET IN 7 DAYS (11-23-18)  1. PT WILL REQUIRE MODA FOR BED  MOBILITY  2. PT WILL REQUIRE MODA FOR TF'S  3. PT WILL ' WITH MODA  4. PT WILL DEMO P+ DYNAMIC BALANCE DURING GAIT                    History:     Past Medical History:   Diagnosis Date    Anemia     Asthma     Basal cell carcinoma     COPD (chronic obstructive pulmonary disease)     Coronary artery disease involving native coronary artery of native heart with unstable angina pectoris 11/14/2018    Hyperlipidemia     Hypertension     Immune deficiency disorder     NSTEMI (non-ST elevated myocardial infarction) 11/13/2018    Pneumonia     Sarcoidosis     TIA (transient ischemic attack)        Past Surgical History:   Procedure Laterality Date    CHOLECYSTECTOMY      COSMETIC SURGERY      blepharplasty    FRACTURE SURGERY Left     wrist ORIF    HYSTERECTOMY      LUNG BIOPSY      OOPHORECTOMY      SKIN BIOPSY      TONSILLECTOMY         Clinical Decision Making:     History  Co-morbidities and personal factors that may impact the plan of care Examination  Body Structures and Functions, activity limitations and participation restrictions that may impact the plan of care Clinical Presentation   Decision Making/ Complexity Score   Co-morbidities:   [] Time since onset of injury / illness / exacerbation  [] Status of current condition  []Patient's cognitive status and safety concerns    [] Multiple Medical Problems (see med hx)  Personal Factors:   [] Patient's age  [] Prior Level of function   [] Patient's home situation (environment and family support)  [] Patient's level of motivation  [] Expected progression of patient      HISTORY:(criteria)    [] 67730 - no personal factors/history    [] 16032 - has 1-2 personal factor/comorbidity     [] 42250 - has >3 personal factor/comorbidity     Body Regions:  [] Objective examination findings  [] Head     []  Neck  [] Trunk   [] Upper Extremity  [] Lower Extremity    Body Systems:  [] For communication ability, affect, cognition, language, and learning  style: the assessment of the ability to make needs known, consciousness, orientation (person, place, and time), expected emotional /behavioral responses, and learning preferences (eg, learning barriers, education  needs)  [] For the neuromuscular system: a general assessment of gross coordinated movement (eg, balance, gait, locomotion, transfers, and transitions) and motor function  (motor control and motor learning)  [] For the musculoskeletal system: the assessment of gross symmetry, gross range of motion, gross strength, height, and weight  [] For the integumentary system: the assessment of pliability(texture), presence of scar formation, skin color, and skin integrity  [] For cardiovascular/pulmonary system: the assessment of heart rate, respiratory rate, blood pressure, and edema     Activity limitations:    [] Patient's cognitive status and saf ety concerns          [] Status of current condition      [] Weight bearing restriction  [] Cardiopulmunary Restriction    Participation Restrictions:   [] Goals and goal agreement with the patient     [] Rehab potential (prognosis) and probable outcome      Examination of Body System: (criteria)    [] 56903 - addressing 1-2 elements    [] 65090 - addressing a total of 3 or more elements     [] 34142 -  Addressing a total of 4 or more elements         Clinical Presentation: (criteria)  Choose one     On examination of body system using standardized tests and measures patient presents with (CHOOSE ONE) elements from any of the following: body structures and functions, activity limitations, and/or participation restrictions.  Leading to a clinical presentation that is considered (CHOOSE ONE)                              Clinical Decision Making  (Eval Complexity):  Choose One     Time Tracking:     PT Received On: 11/16/18  PT Start Time: 1240     PT Stop Time: 1305  PT Total Time (min): 25 min     Billable Minutes: Evaluation 15 and Therapeutic Activity 10    Sharyn  Abimael, PT  11/16/2018

## 2018-11-16 NOTE — PROGRESS NOTES
Pharmacist Intervention IV to PO Note    Eve Long is a 68 y.o. female being treated with IV medication pantoprazole    Patient Data:    Vital Signs (Most Recent):  Temp: 98.9 °F (37.2 °C) (11/16/18 0745)  Pulse: 92 (11/16/18 1000)  Resp: (!) 28 (11/16/18 1000)  BP: (!) 105/55 (11/16/18 1000)  SpO2: (!) 94 % (11/16/18 1000)   Vital Signs (72h Range):  Temp:  [97 °F (36.1 °C)-100.8 °F (38.2 °C)]   Pulse:  []   Resp:  [10-36]   BP: ()/()   SpO2:  [88 %-100 %]   Arterial Line BP: ()/(38-61)      CBC:  Recent Labs   Lab 11/15/18  1237 11/15/18  1523 11/15/18  1621 11/16/18  0350   WBC 16.94* 19.26*  --  14.33*   RBC 3.08* 3.21*  --  2.62*   HGB 9.0* 9.0*  --  7.5*   HCT 25.7* 26.7* 22* 21.9*   * 143*  --  150   MCV 83 83  --  84   MCH 29.2 28.0  --  28.6   MCHC 35.0 33.7  --  34.2     CMP:     Recent Labs   Lab 11/13/18  1600 11/14/18  0507 11/14/18  1621  11/15/18  1523 11/15/18  1952 11/16/18  0350   * 104 185*   < > 185* 243* 129*   CALCIUM 9.4 9.3 8.7   < > 8.0* 8.6* 8.3*   ALBUMIN 3.9 3.4* 3.4*  --   --   --   --    PROT 7.4 6.2 6.3  --   --   --   --     139 136   < > 141 142 143   K 3.7 4.2 4.2   < > 5.0 4.4 3.9   CO2 24 22* 20*   < > 22* 22* 26    109 107   < > 111* 111* 111*   BUN 14 19 15   < > 11 13 17   CREATININE 0.8 0.7 0.8   < > 0.7 0.8 0.8   ALKPHOS 85 71 74  --   --   --   --    ALT 16 14 15  --   --   --   --    AST 22 20 21  --   --   --   --    BILITOT 0.2 0.3 0.3  --   --   --   --     < > = values in this interval not displayed.       Dietary Orders:  Diet Orders            Diet clear liquid: Clear Liquid starting at 11/16 0550            Based on the following criteria, this patient qualifies for intravenous to oral conversion:  [x] The patients gastrointestinal tract is functioning (tolerating medications via oral or enteral route for 24 hours and tolerating food or enteral feeds for 24 hours).  [x] The patient is hemodynamically  stable for 24 hours (heart rate <100 beats per minute, systolic blood pressure >99 mm Hg, and respiratory rate <20 breaths per minute).  [x] The patient shows clinical improvement (afebrile for at least 24 hours and white blood cell count downtrending or normalized). Additionally, the patient must be non-neutropenic (absolute neutrophil count >500 cells/mm3).  [x] For antimicrobials, the patient has received IV therapy for at least 24 hours.    IV medication Protonix will be changed to oral medication Protonix    Pharmacist's Name: ARELI COHN  Pharmacist's Extension: [phone]

## 2018-11-16 NOTE — ASSESSMENT & PLAN NOTE
Pulmonary:  Continue ventilator support.      Mode: ASSIST CONTROL    Rate: 20 bpm   Tidal Volume  5-6 cc/kg/IBW   PEEP 5.0 CM/H2O cmH2O/Optimal Peep   FiO2% 100 ks greater than 92%       Ventilator settings reviewed and adjusted to optimize gas exchange.     Proceed with weaning trials on a support/weaning mode on the ventilator when the following goals are met:  Patient Criteria:  - Spontaneous breathing in CPAP  - Able to lift head off pillow  - Hemodynamic stability  - HR: 60 to 110 bpm  - ABP: greater than 90/50  - RR: 10 to 28 bpm  - Secretion management/adequate cough  - Acceptable arterial blood gases    Ventilator Criteria:  - FiO2: less than or equal to 40%  - PEEP less than or equal to 8 to 10 cm H2O    Bronchodilators per protocol.

## 2018-11-16 NOTE — PROGRESS NOTES
[facility] -   Critical Care Medicine  Progress Note    Patient Name: Eve Long  MRN: [id number]  Admission Date: 11/13/2018  Hospital Length of Stay: 3 days  Code Status: Full Code  Attending Provider: Eloise Pacheco MD  Primary Care Provider: Chaya Morelos MD   Principal Problem: NSTEMI (non-ST elevated myocardial infarction)    Subjective:     HPI:  68 year old female with PMH including moderate COPD, sarcoid lung disease, HTN, anemia, HLD, and TIA  Presented to ED on 11/13 with complaint of cp/pressure x 3d with radiation to lt arm and back with associated diaphoresis and jaw pain  Evaluation revealed troponin 0.241  She was admitted with monitoring and heparin infusion for NSTEMI    Troponin jac to 0.297 and she was taken on 11/14 for LHC which revealed subtotal diffuse LAD disease along with multivessel non occlusive disease; IABP was placed for coronary support      Hospital/ICU Course:  11/14/18: Admitted to ICU post LHC with IABP support, heparin infusion, nitro infusion, and nasal cannula oxygen; awake, alert, oriented and able to verbalize plan for CABG tomorrow after consult with CTS    11/15/18: Seen and examined at bedside. Hospital chart reviewed. No acute interval detrimental events noted. To OR today for CAB.    11/16/18: Seen and examined at bedside. Hospital chart reviewed. No acute interval detrimental events noted. Extubated. IABP discontinued. Leg drains discontinues. Paced  - AAI. To OR today for CAB. Chest drains to be discontinued later.     Interval History: POD # 1. Seen and examined at bedside. Hospital chart reviewed. No acute interval detrimental events noted. She reports that she  has moderately improved. Exubated. Leg drains discontinued. IABP discontinued. Chest drains to be discontinues later today.      Review of Systems   Unable to perform ROS: Intubated   Constitutional: Negative for chills and fever.   HENT: Negative for ear pain and hearing  loss.    Eyes: Negative for pain and discharge.   Respiratory: Negative for cough and shortness of breath.    Cardiovascular: Negative for chest pain.   Gastrointestinal: Negative for abdominal pain and constipation.   Endocrine: Negative for cold intolerance and heat intolerance.   Genitourinary: Negative for decreased urine volume and flank pain.   Musculoskeletal: Positive for back pain. Negative for neck pain.   Neurological: Negative for seizures and headaches.   Hematological: Does not bruise/bleed easily.   Psychiatric/Behavioral: Negative for agitation and confusion.     Scheduled Meds:   albuterol-ipratropium  3 mL Nebulization Q4H    aspirin  81 mg Oral Daily    chlorhexidine  10 mL Mouth/Throat BID    clopidogrel  75 mg Oral Daily    docusate sodium  100 mg Oral BID    furosemide  40 mg Intravenous BID    metoprolol tartrate  25 mg Oral BID    nozaseptin   Each Nare BID    pantoprazole  40 mg Intravenous Daily    polyethylene glycol  17 g Oral Daily    potassium chloride  20 mEq Oral Q12H     Continuous Infusions:   amiodarone in dextrose 5% 1 mg/min (11/16/18 0700)    amiodarone in dextrose 5%      dexmedetomidine (PRECEDEX) infusion 0.3 mcg/kg/hr (11/16/18 0700)    dextrose 5% lactated ringers 25 mL/hr at 11/16/18 0800    epinephrine infusion 0.04 mcg/kg/min (11/16/18 0700)    insulin (HUMAN R) infusion (adults) 5.1 Units/hr (11/16/18 0700)    niCARdipine Stopped (11/15/18 1545)    vasopressin (PITRESSIN) infusion Stopped (11/16/18 0440)     PRN Meds:.sodium chloride, albumin human 5%, albuterol **AND** Inhalation Treatment Q6H PRN, calcium chloride, dextrose 50%, dextrose 50%, fentaNYL, fentaNYL, HYDROcodone-acetaminophen, insulin (HUMAN R) infusion (adults), lactated ringers, magnesium sulfate IVPB, metoclopramide HCl, nozaseptin, ondansetron, oxyCODONE, oxyCODONE, potassium chloride in water **AND** potassium chloride in water **AND** potassium chloride in water    Objective:      Vital Signs (Most Recent):  Temp: 98.4 °F (36.9 °C) (11/16/18 0700)  Pulse: 91 (11/16/18 0800)  Resp: (!) 27 (11/16/18 0800)  BP: 125/86 (11/16/18 0800)  SpO2: 95 % (11/16/18 0800) Vital Signs (24h Range):  Temp:  [98.4 °F (36.9 °C)-100.8 °F (38.2 °C)] 98.4 °F (36.9 °C)  Pulse:  [] 91  Resp:  [12-36] 27  SpO2:  [88 %-100 %] 95 %  BP: ()/() 125/86  Arterial Line BP: ()/(38-61) 109/51     Weight: 74.4 kg (164 lb 0.4 oz)  Body mass index is 29.06 kg/m².      Intake/Output Summary (Last 24 hours) at 11/16/2018 0945  Last data filed at 11/16/2018 0600  Gross per 24 hour   Intake 7156.79 ml   Output 2091 ml   Net 5065.79 ml       Physical Exam   Constitutional: She is sedated and intubated.   HENT:   Head: Normocephalic and atraumatic.   Eyes: Conjunctivae are normal. Pupils are equal, round, and reactive to light.   Neck: Normal range of motion. Neck supple.   Cardiovascular: Normal rate, regular rhythm, intact distal pulses and normal pulses.   Chest tubes and Mediastinal tubes in place.  IABP in place   Pulmonary/Chest: Effort normal and breath sounds normal. She is intubated.   Abdominal: Soft. Bowel sounds are normal.   Musculoskeletal: Normal range of motion.   Neurological: GCS eye subscore is 1. GCS verbal subscore is 1. GCS motor subscore is 1.   Skin: Skin is warm and dry. Capillary refill takes less than 2 seconds.   Vitals reviewed.      Vents:  Vent Mode: Spont (11/15/18 2120)  Ventilator Initiated: Yes (11/15/18 1555)  Set Rate: 0 bmp (11/15/18 2120)  Vt Set: 440 mL (11/15/18 2120)  Pressure Support: 5 cmH20 (11/15/18 2120)  PEEP/CPAP: 5 cmH20 (11/15/18 2120)  Oxygen Concentration (%): 40 (11/16/18 8589)  Peak Airway Pressure: 11 cmH2O (11/15/18 2120)  Plateau Pressure: 0 cmH20 (11/15/18 2120)  Total Ve: 10.1 mL (11/15/18 2120)  Negative Inspiratory Force (cm H2O): -25 (11/15/18 2126)  F/VT Ratio<105 (RSBI): (!) 19.96 (11/15/18 1657)    Lines/Drains/Airways     Central Venous  Catheter Line                 Introducer 11/15/18 0813 1 day         Pulmonary Artery Catheter Assessment  11/15/18 0846 right internal jugular 1 day          Drain                 Urethral Catheter 11/15/18 0840 Latex;Straight-tip 16 Fr. 1 day         Closed/Suction Drain 11/15/18 1134 Left Leg Bulb 19 Fr. less than 1 day         Closed/Suction Drain 11/15/18 1136 Left Leg Bulb 19 Fr. less than 1 day         Y Chest Tube 1 and 2 11/15/18 1137 1 Left Mediastinal 24 Fr. 2 Right Mediastinal 24 Fr. less than 1 day         Y Chest Tube 3 and 4 11/15/18 1139 3 Left Pleural 24 Fr. 24 Fr. less than 1 day          Arterial Line                 Arterial Line 11/15/18 0838 Left Radial 1 day          Line                 IABP 11/14/18 1300 7.5 Fr. 30 mL 1 day         Pacer Wires 11/15/18 1600 less than 1 day          Pressure Ulcer                 Negative Pressure Wound Therapy  less than 1 day          Peripheral Intravenous Line                 Peripheral IV - Single Lumen 11/13/18 1635 Left Antecubital 2 days         Peripheral IV - Single Lumen 11/14/18 0920 Left;Posterior Hand 2 days                Significant Labs:    CBC/Anemia Profile:  Recent Labs   Lab 11/15/18  1237 11/15/18  1523 11/15/18  1621 11/16/18  0350   WBC 16.94* 19.26*  --  14.33*   HGB 9.0* 9.0*  --  7.5*   HCT 25.7* 26.7* 22* 21.9*   * 143*  --  150   MCV 83 83  --  84   RDW 14.1 14.1  --  15.1*        Chemistries:  Recent Labs   Lab 11/14/18  1621  11/15/18  1523 11/15/18  1546 11/15/18  1952 11/16/18  0350      < > 141  --  142 143   K 4.2   < > 5.0  --  4.4 3.9      < > 111*  --  111* 111*   CO2 20*   < > 22*  --  22* 26   BUN 15   < > 11  --  13 17   CREATININE 0.8   < > 0.7  --  0.8 0.8   CALCIUM 8.7   < > 8.0*  --  8.6* 8.3*   ALBUMIN 3.4*  --   --   --   --   --    PROT 6.3  --   --   --   --   --    BILITOT 0.3  --   --   --   --   --    ALKPHOS 74  --   --   --   --   --    ALT 15  --   --   --   --   --    AST 21  --   --    --   --   --    MG  --   --   --  4.3* 3.9* 3.5*    < > = values in this interval not displayed.       ABGs:   Recent Labs   Lab 11/15/18  2116   PH 7.358   PCO2 40.0   HCO3 22.5*   POCSATURATED 95   BE -3     Coagulation:   Recent Labs   Lab 11/16/18  0350   INR 1.1   APTT 29.1     Troponin:   Urine Studies:   Recent Labs   Lab 11/14/18  1748   COLORU Yellow   APPEARANCEUA Clear   PHUR 5.0   SPECGRAV 1.020   PROTEINUA Negative   GLUCUA Negative   KETONESU Negative   BILIRUBINUA Negative   OCCULTUA Trace*   NITRITE Negative   UROBILINOGEN Negative   LEUKOCYTESUR Negative     All pertinent labs within the past 24 hours have been reviewed.    Significant Imaging:      X-Ray Chest AP Portable: 11/16/18:    Endotracheal and nasogastric tubes have been removed.  Remaining tubes are stable.  Decreased lung volumes are noted.  Small left pleural effusion left lower lobe atelectasis or airspace disease.  Mild pulmonary vascular congestion is suspected.  Bones demonstrate degenerative changes.  Moderate gastric distention noted.  In comparison to the prior study, there is no adverse interval changes      Assessment/Plan:     I have reviewed all labs and imaging studies and compared to previous results. I have also discussed labs with all the teams in the medical care of the patient and my plan is outlined below       Neuro      SHORT TERM SEDATION:     []        Midazolam  []        Propofol  []        Dexmedetomidine     ANALGESIA:     []        Morphine   [x]        Fentanyl  [x]        Hydrocodone / APAP.   [x]        Oxycodone       RASS  => 0     Neurochecks per routine.  Daily sedation vacation     Pulmonary    Extubated successfully. Tolerating oxygen via N/C.     COPD, moderate    Supplement oxygenation. Keep SAO2 >= 92%.Bronchodilators per orders     Cardiac/Vascular   * NSTEMI (non-ST elevated myocardial infarction) on Intraaortic Baloon pump    S/P CAB X 5: LIMA to LAD and reverse saphenous vein graft to the  distal RCA sequence reverse saphenous vein graft to OM2 and OM3 and reverse saphenous vein graft to diagonal    ASA, METOPROLOL, CLOPIDOGREL.         S/P CABG x 5    Coronary artery bypass grafting x5 with LIMA to LAD and reverse saphenous vein graft to the distal RCA sequence reverse saphenous vein graft to OM2 and OM3 and reverse saphenous vein graft to diagonal    POD # 1:     Paced: Mode AAI. Normal pacemaker function and stable pacing and sensing thresholds  Chest drains: Monitor output. STERNAL INCISION: staples in place; wound clean, dry, and intact, STABLE HEMODYNAMICS.    CARDIAC STERNAL PRECAUTIONS.     Monitor hemodynamics and  monitor for dysrhythmias MAP goal of  65 mmHg.    CARDIOACTIVE MEDS:   EPINEPHRINE    DIURETICS:  FUROSEMIDE           Renal/    Monitor BUN / Cr. Montor urine output. Strict input and output monitoring. Monitor and replete electrolytes per protocol.     Intake/Output Summary (Last 24 hours) at 11/16/2018 0954  Last data filed at 11/16/2018 0600  Gross per 24 hour   Intake 7156.79 ml   Output 2091 ml   Net 5065.79 ml        ID    Monitor fever curve. panculture for temperatures greater than 101 degrees F. Source control: n/a     Immunology/Multi System    Immunization status reviewed and up to date.     Pulmonary sarcoidosis    Stable and controlled.       Hematology    Monitor hemogram. Transfuse as needed.     Endocrine     EUGLYCEMIC.  INSULIN GTT. Monitor for glucose control and prevention of insulin toxicity. Blood glucose target 100 - 180 mg/dl         Hypothyroidism    Resume home meds when extubated.      GI    NPO. PANTOPRAZOLE. GI regimen:  PRN anti emetics, anti diarrheals and stool softeners.        Critical Care Daily Checklist:    A: Awake: RASS Goal/Actual Goal: RASS Goal: 0-->alert and calm  Actual: Dorsey Agitation Sedation Scale (RASS): Alert and calm   B: Spontaneous Breathing Trial Performed? Spon. Breathing Trial Initiated?: Initiated (11/15/18 2054)   C:  SAT & SBT Coordinated?  yes                   D: Delirium: CAM-ICU     E: Early Mobility Performed? Yes   F: Feeding Goal:    Status:     Current Diet Order   Procedures    Diet clear liquid      AS: Analgesia/Sedation FENTANYL, HYDROCODONE/ APAP, OXYCODONE   T: Thromboembolic Prophylaxis SCD   H: HOB > 300 Yes   U: Stress Ulcer Prophylaxis (if needed) PANTOPRAZOLE   G: Glucose Control INSULIN GTT   B: Bowel Function Stool Occurrence: 0   I: Indwelling Catheter (Lines & Hilton) Necessity YES   D: De-escalation of Antimicrobials/Pharmacotherapies YES    Plan for the day/ETD CONTINUE CURRENT    Code Status:  Family/Goals of Care: Full Code  HOME SELF CARE     Critical Care Time: 45 minutes  Critical secondary to Patient has a condition that poses threat to life and bodily function: NSTEMI s/p CABG X 5      Critical care was time spent personally by me on the following activities: development of treatment plan with patient or surrogate and bedside caregivers, discussions with consultants, evaluation of patient's response to treatment, examination of patient, ordering and performing treatments and interventions, ordering and review of laboratory studies, ordering and review of radiographic studies, pulse oximetry, re-evaluation of patient's condition. This critical care time did not overlap with that of any other provider or involve time for any procedures.     Ankit Keenan MD  Critical Care Medicine  [facility] -

## 2018-11-16 NOTE — PROGRESS NOTES
[facility] -   Cardiology  Progress Note    Patient Name: Eve Long  MRN: [id number]  Admission Date: 11/13/2018  Hospital Length of Stay: 3 days  Code Status: Full Code   Attending Physician: Eloise Pacheco MD   Primary Care Physician: Chaya Morelos MD  Expected Discharge Date:   Principal Problem:NSTEMI (non-ST elevated myocardial infarction)    Subjective:   Brief HPI:     Eve Long is a 68 y.o. female patient with a PMHx of anemia, HLD, HTN, TIA,  pneumonia, sarcoidosis, Statin intolerant due to myalgias. Tried Zetia and still had myalgia. Patient presented to the ER for evaluation of constant left sided chest pain which onset approximately 4 days ago. Pt characterizes her pain as a pressure 10/10 at its worse. Pt reports a strong FMHx of cardiac disease (Father with CABG/MI, all materal uncles with CAD) and reports her last stress test was done in 2006.  patient states that she tried to take a walk around her neighborhood and chest pain worsened. Pain associated with diaphoresis, and radiated from chest and left jaw, arm and back.  Patient denied any fever, chills, SOB, leg swelling, palpitations, N/V, dysuria, hematuria, HA, weakness, and all other sxs at this time. In ER, troponin 0.241, 0.297, 0.288. EKG with + anterior changes.  Echo in Aug 2018 showed normal LVF.  Patient has been started on Heparin Drip. Last episode of chest pain was this morning. Had chest pain all night improved with nitro paste.         Hospital Course:   11/16/18- Patient is POD 1 s/p CABG x5. Has been extubated. Remains on IABP 1:1. CT x 3 inplace. RAZIA x2 remains in place. Had A-fib with RVR overnight and started on Amio gtt. Converted to NSR and is A-pacing. Given 1 unit of blood . H/H 7.5/21.9 down from 9.0/ 26.7.  Has about 15-30cc/hr of urine output         Review of Systems   Constitution: Positive for weakness and malaise/fatigue. Negative for diaphoresis, weight gain and weight  loss.   HENT: Negative for congestion and nosebleeds.    Cardiovascular: Negative for chest pain, claudication, cyanosis, dyspnea on exertion, irregular heartbeat, leg swelling, near-syncope, orthopnea, palpitations, paroxysmal nocturnal dyspnea and syncope.        Incisional pain    Respiratory: Negative for cough, hemoptysis, shortness of breath, sleep disturbances due to breathing, snoring, sputum production and wheezing.    Hematologic/Lymphatic: Negative for bleeding problem. Does not bruise/bleed easily.   Skin: Negative for rash.   Musculoskeletal: Negative for arthritis, back pain, falls, joint pain, muscle cramps and muscle weakness.   Gastrointestinal: Negative for abdominal pain, constipation, diarrhea, heartburn, hematemesis, hematochezia, melena and nausea.   Genitourinary: Negative for dysuria, hematuria and nocturia.   Neurological: Negative for excessive daytime sleepiness, dizziness, headaches, light-headedness, loss of balance, numbness and vertigo.     Objective:     Vital Signs (Most Recent):  Temp: 98.9 °F (37.2 °C) (11/16/18 0745)  Pulse: 91 (11/16/18 1345)  Resp: (!) 36 (11/16/18 1345)  BP: (!) 98/56 (11/16/18 1230)  SpO2: 97 % (11/16/18 1345) Vital Signs (24h Range):  Temp:  [98.4 °F (36.9 °C)-100.8 °F (38.2 °C)] 98.9 °F (37.2 °C)  Pulse:  [] 91  Resp:  [12-36] 36  SpO2:  [88 %-100 %] 97 %  BP: ()/() 98/56  Arterial Line BP: ()/(38-61) 100/51     Weight: 74.4 kg (164 lb 0.4 oz)  Body mass index is 29.06 kg/m².     SpO2: 97 %  O2 Device (Oxygen Therapy): nasal cannula      Intake/Output Summary (Last 24 hours) at 11/16/2018 1359  Last data filed at 11/16/2018 1200  Gross per 24 hour   Intake 7469.29 ml   Output 2321 ml   Net 5148.29 ml       Lines/Drains/Airways     Central Venous Catheter Line                 Introducer 11/15/18 0813 1 day         Pulmonary Artery Catheter Assessment  11/15/18 0846 right internal jugular 1 day          Drain                  Closed/Suction Drain 11/15/18 1134 Left Leg Bulb 19 Fr. 1 day         Closed/Suction Drain 11/15/18 1136 Left Leg Bulb 19 Fr. 1 day         Urethral Catheter 11/15/18 0840 Latex;Straight-tip 16 Fr. 1 day         Y Chest Tube 1 and 2 11/15/18 1137 1 Left Mediastinal 24 Fr. 2 Right Mediastinal 24 Fr. 1 day         Y Chest Tube 3 and 4 11/15/18 1139 3 Left Pleural 24 Fr. 24 Fr. 1 day          Arterial Line                 Arterial Line 11/15/18 0838 Left Radial 1 day          Line                 IABP 11/14/18 1300 7.5 Fr. 30 mL 2 days         Pacer Wires 11/15/18 1600 less than 1 day          Pressure Ulcer                 Negative Pressure Wound Therapy  1 day          Peripheral Intravenous Line                 Peripheral IV - Single Lumen 11/13/18 1635 Left Antecubital 2 days         Peripheral IV - Single Lumen 11/14/18 0920 Left;Posterior Hand 2 days                Physical Exam   Constitutional: She is oriented to person, place, and time. She appears well-developed and well-nourished.   Neck: Neck supple. No JVD present.   Cardiovascular: Normal rate, regular rhythm, normal heart sounds and normal pulses. Exam reveals no friction rub.   No murmur heard.  Pulmonary/Chest: Effort normal and breath sounds normal. No respiratory distress. She has no wheezes. She has no rales.   Sternal incision with wound vac noted  Pacer wires in use   CT x3    Abdominal: Soft. Bowel sounds are normal. She exhibits no distension.   Musculoskeletal: She exhibits no edema or tenderness.   Neurological: She is alert and oriented to person, place, and time.   Skin: Skin is warm and dry. No rash noted.   RAZIA x 2   Right groin access site without redness, tenderness, swelling, or drainage. There is no active external bleeding or hematoma. Femoral and DP pulse 2+. Skin warm/dry/pink.   Right femoral IABP    Psychiatric: She has a normal mood and affect. Her behavior is normal.   Nursing note and vitals reviewed.      Significant Labs:    All pertinent lab results from the last 24 hours have been reviewed. and   Recent Lab Results  (Last 25 results in the past 24 hours)      11/16/18  1357   11/16/18  1215   11/16/18  1128   11/16/18  0919   11/16/18  0707        Allens Test               Anion Gap               aPTT               Baso #               Basophil%               Site               BUN, Bld               Calcium               Chloride               CO2               Creatinine               DelSys               Differential Method               eGFR if                eGFR if non                Eos #               Eosinophil%               Fibrinogen               FiO2               Glucose               Gran # (ANC)               Gran%               Hematocrit               Hemoglobin               Coumadin Monitoring INR               Lymph #               Lymph%               Magnesium               MCH               MCHC               MCV               Mode               Mono #               Mono%               MPV               PEEP               Platelets               POC BE               POC Glucose               POC HCO3               POC Hematocrit               POC Ionized Calcium               POC PCO2               POC PH               POC PO2               POC Potassium               POC SATURATED O2               POC Sodium               POCT Glucose 151 104 78 118 150     Potassium               Protime               PS               RBC               RDW               Sample               Sodium               WBC                                11/16/18  0554   11/16/18  0452   11/16/18  0354   11/16/18  0350   11/16/18  0254        Allens Test               Anion Gap       6       aPTT       29.1  Comment:  aPTT therapeutic range = 39-69 seconds       Baso #       0.01       Basophil%       0.1       Site               BUN, Bld       17       Calcium       8.3       Chloride       111        CO2       26       Creatinine       0.8       DelSys               Differential Method       Automated       eGFR if        >60       eGFR if non        >60  Comment:  Calculation used to obtain the estimated glomerular filtration  rate (eGFR) is the CKD-EPI equation.          Eos #       0.0       Eosinophil%       0.0       Fibrinogen               FiO2               Glucose       129       Gran # (ANC)       12.3       Gran%       85.9       Hematocrit       21.9       Hemoglobin       7.5       Coumadin Monitoring INR       1.1  Comment:  Coumadin Therapy:  2.0 - 3.0 for INR for all indicators except mechanical heart valves  and antiphospholipid syndromes which should use 2.5 - 3.5.         Lymph #       0.7       Lymph%       4.9       Magnesium       3.5       MCH       28.6       MCHC       34.2       MCV       84       Mode               Mono #       1.3       Mono%       9.1       MPV       9.1       PEEP               Platelets       150       POC BE               POC Glucose               POC HCO3               POC Hematocrit               POC Ionized Calcium               POC PCO2               POC PH               POC PO2               POC Potassium               POC SATURATED O2               POC Sodium               POCT Glucose 140 139 137   184     Potassium       3.9       Protime       11.6       PS               RBC       2.62       RDW       15.1       Sample               Sodium       143       WBC       14.33                        11/16/18  0152   11/16/18  0055   11/15/18  2356   11/15/18  2256   11/15/18  2154        Allens Test               Anion Gap               aPTT               Baso #               Basophil%               Site               BUN, Bld               Calcium               Chloride               CO2               Creatinine               DelSys               Differential Method               eGFR if                eGFR if  non                Eos #               Eosinophil%               Fibrinogen               FiO2               Glucose               Gran # (ANC)               Gran%               Hematocrit               Hemoglobin               Coumadin Monitoring INR               Lymph #               Lymph%               Magnesium               MCH               MCHC               MCV               Mode               Mono #               Mono%               MPV               PEEP               Platelets               POC BE               POC Glucose               POC HCO3               POC Hematocrit               POC Ionized Calcium               POC PCO2               POC PH               POC PO2               POC Potassium               POC SATURATED O2               POC Sodium               POCT Glucose 214 226 249 263 278     Potassium               Protime               PS               RBC               RDW               Sample               Sodium               WBC                                11/15/18  2116   11/15/18  2055   11/15/18  1958   11/15/18  1952   11/15/18  1847        Allens Test N/A             Anion Gap       9       aPTT               Baso #               Basophil%               Site Ronal/UAC             BUN, Bld       13       Calcium       8.6       Chloride       111       CO2       22       Creatinine       0.8       DelSys Adult Vent             Differential Method               eGFR if        >60       eGFR if non        >60  Comment:  Calculation used to obtain the estimated glomerular filtration  rate (eGFR) is the CKD-EPI equation.          Eos #               Eosinophil%               Fibrinogen               FiO2 40             Glucose       243       Gran # (ANC)               Gran%               Hematocrit               Hemoglobin               Coumadin Monitoring INR               Lymph #               Lymph%               Magnesium        3.9       MCH               MCHC               MCV               Mode PSV             Mono #               Mono%               MPV               PEEP 5             Platelets               POC BE -3             POC Glucose               POC HCO3 22.5             POC Hematocrit               POC Ionized Calcium               POC PCO2 40.0             POC PH 7.358             POC PO2 78             POC Potassium               POC SATURATED O2 95             POC Sodium               POCT Glucose   252 225   238     Potassium       4.4       Protime               PS 5             RBC               RDW               Sample ARTERIAL             Sodium       142       WBC                                11/15/18  1755   11/15/18  1648   11/15/18  1621   11/15/18  1546   11/15/18  1523        Allens Test     Pass         Anion Gap         8     aPTT         26.5  Comment:  aPTT therapeutic range = 39-69 seconds     Baso #         0.01     Basophil%         0.1     Site     Ronal/UAC         BUN, Bld         11     Calcium         8.0     Chloride         111     CO2         22     Creatinine         0.7     DelSys               Differential Method         Automated     eGFR if          >60     eGFR if non          >60  Comment:  Calculation used to obtain the estimated glomerular filtration  rate (eGFR) is the CKD-EPI equation.        Eos #         0.0     Eosinophil%         0.1     Fibrinogen       194       FiO2               Glucose         185     Gran # (ANC)         16.5     Gran%         85.6     Hematocrit         26.7     Hemoglobin         9.0     Coumadin Monitoring INR         1.1  Comment:  Coumadin Therapy:  2.0 - 3.0 for INR for all indicators except mechanical heart valves  and antiphospholipid syndromes which should use 2.5 - 3.5.       Lymph #         1.1     Lymph%         5.6     Magnesium       4.3       MCH         28.0     MCHC         33.7     MCV         83     Mode                Mono #         1.7     Mono%         8.6     MPV         10.0     PEEP               Platelets         143     POC BE     1         POC Glucose     179         POC HCO3     25.8         POC Hematocrit     22         POC Ionized Calcium     1.12         POC PCO2     42.2         POC PH     7.394         POC PO2     278         POC Potassium     4.9         POC SATURATED O2     100         POC Sodium     142         POCT Glucose 247 184           Potassium         5.0     Protime         12.4     PS               RBC         3.21     RDW         14.1     Sample     ARTERIAL         Sodium         141     WBC         19.26                          Significant Imaging: Echocardiogram:   2D echo with color flow doppler:   Results for orders placed or performed during the hospital encounter of 11/13/18   2D echo with color flow doppler   Result Value Ref Range    QEF 40 (A) 55 - 65    Diastolic Dysfunction Yes (A)      Assessment and Plan:       * NSTEMI (non-ST elevated myocardial infarction) on Intraaortic Baloon pump    -Trop 0.241, 0.297, 0.288  -EKG with anterior lead changes   -Chest pain all night improved with nitro paste. Last episode of pain this morning around 9am  -Has been started on Heparin gtt  -Continue ASA, BB.   -No statin due to history of intolerance(myalgia). Had side effects to Zetia when tried   -Recommendation has been made for patient to undergo LHC today   -Dr. Rodríguez explained the risks, benefits and alternatives of the procedure in detail. The patient voices understanding and all questions have been answered. Patient agrees to proceed as planned.   -Keep NPO   -NS at 100/hr   -Further recommendations to follow cath    11/16/18  -patient is POD 1 s/p CABG x5.   -See plan for CABG      S/P CABG x 5    -Patient is POD 1 s/p CABG x5   -Continue Amio gtt and plan to switch to PO tomorrow  -CT and RAZIA out when ok with CT surgery   -IABP being weaned. Changed to 1:2 by Dr. Rodríguez  -Continue  ASA, Plavix   -No statin due to intolerance in the past. Had severe myalgia with statin and Zetia use   -Encourage IS use and begin ambulation when transferred to Tele            VTE Risk Mitigation (From admission, onward)        Ordered     IP VTE LOW RISK PATIENT  Once      11/14/18 1529     Place LIUDMILA hose  Until discontinued      11/14/18 1529     Place sequential compression device  Until discontinued      11/14/18 1529        Chart reviewed. Patient examined by Dr. Rodríguez and agrees with plan that has been outlined.     DAVID Mcclure  Cardiology  [facility] [place]

## 2018-11-16 NOTE — ASSESSMENT & PLAN NOTE
11/16/2018  The patient is postop day 1.  Status post coronary artery bypass grafting x5.  Overall the patient is is doing very well.  Neuro:  The patient is awake alert and oriented x3.  Nonfocal neuro exam.  Pain is controlled with pain medication.  Cardiac:  Patient has been stable with good cardiac indices on low-dose epi.  Patient was in atrial fibrillation earlier now in sinus on a amiodarone drip.  Will discontinue intra-aortic balloon pump.  Wean the epi to off.  Respiratory:  The patient is extubated and good sats on nasal cannula.  Continue pulmonary toilet.  GI:  Advance p.o. intake as tolerated.  Renal:  The patient has good urine output creatinine is 0.8.  Lasix for diuresis.  Heme:  Hematocrit is 21.  Patient transfuse 1 unit packed red blood cells.  Endocrine:  Glucose is controlled with insulin.  Activities:  Patient is currently on bedrest for intra-aortic balloon pump.  Will get out of bed and ambulate once intra-aortic balloon pump is removed.  Lines tubes and drains:  Intra-aortic balloon pump, [place] and [place], RAZIA drains in saphenectomy site, and chest tubes will be discontinued.

## 2018-11-16 NOTE — HOSPITAL COURSE
11/16/18- Patient is POD 1 s/p CABG x5. Has been extubated. Remains on IABP 1:1. CT x 3 inplace. RAZIA x2 remains in place. Had A-fib with RVR overnight and started on Amio gtt. Converted to NSR and is A-pacing. Given 1 unit of blood . H/H 7.5/21.9 down from 9.0/ 26.7.  Has about 15-30cc/hr of urine output     11/17/18- POD 2 s/p CABG x5. Chest tubes, RAZAI drains and IABP have all been DC'd. Pacer wires remain in place. A-pacing. Having PAF. CXR shows bilateral effusion. Has some SOB this morning and is receiving IV Lasix. . Given IV lopressor this morning for rate control. K+ being replaced     11/18/18- POD 3 s/p CABG x5. Patient in A-fib this morning. Has been switched to PO amio. SOB improved today since diuresing. Labs and vitals stable.     11/19/18-Patient seen and examined in room, lying in bed. POD 4 s/p CABG x 5v. Denies chest pain or anginal equivalents today. Labs reviewed, stable.     11/20/18-Patient seen and examined in room, sitting in bedside chair. POD 5 s/p CABG x 5 v. Denies chest pain or anginal equivalents today. Pacer wires removed this AM. Labs reviewed, stable.

## 2018-11-16 NOTE — ASSESSMENT & PLAN NOTE
-Patient is POD 1 s/p CABG x5   -Continue Amio gtt and plan to switch to PO tomorrow  -CT and RAZIA out when ok with CT surgery   -IABP being weaned. Changed to 1:2 by Dr. Rodríguez  -Continue ASA, Plavix   -No statin due to intolerance in the past. Had severe myalgia with statin and Zetia use   -Encourage IS use and begin ambulation when transferred to [facility]

## 2018-11-16 NOTE — ASSESSMENT & PLAN NOTE
S/P CAB X 5: LIMA to LAD and reverse saphenous vein graft to the distal RCA sequence reverse saphenous vein graft to OM2 and OM3 and reverse saphenous vein graft to diagonal    ASA, METOPROLOL, CLOPIDOGREL.

## 2018-11-16 NOTE — SUBJECTIVE & OBJECTIVE
Interval History: POD # 1. Seen and examined at bedside. Hospital chart reviewed. No acute interval detrimental events noted. She reports that she  has moderately improved. Exubated. Leg drains discontinued. IABP discontinued. Chest drains to be discontinues later today.      Review of Systems   Unable to perform ROS: Intubated   Constitutional: Negative for chills and fever.   HENT: Negative for ear pain and hearing loss.    Eyes: Negative for pain and discharge.   Respiratory: Negative for cough and shortness of breath.    Cardiovascular: Negative for chest pain.   Gastrointestinal: Negative for abdominal pain and constipation.   Endocrine: Negative for cold intolerance and heat intolerance.   Genitourinary: Negative for decreased urine volume and flank pain.   Musculoskeletal: Positive for back pain. Negative for neck pain.   Neurological: Negative for seizures and headaches.   Hematological: Does not bruise/bleed easily.   Psychiatric/Behavioral: Negative for agitation and confusion.     Scheduled Meds:   albuterol-ipratropium  3 mL Nebulization Q4H    aspirin  81 mg Oral Daily    chlorhexidine  10 mL Mouth/Throat BID    clopidogrel  75 mg Oral Daily    docusate sodium  100 mg Oral BID    furosemide  40 mg Intravenous BID    metoprolol tartrate  25 mg Oral BID    nozaseptin   Each Nare BID    pantoprazole  40 mg Intravenous Daily    polyethylene glycol  17 g Oral Daily    potassium chloride  20 mEq Oral Q12H     Continuous Infusions:   amiodarone in dextrose 5% 1 mg/min (11/16/18 0700)    amiodarone in dextrose 5%      dexmedetomidine (PRECEDEX) infusion 0.3 mcg/kg/hr (11/16/18 0700)    dextrose 5% lactated ringers 25 mL/hr at 11/16/18 0800    epinephrine infusion 0.04 mcg/kg/min (11/16/18 0700)    insulin (HUMAN R) infusion (adults) 5.1 Units/hr (11/16/18 0700)    niCARdipine Stopped (11/15/18 3105)    vasopressin (PITRESSIN) infusion Stopped (11/16/18 2520)     PRN Meds:.sodium chloride,  albumin human 5%, albuterol **AND** Inhalation Treatment Q6H PRN, calcium chloride, dextrose 50%, dextrose 50%, fentaNYL, fentaNYL, HYDROcodone-acetaminophen, insulin (HUMAN R) infusion (adults), lactated ringers, magnesium sulfate IVPB, metoclopramide HCl, nozaseptin, ondansetron, oxyCODONE, oxyCODONE, potassium chloride in water **AND** potassium chloride in water **AND** potassium chloride in water    Objective:     Vital Signs (Most Recent):  Temp: 98.4 °F (36.9 °C) (11/16/18 0700)  Pulse: 91 (11/16/18 0800)  Resp: (!) 27 (11/16/18 0800)  BP: 125/86 (11/16/18 0800)  SpO2: 95 % (11/16/18 0800) Vital Signs (24h Range):  Temp:  [98.4 °F (36.9 °C)-100.8 °F (38.2 °C)] 98.4 °F (36.9 °C)  Pulse:  [] 91  Resp:  [12-36] 27  SpO2:  [88 %-100 %] 95 %  BP: ()/() 125/86  Arterial Line BP: ()/(38-61) 109/51     Weight: 74.4 kg (164 lb 0.4 oz)  Body mass index is 29.06 kg/m².      Intake/Output Summary (Last 24 hours) at 11/16/2018 0945  Last data filed at 11/16/2018 0600  Gross per 24 hour   Intake 7156.79 ml   Output 2091 ml   Net 5065.79 ml       Physical Exam   Constitutional: She is sedated and intubated.   HENT:   Head: Normocephalic and atraumatic.   Eyes: Conjunctivae are normal. Pupils are equal, round, and reactive to light.   Neck: Normal range of motion. Neck supple.   Cardiovascular: Normal rate, regular rhythm, intact distal pulses and normal pulses.   Chest tubes and Mediastinal tubes in place.  IABP in place   Pulmonary/Chest: Effort normal and breath sounds normal. She is intubated.   Abdominal: Soft. Bowel sounds are normal.   Musculoskeletal: Normal range of motion.   Neurological: GCS eye subscore is 1. GCS verbal subscore is 1. GCS motor subscore is 1.   Skin: Skin is warm and dry. Capillary refill takes less than 2 seconds.   Vitals reviewed.      Vents:  Vent Mode: Spont (11/15/18 2120)  Ventilator Initiated: Yes (11/15/18 1555)  Set Rate: 0 bmp (11/15/18 2120)  Vt Set: 440 mL  (11/15/18 2120)  Pressure Support: 5 cmH20 (11/15/18 2120)  PEEP/CPAP: 5 cmH20 (11/15/18 2120)  Oxygen Concentration (%): 40 (11/16/18 0723)  Peak Airway Pressure: 11 cmH2O (11/15/18 2120)  Plateau Pressure: 0 cmH20 (11/15/18 2120)  Total Ve: 10.1 mL (11/15/18 2120)  Negative Inspiratory Force (cm H2O): -25 (11/15/18 2126)  F/VT Ratio<105 (RSBI): (!) 19.96 (11/15/18 1657)    Lines/Drains/Airways     Central Venous Catheter Line                 Introducer 11/15/18 0813 1 day         Pulmonary Artery Catheter Assessment  11/15/18 0846 right internal jugular 1 day          Drain                 Urethral Catheter 11/15/18 0840 Latex;Straight-tip 16 Fr. 1 day         Closed/Suction Drain 11/15/18 1134 Left Leg Bulb 19 Fr. less than 1 day         Closed/Suction Drain 11/15/18 1136 Left Leg Bulb 19 Fr. less than 1 day         Y Chest Tube 1 and 2 11/15/18 1137 1 Left Mediastinal 24 Fr. 2 Right Mediastinal 24 Fr. less than 1 day         Y Chest Tube 3 and 4 11/15/18 1139 3 Left Pleural 24 Fr. 24 Fr. less than 1 day          Arterial Line                 Arterial Line 11/15/18 0838 Left Radial 1 day          Line                 IABP 11/14/18 1300 7.5 Fr. 30 mL 1 day         Pacer Wires 11/15/18 1600 less than 1 day          Pressure Ulcer                 Negative Pressure Wound Therapy  less than 1 day          Peripheral Intravenous Line                 Peripheral IV - Single Lumen 11/13/18 1635 Left Antecubital 2 days         Peripheral IV - Single Lumen 11/14/18 0920 Left;Posterior Hand 2 days                Significant Labs:    CBC/Anemia Profile:  Recent Labs   Lab 11/15/18  1237 11/15/18  1523 11/15/18  1621 11/16/18  0350   WBC 16.94* 19.26*  --  14.33*   HGB 9.0* 9.0*  --  7.5*   HCT 25.7* 26.7* 22* 21.9*   * 143*  --  150   MCV 83 83  --  84   RDW 14.1 14.1  --  15.1*        Chemistries:  Recent Labs   Lab 11/14/18  1621  11/15/18  1523 11/15/18  1546 11/15/18  1952 11/16/18  0350      < > 141  --  142  143   K 4.2   < > 5.0  --  4.4 3.9      < > 111*  --  111* 111*   CO2 20*   < > 22*  --  22* 26   BUN 15   < > 11  --  13 17   CREATININE 0.8   < > 0.7  --  0.8 0.8   CALCIUM 8.7   < > 8.0*  --  8.6* 8.3*   ALBUMIN 3.4*  --   --   --   --   --    PROT 6.3  --   --   --   --   --    BILITOT 0.3  --   --   --   --   --    ALKPHOS 74  --   --   --   --   --    ALT 15  --   --   --   --   --    AST 21  --   --   --   --   --    MG  --   --   --  4.3* 3.9* 3.5*    < > = values in this interval not displayed.       ABGs:   Recent Labs   Lab 11/15/18  2116   PH 7.358   PCO2 40.0   HCO3 22.5*   POCSATURATED 95   BE -3     Coagulation:   Recent Labs   Lab 11/16/18  0350   INR 1.1   APTT 29.1     Troponin:   Urine Studies:   Recent Labs   Lab 11/14/18  1748   COLORU Yellow   APPEARANCEUA Clear   PHUR 5.0   SPECGRAV 1.020   PROTEINUA Negative   GLUCUA Negative   KETONESU Negative   BILIRUBINUA Negative   OCCULTUA Trace*   NITRITE Negative   UROBILINOGEN Negative   LEUKOCYTESUR Negative     All pertinent labs within the past 24 hours have been reviewed.    Significant Imaging:      X-Ray Chest AP Portable: 11/16/18:    Endotracheal and nasogastric tubes have been removed.  Remaining tubes are stable.  Decreased lung volumes are noted.  Small left pleural effusion left lower lobe atelectasis or airspace disease.  Mild pulmonary vascular congestion is suspected.  Bones demonstrate degenerative changes.  Moderate gastric distention noted.  In comparison to the prior study, there is no adverse interval changes

## 2018-11-16 NOTE — PLAN OF CARE
Problem: Patient Care Overview  Goal: Plan of Care Review  Outcome: Ongoing (interventions implemented as appropriate)  Recommendations    Recommendation/Intervention:   1. When medically able, ADAT to Cardiac diet.   2. Will reattempt diet education on Monday. Written materials attached to EMR for print at discharge.   3. Will continue to monitor    Goals: Meet >85% EEN/EPN this admit   Nutrition Goal Status: new  Communication of RD Recs: (POC review, sticky note)

## 2018-11-16 NOTE — ASSESSMENT & PLAN NOTE
EUGLYCEMIC.  INSULIN GTT. Monitor for glucose control and prevention of insulin toxicity. Blood glucose target 100 - 180 mg/dl

## 2018-11-16 NOTE — PROGRESS NOTES
[facility] -   Cardiothoracic Surgery  Progress Note    Patient Name: Eve Long  MRN: [id number]  Admission Date: 11/13/2018  Hospital Length of Stay: 3 days  Code Status: Full Code   Attending Physician: Eloise Pacheco MD   Referring Provider: Self, Aaareferral  Principal Problem:NSTEMI (non-ST elevated myocardial infarction)            Subjective:     Post-Op Info:  Procedure(s) (LRB):  CORONARY ARTERY BYPASS GRAFT (CABG) (N/A)  SURGICAL PROCUREMENT, VEIN, ENDOSCOPIC (Left)  ECHOCARDIOGRAM,TRANSESOPHAGEAL (N/A)   1 Day Post-Op     Interval History:  The patient is postop day 1.  Status post coronary artery bypass grafting x5.  Patient has no complaints.    ROS  Medications:  Continuous Infusions:   amiodarone in dextrose 5% 1 mg/min (11/16/18 0700)    amiodarone in dextrose 5%      dexmedetomidine (PRECEDEX) infusion 0.3 mcg/kg/hr (11/16/18 0700)    dextrose 5% lactated ringers 25 mL/hr at 11/16/18 0800    epinephrine infusion 0.04 mcg/kg/min (11/16/18 0700)    insulin (HUMAN R) infusion (adults) 5.1 Units/hr (11/16/18 0700)    niCARdipine Stopped (11/15/18 1545)    vasopressin (PITRESSIN) infusion Stopped (11/16/18 0440)     Scheduled Meds:   albuterol-ipratropium  3 mL Nebulization Q4H    aspirin  81 mg Oral Daily    chlorhexidine  10 mL Mouth/Throat BID    clopidogrel  75 mg Oral Daily    docusate sodium  100 mg Oral BID    furosemide  40 mg Intravenous BID    metoprolol tartrate  25 mg Oral BID    nozaseptin   Each Nare BID    pantoprazole  40 mg Intravenous Daily    polyethylene glycol  17 g Oral Daily    potassium chloride  20 mEq Oral Q12H     PRN Meds:sodium chloride, albumin human 5%, albuterol **AND** Inhalation Treatment Q6H PRN, calcium chloride, dextrose 50%, dextrose 50%, fentaNYL, fentaNYL, HYDROcodone-acetaminophen, insulin (HUMAN R) infusion (adults), lactated ringers, magnesium sulfate IVPB, metoclopramide HCl, nozaseptin, ondansetron, oxyCODONE,  oxyCODONE, potassium chloride in water **AND** potassium chloride in water **AND** potassium chloride in water     Objective:     Vital Signs (Most Recent):  Temp: 98.4 °F (36.9 °C) (11/16/18 0700)  Pulse: 91 (11/16/18 0800)  Resp: (!) 27 (11/16/18 0800)  BP: 125/86 (11/16/18 0800)  SpO2: 95 % (11/16/18 0800) Vital Signs (24h Range):  Temp:  [98.4 °F (36.9 °C)-100.8 °F (38.2 °C)] 98.4 °F (36.9 °C)  Pulse:  [] 91  Resp:  [12-36] 27  SpO2:  [88 %-100 %] 95 %  BP: ()/() 125/86  Arterial Line BP: ()/(38-61) 109/51     Weight: 74.4 kg (164 lb 0.4 oz)  Body mass index is 29.06 kg/m².    SpO2: 95 %  O2 Device (Oxygen Therapy): nasal cannula    Intake/Output - Last 3 Shifts       11/14 0700 - 11/15 0659 11/15 0700 - 11/16 0659 11/16 0700 - 11/17 0659    P.O.  1000     I.V. (mL/kg) 1487.7 (18.4) 4575.3 (61.5)     Blood  1450     IV Piggyback  300     Total Intake(mL/kg) 1487.7 (18.4) 7325.3 (98.5)     Urine (mL/kg/hr) 1310 (0.7) 1660 (0.9)     Drains  68     Other  0     Stool  0     Chest Tube  473     Total Output 1310 2201     Net +177.7 +5124.3            Stool Occurrence  0 x           Lines/Drains/Airways     Central Venous Catheter Line                 Introducer 11/15/18 0813 1 day         Pulmonary Artery Catheter Assessment  11/15/18 0846 right internal jugular 1 day          Drain                 Urethral Catheter 11/15/18 0840 Latex;Straight-tip 16 Fr. 1 day         Closed/Suction Drain 11/15/18 1134 Left Leg Bulb 19 Fr. less than 1 day         Closed/Suction Drain 11/15/18 1136 Left Leg Bulb 19 Fr. less than 1 day         Y Chest Tube 1 and 2 11/15/18 1137 1 Left Mediastinal 24 Fr. 2 Right Mediastinal 24 Fr. less than 1 day         Y Chest Tube 3 and 4 11/15/18 1139 3 Left Pleural 24 Fr. 24 Fr. less than 1 day          Arterial Line                 Arterial Line 11/15/18 0838 Left Radial 1 day          Line                 IABP 11/14/18 1300 7.5 Fr. 30 mL 1 day         Pacer Wires  11/15/18 1600 less than 1 day          Pressure Ulcer                 Negative Pressure Wound Therapy  less than 1 day          Peripheral Intravenous Line                 Peripheral IV - Single Lumen 11/13/18 1635 Left Antecubital 2 days         Peripheral IV - Single Lumen 11/14/18 0920 Left;Posterior Hand 1 day                Physical Exam   Constitutional: She is oriented to person, place, and time. She appears well-developed and well-nourished. No distress.   HENT:   Head: Normocephalic and atraumatic.   Cardiovascular: Normal rate and normal heart sounds.   Pulmonary/Chest: She has rales.   Abdominal: Soft. Bowel sounds are normal.   Musculoskeletal: She exhibits no edema.   Neurological: She is alert and oriented to person, place, and time.   Skin: Skin is warm and dry. She is not diaphoretic.       Significant Labs:  All pertinent labs from the last 24 hours have been reviewed.    Significant Diagnostics:  I have reviewed all pertinent imaging results/findings within the past 24 hours.    Assessment/Plan:     S/P CABG x 5    11/16/2018  The patient is postop day 1.  Status post coronary artery bypass grafting x5.  Overall the patient is is doing very well.  Neuro:  The patient is awake alert and oriented x3.  Nonfocal neuro exam.  Pain is controlled with pain medication.  Cardiac:  Patient has been stable with good cardiac indices on low-dose epi.  Patient was in atrial fibrillation earlier now in sinus on a amiodarone drip.  Will discontinue intra-aortic balloon pump.  Wean the epi to off.  Respiratory:  The patient is extubated and good sats on nasal cannula.  Continue pulmonary toilet.  GI:  Advance p.o. intake as tolerated.  Renal:  The patient has good urine output creatinine is 0.8.  Lasix for diuresis.  Heme:  Hematocrit is 21.  Patient transfuse 1 unit packed red blood cells.  Endocrine:  Glucose is controlled with insulin.  Activities:  Patient is currently on bedrest for intra-aortic balloon pump.   Will get out of bed and ambulate once intra-aortic balloon pump is removed.  Lines tubes and drains:  Intra-aortic balloon pump, [place] and [place], RAZIA drains in saphenectomy site, and chest tubes will be discontinued.         Ricardo Dorsey MD  Cardiothoracic Surgery  [facility]

## 2018-11-16 NOTE — ASSESSMENT & PLAN NOTE
Monitor BUN / Cr. Montor urine output. Strict input and output monitoring. Monitor and replete electrolytes per protocol.     Intake/Output Summary (Last 24 hours) at 11/16/2018 0954  Last data filed at 11/16/2018 0600  Gross per 24 hour   Intake 7156.79 ml   Output 2091 ml   Net 5065.79 ml

## 2018-11-16 NOTE — ASSESSMENT & PLAN NOTE
Doing well post op on vent and IABP 1:1  Mildly sedated  Continue post op orders per CVT- may need Epi gtt to augment CI    See above

## 2018-11-16 NOTE — CONSULTS
[facility] -   Adult Nutrition  Consult Note    SUMMARY     Recommendations    Recommendation/Intervention:   1. When medically able, ADAT to Cardiac diet.   2. Will reattempt diet education on Monday. Written materials attached to EMR for print at discharge.   3. Will continue to monitor    Goals: Meet >85% EEN/EPN this admit   Nutrition Goal Status: new  Communication of BETTYE Recs: (POC review, sticky note)    Reason for Assessment    Reason for Assessment: consult  Dx:  1. NSTEMI (non-ST elevated myocardial infarction)    2. Chest pain    3. CAD (coronary artery disease)    4. Pre-operative cardiovascular examination    5. COPD, moderate    6. Coronary artery disease involving native coronary artery of native heart with unstable angina pectoris    7. Essential hypertension    8. Sarcoidosis of lung    9. Post-operative state    10. Acquired hypothyroidism    11. On mechanically assisted ventilation    12. S/P CABG x 5      Past Medical History:   Diagnosis Date    Anemia     Asthma     Basal cell carcinoma     COPD (chronic obstructive pulmonary disease)     Coronary artery disease involving native coronary artery of native heart with unstable angina pectoris 11/14/2018    Hyperlipidemia     Hypertension     Immune deficiency disorder     NSTEMI (non-ST elevated myocardial infarction) 11/13/2018    Pneumonia     Sarcoidosis     TIA (transient ischemic attack)        General Information Comments: RD consulted for post-CABG diet education. RD attempted education x2, both attempts pt was w/ other providers. Will reattempt education, obtain diet hx, complete NFPE if necessary on Monday. Attached written materials to EMR for print at discharge. Pt is POD#1 s/p CABGx5. Chest tube remains in place. On clear liquid diet  Nutrition Discharge Planning: Cardiac diet    Nutrition Risk Screen    Nutrition Risk Screen: no indicators present    Nutrition/Diet History    Do you have any cultural,  "spiritual, Sabianist conflicts, given your current situation?: no  Food Allergies: NKFA    Anthropometrics    Temp: 98.9 °F (37.2 °C)  Height Method: Stated  Height: 5' 3"  Height (inches): 63 in  Weight Method: Bed Scale  Weight: 74.4 kg (164 lb 0.4 oz)  Weight (lb): 164.02 lb  Ideal Body Weight (IBW), Female: 115 lb  % Ideal Body Weight, Female (lb): 142.63 lb  BMI (Calculated): 29.1  BMI Grade: 25 - 29.9 - overweight       Lab/Procedures/Meds    Pertinent Labs Reviewed: reviewed  BMP  Lab Results   Component Value Date     11/16/2018    K 3.9 11/16/2018     (H) 11/16/2018    CO2 26 11/16/2018    BUN 17 11/16/2018    CREATININE 0.8 11/16/2018    CALCIUM 8.3 (L) 11/16/2018    ANIONGAP 6 (L) 11/16/2018    ESTGFRAFRICA >60 11/16/2018    EGFRNONAA >60 11/16/2018     Lab Results   Component Value Date    ALBUMIN 3.4 (L) 11/14/2018     Lab Results   Component Value Date    CHOL 202 (H) 10/09/2018    CHOL 190 08/14/2018    CHOL 218 (H) 04/19/2017     Pertinent Medications Reviewed: reviewed  Scheduled Meds:   albuterol-ipratropium  3 mL Nebulization Q4H    aspirin  81 mg Oral Daily    chlorhexidine  10 mL Mouth/Throat BID    clopidogrel  75 mg Oral Daily    docusate sodium  100 mg Oral BID    furosemide  40 mg Intravenous BID    metoprolol tartrate  25 mg Oral BID    nozaseptin   Each Nare BID    [START ON 11/17/2018] pantoprazole  40 mg Oral Daily    polyethylene glycol  17 g Oral Daily    potassium chloride  20 mEq Oral Q12H     Continuous Infusions:   amiodarone in dextrose 5% 0.5 mg/min (11/16/18 1011)    dexmedetomidine (PRECEDEX) infusion 0.3 mcg/kg/hr (11/16/18 1000)    dextrose 5% lactated ringers 25 mL/hr at 11/16/18 1000    epinephrine infusion 0.03 mcg/kg/min (11/16/18 1000)    insulin (HUMAN R) infusion (adults) 3.1 Units/hr (11/16/18 1000)    niCARdipine Stopped (11/15/18 1545)    vasopressin (PITRESSIN) infusion Stopped (11/16/18 7791)     PRN Meds:.sodium chloride, albumin " human 5%, albuterol **AND** Inhalation Treatment Q6H PRN, calcium chloride, dextrose 50%, dextrose 50%, fentaNYL, fentaNYL, HYDROcodone-acetaminophen, insulin (HUMAN R) infusion (adults), lactated ringers, magnesium sulfate IVPB, metoclopramide HCl, ondansetron, oxyCODONE, oxyCODONE, potassium chloride in water **AND** potassium chloride in water **AND** potassium chloride in water    Physical Findings/Assessment    Overall Physical Appearance: nourished, overweight  Oral/Mouth Cavity: WDL  Skin: (incisions to L leg, chest; Scott=14)    Estimated/Assessed Needs    Weight Used For Calorie Calculations: 74.4 kg (164 lb 0.4 oz)  Energy Calorie Requirements (kcal): 1607  Energy Need Method: Imperial-St Jeor(x1.3)  Protein Requirements:   Weight Used For Protein Calculations: 74.4 kg (164 lb 0.4 oz)(x1.2-1.5)     Fluid Need Method: RDA Method(or per MD)  RDA Method (mL): 1607  CHO Requirement: n/a      Nutrition Prescription Ordered    Current Diet Order: Clear liquids    Evaluation of Received Nutrient/Fluid Intake     % Intake of Estimated Energy Needs: 25 - 50 %  % Meal Intake: 25 - 50 %    Nutrition Risk    Level of Risk/Frequency of Follow-up: (f/u x2 weekly)     Assessment and Plan    Nutrition Problem  Inadequate energy intake    Related to (etiology):   Inability to consume adequate energy    Signs and Symptoms (as evidenced by):   Current diet order (Clear liquids) only meeting 25-50% EEN    Interventions/Recommendations (treatment strategy):  See above    Nutrition Diagnosis Status:   New        Monitor and Evaluation    Food and Nutrient Intake: energy intake, food and beverage intake  Food and Nutrient Adminstration: diet order  Anthropometric Measurements: weight  Biochemical Data, Medical Tests and Procedures: electrolyte and renal panel, lipid profile, gastrointestinal profile  Nutrition-Focused Physical Findings: overall appearance     Nutrition Follow-Up    RD Follow-up?: Yes

## 2018-11-16 NOTE — ASSESSMENT & PLAN NOTE
-Trop 0.241, 0.297, 0.288  -EKG with anterior lead changes   -Chest pain all night improved with nitro paste. Last episode of pain this morning around 9am  -Has been started on Heparin gtt  -Continue ASA, BB.   -No statin due to history of intolerance(myalgia). Had side effects to Zetia when tried   -Recommendation has been made for patient to undergo LHC today   -Dr. Rodríguez explained the risks, benefits and alternatives of the procedure in detail. The patient voices understanding and all questions have been answered. Patient agrees to proceed as planned.   -Keep NPO   -NS at 100/hr   -Further recommendations to follow cath    11/16/18  -patient is POD 1 s/p CABG x5.   -See plan for CABG

## 2018-11-16 NOTE — SUBJECTIVE & OBJECTIVE
Interval History:  The patient is postop day 1.  Status post coronary artery bypass grafting x5.  Patient has no complaints.    ROS  Medications:  Continuous Infusions:   amiodarone in dextrose 5% 1 mg/min (11/16/18 0700)    amiodarone in dextrose 5%      dexmedetomidine (PRECEDEX) infusion 0.3 mcg/kg/hr (11/16/18 0700)    dextrose 5% lactated ringers 25 mL/hr at 11/16/18 0800    epinephrine infusion 0.04 mcg/kg/min (11/16/18 0700)    insulin (HUMAN R) infusion (adults) 5.1 Units/hr (11/16/18 0700)    niCARdipine Stopped (11/15/18 1545)    vasopressin (PITRESSIN) infusion Stopped (11/16/18 0440)     Scheduled Meds:   albuterol-ipratropium  3 mL Nebulization Q4H    aspirin  81 mg Oral Daily    chlorhexidine  10 mL Mouth/Throat BID    clopidogrel  75 mg Oral Daily    docusate sodium  100 mg Oral BID    furosemide  40 mg Intravenous BID    metoprolol tartrate  25 mg Oral BID    nozaseptin   Each Nare BID    pantoprazole  40 mg Intravenous Daily    polyethylene glycol  17 g Oral Daily    potassium chloride  20 mEq Oral Q12H     PRN Meds:sodium chloride, albumin human 5%, albuterol **AND** Inhalation Treatment Q6H PRN, calcium chloride, dextrose 50%, dextrose 50%, fentaNYL, fentaNYL, HYDROcodone-acetaminophen, insulin (HUMAN R) infusion (adults), lactated ringers, magnesium sulfate IVPB, metoclopramide HCl, nozaseptin, ondansetron, oxyCODONE, oxyCODONE, potassium chloride in water **AND** potassium chloride in water **AND** potassium chloride in water     Objective:     Vital Signs (Most Recent):  Temp: 98.4 °F (36.9 °C) (11/16/18 0700)  Pulse: 91 (11/16/18 0800)  Resp: (!) 27 (11/16/18 0800)  BP: 125/86 (11/16/18 0800)  SpO2: 95 % (11/16/18 0800) Vital Signs (24h Range):  Temp:  [98.4 °F (36.9 °C)-100.8 °F (38.2 °C)] 98.4 °F (36.9 °C)  Pulse:  [] 91  Resp:  [12-36] 27  SpO2:  [88 %-100 %] 95 %  BP: ()/() 125/86  Arterial Line BP: ()/(38-61) 109/51     Weight: 74.4 kg (164 lb  0.4 oz)  Body mass index is 29.06 kg/m².    SpO2: 95 %  O2 Device (Oxygen Therapy): nasal cannula    Intake/Output - Last 3 Shifts       11/14 0700 - 11/15 0659 11/15 0700 - 11/16 0659 11/16 0700 - 11/17 0659    P.O.  1000     I.V. (mL/kg) 1487.7 (18.4) 4575.3 (61.5)     Blood  1450     IV Piggyback  300     Total Intake(mL/kg) 1487.7 (18.4) 7325.3 (98.5)     Urine (mL/kg/hr) 1310 (0.7) 1660 (0.9)     Drains  68     Other  0     Stool  0     Chest Tube  473     Total Output 1310 2201     Net +177.7 +5124.3            Stool Occurrence  0 x           Lines/Drains/Airways     Central Venous Catheter Line                 Introducer 11/15/18 0813 1 day         Pulmonary Artery Catheter Assessment  11/15/18 0846 right internal jugular 1 day          Drain                 Urethral Catheter 11/15/18 0840 Latex;Straight-tip 16 Fr. 1 day         Closed/Suction Drain 11/15/18 1134 Left Leg Bulb 19 Fr. less than 1 day         Closed/Suction Drain 11/15/18 1136 Left Leg Bulb 19 Fr. less than 1 day         Y Chest Tube 1 and 2 11/15/18 1137 1 Left Mediastinal 24 Fr. 2 Right Mediastinal 24 Fr. less than 1 day         Y Chest Tube 3 and 4 11/15/18 1139 3 Left Pleural 24 Fr. 24 Fr. less than 1 day          Arterial Line                 Arterial Line 11/15/18 0838 Left Radial 1 day          Line                 IABP 11/14/18 1300 7.5 Fr. 30 mL 1 day         Pacer Wires 11/15/18 1600 less than 1 day          Pressure Ulcer                 Negative Pressure Wound Therapy  less than 1 day          Peripheral Intravenous Line                 Peripheral IV - Single Lumen 11/13/18 1635 Left Antecubital 2 days         Peripheral IV - Single Lumen 11/14/18 0920 Left;Posterior Hand 1 day                Physical Exam   Constitutional: She is oriented to person, place, and time. She appears well-developed and well-nourished. No distress.   HENT:   Head: Normocephalic and atraumatic.   Cardiovascular: Normal rate and normal heart sounds.    Pulmonary/Chest: She has rales.   Abdominal: Soft. Bowel sounds are normal.   Musculoskeletal: She exhibits no edema.   Neurological: She is alert and oriented to person, place, and time.   Skin: Skin is warm and dry. She is not diaphoretic.       Significant Labs:  All pertinent labs from the last 24 hours have been reviewed.    Significant Diagnostics:  I have reviewed all pertinent imaging results/findings within the past 24 hours.

## 2018-11-16 NOTE — ASSESSMENT & PLAN NOTE
Coronary artery bypass grafting x5 with LIMA to LAD and reverse saphenous vein graft to the distal RCA sequence reverse saphenous vein graft to OM2 and OM3 and reverse saphenous vein graft to diagonal    POD # 1:     Paced: Mode AAI. Normal pacemaker function and stable pacing and sensing thresholds  Chest drains: Monitor output. STERNAL INCISION: staples in place; wound clean, dry, and intact, STABLE HEMODYNAMICS.    CARDIAC STERNAL PRECAUTIONS.     Monitor hemodynamics and  monitor for dysrhythmias MAP goal of  65 mmHg.    CARDIOACTIVE MEDS:   EPINEPHRINE    DIURETICS:  FUROSEMIDE

## 2018-11-16 NOTE — ASSESSMENT & PLAN NOTE
Monitor BUN / Cr. Montor urine output. Strict input and output monitoring. Monitor and replete electrolytes per protocol.

## 2018-11-16 NOTE — PROGRESS NOTES
[facility] -   Critical Care Medicine  Progress Note    Patient Name: Eve Long  MRN: [id number]  Admission Date: 11/13/2018  Hospital Length of Stay: 2 days  Code Status: Full Code  Attending Provider: Eloise Pacheco MD  Primary Care Provider: Chaya Morelos MD   Principal Problem: NSTEMI (non-ST elevated myocardial infarction)    Subjective:     HPI:  68 year old female with PMH including moderate COPD, sarcoid lung disease, HTN, anemia, HLD, and TIA  Presented to ED on 11/13 with complaint of cp/pressure x 3d with radiation to lt arm and back with associated diaphoresis and jaw pain  Evaluation revealed troponin 0.241  She was admitted with monitoring and heparin infusion for NSTEMI    Troponin jac to 0.297 and she was taken on 11/14 for LHC which revealed subtotal diffuse LAD disease along with multivessel non occlusive disease; IABP was placed for coronary support     Hospital/ICU Course:  11/14/18: Admitted to ICU post LHC with IABP support, heparin infusion, nitro infusion, and nasal cannula oxygen; awake, alert, oriented and able to verbalize plan for CABG tomorrow after consult with CTS    11/15/18: Seen and examined at bedside. Hospital chart reviewed. No acute interval detrimental events noted. To OR today for CAB    Interval History: Seen and examined at bedside. Hospital chart reviewed. No acute interval detrimental events noted. She reports that she  has moderately improved. To OR today for CAB. Tolerated procedure well. Returned to the ICU intubated.     Review of Systems   Unable to perform ROS: Intubated     Scheduled Meds:   albuterol-ipratropium  3 mL Nebulization Q4H    [START ON 11/16/2018] aspirin  81 mg Oral Daily    ceFAZolin (ANCEF) IVPB  2 g Intravenous Q8H    chlorhexidine  10 mL Mouth/Throat BID    [START ON 11/16/2018] clopidogrel  75 mg Oral Daily    [START ON 11/16/2018] docusate sodium  100 mg Oral BID    [START ON 11/16/2018] furosemide  40  mg Intravenous BID    [START ON 11/16/2018] metoprolol tartrate  25 mg Oral BID    nozaseptin   Each Nare BID    [START ON 11/16/2018] pantoprazole  40 mg Intravenous Daily    [START ON 11/16/2018] polyethylene glycol  17 g Oral Daily    [START ON 11/16/2018] potassium chloride  20 mEq Oral Q12H     Continuous Infusions:   dexmedetomidine (PRECEDEX) infusion 1 mcg/kg/hr (11/15/18 1630)    dextrose 5% lactated ringers 25 mL/hr at 11/15/18 1900    epinephrine infusion 0.02 mcg/kg/min (11/15/18 1905)    insulin (HUMAN R) infusion (adults) 1.8 Units/hr (11/15/18 1845)    niCARdipine Stopped (11/15/18 1545)    vasopressin (PITRESSIN) infusion       PRN Meds:.albumin human 5%, albuterol **AND** Inhalation Treatment Q6H PRN, calcium chloride, dextrose 50%, dextrose 50%, fentaNYL, fentaNYL, HYDROcodone-acetaminophen, insulin (HUMAN R) infusion (adults), lactated ringers, magnesium sulfate IVPB, metoclopramide HCl, nozaseptin, ondansetron, oxyCODONE, oxyCODONE, potassium chloride in water **AND** potassium chloride in water **AND** potassium chloride in water    Objective:     Vital Signs (Most Recent):  Temp: (!) 100.8 °F (38.2 °C) (11/15/18 1905)  Pulse: 91 (11/15/18 1925)  Resp: 20 (11/15/18 1925)  BP: 108/62 (11/15/18 1905)  SpO2: 98 % (11/15/18 1925) Vital Signs (24h Range):  Temp:  [97.8 °F (36.6 °C)-100.8 °F (38.2 °C)] 100.8 °F (38.2 °C)  Pulse:  [56-96] 91  Resp:  [10-22] 20  SpO2:  [94 %-99 %] 98 %  BP: ()/() 108/62  Arterial Line BP: ()/(44-55) 101/54     Weight: 81 kg (178 lb 9.2 oz)  Body mass index is 31.63 kg/m².      Intake/Output Summary (Last 24 hours) at 11/15/2018 1944  Last data filed at 11/15/2018 1845  Gross per 24 hour   Intake 6206.11 ml   Output 1785 ml   Net 4421.11 ml       Physical Exam   Constitutional: She is sedated and intubated.   HENT:   Head: Normocephalic and atraumatic.   Eyes: Conjunctivae are normal. Pupils are equal, round, and reactive to light.   Neck:  Normal range of motion. Neck supple.   Cardiovascular: Normal rate, regular rhythm, intact distal pulses and normal pulses.   Chest tubes and Mediastinal tubes in place.  IABP in place   Pulmonary/Chest: Effort normal and breath sounds normal. She is intubated.   Abdominal: Soft. Bowel sounds are normal.   Musculoskeletal: Normal range of motion.   Neurological: GCS eye subscore is 1. GCS verbal subscore is 1. GCS motor subscore is 1.   Skin: Skin is warm and dry. Capillary refill takes less than 2 seconds.   Vitals reviewed.      Vents:  Vent Mode: A/C (11/15/18 1657)  Ventilator Initiated: Yes (11/15/18 1555)  Set Rate: 20 bmp (11/15/18 1657)  Vt Set: 440 mL (11/15/18 1657)  Pressure Support: 0 cmH20 (11/15/18 1657)  PEEP/CPAP: 5 cmH20 (11/15/18 1657)  Oxygen Concentration (%): 50 (11/15/18 1925)  Peak Airway Pressure: 29 cmH2O (11/15/18 1657)  Plateau Pressure: 0 cmH20 (11/15/18 1657)  Total Ve: 19.1 mL (11/15/18 1657)  F/VT Ratio<105 (RSBI): (!) 19.96 (11/15/18 1657)    Lines/Drains/Airways     Central Venous Catheter Line                 Introducer 11/15/18 0813 less than 1 day         Pulmonary Artery Catheter Assessment  11/15/18 0846 right internal jugular less than 1 day          Drain                 Closed/Suction Drain 11/15/18 1134 Left Leg Bulb 19 Fr. less than 1 day         Closed/Suction Drain 11/15/18 1136 Left Leg Bulb 19 Fr. less than 1 day         Urethral Catheter 11/15/18 0840 Latex;Straight-tip 16 Fr. less than 1 day         Y Chest Tube 1 and 2 11/15/18 1137 1 Left Mediastinal 24 Fr. 2 Right Mediastinal 24 Fr. less than 1 day         Y Chest Tube 3 and 4 11/15/18 1139 3 Left Pleural 24 Fr. 24 Fr. less than 1 day          Airway                 Airway - Non-Surgical 11/15/18 0836 less than 1 day          Arterial Line                 Arterial Line 11/15/18 0838 Left Radial less than 1 day          Line                 IABP 11/14/18 1300 7.5 Fr. 30 mL 1 day         Pacer Wires 11/15/18 1600  less than 1 day          Pressure Ulcer                 Negative Pressure Wound Therapy  less than 1 day          Peripheral Intravenous Line                 Peripheral IV - Single Lumen 11/13/18 1635 Left Antecubital 2 days         Peripheral IV - Single Lumen 11/14/18 0920 Left;Posterior Hand 1 day                Significant Labs:    CBC/Anemia Profile:  Recent Labs   Lab 11/15/18  0415 11/15/18  1230 11/15/18  1237 11/15/18  1523 11/15/18  1621   WBC 9.65  --  16.94* 19.26*  --    HGB 12.3 7.2* 9.0* 9.0*  --    HCT 37.2 21.5* 25.7* 26.7* 22*     --  123* 143*  --    MCV 85  --  83 83  --    RDW 14.0  --  14.1 14.1  --         Chemistries:  Recent Labs   Lab 11/14/18  0507 11/14/18  1621 11/15/18  0415 11/15/18  1523 11/15/18  1546    136 137 141  --    K 4.2 4.2 4.0 5.0  --     107 108 111*  --    CO2 22* 20* 21* 22*  --    BUN 19 15 13 11  --    CREATININE 0.7 0.8 0.7 0.7  --    CALCIUM 9.3 8.7 8.7 8.0*  --    ALBUMIN 3.4* 3.4*  --   --   --    PROT 6.2 6.3  --   --   --    BILITOT 0.3 0.3  --   --   --    ALKPHOS 71 74  --   --   --    ALT 14 15  --   --   --    AST 20 21  --   --   --    MG 2.2  --   --   --  4.3*       ABGs:   Recent Labs   Lab 11/15/18  1621   PH 7.394   PCO2 42.2   HCO3 25.8   POCSATURATED 100   BE 1     Coagulation:   Recent Labs   Lab 11/15/18  1523   INR 1.1   APTT 26.5     Troponin:   Recent Labs   Lab 11/13/18  2350 11/14/18  0507   TROPONINI 0.297* 0.288*     Urine Studies:   Recent Labs   Lab 11/14/18  1748   COLORU Yellow   APPEARANCEUA Clear   PHUR 5.0   SPECGRAV 1.020   PROTEINUA Negative   GLUCUA Negative   KETONESU Negative   BILIRUBINUA Negative   OCCULTUA Trace*   NITRITE Negative   UROBILINOGEN Negative   LEUKOCYTESUR Negative     All pertinent labs within the past 24 hours have been reviewed.    Significant Imaging:      X-Ray Chest AP Portable: 11/15/18:      Interval postoperative changes are present.  Endotracheal tube is in place with the distal tip  located approximately 1.5 cm above the reynaldo.  [place]-Janine catheter is in place with the distal tip overlying the distal right main pulmonary artery.  Mediastinal drains identified.  Left chest tube is detected.  No left pneumothorax.  Mild atelectasis identified at the left lung base.  Otherwise lungs are clear.  Heart size is normal with mediastinum wires in place.      Assessment/Plan:     Neuro      SHORT TERM SEDATION:     []        Midazolam  []        Propofol  [x]        Dexmedetomidine     ANALGESIA:     []        Morphine   [x]        Fentanyl  [x]        Hydrocodone / APAP.   [x]        Oxycodone       RASS  => - 2     Neurochecks per routine.  Daily sedation vacation     Pulmonary    Pulmonary:  Continue ventilator support.      Mode: ASSIST CONTROL    Rate: 20 bpm   Tidal Volume  5-6 cc/kg/IBW   PEEP 5.0 CM/H2O cmH2O/Optimal Peep   FiO2% 100 ks greater than 92%       Ventilator settings reviewed and adjusted to optimize gas exchange.     Proceed with weaning trials on a support/weaning mode on the ventilator when the following goals are met:  Patient Criteria:  - Spontaneous breathing in CPAP  - Able to lift head off pillow  - Hemodynamic stability  - HR: 60 to 110 bpm  - ABP: greater than 90/50  - RR: 10 to 28 bpm  - Secretion management/adequate cough  - Acceptable arterial blood gases    Ventilator Criteria:  - FiO2: less than or equal to 40%  - PEEP less than or equal to 8 to 10 cm H2O    Bronchodilators per protocol.            On mechanically assisted ventilation    Continue ventilator support. Ventilator settings reviewed and adjusted to optimize gas exchange. Daily wake up and breathe trials.  Titrate FIO2 to keep SAO2 > 92%. Bronchodilators per protocol.     COPD, moderate    Supplement oxygenation. Keep SAO2 >= 92%.Bronchodilators per orders     Cardiac/Vascular   * NSTEMI (non-ST elevated myocardial infarction) on Intraaortic Baloon pump    S/P CAB X 5: LIMA to LAD and reverse saphenous vein  graft to the distal RCA sequence reverse saphenous vein graft to OM2 and OM3 and reverse saphenous vein graft to diagonal    ASA, METOPROLOL, CLOPIDOGREL.         S/P CABG x 5    Coronary artery bypass grafting x5 with LIMA to LAD and reverse saphenous vein graft to the distal RCA sequence reverse saphenous vein graft to OM2 and OM3 and reverse saphenous vein graft to diagonal    POD # 0:     Paced: Mode DDD, Rate 90, A mA 20, V mA 25. Normal pacemaker function and stable pacing and sensing thresholds ;Multiple drains: Monitor output. STERNAL INCISION: staples in place; wound clean, dry, and intact, AIBP IN PLACE. STABLE HEMODYNAMICS.    CARDIAC STERNAL PRECAUTIONS.     Monitor hemodynamics and  monitor for dysrhythmias MAP goal of  65 mmHg.    CARDIOACTIVE MEDS:   EPINEPHRINE, NICARDIPINE, VASOPRESSIN    DIURETICS:  FUROSEMIDE           Renal/    Monitor BUN / Cr. Montor urine output. Strict input and output monitoring. Monitor and replete electrolytes per protocol.      ID    Monitor fever curve. panculture for temperatures greater than 101 degrees F. Source control: n/a     Immunology/Multi System    Immunization status reviewed and up to date.     Hematology    Monitor hemogram. Transfuse as needed.     Endocrine    EUGLYCEMIC.  INSULIN GTT. Monitor for glucose control and prevention of insulin toxicity. Blood glucose target 100 - 180 mg/dl         Hypothyroidism    Resume home meds when extubated.      GI    NPO. PANTOPRAZOLE. GI regimen:  PRN anti emetics, anti diarrheals and stool softeners.        Critical Care Daily Checklist:    A: Awake: RASS Goal/Actual Goal: RASS Goal: 0-->alert and calm  Actual: Dorsey Agitation Sedation Scale (RASS): Alert and calm   B: Spontaneous Breathing Trial Performed?  N/A   C: SAT & SBT Coordinated?  N/A                     D: Delirium: CAM-ICU     E: Early Mobility Performed? No   F: Feeding Goal:    Status:     Current Diet Order   Procedures    Diet NPO Except for:  Sips with Medication     Order Specific Question:   Except for     Answer:   Sips with Medication      AS: Analgesia/Sedation FENTANYL, HYDROCODONE/ APAP, OXYCODONE   T: Thromboembolic Prophylaxis SCD   H: HOB > 300 Yes   U: Stress Ulcer Prophylaxis (if needed) PANTOPRAZOLE   G: Glucose Control INSULIN GTT   B: Bowel Function     I: Indwelling Catheter (Lines & Hilton) Necessity YES   D: De-escalation of Antimicrobials/Pharmacotherapies YES    Plan for the day/ETD Continue current    Code Status:  Family/Goals of Care: Full Code  Home with family     Critical Care Time: 90 minutes  Critical secondary to Patient has a condition that poses threat to life and bodily function: NSTEMI S/P CABG X 5      Critical care was time spent personally by me on the following activities: development of treatment plan with patient or surrogate and bedside caregivers, discussions with consultants, evaluation of patient's response to treatment, examination of patient, ordering and performing treatments and interventions, ordering and review of laboratory studies, ordering and review of radiographic studies, pulse oximetry, re-evaluation of patient's condition. This critical care time did not overlap with that of any other provider or involve time for any procedures.     Ankit Keenan MD  Critical Care Medicine  [facility] -

## 2018-11-16 NOTE — PLAN OF CARE
Problem: Patient Care Overview  Goal: Plan of Care Review  Outcome: Ongoing (interventions implemented as appropriate)  Pt CABG x5 with IABP in place. midsternal dressing to continuous suction. R groin site WNL. No hematoma noted. CTx3 to -20 suction with 2 atrium. AV pacing wires attached to PM- DDD setting with V-lead unplugged and tapped to PM. RAZIA drain x2 to L leg with ace wrap covering. +1 pulses BLE. L radial art. RIJ yayo with SG at 46cm. Epi, insulin, precedex and D5LR infusing. Will continue to monitor.

## 2018-11-17 PROBLEM — I25.810 CORONARY ARTERY DISEASE INVOLVING AUTOLOGOUS VEIN CORONARY BYPASS GRAFT WITHOUT ANGINA PECTORIS: Status: ACTIVE | Noted: 2018-11-17

## 2018-11-17 PROBLEM — I21.4 NSTEMI (NON-ST ELEVATED MYOCARDIAL INFARCTION): Status: RESOLVED | Noted: 2018-11-13 | Resolved: 2018-11-17

## 2018-11-17 LAB
ANION GAP SERPL CALC-SCNC: 11 MMOL/L
ANION GAP SERPL CALC-SCNC: 8 MMOL/L
ANION GAP SERPL CALC-SCNC: 9 MMOL/L
BASOPHILS # BLD AUTO: 0 K/UL
BASOPHILS NFR BLD: 0 %
BUN SERPL-MCNC: 26 MG/DL
BUN SERPL-MCNC: 29 MG/DL
BUN SERPL-MCNC: 33 MG/DL
CALCIUM SERPL-MCNC: 9.1 MG/DL
CALCIUM SERPL-MCNC: 9.1 MG/DL
CALCIUM SERPL-MCNC: 9.2 MG/DL
CHLORIDE SERPL-SCNC: 100 MMOL/L
CHLORIDE SERPL-SCNC: 100 MMOL/L
CHLORIDE SERPL-SCNC: 104 MMOL/L
CO2 SERPL-SCNC: 27 MMOL/L
CO2 SERPL-SCNC: 27 MMOL/L
CO2 SERPL-SCNC: 30 MMOL/L
CREAT SERPL-MCNC: 1 MG/DL
CREAT SERPL-MCNC: 1 MG/DL
CREAT SERPL-MCNC: 1.1 MG/DL
DIFFERENTIAL METHOD: ABNORMAL
EOSINOPHIL # BLD AUTO: 0 K/UL
EOSINOPHIL NFR BLD: 0 %
ERYTHROCYTE [DISTWIDTH] IN BLOOD BY AUTOMATED COUNT: 15.1 %
EST. GFR  (AFRICAN AMERICAN): 59 ML/MIN/1.73 M^2
EST. GFR  (AFRICAN AMERICAN): >60 ML/MIN/1.73 M^2
EST. GFR  (AFRICAN AMERICAN): >60 ML/MIN/1.73 M^2
EST. GFR  (NON AFRICAN AMERICAN): 51 ML/MIN/1.73 M^2
EST. GFR  (NON AFRICAN AMERICAN): 58 ML/MIN/1.73 M^2
EST. GFR  (NON AFRICAN AMERICAN): 58 ML/MIN/1.73 M^2
GLUCOSE SERPL-MCNC: 144 MG/DL
GLUCOSE SERPL-MCNC: 152 MG/DL
GLUCOSE SERPL-MCNC: 174 MG/DL
HCT VFR BLD AUTO: 26.6 %
HGB BLD-MCNC: 9 G/DL
LYMPHOCYTES # BLD AUTO: 0.7 K/UL
LYMPHOCYTES NFR BLD: 4.6 %
MAGNESIUM SERPL-MCNC: 2.3 MG/DL
MAGNESIUM SERPL-MCNC: 2.5 MG/DL
MCH RBC QN AUTO: 28.3 PG
MCHC RBC AUTO-ENTMCNC: 33.8 G/DL
MCV RBC AUTO: 84 FL
MONOCYTES # BLD AUTO: 1.4 K/UL
MONOCYTES NFR BLD: 8.8 %
NEUTROPHILS # BLD AUTO: 13.3 K/UL
NEUTROPHILS NFR BLD: 86.6 %
PLATELET # BLD AUTO: 148 K/UL
PMV BLD AUTO: 9.7 FL
POCT GLUCOSE: 137 MG/DL (ref 70–110)
POCT GLUCOSE: 141 MG/DL (ref 70–110)
POCT GLUCOSE: 160 MG/DL (ref 70–110)
POCT GLUCOSE: 180 MG/DL (ref 70–110)
POTASSIUM SERPL-SCNC: 3.6 MMOL/L
POTASSIUM SERPL-SCNC: 3.8 MMOL/L
POTASSIUM SERPL-SCNC: 3.8 MMOL/L
RBC # BLD AUTO: 3.18 M/UL
SODIUM SERPL-SCNC: 138 MMOL/L
SODIUM SERPL-SCNC: 138 MMOL/L
SODIUM SERPL-SCNC: 140 MMOL/L
WBC # BLD AUTO: 15.32 K/UL

## 2018-11-17 PROCEDURE — 25000003 PHARM REV CODE 250: Mod: HCNC | Performed by: THORACIC SURGERY (CARDIOTHORACIC VASCULAR SURGERY)

## 2018-11-17 PROCEDURE — 83735 ASSAY OF MAGNESIUM: CPT | Mod: HCNC

## 2018-11-17 PROCEDURE — 21400001 HC TELEMETRY ROOM: Mod: HCNC

## 2018-11-17 PROCEDURE — 94799 UNLISTED PULMONARY SVC/PX: CPT | Mod: HCNC

## 2018-11-17 PROCEDURE — 27200667 HC PACEMAKER, TEMPORARY MONITORING, PER SHIFT: Mod: HCNC

## 2018-11-17 PROCEDURE — 25000003 PHARM REV CODE 250: Mod: HCNC | Performed by: EMERGENCY MEDICINE

## 2018-11-17 PROCEDURE — 97530 THERAPEUTIC ACTIVITIES: CPT | Mod: HCNC

## 2018-11-17 PROCEDURE — 80048 BASIC METABOLIC PNL TOTAL CA: CPT | Mod: 91,HCNC

## 2018-11-17 PROCEDURE — 92597 ORAL SPEECH DEVICE EVAL: CPT | Mod: HCNC

## 2018-11-17 PROCEDURE — 63600175 PHARM REV CODE 636 W HCPCS: Mod: HCNC | Performed by: THORACIC SURGERY (CARDIOTHORACIC VASCULAR SURGERY)

## 2018-11-17 PROCEDURE — 27000221 HC OXYGEN, UP TO 24 HOURS: Mod: HCNC

## 2018-11-17 PROCEDURE — 99233 SBSQ HOSP IP/OBS HIGH 50: CPT | Mod: HCNC,,, | Performed by: INTERNAL MEDICINE

## 2018-11-17 PROCEDURE — 85025 COMPLETE CBC W/AUTO DIFF WBC: CPT | Mod: HCNC

## 2018-11-17 PROCEDURE — 99232 SBSQ HOSP IP/OBS MODERATE 35: CPT | Mod: HCNC,,, | Performed by: INTERNAL MEDICINE

## 2018-11-17 PROCEDURE — 97116 GAIT TRAINING THERAPY: CPT | Mod: HCNC

## 2018-11-17 RX ORDER — POTASSIUM CHLORIDE 20 MEQ/1
40 TABLET, EXTENDED RELEASE ORAL ONCE
Status: COMPLETED | OUTPATIENT
Start: 2018-11-17 | End: 2018-11-17

## 2018-11-17 RX ORDER — METOPROLOL TARTRATE 50 MG/1
50 TABLET ORAL ONCE
Status: COMPLETED | OUTPATIENT
Start: 2018-11-17 | End: 2018-11-17

## 2018-11-17 RX ORDER — AMIODARONE HYDROCHLORIDE 200 MG/1
200 TABLET ORAL 2 TIMES DAILY
Status: DISCONTINUED | OUTPATIENT
Start: 2018-11-17 | End: 2018-11-17

## 2018-11-17 RX ORDER — CHLORHEXIDINE GLUCONATE ORAL RINSE 1.2 MG/ML
10 SOLUTION DENTAL 2 TIMES DAILY
Status: DISCONTINUED | OUTPATIENT
Start: 2018-11-17 | End: 2018-11-20 | Stop reason: HOSPADM

## 2018-11-17 RX ORDER — METOPROLOL TARTRATE 1 MG/ML
5 INJECTION, SOLUTION INTRAVENOUS ONCE
Status: COMPLETED | OUTPATIENT
Start: 2018-11-17 | End: 2018-11-17

## 2018-11-17 RX ORDER — METOPROLOL TARTRATE 50 MG/1
100 TABLET ORAL 2 TIMES DAILY
Status: DISCONTINUED | OUTPATIENT
Start: 2018-11-17 | End: 2018-11-19

## 2018-11-17 RX ORDER — METOPROLOL TARTRATE 50 MG/1
50 TABLET ORAL 2 TIMES DAILY
Status: DISCONTINUED | OUTPATIENT
Start: 2018-11-17 | End: 2018-11-17

## 2018-11-17 RX ADMIN — PANTOPRAZOLE SODIUM 40 MG: 40 TABLET, DELAYED RELEASE ORAL at 08:11

## 2018-11-17 RX ADMIN — POTASSIUM CHLORIDE 40 MEQ: 1500 TABLET, EXTENDED RELEASE ORAL at 03:11

## 2018-11-17 RX ADMIN — FUROSEMIDE 40 MG: 10 INJECTION, SOLUTION INTRAMUSCULAR; INTRAVENOUS at 08:11

## 2018-11-17 RX ADMIN — POTASSIUM CHLORIDE 20 MEQ: 200 INJECTION, SOLUTION INTRAVENOUS at 08:11

## 2018-11-17 RX ADMIN — CHLORHEXIDINE GLUCONATE 10 ML: 1.2 RINSE ORAL at 08:11

## 2018-11-17 RX ADMIN — POLYETHYLENE GLYCOL 3350 17 G: 17 POWDER, FOR SOLUTION ORAL at 08:11

## 2018-11-17 RX ADMIN — CLOPIDOGREL 75 MG: 75 TABLET, FILM COATED ORAL at 08:11

## 2018-11-17 RX ADMIN — POTASSIUM CHLORIDE 20 MEQ: 1500 TABLET, EXTENDED RELEASE ORAL at 08:11

## 2018-11-17 RX ADMIN — METOPROLOL TARTRATE 50 MG: 50 TABLET ORAL at 04:11

## 2018-11-17 RX ADMIN — ASPIRIN 81 MG: 81 TABLET, COATED ORAL at 08:11

## 2018-11-17 RX ADMIN — METOPROLOL TARTRATE 100 MG: 50 TABLET ORAL at 08:11

## 2018-11-17 RX ADMIN — METOPROLOL TARTRATE 50 MG: 50 TABLET ORAL at 08:11

## 2018-11-17 RX ADMIN — APIXABAN 5 MG: 2.5 TABLET, FILM COATED ORAL at 08:11

## 2018-11-17 RX ADMIN — DOCUSATE SODIUM 100 MG: 100 CAPSULE, LIQUID FILLED ORAL at 08:11

## 2018-11-17 RX ADMIN — OXYCODONE HYDROCHLORIDE 5 MG: 5 TABLET ORAL at 04:11

## 2018-11-17 RX ADMIN — INSULIN ASPART 2 UNITS: 100 INJECTION, SOLUTION INTRAVENOUS; SUBCUTANEOUS at 09:11

## 2018-11-17 RX ADMIN — AMIODARONE HYDROCHLORIDE 200 MG: 200 TABLET ORAL at 08:11

## 2018-11-17 RX ADMIN — INSULIN ASPART 2 UNITS: 100 INJECTION, SOLUTION INTRAVENOUS; SUBCUTANEOUS at 12:11

## 2018-11-17 RX ADMIN — POTASSIUM CHLORIDE 40 MEQ: 1500 TABLET, EXTENDED RELEASE ORAL at 11:11

## 2018-11-17 RX ADMIN — METOPROLOL TARTRATE 5 MG: 5 INJECTION, SOLUTION INTRAVENOUS at 09:11

## 2018-11-17 NOTE — SUBJECTIVE & OBJECTIVE
Review of Systems   Constitution: Negative for diaphoresis, weakness, malaise/fatigue, weight gain and weight loss.   HENT: Negative for congestion and nosebleeds.    Cardiovascular: Negative for chest pain, claudication, cyanosis, dyspnea on exertion, irregular heartbeat, leg swelling, near-syncope, orthopnea, palpitations, paroxysmal nocturnal dyspnea and syncope.        Sternal incision pain      Respiratory: Positive for shortness of breath. Negative for cough, hemoptysis, sleep disturbances due to breathing, snoring, sputum production and wheezing.    Hematologic/Lymphatic: Negative for bleeding problem. Does not bruise/bleed easily.   Skin: Negative for rash.   Musculoskeletal: Negative for arthritis, back pain, falls, joint pain, muscle cramps and muscle weakness.   Gastrointestinal: Negative for abdominal pain, constipation, diarrhea, heartburn, hematemesis, hematochezia, melena and nausea.   Genitourinary: Negative for dysuria, hematuria and nocturia.   Neurological: Negative for excessive daytime sleepiness, dizziness, headaches, light-headedness, loss of balance, numbness and vertigo.     Objective:     Vital Signs (Most Recent):  Temp: 98.1 °F (36.7 °C) (11/17/18 0831)  Pulse: 90 (11/17/18 0939)  Resp: (!) 30 (11/17/18 0939)  BP: (!) 113/57 (11/17/18 0900)  SpO2: 96 % (11/17/18 0939) Vital Signs (24h Range):  Temp:  [98.1 °F (36.7 °C)-99.5 °F (37.5 °C)] 98.1 °F (36.7 °C)  Pulse:  [88-92] 90  Resp:  [19-36] 30  SpO2:  [92 %-99 %] 96 %  BP: ()/(37-75) 113/57  Arterial Line BP: ()/() 147/65     Weight: 76 kg (167 lb 8.8 oz)  Body mass index is 29.68 kg/m².     SpO2: 96 %  O2 Device (Oxygen Therapy): nasal cannula      Intake/Output Summary (Last 24 hours) at 11/17/2018 0941  Last data filed at 11/17/2018 0900  Gross per 24 hour   Intake 1143.95 ml   Output 3954 ml   Net -2810.05 ml       Lines/Drains/Airways     Drain                 Urethral Catheter 11/15/18 0840 Latex;Straight-tip  16 Fr. 2 days          Arterial Line                 Arterial Line 11/15/18 0838 Left Radial 2 days          Line                 Pacer Wires 11/15/18 1600 1 day          Pressure Ulcer                 Negative Pressure Wound Therapy  1 day          Peripheral Intravenous Line                 Peripheral IV - Single Lumen 11/13/18 1635 Left Antecubital 3 days         Midline Catheter Insertion/Assessment  - Double Lumen 11/16/18 1530 Left less than 1 day                Physical Exam   Constitutional: She is oriented to person, place, and time. She appears well-developed and well-nourished.   Neck: Neck supple. No JVD present.   Cardiovascular: Normal rate, regular rhythm, normal heart sounds and normal pulses. Exam reveals no friction rub.   No murmur heard.  Pulmonary/Chest: Effort normal and breath sounds normal. No respiratory distress. She has no wheezes. She has no rales.   Sternal incision with wound vac noted  Pacer wires in use      Abdominal: Soft. Bowel sounds are normal. She exhibits no distension.   Musculoskeletal: She exhibits no edema or tenderness.   Neurological: She is alert and oriented to person, place, and time.   Skin: Skin is warm and dry. No rash noted.     Right groin access site without redness, tenderness, swelling, or drainage. There is no active external bleeding or hematoma. Femoral and DP pulse 2+. Skin warm/dry/pink.      Psychiatric: She has a normal mood and affect. Her behavior is normal.   Nursing note and vitals reviewed.      Significant Labs:   All pertinent lab results from the last 24 hours have been reviewed. and   Recent Lab Results       11/17/18  0916   11/17/18  0400   11/16/18  2117   11/16/18  1919   11/16/18  1715        Anion Gap   9     5     Baso #   0.00           Basophil%   0.0           BUN, Bld   26     24     Calcium   9.1     9.6     Chloride   104     109     CO2   27     25     Creatinine   1.0     0.9     Differential Method   Automated           eGFR if     >60     >60     eGFR if non    58  Comment:  Calculation used to obtain the estimated glomerular filtration  rate (eGFR) is the CKD-EPI equation.        >60  Comment:  Calculation used to obtain the estimated glomerular filtration  rate (eGFR) is the CKD-EPI equation.        Eos #   0.0           Eosinophil%   0.0           Glucose   152     151     Gran # (ANC)   13.3           Gran%   86.6           Hematocrit   26.6     24.8     Hemoglobin   9.0     8.6     Lymph #   0.7           Lymph%   4.6           Magnesium   2.5     2.9     MCH   28.3           MCHC   33.8           MCV   84           Mono #   1.4           Mono%   8.8           MPV   9.7           Platelets   148           POCT Glucose 180   153 142       Potassium   3.8     5.0     RBC   3.18           RDW   15.1           Sodium   140     139     WBC   15.32                            11/16/18  1626   11/16/18  1357   11/16/18  1215   11/16/18  1128        Anion Gap             Baso #             Basophil%             BUN, Bld             Calcium             Chloride             CO2             Creatinine             Differential Method             eGFR if              eGFR if non              Eos #             Eosinophil%             Glucose             Gran # (ANC)             Gran%             Hematocrit             Hemoglobin             Lymph #             Lymph%             Magnesium             MCH             MCHC             MCV             Mono #             Mono%             MPV             Platelets             POCT Glucose 167 151 104 78     Potassium             RBC             RDW             Sodium             WBC                   Significant Imaging: Echocardiogram:   2D echo with color flow doppler:   Results for orders placed or performed during the hospital encounter of 11/13/18   2D echo with color flow doppler   Result Value Ref Range    QEF 40 (A) 55 - 65     Diastolic Dysfunction Yes (A)     and X-Ray: CXR:   .  FINDINGS:  There has been a prior median sternotomy.There are small bilateral pleural effusions, right larger than left, with overlying passive atelectasis.  Slight thickening of the minor fissure.    The cardiac silhouette is normal in size. Fullness of the hilar structures bilaterally.    Bones are intact.      Impression       Small bilateral pleural effusions, right larger than left, with overlying atelectasis.      Electronically signed by: Keith Webb Jr., MD  Date: 11/17/2018  Time: 07:35

## 2018-11-17 NOTE — PROGRESS NOTES
[facility] -   Cardiothoracic Surgery  Progress Note    Patient Name: Eve Long  MRN: [id number]  Admission Date: 11/13/2018  Hospital Length of Stay: 4 days  Code Status: Full Code   Attending Physician: Eloise Pacheco MD   Referring Provider: Self, Aaareferral  Principal Problem:NSTEMI (non-ST elevated myocardial infarction)            Subjective:     Post-Op Info:  Procedure(s) (LRB):  CORONARY ARTERY BYPASS GRAFT (CABG) (N/A)  SURGICAL PROCUREMENT, VEIN, ENDOSCOPIC (Left)  ECHOCARDIOGRAM,TRANSESOPHAGEAL (N/A)   2 Days Post-Op     Interval History: POD#2 s/p CABGx5. No complaints. Pain is well controlled    ROS  Medications:  Continuous Infusions:   amiodarone in dextrose 5% 0.5 mg/min (11/17/18 0600)    dexmedetomidine (PRECEDEX) infusion Stopped (11/16/18 1400)    epinephrine infusion Stopped (11/16/18 1600)    insulin (HUMAN R) infusion (adults) Stopped (11/16/18 1138)    niCARdipine Stopped (11/15/18 1545)     Scheduled Meds:   amiodarone  200 mg Oral BID    aspirin  81 mg Oral Daily    chlorhexidine  10 mL Mouth/Throat BID    clopidogrel  75 mg Oral Daily    docusate sodium  100 mg Oral BID    furosemide  40 mg Intravenous BID    metoprolol tartrate  50 mg Oral BID    nozaseptin   Each Nare BID    pantoprazole  40 mg Oral Daily    polyethylene glycol  17 g Oral Daily    potassium chloride  20 mEq Oral Q12H     PRN Meds:sodium chloride, albumin human 5%, albuterol **AND** Inhalation Treatment Q6H PRN, calcium chloride, dextrose 50%, dextrose 50%, dextrose 50%, dextrose 50%, fentaNYL, fentaNYL, glucagon (human recombinant), glucose, glucose, HYDROcodone-acetaminophen, insulin aspart U-100, insulin (HUMAN R) infusion (adults), lactated ringers, magnesium sulfate IVPB, metoclopramide HCl, ondansetron, oxyCODONE, oxyCODONE, potassium chloride in water **AND** potassium chloride in water **AND** potassium chloride in water     Objective:     Vital Signs (Most Recent):  Temp:  98.7 °F (37.1 °C) (11/17/18 0300)  Pulse: 91 (11/17/18 0630)  Resp: (!) 23 (11/17/18 0630)  BP: 114/60 (11/17/18 0630)  SpO2: 96 % (11/17/18 0630) Vital Signs (24h Range):  Temp:  [98.7 °F (37.1 °C)-99.5 °F (37.5 °C)] 98.7 °F (37.1 °C)  Pulse:  [45-92] 91  Resp:  [19-36] 23  SpO2:  [92 %-99 %] 96 %  BP: ()/(37-93) 114/60  Arterial Line BP: ()/() 136/63     Weight: 76 kg (167 lb 8.8 oz)  Body mass index is 29.68 kg/m².    SpO2: 96 %  O2 Device (Oxygen Therapy): nasal cannula w/ humidification    Intake/Output - Last 3 Shifts       11/15 0700 - 11/16 0659 11/16 0700 - 11/17 0659 11/17 0700 - 11/18 0659    P.O. 1000      I.V. (mL/kg) 4575.3 (61.5) 944 (12.4)     Blood 1450 312.5     IV Piggyback 300 100     Total Intake(mL/kg) 7325.3 (98.5) 1356.5 (17.8)     Urine (mL/kg/hr) 1660 (0.9) 3487 (1.9)     Drains 68 15     Other 0      Stool 0 0     Chest Tube 473 128     Total Output 2201 3630     Net +5124.3 -2273.6            Stool Occurrence 0 x 0 x           Lines/Drains/Airways     Drain                 Urethral Catheter 11/15/18 0840 Latex;Straight-tip 16 Fr. 1 day          Arterial Line                 Arterial Line 11/15/18 0838 Left Radial 1 day          Line                 Pacer Wires 11/15/18 1600 1 day          Pressure Ulcer                 Negative Pressure Wound Therapy  1 day          Peripheral Intravenous Line                 Peripheral IV - Single Lumen 11/13/18 1635 Left Antecubital 3 days         Midline Catheter Insertion/Assessment  - Double Lumen 11/16/18 1530 Left less than 1 day                Physical Exam   Constitutional: She is oriented to person, place, and time. She appears well-developed and well-nourished.   HENT:   Head: Normocephalic and atraumatic.   Cardiovascular: Normal rate, regular rhythm and normal heart sounds.   Pulmonary/Chest: Effort normal and breath sounds normal.   Abdominal: Soft. Bowel sounds are normal.   Musculoskeletal: She exhibits edema. She  exhibits no deformity.   Neurological: She is alert and oriented to person, place, and time.   Skin: Skin is warm and dry.       Significant Labs:  All pertinent labs from the last 24 hours have been reviewed.    Significant Diagnostics:  I have reviewed all pertinent imaging results/findings within the past 24 hours.    Assessment/Plan:     S/P CABG x 5    11/16/2018  The patient is postop day 1.  Status post coronary artery bypass grafting x5.  Overall the patient is is doing very well.  Neuro:  The patient is awake alert and oriented x3.  Nonfocal neuro exam.  Pain is controlled with pain medication.  Cardiac:  Patient has been stable with good cardiac indices on low-dose epi.  Patient was in atrial fibrillation earlier now in sinus on a amiodarone drip.  Will discontinue intra-aortic balloon pump.  Wean the epi to off.  Respiratory:  The patient is extubated and good sats on nasal cannula.  Continue pulmonary toilet.  GI:  Advance p.o. intake as tolerated.  Renal:  The patient has good urine output creatinine is 0.8.  Lasix for diuresis.  Heme:  Hematocrit is 21.  Patient transfuse 1 unit packed red blood cells.  Endocrine:  Glucose is controlled with insulin.  Activities:  Patient is currently on bedrest for intra-aortic balloon pump.  Will get out of bed and ambulate once intra-aortic balloon pump is removed.  Lines tubes and drains:  Intra-aortic balloon pump, Odin and Cordis, RAZIA drains in saphenectomy site, and chest tubes will be discontinued.    11/17/2018  The patient is postop day 12.  Status post coronary artery bypass grafting x5.  Overall the patient is is doing very well. Transfer to Telemetry  Neuro:  The patient is awake alert and oriented x3.  Nonfocal neuro exam.  Pain is controlled with pain medication.  Cardiac:  Patient has been stable . Up titrate metoprolol. Start PO amiodarone  Respiratory:  The patient is extubated and good sats on nasal cannula.  Continue pulmonary toilet.  GI:  Advance  p.o. intake as tolerated.  Renal:  The patient has good urine output creatinine is 1.0.  Good response to diuresis.  Heme:  Hematocrit is 26. Pit 148  ID: Afebrile. WBC15 Will follow.  Endocrine:  Glucose is controlled with insulin.  Activities: OOB and ambulate as tolerated.   Lines tubes and drains:  D/c fang, Keep mid level line and pacing wires         Ricardo Dorsey MD  Cardiothoracic Surgery  [facility] -

## 2018-11-17 NOTE — PLAN OF CARE
Problem: SLP Goal  Goal: SLP Goal  LT. Pt to tolerate least restrictive diet consistency safely and efficiently.  2. Pt to exhibit functional communication and cognitive skills.    ST. BSA with meal with further goals as appropriate.  2. Cognitive testing with goals as appropriate.       S.T. Evaluation completed- please see evaluation report for details.

## 2018-11-17 NOTE — ASSESSMENT & PLAN NOTE
-Patient is POD 1 s/p CABG x5   -Continue Amio gtt and plan to switch to PO tomorrow  -CT and RAZIA out when ok with CT surgery   -IABP being weaned. Changed to 1:2 by Dr. Rodríguez  -Continue ASA, Plavix   -No statin due to intolerance in the past. Had severe myalgia with statin and Zetia use   -Encourage IS use and begin ambulation when transferred to Tele     11/17/18  -Agree with increasing metoprolol to 50mg BID and use Lopressor 5mg IV PRN  -Needs to diurese with IV Lasix   -K+ being replaced  -Continue ASA and Plavix  --No statin due to intolerance in the past. Had severe myalgia with statin and Zetia use   -Encourage IS use and begin ambulation when transferred to Tele

## 2018-11-17 NOTE — ASSESSMENT & PLAN NOTE
Chest pain and elevated troponin.  Case Discussed with subspecialities: Cardiology Consulted: Dr. Rodríguez.  Heparin and Nitroglycerin infusions.  [facility] on 14 November showing multi-vessel disease.  IABP placed and post-procedure in ICU.  Evaluated by Cardiac surgery and plan for CABG Thursday 15 November.  Aspirin, Metoprolol, and Supplemental Oxygen.  Cardiac echo pending.    S/p 5 V CABG, post op doing well.  Continue to remove baloon pump, Chest tubes, cordis, temp pacer etc  OT/PT eval, cont resp rx    11/17- improved, POD 2--              transferred to [facility]             IS and Ambulate

## 2018-11-17 NOTE — ASSESSMENT & PLAN NOTE
11/16/2018  The patient is postop day 1.  Status post coronary artery bypass grafting x5.  Overall the patient is is doing very well.  Neuro:  The patient is awake alert and oriented x3.  Nonfocal neuro exam.  Pain is controlled with pain medication.  Cardiac:  Patient has been stable with good cardiac indices on low-dose epi.  Patient was in atrial fibrillation earlier now in sinus on a amiodarone drip.  Will discontinue intra-aortic balloon pump.  Wean the epi to off.  Respiratory:  The patient is extubated and good sats on nasal cannula.  Continue pulmonary toilet.  GI:  Advance p.o. intake as tolerated.  Renal:  The patient has good urine output creatinine is 0.8.  Lasix for diuresis.  Heme:  Hematocrit is 21.  Patient transfuse 1 unit packed red blood cells.  Endocrine:  Glucose is controlled with insulin.  Activities:  Patient is currently on bedrest for intra-aortic balloon pump.  Will get out of bed and ambulate once intra-aortic balloon pump is removed.  Lines tubes and drains:  Intra-aortic balloon pump, Hunter and Cordis, RZAIA drains in saphenectomy site, and chest tubes will be discontinued.    11/17/2018  The patient is postop day 12.  Status post coronary artery bypass grafting x5.  Overall the patient is is doing very well. Transfer to Telemetry  Neuro:  The patient is awake alert and oriented x3.  Nonfocal neuro exam.  Pain is controlled with pain medication.  Cardiac:  Patient has been stable . Up titrate metoprolol. Start PO amiodarone  Respiratory:  The patient is extubated and good sats on nasal cannula.  Continue pulmonary toilet.  GI:  Advance p.o. intake as tolerated.  Renal:  The patient has good urine output creatinine is 1.0.  Good response to diuresis.  Heme:  Hematocrit is 26. Pit 148  ID: Afebrile. WBC15 Will follow.  Endocrine:  Glucose is controlled with insulin.  Activities: OOB and ambulate as tolerated.   Lines tubes and drains:  D/c fang, Keep mid level line and pacing wires

## 2018-11-17 NOTE — CONSULTS
Consult received for home health services.  Patient currently in a wheelchair fixing to walk on unit.  CM provided patient with a right care listing of home health companies.  CM to follow on Monday for choices.

## 2018-11-17 NOTE — PLAN OF CARE
Problem: Patient Care Overview  Goal: Plan of Care Review  Outcome: Ongoing (interventions implemented as appropriate)  poc reviewed c pt.  Uneventful night, resting intermittently.  Some tachypnea noted on monitor, though pt reports no distress nor sob, sats maintained at 5L NC with H2O.  Hemodynamically stable overnight, no acute events.  Pt bathed in am, ambulated to chair with minimal assistance.  Pt finally had good response to diuresis, almost 3L uop overnight.  Pt to have slp eval today, pt had ice chips overnight.  AM labs stable, no need for repletion today.  K came down from 5 with increased uop, scheduled po K this am to be given.

## 2018-11-17 NOTE — PT/OT/SLP EVAL
Speech Language Pathology Evaluation  Bedside Swallow    Patient Name:  Eve Long   MRN:  [id number]  Admitting Diagnosis: NSTEMI (non-ST elevated myocardial infarction)    Recommendations:                 General Recommendations:  Basic Aspiration Precautions  Diet recommendations:  Regular, Thin   Aspiration Precautions: In place  General Precautions: Standard, aspiration  Communication strategies:  Verbal    History:     Past Medical History:   Diagnosis Date    Anemia     Asthma     Basal cell carcinoma     COPD (chronic obstructive pulmonary disease)     Coronary artery disease involving native coronary artery of native heart with unstable angina pectoris 11/14/2018    Hyperlipidemia     Hypertension     Immune deficiency disorder     NSTEMI (non-ST elevated myocardial infarction) 11/13/2018    Pneumonia     Sarcoidosis     TIA (transient ischemic attack)        Past Surgical History:   Procedure Laterality Date    CHOLECYSTECTOMY      COSMETIC SURGERY      blepharplasty    FRACTURE SURGERY Left     wrist ORIF    HYSTERECTOMY      LUNG BIOPSY      OOPHORECTOMY      SKIN BIOPSY      TONSILLECTOMY       Pt is a 68 year old female admitted to ED on 11/13 with cp/pressure x3 days with radiation to left arm/back and associated diaphroesis and jaw pain.  Pt with NSTEMI and now in ICU post LHC with IABP.  Pt now extubated with S.T. Consulted to assess swallowing skills.    Social History: Patient lives with ;  She does not work and reported being fairly active at home.    Prior Intubation HX:  Pt not intubated prior to this hospitalization    Modified Barium Swallow: non reported    Chest X-Rays: revealed small bilateral pleural effusions right larger than left     Prior diet: Regular consistency diet     Occupation/hobbies/homemaking: n/a    Subjective     Pt leaning back in chair but sat up well;  She is going to the floor and tolerated her breakfast well per nurse report  Patient  "goals: "get stronger"    Pain/Comfort:  · Pain Rating 1: 0/10    Objective:     Oral Musculature Evaluation  · Oral Musculature: WFL  · Dentition: present and adequate  · Oral Labial Strength and Mobility: WFL  · Lingual Strength and Mobility: WFL    Bedside Swallow Eval:   Consistencies Assessed:  · Thin, Thick, Pureed, Cracker    Oral Phase:   · No oral phase difficulties noted    Pharyngeal Phase:   · Pt with no coughing, wet vocal quality or swallow delays with any consistency given.   Pt reported that she tolerates liquids with cup better than straw.  Pt evaluated with and without the straw and no difficulties noted.   Pt instructed to not use the straw if she notices any swallowing difficulties.    Compensatory Strategies  · Basic swallowing precautions reviewed with patient- Sit upright, Small bites/sips, Double swallows,  Alternate liquids/solids.    Treatment: See above report    Assessment:     Eve Long is a 68 y.o. female evaluated this morning.  She did not present with any observable signs of swallowing difficulties with liquids, pureed or solids.  No coughing, wet vocal quality or swallowing delays.  Basic swallowing precautions reviewed with patient.      Pt oriented to person, place, date and situation.  She did report some confusion initially.    Basic cognitive testing is recommended with goals as appropriate and BSA with meal.      Goals:   Multidisciplinary Problems     SLP Goals        Problem: SLP Goal    Goal Priority Disciplines Outcome   SLP Goal     SLP    Description:  LT. Pt to tolerate least restrictive diet consistency safely and efficiently.  2. Pt to exhibit functional communication and cognitive skills.    ST. BSA with meal with further goals as appropriate.  2. Cognitive testing with goals as appropriate.                    Plan:     · Patient to be seen:  3 x/week   · Plan of Care expires:     · Plan of Care reviewed with:  patient   · SLP Follow-Up:  Yes     "   Discharge recommendations:      Barriers to Discharge:      Time Tracking:     SLP Treatment Date:   11/17/18  Speech Start Time:  1010  Speech Stop Time:  1035     Speech Total Time (min):  25 min    Billable Minutes: 25 minutes    Mari Bah CCC-SLP  11/17/2018

## 2018-11-17 NOTE — SUBJECTIVE & OBJECTIVE
Interval History: looks better, transferred to [facility], pacer wire in place, getting Lasix. Kcl and also developed afib w RVR    Review of Systems   Constitutional: Positive for activity change and appetite change.   HENT: Positive for congestion.    Eyes: Negative.    Respiratory: Positive for shortness of breath.    Cardiovascular: Positive for chest pain and leg swelling. Negative for palpitations.   Gastrointestinal: Negative.    Genitourinary: Negative.    Musculoskeletal: Positive for myalgias.   Skin: Positive for color change and pallor.   Neurological: Positive for weakness.   Psychiatric/Behavioral: Negative.      Objective:     Vital Signs (Most Recent):  Temp: 97.9 °F (36.6 °C) (11/17/18 1510)  Pulse: 89 (11/17/18 1511)  Resp: (!) 22 (11/17/18 1510)  BP: 124/72 (11/17/18 1510)  SpO2: (!) 92 % (11/17/18 1511) Vital Signs (24h Range):  Temp:  [97.9 °F (36.6 °C)-99.1 °F (37.3 °C)] 97.9 °F (36.6 °C)  Pulse:  [] 89  Resp:  [19-36] 22  SpO2:  [91 %-98 %] 92 %  BP: ()/(42-75) 124/72  Arterial Line BP: (117-154)/(56-72) 119/61     Weight: 76 kg (167 lb 8.8 oz)  Body mass index is 29.68 kg/m².    Intake/Output Summary (Last 24 hours) at 11/17/2018 1725  Last data filed at 11/17/2018 1207  Gross per 24 hour   Intake 434 ml   Output 3880 ml   Net -3446 ml      Physical Exam   Constitutional: She is oriented to person, place, and time. She appears well-developed and well-nourished.   HENT:   Head: Normocephalic and atraumatic.   Cardiovascular: Normal rate, regular rhythm and normal heart sounds.   Pulmonary/Chest: Effort normal and breath sounds normal.   Abdominal: Soft. Bowel sounds are normal.   Musculoskeletal: She exhibits edema. She exhibits no deformity.   Neurological: She is alert and oriented to person, place, and time.   Skin: Skin is warm and dry.   Nursing note and vitals reviewed.      Significant Labs:   BMP:   Recent Labs   Lab 11/17/18  0400 11/17/18  1117   * 174*    138   K  3.8 3.8    100   CO2 27 27   BUN 26* 29*   CREATININE 1.0 1.1   CALCIUM 9.1 9.2   MG 2.5  --      CBC:   Recent Labs   Lab 11/16/18  0350 11/16/18  1715 11/17/18  0400   WBC 14.33*  --  15.32*   HGB 7.5* 8.6* 9.0*   HCT 21.9* 24.8* 26.6*     --  148*     Magnesium:   Recent Labs   Lab 11/16/18  0350 11/16/18 1715 11/17/18  0400   MG 3.5* 2.9* 2.5     Respiratory Culture:   Troponin:   All pertinent labs within the past 24 hours have been reviewed.    Significant Imaging: I have reviewed all pertinent imaging results/findings within the past 24 hours.

## 2018-11-17 NOTE — PLAN OF CARE
Problem: Patient Care Overview  Goal: Plan of Care Review  Outcome: Ongoing (interventions implemented as appropriate)  Pt d-lined today.  Chest tubes and IABP removed as well as LLE kendrick drains.  Pt hypotensive early afternoon, and urine output down.  Pt responded to albumin and calcium chloride followed by bumex.  Able to wean epi and precedex off this afternoon.  Pt did not have any ectopy today, remains on amiodarone.  Pt having problems swallowing this afternoon.  Pt states she does not want any dinner and just wants to get rest.  Speech therapy orders entered by Dr. Dorsey.

## 2018-11-17 NOTE — PROGRESS NOTES
[facility] -   Cardiology  Progress Note    Patient Name: Eve Long  MRN: [id number]  Admission Date: 11/13/2018  Hospital Length of Stay: 4 days  Code Status: Full Code   Attending Physician: Eloise Pacheco MD   Primary Care Physician: Chaya Morelos MD  Expected Discharge Date:   Principal Problem:NSTEMI (non-ST elevated myocardial infarction)    Subjective:   Brief HPI:    Eve Long is a 68 y.o. female patient with a PMHx of anemia, HLD, HTN, TIA,  pneumonia, sarcoidosis, Statin intolerant due to myalgias. Tried Zetia and still had myalgia. Patient presented to the ER for evaluation of constant left sided chest pain which onset approximately 4 days ago. Pt characterizes her pain as a pressure 10/10 at its worse. Pt reports a strong FMHx of cardiac disease (Father with CABG/MI, all materal uncles with CAD) and reports her last stress test was done in 2006.  patient states that she tried to take a walk around her neighborhood and chest pain worsened. Pain associated with diaphoresis, and radiated from chest and left jaw, arm and back.  Patient denied any fever, chills, SOB, leg swelling, palpitations, N/V, dysuria, hematuria, HA, weakness, and all other sxs at this time. In ER, troponin 0.241, 0.297, 0.288. EKG with + anterior changes.  Echo in Aug 2018 showed normal LVF.  Patient has been started on Heparin Drip. Last episode of chest pain was this morning. Had chest pain all night improved with nitro paste.       Hospital Course:   11/16/18- Patient is POD 1 s/p CABG x5. Has been extubated. Remains on IABP 1:1. CT x 3 inplace. RAZIA x2 remains in place. Had A-fib with RVR overnight and started on Amio gtt. Converted to NSR and is A-pacing. Given 1 unit of blood . H/H 7.5/21.9 down from 9.0/ 26.7.  Has about 15-30cc/hr of urine output     11/17/18- POD 2 s/p CABG x5. Chest tubes, RAZIA drains and IABP have all been DC'd. Pacer wires remain in place. A-pacing. Having PAF. CXR  shows bilateral effusion. Has some SOB this morning and is receiving IV Lasix. . Given IV lopressor this morning for rate control. K+ being replaced         Review of Systems   Constitution: Negative for diaphoresis, weakness, malaise/fatigue, weight gain and weight loss.   HENT: Negative for congestion and nosebleeds.    Cardiovascular: Negative for chest pain, claudication, cyanosis, dyspnea on exertion, irregular heartbeat, leg swelling, near-syncope, orthopnea, palpitations, paroxysmal nocturnal dyspnea and syncope.        Sternal incision pain      Respiratory: Positive for shortness of breath. Negative for cough, hemoptysis, sleep disturbances due to breathing, snoring, sputum production and wheezing.    Hematologic/Lymphatic: Negative for bleeding problem. Does not bruise/bleed easily.   Skin: Negative for rash.   Musculoskeletal: Negative for arthritis, back pain, falls, joint pain, muscle cramps and muscle weakness.   Gastrointestinal: Negative for abdominal pain, constipation, diarrhea, heartburn, hematemesis, hematochezia, melena and nausea.   Genitourinary: Negative for dysuria, hematuria and nocturia.   Neurological: Negative for excessive daytime sleepiness, dizziness, headaches, light-headedness, loss of balance, numbness and vertigo.     Objective:     Vital Signs (Most Recent):  Temp: 98.1 °F (36.7 °C) (11/17/18 0831)  Pulse: 90 (11/17/18 0939)  Resp: (!) 30 (11/17/18 0939)  BP: (!) 113/57 (11/17/18 0900)  SpO2: 96 % (11/17/18 0939) Vital Signs (24h Range):  Temp:  [98.1 °F (36.7 °C)-99.5 °F (37.5 °C)] 98.1 °F (36.7 °C)  Pulse:  [88-92] 90  Resp:  [19-36] 30  SpO2:  [92 %-99 %] 96 %  BP: ()/(37-75) 113/57  Arterial Line BP: ()/() 147/65     Weight: 76 kg (167 lb 8.8 oz)  Body mass index is 29.68 kg/m².     SpO2: 96 %  O2 Device (Oxygen Therapy): nasal cannula      Intake/Output Summary (Last 24 hours) at 11/17/2018 0941  Last data filed at 11/17/2018 0900  Gross per 24 hour    Intake 1143.95 ml   Output 3954 ml   Net -2810.05 ml       Lines/Drains/Airways     Drain                 Urethral Catheter 11/15/18 0840 Latex;Straight-tip 16 Fr. 2 days          Arterial Line                 Arterial Line 11/15/18 0838 Left Radial 2 days          Line                 Pacer Wires 11/15/18 1600 1 day          Pressure Ulcer                 Negative Pressure Wound Therapy  1 day          Peripheral Intravenous Line                 Peripheral IV - Single Lumen 11/13/18 1635 Left Antecubital 3 days         Midline Catheter Insertion/Assessment  - Double Lumen 11/16/18 1530 Left less than 1 day                Physical Exam   Constitutional: She is oriented to person, place, and time. She appears well-developed and well-nourished.   Neck: Neck supple. No JVD present.   Cardiovascular: Normal rate, regular rhythm, normal heart sounds and normal pulses. Exam reveals no friction rub.   No murmur heard.  Pulmonary/Chest: Effort normal and breath sounds normal. No respiratory distress. She has no wheezes. She has no rales.   Sternal incision with wound vac noted  Pacer wires in use      Abdominal: Soft. Bowel sounds are normal. She exhibits no distension.   Musculoskeletal: She exhibits no edema or tenderness.   Neurological: She is alert and oriented to person, place, and time.   Skin: Skin is warm and dry. No rash noted.     Right groin access site without redness, tenderness, swelling, or drainage. There is no active external bleeding or hematoma. Femoral and DP pulse 2+. Skin warm/dry/pink.      Psychiatric: She has a normal mood and affect. Her behavior is normal.   Nursing note and vitals reviewed.      Significant Labs:   All pertinent lab results from the last 24 hours have been reviewed. and   Recent Lab Results       11/17/18  0916   11/17/18  0400   11/16/18  2117   11/16/18  1919   11/16/18  1715        Anion Gap   9     5     Baso #   0.00           Basophil%   0.0           BUN, Bld   26      24     Calcium   9.1     9.6     Chloride   104     109     CO2   27     25     Creatinine   1.0     0.9     Differential Method   Automated           eGFR if    >60     >60     eGFR if non    58  Comment:  Calculation used to obtain the estimated glomerular filtration  rate (eGFR) is the CKD-EPI equation.        >60  Comment:  Calculation used to obtain the estimated glomerular filtration  rate (eGFR) is the CKD-EPI equation.        Eos #   0.0           Eosinophil%   0.0           Glucose   152     151     Gran # (ANC)   13.3           Gran%   86.6           Hematocrit   26.6     24.8     Hemoglobin   9.0     8.6     Lymph #   0.7           Lymph%   4.6           Magnesium   2.5     2.9     MCH   28.3           MCHC   33.8           MCV   84           Mono #   1.4           Mono%   8.8           MPV   9.7           Platelets   148           POCT Glucose 180   153 142       Potassium   3.8     5.0     RBC   3.18           RDW   15.1           Sodium   140     139     WBC   15.32                            11/16/18  1626   11/16/18  1357   11/16/18  1215   11/16/18  1128        Anion Gap             Baso #             Basophil%             BUN, Bld             Calcium             Chloride             CO2             Creatinine             Differential Method             eGFR if              eGFR if non              Eos #             Eosinophil%             Glucose             Gran # (ANC)             Gran%             Hematocrit             Hemoglobin             Lymph #             Lymph%             Magnesium             MCH             MCHC             MCV             Mono #             Mono%             MPV             Platelets             POCT Glucose 167 151 104 78     Potassium             RBC             RDW             Sodium             WBC                   Significant Imaging: Echocardiogram:   2D echo with color flow doppler:   Results for  orders placed or performed during the hospital encounter of 11/13/18   2D echo with color flow doppler   Result Value Ref Range    QEF 40 (A) 55 - 65    Diastolic Dysfunction Yes (A)     and X-Ray: CXR:   .  FINDINGS:  There has been a prior median sternotomy.There are small bilateral pleural effusions, right larger than left, with overlying passive atelectasis.  Slight thickening of the minor fissure.    The cardiac silhouette is normal in size. Fullness of the hilar structures bilaterally.    Bones are intact.      Impression       Small bilateral pleural effusions, right larger than left, with overlying atelectasis.      Electronically signed by: Keith Webb Jr., MD  Date: 11/17/2018  Time: 07:35         Assessment and Plan:       * NSTEMI (non-ST elevated myocardial infarction) on Intraaortic Baloon pump    -Trop 0.241, 0.297, 0.288  -EKG with anterior lead changes   -Chest pain all night improved with nitro paste. Last episode of pain this morning around 9am  -Has been started on Heparin gtt  -Continue ASA, BB.   -No statin due to history of intolerance(myalgia). Had side effects to Zetia when tried   -Recommendation has been made for patient to undergo LHC today   -Dr. Rodríguez explained the risks, benefits and alternatives of the procedure in detail. The patient voices understanding and all questions have been answered. Patient agrees to proceed as planned.   -Keep NPO   -NS at 100/hr   -Further recommendations to follow cath    11/16/18  -patient is POD 1 s/p CABG x5.   -See plan for CABG      S/P CABG x 5    -Patient is POD 1 s/p CABG x5   -Continue Amio gtt and plan to switch to PO tomorrow  -CT and RAZIA out when ok with CT surgery   -IABP being weaned. Changed to 1:2 by Dr. Rodríguez  -Continue ASA, Plavix   -No statin due to intolerance in the past. Had severe myalgia with statin and Zetia use   -Encourage IS use and begin ambulation when transferred to Tele     11/17/18  -Agree with increasing metoprolol to  50mg BID and use Lopressor 5mg IV PRN  -Needs to diurese with IV Lasix   -K+ being replaced  -Continue ASA and Plavix  --No statin due to intolerance in the past. Had severe myalgia with statin and Zetia use   -Encourage IS use and begin ambulation when transferred to [facility]              VTE Risk Mitigation (From admission, onward)        Ordered     IP VTE LOW RISK PATIENT  Once      11/14/18 1529     Place LIUDMILA hose  Until discontinued      11/14/18 1529     Place sequential compression device  Until discontinued      11/14/18 1529        Chart reviewed. Patient examined by Dr. Rodríguez and agrees with plan that has been outlined.     DAVID Mcclure  Cardiology  [facility] [place]

## 2018-11-17 NOTE — PT/OT/SLP PROGRESS
Physical Therapy  Treatment    Eve Long   MRN: [id number]   Admitting Diagnosis: NSTEMI (non-ST elevated myocardial infarction)    PT Received On: 11/17/18  PT Start Time: 1050     PT Stop Time: 1115    PT Total Time (min): 25 min       Billable Minutes:  Gait Training 15 and Therapeutic Activity 10    Treatment Type: Treatment  PT/PTA: PTA     PTA Visit Number: 1       General Precautions: Standard, aspiration, sternal  Orthopedic Precautions: N/A   Braces: N/A         Subjective:  CONSULTED WITH PATIENT NURSEZULEYMA AND CHART REVIEWED  AT Epic . PATIENT  AGREE TO TX NOW.    Pain/Comfort  Pain Rating 1: 0/10    Objective:   Patient found with: arterial line, blood pressure cuff, peripheral IV, wound vac, fang catheter, pulse ox (continuous)(NURSEZULEYMA PRESENT TO REMOVE CATHETER AT PRESENT TIME.)    Functional Mobility:  Bed Mobility:    PATIENT SEATED NOW IN  B/S CHAIR.    Transfers:    SIT TO STAND, STAND TO SIT AT MOD ASSIST X2 , MAX CUES FOR MAINTAINING STERNAL PERCAUTIONS.   Gait:     5'X2 AT MOD ASSIST X2 AT HHA X2 ,  WITH MAX CUES TO MAINTAIN STERNAL PERCAUTIONS , TACTILE TOUCH TO INCREASE UPRIGHT POSTURE.      Balance:   Static Sit: FAIR+: Able to take MINIMAL challenges from all directions  Dynamic Sit: FAIR+: Maintains balance through MINIMAL excursions of active trunk motion  Static Stand: POOR: Needs MODERATE assist to maintain  Dynamic stand: POOR: Needs MOD (moderate) assist during gait     Therapeutic Activities and Exercises:  T/FS FROM B/S CHAIR, GT STEP, BALANCE ACTIVITY IN ROOM, STERNAL PERCAUTION REVIEWED THROUGHOUT TX.    AM-PAC 6 CLICK MOBILITY  How much help from another person does this patient currently need?   1 = Unable, Total/Dependent Assistance  2 = A lot, Maximum/Moderate Assistance  3 = A little, Minimum/Contact Guard/Supervision  4 = None, Modified [place]/Independent    Turning over in bed (including adjusting bedclothes, sheets and blankets)?: 2  Sitting down on  and standing up from a chair with arms (e.g., wheelchair, bedside commode, etc.): 2  Moving from lying on back to sitting on the side of the bed?: 2  Moving to and from a bed to a chair (including a wheelchair)?: 2  Need to walk in hospital room?: 2  Climbing 3-5 steps with a railing?: 1  Basic Mobility Total Score: 11    AM-PAC Raw Score CMS G-Code Modifier Level of Impairment Assistance   6 % Total / Unable   7 - 9 CM 80 - 100% Maximal Assist   10 - 14 CL 60 - 80% Moderate Assist   15 - 19 CK 40 - 60% Moderate Assist   20 - 22 CJ 20 - 40% Minimal Assist   23 CI 1-20% SBA / CGA   24 CH 0% Independent/ Mod I     Patient left up in chair with all lines intact and call button in reach.    Assessment:  Eve Long is a 68 y.o. female with a medical diagnosis of NSTEMI (non-ST elevated myocardial infarction).    Rehab identified problem list/impairments: Rehab identified problem list/impairments: weakness, gait instability, impaired endurance, impaired balance, decreased upper extremity function, impaired sensation, impaired self care skills, impaired cognition, impaired functional mobilty    Rehab potential is excellent.    Activity tolerance: Excellent    Discharge recommendations: Discharge Facility/Level Of Care Needs: home health PT, home health OT, rehabilitation facility     Barriers to discharge:      Equipment recommendations: Equipment Needed After Discharge: bath bench, bedside commode     GOALS:   Multidisciplinary Problems     Physical Therapy Goals        Problem: Physical Therapy Goal    Goal Priority Disciplines Outcome Goal Variances Interventions   Physical Therapy Goal     PT, PT/OT Ongoing (interventions implemented as appropriate)     Description:  LTG'S TO BE MET IN 7 DAYS (11-23-18)  1. PT WILL REQUIRE MODA FOR BED MOBILITY  2. PT WILL REQUIRE MODA FOR TF'S  3. PT WILL ' WITH MODA  4. PT WILL DEMO P+ DYNAMIC BALANCE DURING GAIT                    PLAN:    Patient to be seen  5 x/week  to address the above listed problems via gait training, therapeutic activities, therapeutic exercises  Plan of Care expires: 11/23/18  Plan of Care reviewed with: patient         Sherin Noonan, PTA  11/17/2018

## 2018-11-17 NOTE — PROGRESS NOTES
[facility] Hospital Medicine  Progress Note    Patient Name: Eve Long  MRN: [id number]  Patient Class: IP- Inpatient   Admission Date: 11/13/2018  Length of Stay: 4 days  Attending Physician: Ricardo Dorsey MD  Primary Care Provider: Chaya Morelos MD        Subjective:     Principal Problem:NSTEMI (non-ST elevated myocardial infarction)    HPI:  Eve Long is a 68 y.o. female patient with a PMHx of anemia, HLD, HTN, pneumonia, sarcoidosis, who presented to the ER for evaluation of constant left sided chest pain which onset suddenly x2-4 days ago. Pt characterizes her pain as a pressure 10/10 at its worse. Pt reports a strong FMHx of cardiac disease (Father with CABG/MI, all materal uncles with CAD) and reports her last stress test was done in 2006. Symptoms are constant and moderate in severity. Exacerbated by nothing and relieved by anti-hypertensive medications. Associated sxs included diaphoresis, left arm pain radiating from chest and lef jaw pain radiating from chest. Patient denied any fever, chills, SOB, leg swelling, palpitations, N/V, dysuria, hematuria, HA, weakness, and all other sxs at this time. In ER, troponin 0.241. Cardiology was consulted and Dr. Rodríguez recommended IV Heparin Drip. Spouse, Bravo Logn, is the surrogate decision maker, HOME [phone]. She was admitted to telemetry Dx NSTEMI.         Hospital Course:  Admitted for evaluation and treatment of chest pain with elevated troponin.  Seen by Cardiology and had a left heart angiogram by Dr. Rodríguez 14 November.  Procedure revealed multivessel disease.  IABP placed and post-procedure in the ICU.  Consultation to Cardiac Surgery to plan for CABG.  11/15- pt underwent 5 vessel CABG by Dr. Castellanos today on IABP 1:1, seen post op in the ICU on vent, sedated with Precedex. Getting a dose of Albumin and Ca and is on Insulin drip, remains on IABP along with CT to water seal. Cardiac Index 2.0--  may need a little Epi drip to improve CI.   11/16- doing much better, alert and awake and talking. Extubated around 9 pm last evening. Had to be placed on Epinephrine gtt and Vasopressin to augment CO/CI and both were d/james this am. IABP removed by Dr. Castellanos this am and his Cordis and Chest tubes are coming off soon today. Also got a unit of blood post op. No obvious hemorrhage anywhere, H/H stable. Good urine output.   11/17- looks a little better after all the chest tubes, cordis, chest tubes removed yesterday. Got OT/Pt earlier and then got up by herself and did well. Also has some CHF and getting Lasix, K is being replaced. Also developed Afib w RVR-- getting BB.      Interval History: looks better, transferred to [facility], pacer wire in place, getting Lasix. Kcl and also developed afib w RVR    Review of Systems   Constitutional: Positive for activity change and appetite change.   HENT: Positive for congestion.    Eyes: Negative.    Respiratory: Positive for shortness of breath.    Cardiovascular: Positive for chest pain and leg swelling. Negative for palpitations.   Gastrointestinal: Negative.    Genitourinary: Negative.    Musculoskeletal: Positive for myalgias.   Skin: Positive for color change and pallor.   Neurological: Positive for weakness.   Psychiatric/Behavioral: Negative.      Objective:     Vital Signs (Most Recent):  Temp: 97.9 °F (36.6 °C) (11/17/18 1510)  Pulse: 89 (11/17/18 1511)  Resp: (!) 22 (11/17/18 1510)  BP: 124/72 (11/17/18 1510)  SpO2: (!) 92 % (11/17/18 1511) Vital Signs (24h Range):  Temp:  [97.9 °F (36.6 °C)-99.1 °F (37.3 °C)] 97.9 °F (36.6 °C)  Pulse:  [] 89  Resp:  [19-36] 22  SpO2:  [91 %-98 %] 92 %  BP: ()/(42-75) 124/72  Arterial Line BP: (117-154)/(56-72) 119/61     Weight: 76 kg (167 lb 8.8 oz)  Body mass index is 29.68 kg/m².    Intake/Output Summary (Last 24 hours) at 11/17/2018 1725  Last data filed at 11/17/2018 1207  Gross per 24 hour   Intake 434 ml   Output  3880 ml   Net -3446 ml      Physical Exam   Constitutional: She is oriented to person, place, and time. She appears well-developed and well-nourished.   HENT:   Head: Normocephalic and atraumatic.   Cardiovascular: Normal rate, regular rhythm and normal heart sounds.   Pulmonary/Chest: Effort normal and breath sounds normal.   Abdominal: Soft. Bowel sounds are normal.   Musculoskeletal: She exhibits edema. She exhibits no deformity.   Neurological: She is alert and oriented to person, place, and time.   Skin: Skin is warm and dry.   Nursing note and vitals reviewed.      Significant Labs:   BMP:   Recent Labs   Lab 11/17/18  0400 11/17/18  1117   * 174*    138   K 3.8 3.8    100   CO2 27 27   BUN 26* 29*   CREATININE 1.0 1.1   CALCIUM 9.1 9.2   MG 2.5  --      CBC:   Recent Labs   Lab 11/16/18  0350 11/16/18  1715 11/17/18  0400   WBC 14.33*  --  15.32*   HGB 7.5* 8.6* 9.0*   HCT 21.9* 24.8* 26.6*     --  148*     Magnesium:   Recent Labs   Lab 11/16/18  0350 11/16/18  1715 11/17/18  0400   MG 3.5* 2.9* 2.5     Respiratory Culture:   Troponin:   All pertinent labs within the past 24 hours have been reviewed.    Significant Imaging: I have reviewed all pertinent imaging results/findings within the past 24 hours.    Assessment/Plan:      * NSTEMI (non-ST elevated myocardial infarction) on Intraaortic Baloon pump    Chest pain and elevated troponin.  Case Discussed with subspecialities: Cardiology Consulted: Dr. Rodríguez.  Heparin and Nitroglycerin infusions.  [facility] on 14 November showing multi-vessel disease.  IABP placed and post-procedure in ICU.  Evaluated by Cardiac surgery and plan for CABG Thursday 15 November.  Aspirin, Metoprolol, and Supplemental Oxygen.  Cardiac echo pending.    S/p 5 V CABG, post op doing well.  Continue to remove baloon pump, Chest tubes, cordis, temp pacer etc  OT/PT eval, cont resp rx    11/17- improved, POD 2--              transferred to [facility] and  Ambulate     S/P CABG x 5    Doing well post op on vent and IABP 1:1  Mildly sedated  Continue post op orders per CVT- may need Epi gtt to augment CI    See above     COPD, moderate    PRN Duonebs and supplemental oxygen.  Continue vent support    Extubated, now getting nebs, IS  Doing well     Hypothyroidism    Resume Home Meds.  TSH pending.  Normal TSH     Pulmonary sarcoidosis    stable         VTE Risk Mitigation (From admission, onward)        Ordered     apixaban tablet 5 mg  2 times daily      11/17/18 1434     IP VTE LOW RISK PATIENT  Once      11/14/18 1529     Place LIUDMILA hose  Until discontinued      11/14/18 1529     Place sequential compression device  Until discontinued      11/14/18 1529              Eloise Pacheco MD  Department of Hospital Medicine   [facility]

## 2018-11-17 NOTE — PROGRESS NOTES
pts urine output between 25-35 cc/hr.  Pt slightly hypotensive.  Dr. Dorsey notified.  Orders received.  See mar.

## 2018-11-17 NOTE — PLAN OF CARE
Problem: Patient Care Overview  Goal: Plan of Care Review  Outcome: Ongoing (interventions implemented as appropriate)  Plan of care explained and discussed with patient. No questions expressed and asked. Patient remain free of injury throughout shift. Monitor showing in and out of paced/afib rhythm. Md aware of above. Will continue to monitor for any changes.

## 2018-11-17 NOTE — PLAN OF CARE
Problem: Physical Therapy Goal  Goal: Physical Therapy Goal  LTG'S TO BE MET IN 7 DAYS (11-23-18)  1. PT WILL REQUIRE MODA FOR BED MOBILITY  2. PT WILL REQUIRE MODA FOR TF'S  3. PT WILL ' WITH MODA  4. PT WILL DEMO P+ DYNAMIC BALANCE DURING GAIT   Outcome: Ongoing (interventions implemented as appropriate)  PATIENT AT MOD ASSISTX2 FOR T/FS SIT TO STAND, MAX CUES FOR MAINTAINING STERNAL PERCAUTIONS THROUGHOUT T/FS, GT ACTIVITY. 5'X2  AT A X2 , MAX CUES TO INCREASE UPRIGHT POSTURE.

## 2018-11-17 NOTE — SUBJECTIVE & OBJECTIVE
Interval History: POD#2 s/p CABGx5. No complaints. Pain is well controlled    ROS  Medications:  Continuous Infusions:   amiodarone in dextrose 5% 0.5 mg/min (11/17/18 0600)    dexmedetomidine (PRECEDEX) infusion Stopped (11/16/18 1400)    epinephrine infusion Stopped (11/16/18 1600)    insulin (HUMAN R) infusion (adults) Stopped (11/16/18 1138)    niCARdipine Stopped (11/15/18 1545)     Scheduled Meds:   amiodarone  200 mg Oral BID    aspirin  81 mg Oral Daily    chlorhexidine  10 mL Mouth/Throat BID    clopidogrel  75 mg Oral Daily    docusate sodium  100 mg Oral BID    furosemide  40 mg Intravenous BID    metoprolol tartrate  50 mg Oral BID    nozaseptin   Each Nare BID    pantoprazole  40 mg Oral Daily    polyethylene glycol  17 g Oral Daily    potassium chloride  20 mEq Oral Q12H     PRN Meds:sodium chloride, albumin human 5%, albuterol **AND** Inhalation Treatment Q6H PRN, calcium chloride, dextrose 50%, dextrose 50%, dextrose 50%, dextrose 50%, fentaNYL, fentaNYL, glucagon (human recombinant), glucose, glucose, HYDROcodone-acetaminophen, insulin aspart U-100, insulin (HUMAN R) infusion (adults), lactated ringers, magnesium sulfate IVPB, metoclopramide HCl, ondansetron, oxyCODONE, oxyCODONE, potassium chloride in water **AND** potassium chloride in water **AND** potassium chloride in water     Objective:     Vital Signs (Most Recent):  Temp: 98.7 °F (37.1 °C) (11/17/18 0300)  Pulse: 91 (11/17/18 0630)  Resp: (!) 23 (11/17/18 0630)  BP: 114/60 (11/17/18 0630)  SpO2: 96 % (11/17/18 0630) Vital Signs (24h Range):  Temp:  [98.7 °F (37.1 °C)-99.5 °F (37.5 °C)] 98.7 °F (37.1 °C)  Pulse:  [45-92] 91  Resp:  [19-36] 23  SpO2:  [92 %-99 %] 96 %  BP: ()/(37-93) 114/60  Arterial Line BP: ()/() 136/63     Weight: 76 kg (167 lb 8.8 oz)  Body mass index is 29.68 kg/m².    SpO2: 96 %  O2 Device (Oxygen Therapy): nasal cannula w/ humidification    Intake/Output - Last 3 Shifts       11/15  0700 - 11/16 0659 11/16 0700 - 11/17 0659 11/17 0700 - 11/18 0659    P.O. 1000      I.V. (mL/kg) 4575.3 (61.5) 944 (12.4)     Blood 1450 312.5     IV Piggyback 300 100     Total Intake(mL/kg) 7325.3 (98.5) 1356.5 (17.8)     Urine (mL/kg/hr) 1660 (0.9) 3487 (1.9)     Drains 68 15     Other 0      Stool 0 0     Chest Tube 473 128     Total Output 2201 3630     Net +5124.3 -2273.6            Stool Occurrence 0 x 0 x           Lines/Drains/Airways     Drain                 Urethral Catheter 11/15/18 0840 Latex;Straight-tip 16 Fr. 1 day          Arterial Line                 Arterial Line 11/15/18 0838 Left Radial 1 day          Line                 Pacer Wires 11/15/18 1600 1 day          Pressure Ulcer                 Negative Pressure Wound Therapy  1 day          Peripheral Intravenous Line                 Peripheral IV - Single Lumen 11/13/18 1635 Left Antecubital 3 days         Midline Catheter Insertion/Assessment  - Double Lumen 11/16/18 1530 Left less than 1 day                Physical Exam   Constitutional: She is oriented to person, place, and time. She appears well-developed and well-nourished.   HENT:   Head: Normocephalic and atraumatic.   Cardiovascular: Normal rate, regular rhythm and normal heart sounds.   Pulmonary/Chest: Effort normal and breath sounds normal.   Abdominal: Soft. Bowel sounds are normal.   Musculoskeletal: She exhibits edema. She exhibits no deformity.   Neurological: She is alert and oriented to person, place, and time.   Skin: Skin is warm and dry.       Significant Labs:  All pertinent labs from the last 24 hours have been reviewed.    Significant Diagnostics:  I have reviewed all pertinent imaging results/findings within the past 24 hours.

## 2018-11-18 PROBLEM — D62 ACUTE BLOOD LOSS ANEMIA: Status: ACTIVE | Noted: 2018-11-18

## 2018-11-18 LAB
ANION GAP SERPL CALC-SCNC: 7 MMOL/L
ANION GAP SERPL CALC-SCNC: 9 MMOL/L
BASOPHILS # BLD AUTO: 0 K/UL
BASOPHILS NFR BLD: 0 %
BLD PROD TYP BPU: NORMAL
BLOOD UNIT EXPIRATION DATE: NORMAL
BLOOD UNIT TYPE CODE: 6200
BLOOD UNIT TYPE: NORMAL
BUN SERPL-MCNC: 33 MG/DL
BUN SERPL-MCNC: 35 MG/DL
BUN SERPL-MCNC: 37 MG/DL
CALCIUM SERPL-MCNC: 9.1 MG/DL
CALCIUM SERPL-MCNC: 9.2 MG/DL
CHLORIDE SERPL-SCNC: 95 MMOL/L
CHLORIDE SERPL-SCNC: 97 MMOL/L
CHLORIDE SERPL-SCNC: 98 MMOL/L
CO2 SERPL-SCNC: 32 MMOL/L
CO2 SERPL-SCNC: 33 MMOL/L
CO2 SERPL-SCNC: 34 MMOL/L
CODING SYSTEM: NORMAL
CREAT SERPL-MCNC: 0.8 MG/DL
CREAT SERPL-MCNC: 0.9 MG/DL
CREAT SERPL-MCNC: 0.9 MG/DL
DIFFERENTIAL METHOD: ABNORMAL
DISPENSE STATUS: NORMAL
EOSINOPHIL # BLD AUTO: 0 K/UL
EOSINOPHIL NFR BLD: 0 %
ERYTHROCYTE [DISTWIDTH] IN BLOOD BY AUTOMATED COUNT: 15.1 %
EST. GFR  (AFRICAN AMERICAN): >60 ML/MIN/1.73 M^2
EST. GFR  (NON AFRICAN AMERICAN): >60 ML/MIN/1.73 M^2
GLUCOSE SERPL-MCNC: 117 MG/DL
GLUCOSE SERPL-MCNC: 153 MG/DL
GLUCOSE SERPL-MCNC: 160 MG/DL
HCT VFR BLD AUTO: 27.3 %
HGB BLD-MCNC: 9.1 G/DL
LYMPHOCYTES # BLD AUTO: 0.9 K/UL
LYMPHOCYTES NFR BLD: 6.6 %
MAGNESIUM SERPL-MCNC: 2.2 MG/DL
MAGNESIUM SERPL-MCNC: 2.3 MG/DL
MCH RBC QN AUTO: 28.3 PG
MCHC RBC AUTO-ENTMCNC: 33.3 G/DL
MCV RBC AUTO: 85 FL
MONOCYTES # BLD AUTO: 1.4 K/UL
MONOCYTES NFR BLD: 10.1 %
NEUTROPHILS # BLD AUTO: 11.8 K/UL
NEUTROPHILS NFR BLD: 83.3 %
NUM UNITS TRANS PACKED RBC: NORMAL
PLATELET # BLD AUTO: 168 K/UL
PMV BLD AUTO: 9.7 FL
POCT GLUCOSE: 116 MG/DL (ref 70–110)
POCT GLUCOSE: 120 MG/DL (ref 70–110)
POCT GLUCOSE: 130 MG/DL (ref 70–110)
POTASSIUM SERPL-SCNC: 3.4 MMOL/L
POTASSIUM SERPL-SCNC: 3.9 MMOL/L
RBC # BLD AUTO: 3.21 M/UL
SODIUM SERPL-SCNC: 137 MMOL/L
SODIUM SERPL-SCNC: 138 MMOL/L
SODIUM SERPL-SCNC: 139 MMOL/L
WBC # BLD AUTO: 14.21 K/UL

## 2018-11-18 PROCEDURE — 85025 COMPLETE CBC W/AUTO DIFF WBC: CPT | Mod: HCNC

## 2018-11-18 PROCEDURE — 25000003 PHARM REV CODE 250: Mod: HCNC | Performed by: EMERGENCY MEDICINE

## 2018-11-18 PROCEDURE — 27000221 HC OXYGEN, UP TO 24 HOURS: Mod: HCNC

## 2018-11-18 PROCEDURE — 63600175 PHARM REV CODE 636 W HCPCS: Mod: HCNC | Performed by: EMERGENCY MEDICINE

## 2018-11-18 PROCEDURE — 25000003 PHARM REV CODE 250: Mod: HCNC | Performed by: NURSE PRACTITIONER

## 2018-11-18 PROCEDURE — 83735 ASSAY OF MAGNESIUM: CPT | Mod: HCNC

## 2018-11-18 PROCEDURE — 94799 UNLISTED PULMONARY SVC/PX: CPT | Mod: HCNC

## 2018-11-18 PROCEDURE — 94761 N-INVAS EAR/PLS OXIMETRY MLT: CPT | Mod: HCNC

## 2018-11-18 PROCEDURE — 80048 BASIC METABOLIC PNL TOTAL CA: CPT | Mod: 91,HCNC

## 2018-11-18 PROCEDURE — 80048 BASIC METABOLIC PNL TOTAL CA: CPT | Mod: HCNC

## 2018-11-18 PROCEDURE — 25000003 PHARM REV CODE 250: Mod: HCNC | Performed by: THORACIC SURGERY (CARDIOTHORACIC VASCULAR SURGERY)

## 2018-11-18 PROCEDURE — 21400001 HC TELEMETRY ROOM: Mod: HCNC

## 2018-11-18 PROCEDURE — 99232 SBSQ HOSP IP/OBS MODERATE 35: CPT | Mod: HCNC,,, | Performed by: INTERNAL MEDICINE

## 2018-11-18 PROCEDURE — 63600175 PHARM REV CODE 636 W HCPCS: Mod: HCNC | Performed by: THORACIC SURGERY (CARDIOTHORACIC VASCULAR SURGERY)

## 2018-11-18 RX ORDER — MAGNESIUM SULFATE 1 G/100ML
1 INJECTION INTRAVENOUS ONCE
Status: COMPLETED | OUTPATIENT
Start: 2018-11-18 | End: 2018-11-18

## 2018-11-18 RX ORDER — POTASSIUM CHLORIDE 14.9 MG/ML
20 INJECTION INTRAVENOUS ONCE
Status: COMPLETED | OUTPATIENT
Start: 2018-11-18 | End: 2018-11-18

## 2018-11-18 RX ORDER — SPIRONOLACTONE 25 MG/1
25 TABLET ORAL 2 TIMES DAILY
Status: DISCONTINUED | OUTPATIENT
Start: 2018-11-18 | End: 2018-11-18

## 2018-11-18 RX ORDER — THIAMINE HCL 100 MG
100 TABLET ORAL DAILY
Status: DISCONTINUED | OUTPATIENT
Start: 2018-11-19 | End: 2018-11-20 | Stop reason: HOSPADM

## 2018-11-18 RX ORDER — POTASSIUM CHLORIDE 20 MEQ/1
40 TABLET, EXTENDED RELEASE ORAL ONCE
Status: COMPLETED | OUTPATIENT
Start: 2018-11-18 | End: 2018-11-18

## 2018-11-18 RX ORDER — AMIODARONE HYDROCHLORIDE 200 MG/1
200 TABLET ORAL 3 TIMES DAILY
Status: DISCONTINUED | OUTPATIENT
Start: 2018-11-18 | End: 2018-11-18

## 2018-11-18 RX ORDER — POTASSIUM CHLORIDE 7.45 MG/ML
10 INJECTION INTRAVENOUS
Status: COMPLETED | OUTPATIENT
Start: 2018-11-18 | End: 2018-11-18

## 2018-11-18 RX ORDER — SPIRONOLACTONE 25 MG/1
50 TABLET ORAL 2 TIMES DAILY
Status: COMPLETED | OUTPATIENT
Start: 2018-11-18 | End: 2018-11-19

## 2018-11-18 RX ORDER — LISINOPRIL 2.5 MG/1
2.5 TABLET ORAL DAILY
Status: DISCONTINUED | OUTPATIENT
Start: 2018-11-18 | End: 2018-11-20 | Stop reason: HOSPADM

## 2018-11-18 RX ORDER — FERROUS SULFATE 325(65) MG
325 TABLET, DELAYED RELEASE (ENTERIC COATED) ORAL DAILY
Status: DISCONTINUED | OUTPATIENT
Start: 2018-11-19 | End: 2018-11-20 | Stop reason: HOSPADM

## 2018-11-18 RX ORDER — POTASSIUM CHLORIDE 20 MEQ/1
20 TABLET, EXTENDED RELEASE ORAL ONCE
Status: COMPLETED | OUTPATIENT
Start: 2018-11-18 | End: 2018-11-18

## 2018-11-18 RX ORDER — AMIODARONE HYDROCHLORIDE 200 MG/1
200 TABLET ORAL 2 TIMES DAILY
Status: DISCONTINUED | OUTPATIENT
Start: 2018-11-18 | End: 2018-11-20

## 2018-11-18 RX ADMIN — POTASSIUM CHLORIDE 20 MEQ: 14.9 INJECTION, SOLUTION INTRAVENOUS at 09:11

## 2018-11-18 RX ADMIN — POTASSIUM CHLORIDE 10 MEQ: 7.46 INJECTION, SOLUTION INTRAVENOUS at 05:11

## 2018-11-18 RX ADMIN — CHLORHEXIDINE GLUCONATE 10 ML: 1.2 RINSE ORAL at 08:11

## 2018-11-18 RX ADMIN — SPIRONOLACTONE 25 MG: 25 TABLET ORAL at 08:11

## 2018-11-18 RX ADMIN — POTASSIUM CHLORIDE 40 MEQ: 1500 TABLET, EXTENDED RELEASE ORAL at 11:11

## 2018-11-18 RX ADMIN — LISINOPRIL 2.5 MG: 2.5 TABLET ORAL at 11:11

## 2018-11-18 RX ADMIN — INSULIN ASPART 1 UNITS: 100 INJECTION, SOLUTION INTRAVENOUS; SUBCUTANEOUS at 09:11

## 2018-11-18 RX ADMIN — APIXABAN 5 MG: 2.5 TABLET, FILM COATED ORAL at 09:11

## 2018-11-18 RX ADMIN — POTASSIUM CHLORIDE 20 MEQ: 1500 TABLET, EXTENDED RELEASE ORAL at 08:11

## 2018-11-18 RX ADMIN — PANTOPRAZOLE SODIUM 40 MG: 40 TABLET, DELAYED RELEASE ORAL at 08:11

## 2018-11-18 RX ADMIN — POTASSIUM CHLORIDE 10 MEQ: 7.46 INJECTION, SOLUTION INTRAVENOUS at 03:11

## 2018-11-18 RX ADMIN — FUROSEMIDE 40 MG: 10 INJECTION, SOLUTION INTRAMUSCULAR; INTRAVENOUS at 08:11

## 2018-11-18 RX ADMIN — POTASSIUM CHLORIDE 20 MEQ: 1500 TABLET, EXTENDED RELEASE ORAL at 09:11

## 2018-11-18 RX ADMIN — METOPROLOL TARTRATE 100 MG: 50 TABLET ORAL at 08:11

## 2018-11-18 RX ADMIN — POTASSIUM CHLORIDE 40 MEQ: 1500 TABLET, EXTENDED RELEASE ORAL at 03:11

## 2018-11-18 RX ADMIN — IRON SUCROSE 200 MG: 20 INJECTION, SOLUTION INTRAVENOUS at 06:11

## 2018-11-18 RX ADMIN — AMIODARONE HYDROCHLORIDE 200 MG: 200 TABLET ORAL at 09:11

## 2018-11-18 RX ADMIN — FUROSEMIDE 40 MG: 10 INJECTION, SOLUTION INTRAMUSCULAR; INTRAVENOUS at 09:11

## 2018-11-18 RX ADMIN — METOPROLOL TARTRATE 100 MG: 50 TABLET ORAL at 09:11

## 2018-11-18 RX ADMIN — CHLORHEXIDINE GLUCONATE 10 ML: 1.2 RINSE ORAL at 09:11

## 2018-11-18 RX ADMIN — DOCUSATE SODIUM 100 MG: 100 CAPSULE, LIQUID FILLED ORAL at 09:11

## 2018-11-18 RX ADMIN — SPIRONOLACTONE 50 MG: 25 TABLET ORAL at 09:11

## 2018-11-18 RX ADMIN — MAGNESIUM SULFATE IN DEXTROSE 1 G: 10 INJECTION, SOLUTION INTRAVENOUS at 08:11

## 2018-11-18 RX ADMIN — APIXABAN 5 MG: 2.5 TABLET, FILM COATED ORAL at 08:11

## 2018-11-18 RX ADMIN — ASPIRIN 81 MG: 81 TABLET, COATED ORAL at 08:11

## 2018-11-18 RX ADMIN — POLYETHYLENE GLYCOL 3350 17 G: 17 POWDER, FOR SOLUTION ORAL at 08:11

## 2018-11-18 RX ADMIN — AMIODARONE HYDROCHLORIDE 200 MG: 200 TABLET ORAL at 08:11

## 2018-11-18 RX ADMIN — DOCUSATE SODIUM 100 MG: 100 CAPSULE, LIQUID FILLED ORAL at 08:11

## 2018-11-18 NOTE — PLAN OF CARE
Problem: Patient Care Overview  Goal: Plan of Care Review  Outcome: Ongoing (interventions implemented as appropriate)  Plan of care reviewed with patient. Patient mobilized more and sat more in chair with no complaints throughout shift. Labs monitored and values replaced as needed. Continues to be paced on monitor with no rhythm difficulties/changes. Pain denied throughout shift and no injury noted. Will continue to monitor for any changes.

## 2018-11-18 NOTE — ASSESSMENT & PLAN NOTE
Coronary artery bypass grafting x5 with LIMA to LAD and reverse saphenous vein graft to the distal RCA sequence reverse saphenous vein graft to OM2 and OM3 and reverse saphenous vein graft to diagonal    POD # 2:     Paced: Mode AAI. Normal pacemaker function and stable pacing and sensing thresholds.  STERNAL INCISION: staples in place; wound clean, dry, and intact, STABLE HEMODYNAMICS.    CARDIAC STERNAL PRECAUTIONS.     Monitor hemodynamics and  monitor for dysrhythmias MAP goal of  65 mmHg.    CARDIOACTIVE MEDS:   OFF    DIURETICS:  FUROSEMIDE

## 2018-11-18 NOTE — SUBJECTIVE & OBJECTIVE
Interval History: POD # 2. Seen and examined at bedside. Hospital chart reviewed. No acute interval detrimental events noted. She reports that she  has moderately improved. Exubated. Drains discontinued. IABP discontinued. Paced.       Review of Systems   Constitutional: Negative for chills and fever.   HENT: Negative for ear pain and hearing loss.    Eyes: Negative for pain and discharge.   Respiratory: Negative for cough and shortness of breath.    Cardiovascular: Negative for chest pain.   Gastrointestinal: Negative for abdominal pain and constipation.   Endocrine: Negative for cold intolerance and heat intolerance.   Genitourinary: Negative for decreased urine volume and flank pain.   Musculoskeletal: Positive for back pain. Negative for neck pain.   Neurological: Negative for seizures and headaches.   Hematological: Does not bruise/bleed easily.   Psychiatric/Behavioral: Negative for agitation and confusion.     Scheduled Meds:   apixaban  5 mg Oral BID    aspirin  81 mg Oral Daily    chlorhexidine  10 mL Mouth/Throat BID    docusate sodium  100 mg Oral BID    furosemide  40 mg Intravenous BID    metoprolol tartrate  100 mg Oral BID    nozaseptin   Each Nare BID    pantoprazole  40 mg Oral Daily    polyethylene glycol  17 g Oral Daily    potassium chloride  20 mEq Oral Q12H     Continuous Infusions:   amiodarone in dextrose 5% Stopped (11/17/18 1105)     PRN Meds:.sodium chloride, albuterol **AND** Inhalation Treatment Q6H PRN, dextrose 50%, dextrose 50%, dextrose 50%, dextrose 50%, glucagon (human recombinant), glucose, glucose, HYDROcodone-acetaminophen, insulin aspart U-100, lactated ringers, metoclopramide HCl, ondansetron, oxyCODONE, oxyCODONE    Objective:     Vital Signs (Most Recent):  Temp: 97.9 °F (36.6 °C) (11/17/18 1510)  Pulse: 89 (11/17/18 1511)  Resp: (!) 22 (11/17/18 1510)  BP: 124/72 (11/17/18 1510)  SpO2: (!) 92 % (11/17/18 1511) Vital Signs (24h Range):  Temp:  [97.9 °F (36.6  °C)-99.1 °F (37.3 °C)] 97.9 °F (36.6 °C)  Pulse:  [] 89  Resp:  [19-36] 22  SpO2:  [91 %-98 %] 92 %  BP: ()/(42-75) 124/72  Arterial Line BP: (117-154)/(56-72) 119/61     Weight: 76 kg (167 lb 8.8 oz)  Body mass index is 29.68 kg/m².      Intake/Output Summary (Last 24 hours) at 11/17/2018 1848  Last data filed at 11/17/2018 1700  Gross per 24 hour   Intake 367.2 ml   Output 4080 ml   Net -3712.8 ml       Physical Exam   Constitutional: She is sedated and intubated.   HENT:   Head: Normocephalic and atraumatic.   Eyes: Conjunctivae are normal. Pupils are equal, round, and reactive to light.   Neck: Normal range of motion. Neck supple.   Cardiovascular: Normal rate, regular rhythm, intact distal pulses and normal pulses.   Pulmonary/Chest: Effort normal and breath sounds normal. She is intubated.   Abdominal: Soft. Bowel sounds are normal.   Musculoskeletal: Normal range of motion.   Neurological: GCS eye subscore is 1. GCS verbal subscore is 1. GCS motor subscore is 1.   Skin: Skin is warm and dry. Capillary refill takes less than 2 seconds.   Vitals reviewed.          Lines/Drains/Airways     Line                 Pacer Wires 11/15/18 1600 2 days          Pressure Ulcer                 Negative Pressure Wound Therapy  2 days          Peripheral Intravenous Line                 Peripheral IV - Single Lumen 11/13/18 1635 Left Antecubital 4 days         Midline Catheter Insertion/Assessment  - Double Lumen 11/16/18 1530 Left 1 day                Significant Labs:    CBC/Anemia Profile:  Recent Labs   Lab 11/16/18  0350 11/16/18  1715 11/17/18  0400   WBC 14.33*  --  15.32*   HGB 7.5* 8.6* 9.0*   HCT 21.9* 24.8* 26.6*     --  148*   MCV 84  --  84   RDW 15.1*  --  15.1*        Chemistries:  Recent Labs   Lab 11/16/18  0350 11/16/18  1715 11/17/18  0400 11/17/18  1117    139 140 138   K 3.9 5.0 3.8 3.8   * 109 104 100   CO2 26 25 27 27   BUN 17 24* 26* 29*   CREATININE 0.8 0.9 1.0 1.1    CALCIUM 8.3* 9.6 9.1 9.2   MG 3.5* 2.9* 2.5  --        ABGs:   Recent Labs   Lab 11/15/18  2116   PH 7.358   PCO2 40.0   HCO3 22.5*   POCSATURATED 95   BE -3     Coagulation:   Recent Labs   Lab 11/16/18  0350   INR 1.1   APTT 29.1     All pertinent labs within the past 24 hours have been reviewed.    Significant Imaging:      X-Ray Chest AP Portable: 11/16/18:    Endotracheal and nasogastric tubes have been removed.  Remaining tubes are stable.  Decreased lung volumes are noted.  Small left pleural effusion left lower lobe atelectasis or airspace disease.  Mild pulmonary vascular congestion is suspected.  Bones demonstrate degenerative changes.  Moderate gastric distention noted.  In comparison to the prior study, there is no adverse interval changes

## 2018-11-18 NOTE — ASSESSMENT & PLAN NOTE
-Patient is POD 1 s/p CABG x5   -Continue Amio gtt and plan to switch to PO tomorrow  -CT and RAZIA out when ok with CT surgery   -IABP being weaned. Changed to 1:2 by Dr. Rodríguez  -Continue ASA, Plavix   -No statin due to intolerance in the past. Had severe myalgia with statin and Zetia use   -Encourage IS use and begin ambulation when transferred to Tele     11/17/18  -Agree with increasing metoprolol to 50mg BID and use Lopressor 5mg IV PRN  -Needs to diurese with IV Lasix   -K+ being replaced  -Continue ASA and Plavix  --No statin due to intolerance in the past. Had severe myalgia with statin and Zetia use   -Encourage IS use and begin ambulation when transferred to Tele     11/18/18  -Decrease amio to 200mg BID  -Continue Lopressor 100mg BID  -Continue ASA, Lisinopril and Eliqius  -Needs to start Plavix if ok with CT surgery  -No statin due to intolerance in the past. Had severe myalgia with statin and Zetia use    -Encourage IS use and ambulation     11/19/18  -Continue Amio, Lopressor, ASA, ACEi, Eliquis  Start Plavix if ok with CT Surgery  No statin due to severe intolerance  No Zetia due to intolerance  Encourage IS usage every hour  Encourage ambulation  Will continue to follow along.

## 2018-11-18 NOTE — PLAN OF CARE
Problem: Patient Care Overview  Goal: Plan of Care Review  Outcome: Ongoing (interventions implemented as appropriate)  POC reviewed, verbalized understanding. Pt remained free from falls, fall precautions in place. Pt is paced on monitor, 90s. Pacing wires intact. 2L O2 NC. Denies pain. PIV & midline intact. Labs monitored, K replaced this shift. Up with assist. Voids per bedside commode. Call bell and personal belongings within reach. Hourly rounding complete. Reminded to call for assistance. Will continue to monitor.

## 2018-11-18 NOTE — ASSESSMENT & PLAN NOTE
Doing well post op on vent and IABP 1:1  Mildly sedated  Continue post op orders per CVT- may need Epi gtt to augment CI    See above, doing well

## 2018-11-18 NOTE — SUBJECTIVE & OBJECTIVE
Review of Systems   Constitution: Negative for diaphoresis, weakness, malaise/fatigue, weight gain and weight loss.   HENT: Negative for congestion and nosebleeds.    Cardiovascular: Negative for chest pain, claudication, cyanosis, dyspnea on exertion, irregular heartbeat, leg swelling, near-syncope, orthopnea, palpitations, paroxysmal nocturnal dyspnea and syncope.        Sternal incision pain      Respiratory: Negative for cough, hemoptysis, shortness of breath, sleep disturbances due to breathing, snoring, sputum production and wheezing.    Hematologic/Lymphatic: Negative for bleeding problem. Does not bruise/bleed easily.   Skin: Negative for rash.   Musculoskeletal: Negative for arthritis, back pain, falls, joint pain, muscle cramps and muscle weakness.   Gastrointestinal: Negative for abdominal pain, constipation, diarrhea, heartburn, hematemesis, hematochezia, melena and nausea.   Genitourinary: Negative for dysuria, hematuria and nocturia.   Neurological: Negative for excessive daytime sleepiness, dizziness, headaches, light-headedness, loss of balance, numbness and vertigo.     Objective:     Vital Signs (Most Recent):  Temp: 97.7 °F (36.5 °C) (11/18/18 0724)  Pulse: 90 (11/18/18 1100)  Resp: (!) 22 (11/18/18 0724)  BP: 133/61 (11/18/18 0724)  SpO2: (!) 92 % (11/18/18 0724) Vital Signs (24h Range):  Temp:  [97.5 °F (36.4 °C)-98.3 °F (36.8 °C)] 97.7 °F (36.5 °C)  Pulse:  [89-92] 90  Resp:  [18-22] 22  SpO2:  [91 %-95 %] 92 %  BP: (124-137)/(59-72) 133/61     Weight: 76 kg (167 lb 8.8 oz)  Body mass index is 29.68 kg/m².     SpO2: (!) 92 %  O2 Device (Oxygen Therapy): nasal cannula      Intake/Output Summary (Last 24 hours) at 11/18/2018 1217  Last data filed at 11/18/2018 1000  Gross per 24 hour   Intake 236 ml   Output 3250 ml   Net -3014 ml       Lines/Drains/Airways     Line                 Pacer Wires 11/15/18 1600 2 days          Pressure Ulcer                 Negative Pressure Wound Therapy  2 days           Peripheral Intravenous Line                 Peripheral IV - Single Lumen 11/13/18 1635 Left Antecubital 4 days         Midline Catheter Insertion/Assessment  - Double Lumen 11/16/18 1530 Left 1 day                Physical Exam   Constitutional: She is oriented to person, place, and time. She appears well-developed and well-nourished.   Neck: Neck supple. No JVD present.   Cardiovascular: Normal rate, regular rhythm, normal heart sounds and normal pulses. Exam reveals no friction rub.   No murmur heard.  Pulmonary/Chest: Effort normal and breath sounds normal. No respiratory distress. She has no wheezes. She has no rales.   Sternal incision with wound vac noted  Pacer wires in use      Abdominal: Soft. Bowel sounds are normal. She exhibits no distension.   Musculoskeletal: She exhibits no edema or tenderness.   Neurological: She is alert and oriented to person, place, and time.   Skin: Skin is warm and dry. No rash noted.     Right groin access site without redness, tenderness, swelling, or drainage. There is no active external bleeding or hematoma. Femoral and DP pulse 2+. Skin warm/dry/pink.      Psychiatric: She has a normal mood and affect. Her behavior is normal.   Nursing note and vitals reviewed.      Significant Labs:   All pertinent lab results from the last 24 hours have been reviewed. and   Recent Lab Results       11/18/18  0522   11/18/18  0514   11/17/18 2021 11/17/18 2007 11/17/18  1713        Anion Gap   9   8          9              9           Baso #   0.00           Basophil%   0.0           BUN, Bld   33   33          33              33           Calcium   9.2   9.1          9.2              9.2           Chloride   98   100          98              98           CO2   32   30          32              32           Creatinine   0.8   1.0          0.8              0.8           Differential Method   Automated           eGFR if    >60   >60          >60              >60            eGFR if non    >60  Comment:  Calculation used to obtain the estimated glomerular filtration  rate (eGFR) is the CKD-EPI equation.      58  Comment:  Calculation used to obtain the estimated glomerular filtration  rate (eGFR) is the CKD-EPI equation.             >60  Comment:  Calculation used to obtain the estimated glomerular filtration  rate (eGFR) is the CKD-EPI equation.                 >60  Comment:  Calculation used to obtain the estimated glomerular filtration  rate (eGFR) is the CKD-EPI equation.              Eos #   0.0           Eosinophil%   0.0           Glucose   117   144          117              117           Gran # (ANC)   11.8           Gran%   83.3           Hematocrit   27.3           Hemoglobin   9.1           Lymph #   0.9           Lymph%   6.6           Magnesium   2.2   2.3       MCH   28.3           MCHC   33.3           MCV   85           Mono #   1.4           Mono%   10.1           MPV   9.7           Platelets   168           POCT Glucose 130   141   137     Potassium   3.4   3.6          3.4              3.4           RBC   3.21           RDW   15.1           Sodium   139   138          139              139           WBC   14.21                                Significant Imaging: Echocardiogram:   2D echo with color flow doppler:   Results for orders placed or performed during the hospital encounter of 11/13/18   2D echo with color flow doppler   Result Value Ref Range    QEF 40 (A) 55 - 65    Diastolic Dysfunction Yes (A)     and X-Ray: CXR: X-Ray Chest 1 View (CXR): No results found for this visit on 11/13/18. and X-Ray Chest PA and Lateral (CXR):   Results for orders placed or performed during the hospital encounter of 11/13/18   X-Ray Chest PA And Lateral    Narrative    EXAMINATION:  XR CHEST PA AND LATERAL    CLINICAL HISTORY:  Chest pain, unspecified    TECHNIQUE:  PA and lateral views of the chest were  performed.    COMPARISON:  08/29/2018    FINDINGS:  The cardiac and mediastinal silhouettes appear within normal limits. Stable calcified granuloma in the left lung base.  Stable pleural parenchymal scarring in the right lower lobe.  Cholecystectomy clips in the right upper quadrant.  No acute osseous findings demonstrated.      Impression    No acute findings.      Electronically signed by: Jose Alberot Lang MD  Date:    11/13/2018  Time:    16:13

## 2018-11-18 NOTE — PROGRESS NOTES
[facility] Hospital Medicine  Progress Note    Patient Name: Eve Long  MRN: [id number]  Patient Class: IP- Inpatient   Admission Date: 11/13/2018  Length of Stay: 5 days  Attending Physician: Ricardo Dorsey MD  Primary Care Provider: Chaya Morelos MD        Subjective:     Principal Problem:NSTEMI (non-ST elevated myocardial infarction)    HPI:  Eve Long is a 68 y.o. female patient with a PMHx of anemia, HLD, HTN, pneumonia, sarcoidosis, who presented to the ER for evaluation of constant left sided chest pain which onset suddenly x2-4 days ago. Pt characterizes her pain as a pressure 10/10 at its worse. Pt reports a strong FMHx of cardiac disease (Father with CABG/MI, all materal uncles with CAD) and reports her last stress test was done in 2006. Symptoms are constant and moderate in severity. Exacerbated by nothing and relieved by anti-hypertensive medications. Associated sxs included diaphoresis, left arm pain radiating from chest and lef jaw pain radiating from chest. Patient denied any fever, chills, SOB, leg swelling, palpitations, N/V, dysuria, hematuria, HA, weakness, and all other sxs at this time. In ER, troponin 0.241. Cardiology was consulted and Dr. Rodríguez recommended IV Heparin Drip. Spouse, Bravo Long, is the surrogate decision maker, HOME [phone]. She was admitted to telemetry Dx NSTEMI.         Hospital Course:  Admitted for evaluation and treatment of chest pain with elevated troponin.  Seen by Cardiology and had a left heart angiogram by Dr. Rodríguez 14 November.  Procedure revealed multivessel disease.  IABP placed and post-procedure in the ICU.  Consultation to Cardiac Surgery to plan for CABG.  11/15- pt underwent 5 vessel CABG by Dr. Castellanos today on IABP 1:1, seen post op in the ICU on vent, sedated with Precedex. Getting a dose of Albumin and Ca and is on Insulin drip, remains on IABP along with CT to water seal. Cardiac Index 2.0--  may need a little Epi drip to improve CI.   11/16- doing much better, alert and awake and talking. Extubated around 9 pm last evening. Had to be placed on Epinephrine gtt and Vasopressin to augment CO/CI and both were d/james this am. IABP removed by Dr. Castellanos this am and his Cordis and Chest tubes are coming off soon today. Also got a unit of blood post op. No obvious hemorrhage anywhere, H/H stable. Good urine output.   11/17- looks a little better after all the chest tubes, cordis, chest tubes removed yesterday. Got OT/Pt earlier and then got up by herself and did well. Also has some CHF and getting Lasix, K is being replaced. Also developed Afib w RVR-- getting BB.   11/18- doing much better, sitting up in chair, SOB, leg swelling resolved, still with transvenous pacemaker and occasionally feels the palpitations. Still in apace @90, controlled, now on oral amiodarone.     Interval History: doing much better, sitting up in chair, SOB, leg swelling resolved, still with transvenous pacemaker and occasionally feels the palpitations. Still in apace @90, controlled, now on oral amiodarone.      Review of Systems   Constitutional: Negative for chills and fever.   HENT: Negative for ear pain and hearing loss.    Eyes: Negative for pain and discharge.   Respiratory: Negative for cough and shortness of breath.    Cardiovascular: Positive for palpitations. Negative for chest pain.   Gastrointestinal: Negative for abdominal pain and constipation.   Endocrine: Negative for cold intolerance and heat intolerance.   Genitourinary: Negative for decreased urine volume and flank pain.   Musculoskeletal: Positive for back pain. Negative for neck pain.   Neurological: Negative for seizures and headaches.   Hematological: Does not bruise/bleed easily.   Psychiatric/Behavioral: Negative for agitation and confusion.     Objective:     Vital Signs (Most Recent):  Temp: 98.2 °F (36.8 °C) (11/18/18 1615)  Pulse: 94 (11/18/18 1615)  Resp:  20 (11/18/18 1615)  BP: (!) 109/57 (11/18/18 1615)  SpO2: 95 % (11/18/18 1615) Vital Signs (24h Range):  Temp:  [97.5 °F (36.4 °C)-98.3 °F (36.8 °C)] 98.2 °F (36.8 °C)  Pulse:  [89-94] 94  Resp:  [18-22] 20  SpO2:  [92 %-95 %] 95 %  BP: (109-137)/(57-66) 109/57     Weight: 76 kg (167 lb 8.8 oz)  Body mass index is 29.68 kg/m².    Intake/Output Summary (Last 24 hours) at 11/18/2018 1641  Last data filed at 11/18/2018 1000  Gross per 24 hour   Intake 236 ml   Output 3250 ml   Net -3014 ml      Physical Exam   Constitutional: She is oriented to person, place, and time. She appears well-developed and well-nourished.   Pleasant, comfortable, sitting up in chair, pale but NAD   HENT:   Head: Normocephalic and atraumatic.   Cardiovascular: Normal rate, regular rhythm and normal heart sounds.   Pulmonary/Chest: Effort normal and breath sounds normal.   Abdominal: Soft. Bowel sounds are normal.   Genitourinary:   Genitourinary Comments: deferred   Musculoskeletal: Normal range of motion. She exhibits no edema or deformity.   Neurological: She is alert and oriented to person, place, and time.   Skin: Skin is warm and dry.   Psychiatric: She has a normal mood and affect. Her behavior is normal. Judgment and thought content normal.   Nursing note and vitals reviewed.      Significant Labs:   BMP:   Recent Labs   Lab 11/18/18  0514 11/18/18  1432   *  117*  117* 160*     139  139 137   K 3.4*  3.4*  3.4* 3.4*   CL 98  98  98 95   CO2 32*  32*  32* 33*   BUN 33*  33*  33* 35*   CREATININE 0.8  0.8  0.8 0.9   CALCIUM 9.2  9.2  9.2 9.2   MG 2.2  --      CBC:   Recent Labs   Lab 11/16/18  1715 11/17/18  0400 11/18/18  0514   WBC  --  15.32* 14.21*   HGB 8.6* 9.0* 9.1*   HCT 24.8* 26.6* 27.3*   PLT  --  148* 168     CMP:   Recent Labs   Lab 11/17/18 2007 11/18/18  0514 11/18/18  1432    139  139  139 137   K 3.6 3.4*  3.4*  3.4* 3.4*    98  98  98 95   CO2 30* 32*  32*  32* 33*   GLU  144* 117*  117*  117* 160*   BUN 33* 33*  33*  33* 35*   CREATININE 1.0 0.8  0.8  0.8 0.9   CALCIUM 9.1 9.2  9.2  9.2 9.2   ANIONGAP 8 9  9  9 9   EGFRNONAA 58* >60  >60  >60 >60     Lactic Acid:   Magnesium:   Recent Labs   Lab 11/17/18  0400 11/17/18 2007 11/18/18  0514   MG 2.5 2.3 2.2     Troponin:   All pertinent labs within the past 24 hours have been reviewed.    Significant Imaging: I have reviewed all pertinent imaging results/findings within the past 24 hours.    Assessment/Plan:      S/P CABG x 5    Doing well post op on vent and IABP 1:1  Mildly sedated  Continue post op orders per CVT- may need Epi gtt to augment CI    See above, doing well     Acute blood loss anemia    Expected post op blood loss s/p transfusion.  Will IV venofer and start oral iron and MVI       Coronary artery disease involving autologous vein coronary bypass graft without angina pectoris    See above       COPD, moderate    PRN Duonebs and supplemental oxygen.  Continue vent support    Extubated, now getting nebs, IS  Doing well     Hypothyroidism    Resume Home Meds.  TSH pending.  Normal TSH     Pulmonary sarcoidosis    stable         VTE Risk Mitigation (From admission, onward)        Ordered     apixaban tablet 5 mg  2 times daily      11/17/18 1434     IP VTE LOW RISK PATIENT  Once      11/14/18 1529     Place LIUDMILA hose  Until discontinued      11/14/18 1529     Place sequential compression device  Until discontinued      11/14/18 1529              Eloise Pacheco MD  Department of Hospital Medicine   [facility]

## 2018-11-18 NOTE — PROGRESS NOTES
[facility]  Pulmonology  Progress Note    Patient Name: Eve Long  MRN: [id number]  Admission Date: 11/13/2018  Hospital Length of Stay: 4 days  Code Status: Full Code  Attending Provider: Ricardo Dorsey MD  Primary Care Provider: Chaya Morelos MD   Principal Problem: NSTEMI (non-ST elevated myocardial infarction)    Subjective:     Interval History: POD # 2. Seen and examined at bedside. Hospital chart reviewed. No acute interval detrimental events noted. She reports that she  has moderately improved. Exubated. Drains discontinued. IABP discontinued. Paced.       Review of Systems   Constitutional: Negative for chills and fever.   HENT: Negative for ear pain and hearing loss.    Eyes: Negative for pain and discharge.   Respiratory: Negative for cough and shortness of breath.    Cardiovascular: Negative for chest pain.   Gastrointestinal: Negative for abdominal pain and constipation.   Endocrine: Negative for cold intolerance and heat intolerance.   Genitourinary: Negative for decreased urine volume and flank pain.   Musculoskeletal: Positive for back pain. Negative for neck pain.   Neurological: Negative for seizures and headaches.   Hematological: Does not bruise/bleed easily.   Psychiatric/Behavioral: Negative for agitation and confusion.     Scheduled Meds:   apixaban  5 mg Oral BID    aspirin  81 mg Oral Daily    chlorhexidine  10 mL Mouth/Throat BID    docusate sodium  100 mg Oral BID    furosemide  40 mg Intravenous BID    metoprolol tartrate  100 mg Oral BID    nozaseptin   Each Nare BID    pantoprazole  40 mg Oral Daily    polyethylene glycol  17 g Oral Daily    potassium chloride  20 mEq Oral Q12H     Continuous Infusions:   amiodarone in dextrose 5% Stopped (11/17/18 1105)     PRN Meds:.sodium chloride, albuterol **AND** Inhalation Treatment Q6H PRN, dextrose 50%, dextrose 50%, dextrose 50%, dextrose 50%, glucagon (human recombinant), glucose, glucose,  HYDROcodone-acetaminophen, insulin aspart U-100, lactated ringers, metoclopramide HCl, ondansetron, oxyCODONE, oxyCODONE    Objective:     Vital Signs (Most Recent):  Temp: 97.9 °F (36.6 °C) (11/17/18 1510)  Pulse: 89 (11/17/18 1511)  Resp: (!) 22 (11/17/18 1510)  BP: 124/72 (11/17/18 1510)  SpO2: (!) 92 % (11/17/18 1511) Vital Signs (24h Range):  Temp:  [97.9 °F (36.6 °C)-99.1 °F (37.3 °C)] 97.9 °F (36.6 °C)  Pulse:  [] 89  Resp:  [19-36] 22  SpO2:  [91 %-98 %] 92 %  BP: ()/(42-75) 124/72  Arterial Line BP: (117-154)/(56-72) 119/61     Weight: 76 kg (167 lb 8.8 oz)  Body mass index is 29.68 kg/m².      Intake/Output Summary (Last 24 hours) at 11/17/2018 1848  Last data filed at 11/17/2018 1700  Gross per 24 hour   Intake 367.2 ml   Output 4080 ml   Net -3712.8 ml       Physical Exam   Constitutional: She is sedated and intubated.   HENT:   Head: Normocephalic and atraumatic.   Eyes: Conjunctivae are normal. Pupils are equal, round, and reactive to light.   Neck: Normal range of motion. Neck supple.   Cardiovascular: Normal rate, regular rhythm, intact distal pulses and normal pulses.   Pulmonary/Chest: Effort normal and breath sounds normal. She is intubated.   Abdominal: Soft. Bowel sounds are normal.   Musculoskeletal: Normal range of motion.   Neurological: GCS eye subscore is 1. GCS verbal subscore is 1. GCS motor subscore is 1.   Skin: Skin is warm and dry. Capillary refill takes less than 2 seconds.   Vitals reviewed.          Lines/Drains/Airways     Line                 Pacer Wires 11/15/18 1600 2 days          Pressure Ulcer                 Negative Pressure Wound Therapy  2 days          Peripheral Intravenous Line                 Peripheral IV - Single Lumen 11/13/18 1635 Left Antecubital 4 days         Midline Catheter Insertion/Assessment  - Double Lumen 11/16/18 1530 Left 1 day                Significant Labs:    CBC/Anemia Profile:  Recent Labs   Lab 11/16/18  0350 11/16/18  0965  11/17/18  0400   WBC 14.33*  --  15.32*   HGB 7.5* 8.6* 9.0*   HCT 21.9* 24.8* 26.6*     --  148*   MCV 84  --  84   RDW 15.1*  --  15.1*        Chemistries:  Recent Labs   Lab 11/16/18  0350 11/16/18  1715 11/17/18  0400 11/17/18  1117    139 140 138   K 3.9 5.0 3.8 3.8   * 109 104 100   CO2 26 25 27 27   BUN 17 24* 26* 29*   CREATININE 0.8 0.9 1.0 1.1   CALCIUM 8.3* 9.6 9.1 9.2   MG 3.5* 2.9* 2.5  --        ABGs:   Recent Labs   Lab 11/15/18  2116   PH 7.358   PCO2 40.0   HCO3 22.5*   POCSATURATED 95   BE -3     Coagulation:   Recent Labs   Lab 11/16/18  0350   INR 1.1   APTT 29.1     All pertinent labs within the past 24 hours have been reviewed.    Significant Imaging:      X-Ray Chest AP Portable: 11/16/18:    Endotracheal and nasogastric tubes have been removed.  Remaining tubes are stable.  Decreased lung volumes are noted.  Small left pleural effusion left lower lobe atelectasis or airspace disease.  Mild pulmonary vascular congestion is suspected.  Bones demonstrate degenerative changes.  Moderate gastric distention noted.  In comparison to the prior study, there is no adverse interval changes      Assessment/Plan:     Coronary artery disease involving autologous vein coronary bypass graft without angina pectoris    S/P CAB X 5:    Secondary prevention:  ASPIRIN, METOPRLOL, APIXABAN.     S/P CABG x 5    Coronary artery bypass grafting x5 with LIMA to LAD and reverse saphenous vein graft to the distal RCA sequence reverse saphenous vein graft to OM2 and OM3 and reverse saphenous vein graft to diagonal    POD # 2:     Paced: Mode AAI. Normal pacemaker function and stable pacing and sensing thresholds.  STERNAL INCISION: staples in place; wound clean, dry, and intact, STABLE HEMODYNAMICS.    CARDIAC STERNAL PRECAUTIONS.     Monitor hemodynamics and  monitor for dysrhythmias MAP goal of  65 mmHg.    CARDIOACTIVE MEDS:   OFF    DIURETICS:  FUROSEMIDE           COPD, moderate    Supplement  oxygenation. Keep SAO2 >= 92%.Bronchodilators per orders     Pulmonary sarcoidosis    Stable and controlled.         Continue current ongoing medical management. OK to transfer patient out of the ICU. Will sign continue to follow .     Ankit Keenan MD  Pulmonology  [facility]

## 2018-11-18 NOTE — PROGRESS NOTES
[facility] -   Cardiology  Progress Note    Patient Name: Eve Long  MRN: [id number]  Admission Date: 11/13/2018  Hospital Length of Stay: 5 days  Code Status: Full Code   Attending Physician: Ricardo Dorsey MD   Primary Care Physician: Chaya Morelos MD  Expected Discharge Date:   Principal Problem:NSTEMI (non-ST elevated myocardial infarction)    Subjective:   Brief HPI:     Eve Long is a 68 y.o. female patient with a PMHx of anemia, HLD, HTN, TIA,  pneumonia, sarcoidosis, Statin intolerant due to myalgias. Tried Zetia and still had myalgia. Patient presented to the ER for evaluation of constant left sided chest pain which onset approximately 4 days ago. Pt characterizes her pain as a pressure 10/10 at its worse. Pt reports a strong FMHx of cardiac disease (Father with CABG/MI, all materal uncles with CAD) and reports her last stress test was done in 2006.  patient states that she tried to take a walk around her neighborhood and chest pain worsened. Pain associated with diaphoresis, and radiated from chest and left jaw, arm and back.  Patient denied any fever, chills, SOB, leg swelling, palpitations, N/V, dysuria, hematuria, HA, weakness, and all other sxs at this time. In ER, troponin 0.241, 0.297, 0.288. EKG with + anterior changes.  Echo in Aug 2018 showed normal LVF.  Patient has been started on Heparin Drip. Last episode of chest pain was this morning. Had chest pain all night improved with nitro paste.         Hospital Course:   11/16/18- Patient is POD 1 s/p CABG x5. Has been extubated. Remains on IABP 1:1. CT x 3 inplace. RAZIA x2 remains in place. Had A-fib with RVR overnight and started on Amio gtt. Converted to NSR and is A-pacing. Given 1 unit of blood . H/H 7.5/21.9 down from 9.0/ 26.7.  Has about 15-30cc/hr of urine output     11/17/18- POD 2 s/p CABG x5. Chest tubes, RAZIA drains and IABP have all been DC'd. Pacer wires remain in place. A-pacing. Having PAF.  CXR shows bilateral effusion. Has some SOB this morning and is receiving IV Lasix. . Given IV lopressor this morning for rate control. K+ being replaced     11/18/18- POD 3 s/p CABG x5. Patient in A-fib this morning. Has been switched to PO amio. SOB improved today since diuresing. Labs and vitals stable.         Review of Systems   Constitution: Negative for diaphoresis, weakness, malaise/fatigue, weight gain and weight loss.   HENT: Negative for congestion and nosebleeds.    Cardiovascular: Negative for chest pain, claudication, cyanosis, dyspnea on exertion, irregular heartbeat, leg swelling, near-syncope, orthopnea, palpitations, paroxysmal nocturnal dyspnea and syncope.        Sternal incision pain      Respiratory: Negative for cough, hemoptysis, shortness of breath, sleep disturbances due to breathing, snoring, sputum production and wheezing.    Hematologic/Lymphatic: Negative for bleeding problem. Does not bruise/bleed easily.   Skin: Negative for rash.   Musculoskeletal: Negative for arthritis, back pain, falls, joint pain, muscle cramps and muscle weakness.   Gastrointestinal: Negative for abdominal pain, constipation, diarrhea, heartburn, hematemesis, hematochezia, melena and nausea.   Genitourinary: Negative for dysuria, hematuria and nocturia.   Neurological: Negative for excessive daytime sleepiness, dizziness, headaches, light-headedness, loss of balance, numbness and vertigo.     Objective:     Vital Signs (Most Recent):  Temp: 97.7 °F (36.5 °C) (11/18/18 0724)  Pulse: 90 (11/18/18 1100)  Resp: (!) 22 (11/18/18 0724)  BP: 133/61 (11/18/18 0724)  SpO2: (!) 92 % (11/18/18 0724) Vital Signs (24h Range):  Temp:  [97.5 °F (36.4 °C)-98.3 °F (36.8 °C)] 97.7 °F (36.5 °C)  Pulse:  [89-92] 90  Resp:  [18-22] 22  SpO2:  [91 %-95 %] 92 %  BP: (124-137)/(59-72) 133/61     Weight: 76 kg (167 lb 8.8 oz)  Body mass index is 29.68 kg/m².     SpO2: (!) 92 %  O2 Device (Oxygen Therapy): nasal cannula      Intake/Output  Summary (Last 24 hours) at 11/18/2018 1217  Last data filed at 11/18/2018 1000  Gross per 24 hour   Intake 236 ml   Output 3250 ml   Net -3014 ml       Lines/Drains/Airways     Line                 Pacer Wires 11/15/18 1600 2 days          Pressure Ulcer                 Negative Pressure Wound Therapy  2 days          Peripheral Intravenous Line                 Peripheral IV - Single Lumen 11/13/18 1635 Left Antecubital 4 days         Midline Catheter Insertion/Assessment  - Double Lumen 11/16/18 1530 Left 1 day                Physical Exam   Constitutional: She is oriented to person, place, and time. She appears well-developed and well-nourished.   Neck: Neck supple. No JVD present.   Cardiovascular: Normal rate, regular rhythm, normal heart sounds and normal pulses. Exam reveals no friction rub.   No murmur heard.  Pulmonary/Chest: Effort normal and breath sounds normal. No respiratory distress. She has no wheezes. She has no rales.   Sternal incision with wound vac noted  Pacer wires in use      Abdominal: Soft. Bowel sounds are normal. She exhibits no distension.   Musculoskeletal: She exhibits no edema or tenderness.   Neurological: She is alert and oriented to person, place, and time.   Skin: Skin is warm and dry. No rash noted.     Right groin access site without redness, tenderness, swelling, or drainage. There is no active external bleeding or hematoma. Femoral and DP pulse 2+. Skin warm/dry/pink.      Psychiatric: She has a normal mood and affect. Her behavior is normal.   Nursing note and vitals reviewed.      Significant Labs:   All pertinent lab results from the last 24 hours have been reviewed. and   Recent Lab Results       11/18/18  0522   11/18/18  0514   11/17/18 2021 11/17/18 2007 11/17/18  1713        Anion Gap   9   8          9              9           Baso #   0.00           Basophil%   0.0           BUN, Bld   33   33          33              33           Calcium   9.2   9.1           9.2              9.2           Chloride   98   100          98              98           CO2   32   30          32              32           Creatinine   0.8   1.0          0.8              0.8           Differential Method   Automated           eGFR if    >60   >60          >60              >60           eGFR if non    >60  Comment:  Calculation used to obtain the estimated glomerular filtration  rate (eGFR) is the CKD-EPI equation.      58  Comment:  Calculation used to obtain the estimated glomerular filtration  rate (eGFR) is the CKD-EPI equation.             >60  Comment:  Calculation used to obtain the estimated glomerular filtration  rate (eGFR) is the CKD-EPI equation.                 >60  Comment:  Calculation used to obtain the estimated glomerular filtration  rate (eGFR) is the CKD-EPI equation.              Eos #   0.0           Eosinophil%   0.0           Glucose   117   144          117              117           Gran # (ANC)   11.8           Gran%   83.3           Hematocrit   27.3           Hemoglobin   9.1           Lymph #   0.9           Lymph%   6.6           Magnesium   2.2   2.3       MCH   28.3           MCHC   33.3           MCV   85           Mono #   1.4           Mono%   10.1           MPV   9.7           Platelets   168           POCT Glucose 130   141   137     Potassium   3.4   3.6          3.4              3.4           RBC   3.21           RDW   15.1           Sodium   139   138          139              139           WBC   14.21                                Significant Imaging: Echocardiogram:   2D echo with color flow doppler:   Results for orders placed or performed during the hospital encounter of 11/13/18   2D echo with color flow doppler   Result Value Ref Range    QEF 40 (A) 55 - 65    Diastolic Dysfunction Yes (A)     and X-Ray: CXR: X-Ray Chest 1 View (CXR): No results found for this visit on 11/13/18. and X-Ray Chest PA and Lateral (CXR):    Results for orders placed or performed during the hospital encounter of 11/13/18   X-Ray Chest PA And Lateral    Narrative    EXAMINATION:  XR CHEST PA AND LATERAL    CLINICAL HISTORY:  Chest pain, unspecified    TECHNIQUE:  PA and lateral views of the chest were performed.    COMPARISON:  08/29/2018    FINDINGS:  The cardiac and mediastinal silhouettes appear within normal limits. Stable calcified granuloma in the left lung base.  Stable pleural parenchymal scarring in the right lower lobe.  Cholecystectomy clips in the right upper quadrant.  No acute osseous findings demonstrated.      Impression    No acute findings.      Electronically signed by: Jose Alberto Lang MD  Date:    11/13/2018  Time:    16:13     Assessment and Plan:       S/P CABG x 5    -Patient is POD 1 s/p CABG x5   -Continue Amio gtt and plan to switch to PO tomorrow  -CT and RAZIA out when ok with CT surgery   -IABP being weaned. Changed to 1:2 by Dr. Rodríguez  -Continue ASA, Plavix   -No statin due to intolerance in the past. Had severe myalgia with statin and Zetia use   -Encourage IS use and begin ambulation when transferred to Tele     11/17/18  -Agree with increasing metoprolol to 50mg BID and use Lopressor 5mg IV PRN  -Needs to diurese with IV Lasix   -K+ being replaced  -Continue ASA and Plavix  --No statin due to intolerance in the past. Had severe myalgia with statin and Zetia use   -Encourage IS use and begin ambulation when transferred to Tele     11/18/18  -Decrease amio to 200mg BID  -Continue Lopressor 100mg BID  -Continue ASA, Lisinopril and Eliqius  -Needs to start Plavix if ok with CT surgery   -Encourage IS use and ambulation  -No statin due to intolerance in the past. Had severe myalgia with statin and Zetia use               VTE Risk Mitigation (From admission, onward)        Ordered     apixaban tablet 5 mg  2 times daily      11/17/18 1434     IP VTE LOW RISK PATIENT  Once      11/14/18 1529     Place LIUDMILA hose  Until discontinued       11/14/18 1529     Place sequential compression device  Until discontinued      11/14/18 1529        Chart reviewed. Patient examined by Dr. Rodríguez and agrees with plan that has been outlined.     DAVID Mcclure  Cardiology  [facility] [place]

## 2018-11-18 NOTE — SUBJECTIVE & OBJECTIVE
Interval History: doing much better, sitting up in chair, SOB, leg swelling resolved, still with transvenous pacemaker and occasionally feels the palpitations. Still in apace @90, controlled, now on oral amiodarone.      Review of Systems   Constitutional: Negative for chills and fever.   HENT: Negative for ear pain and hearing loss.    Eyes: Negative for pain and discharge.   Respiratory: Negative for cough and shortness of breath.    Cardiovascular: Positive for palpitations. Negative for chest pain.   Gastrointestinal: Negative for abdominal pain and constipation.   Endocrine: Negative for cold intolerance and heat intolerance.   Genitourinary: Negative for decreased urine volume and flank pain.   Musculoskeletal: Positive for back pain. Negative for neck pain.   Neurological: Negative for seizures and headaches.   Hematological: Does not bruise/bleed easily.   Psychiatric/Behavioral: Negative for agitation and confusion.     Objective:     Vital Signs (Most Recent):  Temp: 98.2 °F (36.8 °C) (11/18/18 1615)  Pulse: 94 (11/18/18 1615)  Resp: 20 (11/18/18 1615)  BP: (!) 109/57 (11/18/18 1615)  SpO2: 95 % (11/18/18 1615) Vital Signs (24h Range):  Temp:  [97.5 °F (36.4 °C)-98.3 °F (36.8 °C)] 98.2 °F (36.8 °C)  Pulse:  [89-94] 94  Resp:  [18-22] 20  SpO2:  [92 %-95 %] 95 %  BP: (109-137)/(57-66) 109/57     Weight: 76 kg (167 lb 8.8 oz)  Body mass index is 29.68 kg/m².    Intake/Output Summary (Last 24 hours) at 11/18/2018 1641  Last data filed at 11/18/2018 1000  Gross per 24 hour   Intake 236 ml   Output 3250 ml   Net -3014 ml      Physical Exam   Constitutional: She is oriented to person, place, and time. She appears well-developed and well-nourished.   Pleasant, comfortable, sitting up in chair, pale but NAD   HENT:   Head: Normocephalic and atraumatic.   Cardiovascular: Normal rate, regular rhythm and normal heart sounds.   Pulmonary/Chest: Effort normal and breath sounds normal.   Abdominal: Soft. Bowel sounds  are normal.   Genitourinary:   Genitourinary Comments: deferred   Musculoskeletal: Normal range of motion. She exhibits no edema or deformity.   Neurological: She is alert and oriented to person, place, and time.   Skin: Skin is warm and dry.   Psychiatric: She has a normal mood and affect. Her behavior is normal. Judgment and thought content normal.   Nursing note and vitals reviewed.      Significant Labs:   BMP:   Recent Labs   Lab 11/18/18  0514 11/18/18  1432   *  117*  117* 160*     139  139 137   K 3.4*  3.4*  3.4* 3.4*   CL 98  98  98 95   CO2 32*  32*  32* 33*   BUN 33*  33*  33* 35*   CREATININE 0.8  0.8  0.8 0.9   CALCIUM 9.2  9.2  9.2 9.2   MG 2.2  --      CBC:   Recent Labs   Lab 11/16/18  1715 11/17/18  0400 11/18/18  0514   WBC  --  15.32* 14.21*   HGB 8.6* 9.0* 9.1*   HCT 24.8* 26.6* 27.3*   PLT  --  148* 168     CMP:   Recent Labs   Lab 11/17/18 2007 11/18/18  0514 11/18/18  1432    139  139  139 137   K 3.6 3.4*  3.4*  3.4* 3.4*    98  98  98 95   CO2 30* 32*  32*  32* 33*   * 117*  117*  117* 160*   BUN 33* 33*  33*  33* 35*   CREATININE 1.0 0.8  0.8  0.8 0.9   CALCIUM 9.1 9.2  9.2  9.2 9.2   ANIONGAP 8 9  9  9 9   EGFRNONAA 58* >60  >60  >60 >60     Lactic Acid:   Magnesium:   Recent Labs   Lab 11/17/18  0400 11/17/18 2007 11/18/18  0514   MG 2.5 2.3 2.2     Troponin:   All pertinent labs within the past 24 hours have been reviewed.    Significant Imaging: I have reviewed all pertinent imaging results/findings within the past 24 hours.

## 2018-11-18 NOTE — PROGRESS NOTES
[facility] -   Cardiothoracic Surgery  Progress Note    Patient Name: Eve Long  MRN: [id number]  Admission Date: 11/13/2018  Hospital Length of Stay: 5 days  Code Status: Full Code   Attending Physician: Ricardo Dorsey MD   Referring Provider: Self, Aaareferral  Principal Problem:NSTEMI (non-ST elevated myocardial infarction)            Subjective:     Post-Op Info:  Procedure(s) (LRB):  CORONARY ARTERY BYPASS GRAFT (CABG) (N/A)  SURGICAL PROCUREMENT, VEIN, ENDOSCOPIC (Left)  ECHOCARDIOGRAM,TRANSESOPHAGEAL (N/A)   3 Days Post-Op     Interval History:  The patient is postop day 3.  Status post coronary artery bypass grafting x5.  The patient has no complaints.  Patient has been in and out of atrial fibrillation.  Patient is currently sinus rhythm paced at 90.    ROS  Medications:  Continuous Infusions:   amiodarone in dextrose 5% Stopped (11/17/18 1105)     Scheduled Meds:   apixaban  5 mg Oral BID    aspirin  81 mg Oral Daily    chlorhexidine  10 mL Mouth/Throat BID    docusate sodium  100 mg Oral BID    furosemide  40 mg Intravenous BID    magnesium sulfate IVPB  1 g Intravenous Once    metoprolol tartrate  100 mg Oral BID    nozaseptin   Each Nare BID    pantoprazole  40 mg Oral Daily    polyethylene glycol  17 g Oral Daily    potassium chloride in water  20 mEq Intravenous Once    potassium chloride  20 mEq Oral Q12H    potassium chloride  20 mEq Oral Once    spironolactone  25 mg Oral BID     PRN Meds:sodium chloride, albuterol **AND** Inhalation Treatment Q6H PRN, dextrose 50%, dextrose 50%, dextrose 50%, dextrose 50%, glucagon (human recombinant), glucose, glucose, HYDROcodone-acetaminophen, insulin aspart U-100, lactated ringers, metoclopramide HCl, ondansetron, oxyCODONE, oxyCODONE     Objective:     Vital Signs (Most Recent):  Temp: 97.7 °F (36.5 °C) (11/18/18 0724)  Pulse: 92 (11/18/18 0724)  Resp: (!) 22 (11/18/18 0724)  BP: 133/61 (11/18/18 0724)  SpO2: (!) 92 %  (11/18/18 0724) Vital Signs (24h Range):  Temp:  [97.5 °F (36.4 °C)-99.1 °F (37.3 °C)] 97.7 °F (36.5 °C)  Pulse:  [] 92  Resp:  [18-34] 22  SpO2:  [91 %-97 %] 92 %  BP: ()/(53-74) 133/61  Arterial Line BP: (117-147)/(56-65) 119/61     Weight: 76 kg (167 lb 8.8 oz)  Body mass index is 29.68 kg/m².    SpO2: (!) 92 %  O2 Device (Oxygen Therapy): nasal cannula    Intake/Output - Last 3 Shifts       11/16 0700 - 11/17 0659 11/17 0700 - 11/18 0659 11/18 0700 - 11/19 0659    P.O.  236     I.V. (mL/kg) 944 (12.4) 100.2 (1.3)     Blood 312.5      IV Piggyback 100 100     Total Intake(mL/kg) 1356.5 (17.8) 436.2 (5.7)     Urine (mL/kg/hr) 3487 (1.9) 3230 (1.8)     Drains 15      Other  0     Stool 0 0     Chest Tube 128      Total Output 3630 3230     Net -2273.6 -2793.8            Stool Occurrence 0 x 0 x           Lines/Drains/Airways     Line                 Pacer Wires 11/15/18 1600 2 days          Pressure Ulcer                 Negative Pressure Wound Therapy  2 days          Peripheral Intravenous Line                 Peripheral IV - Single Lumen 11/13/18 1635 Left Antecubital 4 days         Midline Catheter Insertion/Assessment  - Double Lumen 11/16/18 1530 Left 1 day                Physical Exam   Constitutional: She is oriented to person, place, and time. She appears well-developed and well-nourished. No distress.   HENT:   Head: Normocephalic and atraumatic.   Cardiovascular: Normal heart sounds.   No murmur heard.  Pulmonary/Chest: Effort normal and breath sounds normal.   Abdominal: Soft. Bowel sounds are normal.   Musculoskeletal: She exhibits edema. She exhibits no deformity.   Neurological: She is alert and oriented to person, place, and time.   Skin: Skin is warm and dry. She is not diaphoretic.   Psychiatric: She has a normal mood and affect.       Significant Labs:  All pertinent labs from the last 24 hours have been reviewed.    Significant Diagnostics:  I have reviewed all pertinent imaging  results/findings within the past 24 hours.    Assessment/Plan:     S/P CABG x 5    11/16/2018  The patient is postop day 1.  Status post coronary artery bypass grafting x5.  Overall the patient is is doing very well.  Neuro:  The patient is awake alert and oriented x3.  Nonfocal neuro exam.  Pain is controlled with pain medication.  Cardiac:  Patient has been stable with good cardiac indices on low-dose epi.  Patient was in atrial fibrillation earlier now in sinus on a amiodarone drip.  Will discontinue intra-aortic balloon pump.  Wean the epi to off.  Respiratory:  The patient is extubated and good sats on nasal cannula.  Continue pulmonary toilet.  GI:  Advance p.o. intake as tolerated.  Renal:  The patient has good urine output creatinine is 0.8.  Lasix for diuresis.  Heme:  Hematocrit is 21.  Patient transfuse 1 unit packed red blood cells.  Endocrine:  Glucose is controlled with insulin.  Activities:  Patient is currently on bedrest for intra-aortic balloon pump.  Will get out of bed and ambulate once intra-aortic balloon pump is removed.  Lines tubes and drains:  Intra-aortic balloon pump, [place] and Cordis, RAZIA drains in saphenectomy site, and chest tubes will be discontinued.    11/17/2018  The patient is postop day 2.  Status post coronary artery bypass grafting x5.  Overall the patient is is doing very well. Transfer to Telemetry  Neuro:  The patient is awake alert and oriented x3.  Nonfocal neuro exam.  Pain is controlled with pain medication.  Cardiac:  Patient has been stable . Up titrate metoprolol. Start PO amiodarone  Respiratory:  The patient is extubated and good sats on nasal cannula.  Continue pulmonary toilet.  GI:  Advance p.o. intake as tolerated.  Renal:  The patient has good urine output creatinine is 1.0.  Good response to diuresis.  Heme:  Hematocrit is 26. Pit 148  ID: Afebrile. WBC15 Will follow.  Endocrine:  Glucose is controlled with insulin.  Activities: OOB and ambulate as tolerated.    Lines tubes and drains:  D/c fang, Keep mid level line and pacing wires    11/18/2018  The patient is postop day 3.  Status post coronary artery bypass grafting x5.  Overall the patient is is doing very well.   Neuro:  The patient is awake alert and oriented x3.  Nonfocal neuro exam.  Pain is controlled with pain medication.  Cardiac:  Patient has been stable .  Tolerating metoprolol 100 mg b.i.d.. Start PO amiodarone.  Discontinue amiodarone.  Respiratory:  Good sats on nasal cannula.  Continue pulmonary toilet.  Wean oxygen to room air.  GI:  Advance p.o. intake as tolerated.  Renal:  The patient has good urine output creatinine is 0.8.  Good response to diuresis.  Replace potassium.  Heme:  Hematocrit is 27. Pit 188  ID: Afebrile. WBC 14. Will follow.  Endocrine:  Glucose is controlled with insulin.  Activities: OOB and ambulate as tolerated.   Lines tubes and drains:  Mid level line and pacing wires           Ricardo Dorsey MD  Cardiothoracic Surgery  [facility]

## 2018-11-18 NOTE — PROGRESS NOTES
Pt transferred to room 238 via wheelchair by CAMI Lisa RN.  KELLIE Schwartz at bedside.  Report given.  Pt not in any distress.

## 2018-11-18 NOTE — ASSESSMENT & PLAN NOTE
11/16/2018  The patient is postop day 1.  Status post coronary artery bypass grafting x5.  Overall the patient is is doing very well.  Neuro:  The patient is awake alert and oriented x3.  Nonfocal neuro exam.  Pain is controlled with pain medication.  Cardiac:  Patient has been stable with good cardiac indices on low-dose epi.  Patient was in atrial fibrillation earlier now in sinus on a amiodarone drip.  Will discontinue intra-aortic balloon pump.  Wean the epi to off.  Respiratory:  The patient is extubated and good sats on nasal cannula.  Continue pulmonary toilet.  GI:  Advance p.o. intake as tolerated.  Renal:  The patient has good urine output creatinine is 0.8.  Lasix for diuresis.  Heme:  Hematocrit is 21.  Patient transfuse 1 unit packed red blood cells.  Endocrine:  Glucose is controlled with insulin.  Activities:  Patient is currently on bedrest for intra-aortic balloon pump.  Will get out of bed and ambulate once intra-aortic balloon pump is removed.  Lines tubes and drains:  Intra-aortic balloon pump, [place] and Cordis, RAZIA drains in saphenectomy site, and chest tubes will be discontinued.    11/17/2018  The patient is postop day 2.  Status post coronary artery bypass grafting x5.  Overall the patient is is doing very well. Transfer to Telemetry  Neuro:  The patient is awake alert and oriented x3.  Nonfocal neuro exam.  Pain is controlled with pain medication.  Cardiac:  Patient has been stable . Up titrate metoprolol. Start PO amiodarone  Respiratory:  The patient is extubated and good sats on nasal cannula.  Continue pulmonary toilet.  GI:  Advance p.o. intake as tolerated.  Renal:  The patient has good urine output creatinine is 1.0.  Good response to diuresis.  Heme:  Hematocrit is 26. Pit 148  ID: Afebrile. WBC15 Will follow.  Endocrine:  Glucose is controlled with insulin.  Activities: OOB and ambulate as tolerated.   Lines tubes and drains:  D/c fang, Keep mid level line and pacing  wires    11/18/2018  The patient is postop day 3.  Status post coronary artery bypass grafting x5.  Overall the patient is is doing very well.   Neuro:  The patient is awake alert and oriented x3.  Nonfocal neuro exam.  Pain is controlled with pain medication.  Cardiac:  Patient has been stable .  Tolerating metoprolol 100 mg b.i.d.. Start PO amiodarone.  Discontinue amiodarone.  Respiratory:  Good sats on nasal cannula.  Continue pulmonary toilet.  Wean oxygen to room air.  GI:  Advance p.o. intake as tolerated.  Renal:  The patient has good urine output creatinine is 0.8.  Good response to diuresis.  Replace potassium.  Heme:  Hematocrit is 27. Pit 188  ID: Afebrile. WBC 14. Will follow.  Endocrine:  Glucose is controlled with insulin.  Activities: OOB and ambulate as tolerated.   Lines tubes and drains:  Mid level line and pacing wires

## 2018-11-18 NOTE — ASSESSMENT & PLAN NOTE
-Patient is POD 1 s/p CABG x5   -Continue Amio gtt and plan to switch to PO tomorrow  -CT and RAZIA out when ok with CT surgery   -IABP being weaned. Changed to 1:2 by Dr. Rodríguez  -Continue ASA, Plavix   -No statin due to intolerance in the past. Had severe myalgia with statin and Zetia use   -Encourage IS use and begin ambulation when transferred to Tele     11/17/18  -Agree with increasing metoprolol to 50mg BID and use Lopressor 5mg IV PRN  -Needs to diurese with IV Lasix   -K+ being replaced  -Continue ASA and Plavix  --No statin due to intolerance in the past. Had severe myalgia with statin and Zetia use   -Encourage IS use and begin ambulation when transferred to Tele     11/18/18  -Decrease amio to 200mg BID  -Continue Lopressor 100mg BID  -Continue ASA, Lisinopril and Eliqius  -Needs to start Plavix if ok with CT surgery   -Encourage IS use and ambulation

## 2018-11-18 NOTE — SUBJECTIVE & OBJECTIVE
Interval History:  The patient is postop day 3.  Status post coronary artery bypass grafting x5.  The patient has no complaints.  Patient has been in and out of atrial fibrillation.  Patient is currently sinus rhythm paced at 90.    ROS  Medications:  Continuous Infusions:   amiodarone in dextrose 5% Stopped (11/17/18 1105)     Scheduled Meds:   apixaban  5 mg Oral BID    aspirin  81 mg Oral Daily    chlorhexidine  10 mL Mouth/Throat BID    docusate sodium  100 mg Oral BID    furosemide  40 mg Intravenous BID    magnesium sulfate IVPB  1 g Intravenous Once    metoprolol tartrate  100 mg Oral BID    nozaseptin   Each Nare BID    pantoprazole  40 mg Oral Daily    polyethylene glycol  17 g Oral Daily    potassium chloride in water  20 mEq Intravenous Once    potassium chloride  20 mEq Oral Q12H    potassium chloride  20 mEq Oral Once    spironolactone  25 mg Oral BID     PRN Meds:sodium chloride, albuterol **AND** Inhalation Treatment Q6H PRN, dextrose 50%, dextrose 50%, dextrose 50%, dextrose 50%, glucagon (human recombinant), glucose, glucose, HYDROcodone-acetaminophen, insulin aspart U-100, lactated ringers, metoclopramide HCl, ondansetron, oxyCODONE, oxyCODONE     Objective:     Vital Signs (Most Recent):  Temp: 97.7 °F (36.5 °C) (11/18/18 0724)  Pulse: 92 (11/18/18 0724)  Resp: (!) 22 (11/18/18 0724)  BP: 133/61 (11/18/18 0724)  SpO2: (!) 92 % (11/18/18 0724) Vital Signs (24h Range):  Temp:  [97.5 °F (36.4 °C)-99.1 °F (37.3 °C)] 97.7 °F (36.5 °C)  Pulse:  [] 92  Resp:  [18-34] 22  SpO2:  [91 %-97 %] 92 %  BP: ()/(53-74) 133/61  Arterial Line BP: (117-147)/(56-65) 119/61     Weight: 76 kg (167 lb 8.8 oz)  Body mass index is 29.68 kg/m².    SpO2: (!) 92 %  O2 Device (Oxygen Therapy): nasal cannula    Intake/Output - Last 3 Shifts       11/16 0700 - 11/17 0659 11/17 0700 - 11/18 0659 11/18 0700 - 11/19 0659    P.O.  236     I.V. (mL/kg) 944 (12.4) 100.2 (1.3)     Blood 312.5      IV  Piggyback 100 100     Total Intake(mL/kg) 1356.5 (17.8) 436.2 (5.7)     Urine (mL/kg/hr) 3487 (1.9) 3230 (1.8)     Drains 15      Other  0     Stool 0 0     Chest Tube 128      Total Output 3630 3230     Net -2273.6 -2793.8            Stool Occurrence 0 x 0 x           Lines/Drains/Airways     Line                 Pacer Wires 11/15/18 1600 2 days          Pressure Ulcer                 Negative Pressure Wound Therapy  2 days          Peripheral Intravenous Line                 Peripheral IV - Single Lumen 11/13/18 1635 Left Antecubital 4 days         Midline Catheter Insertion/Assessment  - Double Lumen 11/16/18 1530 Left 1 day                Physical Exam   Constitutional: She is oriented to person, place, and time. She appears well-developed and well-nourished. No distress.   HENT:   Head: Normocephalic and atraumatic.   Cardiovascular: Normal heart sounds.   No murmur heard.  Pulmonary/Chest: Effort normal and breath sounds normal.   Abdominal: Soft. Bowel sounds are normal.   Musculoskeletal: She exhibits edema. She exhibits no deformity.   Neurological: She is alert and oriented to person, place, and time.   Skin: Skin is warm and dry. She is not diaphoretic.   Psychiatric: She has a normal mood and affect.       Significant Labs:  All pertinent labs from the last 24 hours have been reviewed.    Significant Diagnostics:  I have reviewed all pertinent imaging results/findings within the past 24 hours.

## 2018-11-19 LAB
ANION GAP SERPL CALC-SCNC: 8 MMOL/L
ANION GAP SERPL CALC-SCNC: 8 MMOL/L
BUN SERPL-MCNC: 36 MG/DL
BUN SERPL-MCNC: 40 MG/DL
CALCIUM SERPL-MCNC: 9.2 MG/DL
CALCIUM SERPL-MCNC: 9.3 MG/DL
CHLORIDE SERPL-SCNC: 96 MMOL/L
CHLORIDE SERPL-SCNC: 99 MMOL/L
CO2 SERPL-SCNC: 33 MMOL/L
CO2 SERPL-SCNC: 34 MMOL/L
CREAT SERPL-MCNC: 0.8 MG/DL
CREAT SERPL-MCNC: 0.9 MG/DL
EST. GFR  (AFRICAN AMERICAN): >60 ML/MIN/1.73 M^2
EST. GFR  (AFRICAN AMERICAN): >60 ML/MIN/1.73 M^2
EST. GFR  (NON AFRICAN AMERICAN): >60 ML/MIN/1.73 M^2
EST. GFR  (NON AFRICAN AMERICAN): >60 ML/MIN/1.73 M^2
GLUCOSE SERPL-MCNC: 116 MG/DL
GLUCOSE SERPL-MCNC: 117 MG/DL
MAGNESIUM SERPL-MCNC: 2.4 MG/DL
POCT GLUCOSE: 129 MG/DL (ref 70–110)
POCT GLUCOSE: 151 MG/DL (ref 70–110)
POCT GLUCOSE: 164 MG/DL (ref 70–110)
POCT GLUCOSE: 252 MG/DL (ref 70–110)
POTASSIUM SERPL-SCNC: 4.1 MMOL/L
POTASSIUM SERPL-SCNC: 4.1 MMOL/L
SODIUM SERPL-SCNC: 138 MMOL/L
SODIUM SERPL-SCNC: 140 MMOL/L

## 2018-11-19 PROCEDURE — 21400001 HC TELEMETRY ROOM: Mod: HCNC

## 2018-11-19 PROCEDURE — G8979 MOBILITY GOAL STATUS: HCPCS | Mod: CJ,HCNC

## 2018-11-19 PROCEDURE — 25000003 PHARM REV CODE 250: Mod: HCNC | Performed by: THORACIC SURGERY (CARDIOTHORACIC VASCULAR SURGERY)

## 2018-11-19 PROCEDURE — 99231 SBSQ HOSP IP/OBS SF/LOW 25: CPT | Mod: HCNC,,, | Performed by: INTERNAL MEDICINE

## 2018-11-19 PROCEDURE — G8978 MOBILITY CURRENT STATUS: HCPCS | Mod: CK,HCNC

## 2018-11-19 PROCEDURE — 97530 THERAPEUTIC ACTIVITIES: CPT | Mod: HCNC | Performed by: PHYSICAL THERAPIST

## 2018-11-19 PROCEDURE — 83735 ASSAY OF MAGNESIUM: CPT | Mod: HCNC

## 2018-11-19 PROCEDURE — 63600175 PHARM REV CODE 636 W HCPCS: Mod: HCNC | Performed by: EMERGENCY MEDICINE

## 2018-11-19 PROCEDURE — 94799 UNLISTED PULMONARY SVC/PX: CPT | Mod: HCNC

## 2018-11-19 PROCEDURE — 27000221 HC OXYGEN, UP TO 24 HOURS: Mod: HCNC

## 2018-11-19 PROCEDURE — 63600175 PHARM REV CODE 636 W HCPCS: Mod: HCNC | Performed by: THORACIC SURGERY (CARDIOTHORACIC VASCULAR SURGERY)

## 2018-11-19 PROCEDURE — 92507 TX SP LANG VOICE COMM INDIV: CPT | Mod: HCNC

## 2018-11-19 PROCEDURE — 97530 THERAPEUTIC ACTIVITIES: CPT | Mod: HCNC

## 2018-11-19 PROCEDURE — 25000003 PHARM REV CODE 250: Mod: HCNC | Performed by: NURSE PRACTITIONER

## 2018-11-19 PROCEDURE — 97116 GAIT TRAINING THERAPY: CPT | Mod: HCNC

## 2018-11-19 PROCEDURE — 80048 BASIC METABOLIC PNL TOTAL CA: CPT | Mod: HCNC

## 2018-11-19 PROCEDURE — 97535 SELF CARE MNGMENT TRAINING: CPT | Mod: HCNC | Performed by: PHYSICAL THERAPIST

## 2018-11-19 PROCEDURE — 25000003 PHARM REV CODE 250: Mod: HCNC | Performed by: EMERGENCY MEDICINE

## 2018-11-19 RX ORDER — METOPROLOL TARTRATE 50 MG/1
50 TABLET ORAL 2 TIMES DAILY
Status: DISCONTINUED | OUTPATIENT
Start: 2018-11-19 | End: 2018-11-20 | Stop reason: HOSPADM

## 2018-11-19 RX ORDER — ACETAMINOPHEN 325 MG/1
650 TABLET ORAL EVERY 6 HOURS PRN
Status: DISCONTINUED | OUTPATIENT
Start: 2018-11-19 | End: 2018-11-20 | Stop reason: HOSPADM

## 2018-11-19 RX ORDER — CYANOCOBALAMIN 1000 UG/ML
1000 INJECTION, SOLUTION INTRAMUSCULAR; SUBCUTANEOUS
Status: DISCONTINUED | OUTPATIENT
Start: 2018-11-19 | End: 2018-11-20 | Stop reason: HOSPADM

## 2018-11-19 RX ADMIN — PANTOPRAZOLE SODIUM 40 MG: 40 TABLET, DELAYED RELEASE ORAL at 09:11

## 2018-11-19 RX ADMIN — METOPROLOL TARTRATE 100 MG: 50 TABLET ORAL at 09:11

## 2018-11-19 RX ADMIN — LISINOPRIL 2.5 MG: 2.5 TABLET ORAL at 09:11

## 2018-11-19 RX ADMIN — CHLORHEXIDINE GLUCONATE 10 ML: 1.2 RINSE ORAL at 08:11

## 2018-11-19 RX ADMIN — AMIODARONE HYDROCHLORIDE 200 MG: 200 TABLET ORAL at 09:11

## 2018-11-19 RX ADMIN — APIXABAN 5 MG: 2.5 TABLET, FILM COATED ORAL at 09:11

## 2018-11-19 RX ADMIN — FUROSEMIDE 40 MG: 10 INJECTION, SOLUTION INTRAMUSCULAR; INTRAVENOUS at 09:11

## 2018-11-19 RX ADMIN — SPIRONOLACTONE 50 MG: 25 TABLET ORAL at 09:11

## 2018-11-19 RX ADMIN — CYANOCOBALAMIN 1000 MCG: 1000 INJECTION, SOLUTION INTRAMUSCULAR; SUBCUTANEOUS at 06:11

## 2018-11-19 RX ADMIN — APIXABAN 5 MG: 2.5 TABLET, FILM COATED ORAL at 08:11

## 2018-11-19 RX ADMIN — INSULIN ASPART 2 UNITS: 100 INJECTION, SOLUTION INTRAVENOUS; SUBCUTANEOUS at 11:11

## 2018-11-19 RX ADMIN — METOPROLOL TARTRATE 50 MG: 50 TABLET ORAL at 08:11

## 2018-11-19 RX ADMIN — Medication 100 MG: at 09:11

## 2018-11-19 RX ADMIN — Medication 1 TABLET: at 09:11

## 2018-11-19 RX ADMIN — FUROSEMIDE 40 MG: 10 INJECTION, SOLUTION INTRAMUSCULAR; INTRAVENOUS at 08:11

## 2018-11-19 RX ADMIN — DOCUSATE SODIUM 100 MG: 100 CAPSULE, LIQUID FILLED ORAL at 09:11

## 2018-11-19 RX ADMIN — ACETAMINOPHEN 650 MG: 325 TABLET ORAL at 12:11

## 2018-11-19 RX ADMIN — CHLORHEXIDINE GLUCONATE 10 ML: 1.2 RINSE ORAL at 09:11

## 2018-11-19 RX ADMIN — INSULIN ASPART 3 UNITS: 100 INJECTION, SOLUTION INTRAVENOUS; SUBCUTANEOUS at 08:11

## 2018-11-19 RX ADMIN — FERROUS SULFATE TAB EC 325 MG (65 MG FE EQUIVALENT) 325 MG: 325 (65 FE) TABLET DELAYED RESPONSE at 09:11

## 2018-11-19 RX ADMIN — POLYETHYLENE GLYCOL 3350 17 G: 17 POWDER, FOR SOLUTION ORAL at 09:11

## 2018-11-19 RX ADMIN — THERA TABS 1 TABLET: TAB at 09:11

## 2018-11-19 RX ADMIN — POTASSIUM CHLORIDE 20 MEQ: 1500 TABLET, EXTENDED RELEASE ORAL at 09:11

## 2018-11-19 RX ADMIN — AMIODARONE HYDROCHLORIDE 200 MG: 200 TABLET ORAL at 08:11

## 2018-11-19 RX ADMIN — ASPIRIN 81 MG: 81 TABLET, COATED ORAL at 09:11

## 2018-11-19 RX ADMIN — POTASSIUM CHLORIDE 20 MEQ: 1500 TABLET, EXTENDED RELEASE ORAL at 08:11

## 2018-11-19 NOTE — SUBJECTIVE & OBJECTIVE
Interval History: sitting up in chair, feels better but still weak overall, needs a little assistance in rising and walking. occasional CP, no SOB, leg swelling improved.    Review of Systems   Constitutional: Negative for chills and fever.   HENT: Negative for ear pain and hearing loss.    Eyes: Negative for pain and discharge.   Respiratory: Negative for cough and shortness of breath.    Cardiovascular: Positive for palpitations. Negative for chest pain.   Gastrointestinal: Negative for abdominal pain and constipation.   Endocrine: Negative for cold intolerance and heat intolerance.   Genitourinary: Negative for decreased urine volume and flank pain.   Musculoskeletal: Positive for back pain. Negative for neck pain.   Neurological: Negative for seizures and headaches.   Hematological: Does not bruise/bleed easily.   Psychiatric/Behavioral: Negative for agitation and confusion.     Objective:     Vital Signs (Most Recent):  Temp: 98 °F (36.7 °C) (11/19/18 1525)  Pulse: 91 (11/19/18 1525)  Resp: 18 (11/19/18 0900)  BP: (!) 125/58 (11/19/18 1525)  SpO2: (!) 92 % (11/19/18 1525) Vital Signs (24h Range):  Temp:  [96.8 °F (36 °C)-99.3 °F (37.4 °C)] 98 °F (36.7 °C)  Pulse:  [90-95] 91  Resp:  [16-18] 18  SpO2:  [90 %-96 %] 92 %  BP: (102-125)/(53-58) 125/58     Weight: 73.6 kg (162 lb 4.1 oz)  Body mass index is 28.74 kg/m².    Intake/Output Summary (Last 24 hours) at 11/19/2018 1740  Last data filed at 11/19/2018 0505  Gross per 24 hour   Intake 358 ml   Output 1450 ml   Net -1092 ml      Physical Exam   Constitutional: She is oriented to person, place, and time. She appears well-developed and well-nourished.   Pleasant, comfortable, sitting up in chair, pale but NAD, appears pale   HENT:   Head: Normocephalic and atraumatic.   Cardiovascular: Normal rate, regular rhythm and normal heart sounds.   Pulmonary/Chest: Effort normal and breath sounds normal.   Abdominal: Soft. Bowel sounds are normal.   Genitourinary:    Genitourinary Comments: deferred   Musculoskeletal: Normal range of motion. She exhibits no edema or deformity.   Neurological: She is alert and oriented to person, place, and time.   Skin: Skin is warm and dry.   Psychiatric: She has a normal mood and affect. Her behavior is normal. Judgment and thought content normal.   Nursing note and vitals reviewed.      Significant Labs:   BMP:   Recent Labs   Lab 11/19/18  0437   *      K 4.1   CL 96   CO2 34*   BUN 36*   CREATININE 0.8   CALCIUM 9.3   MG 2.4     CBC:   Recent Labs   Lab 11/18/18  0514   WBC 14.21*   HGB 9.1*   HCT 27.3*        All pertinent labs within the past 24 hours have been reviewed.    Significant Imaging: I have reviewed all pertinent imaging results/findings within the past 24 hours.

## 2018-11-19 NOTE — PT/OT/SLP PROGRESS
Physical Therapy  Treatment    Eve Long   MRN: [id number]   Admitting Diagnosis: NSTEMI (non-ST elevated myocardial infarction)       PT Start Time: 1215     PT Stop Time: 1230    PT Total Time (min): 15 min       Billable Minutes:  Therapeutic Activity 15    Treatment Type: Treatment  PT/PTA: PT     PTA Visit Number: 1       General Precautions: Standard, sternal, fall  Orthopedic Precautions: N/A   Braces:           Subjective:  Communicated with MARYSE HOPKINS prior to session.         Objective:    FOUND SITTING UP IN CHAIR WITH FAMILY PRESENT    Functional Mobility:  Bed Mobility: MOD A SUP TO SIT WITH VC/TC'S FOR STERNAL PREC       Transfers: MOD A WITH CUES TO USE MOMENTUM AND INC FWD LEAN       Gait: MOD A X 20 FT HHA X 1 - STILL WITH FLEXED TRUNK POSTURE AND UNSTEADY GT/TENDS TO LOSE BALANCE BACK       Stairs: UNABLE    Balance:   SITTING - CGA; STANDING MIN A STATIC AND MOD A DYNAMIC WITH HHA    Therapeutic Activities and Exercises:  PT REVIEWED FALL/STERNAL PREC TO FOLLOW DURING MOBILITY, P.T. POC, D/C RECS AT THIS TIME, DEEP BREATHING TO MANAGE SOB AND LE EXS TO PREP MOBILITY AND INC STRENGTH. P.T. ALSO EDUC PT./FAMILY FOR PT. TO SPLINT WITH HEART PILLOW DURING COUGHING TO DEC PAIN    AM-PAC 6 CLICK MOBILITY  How much help from another person does this patient currently need?   1 = Unable, Total/Dependent Assistance  2 = A lot, Maximum/Moderate Assistance  3 = A little, Minimum/Contact Guard/Supervision  4 = None, Modified [place]/Independent         AM-PAC Raw Score CMS G-Code Modifier Level of Impairment Assistance   6 % Total / Unable   7 - 9 CM 80 - 100% Maximal Assist   10 - 14 CL 60 - 80% Moderate Assist   15 - 19 CK 40 - 60% Moderate Assist   20 - 22 CJ 20 - 40% Minimal Assist   23 CI 1-20% SBA / CGA   24 CH 0% Independent/ Mod I     Patient left up in chair with all lines intact, call button in reach, MARYSE HOPKINS notified and FAMILY present.    Assessment:  Eve Long is a 68  y.o. female with a medical diagnosis of NSTEMI (non-ST elevated myocardial infarction) and presents with IMPAIRED MOBILITY.    Rehab identified problem list/impairments: Rehab identified problem list/impairments: impaired endurance, gait instability, decreased coordination, decreased safety awareness, impaired functional mobilty, weakness, impaired cardiopulmonary response to activity, impaired balance, impaired self care skills    Rehab potential is excellent.    Activity tolerance: Good    Discharge recommendations: Discharge Facility/Level Of Care Needs: home health PT, home health OT(GOAL IS HOME WITH THERAPY)     Barriers to discharge:      Equipment recommendations: Equipment Needed After Discharge: bedside commode, bath bench     GOALS:   Multidisciplinary Problems     Physical Therapy Goals        Problem: Physical Therapy Goal    Goal Priority Disciplines Outcome Goal Variances Interventions   Physical Therapy Goal     PT, PT/OT Ongoing (interventions implemented as appropriate)     Description:  LTG'S TO BE MET IN 7 DAYS (11-23-18)  1. PT WILL REQUIRE MODA FOR BED MOBILITY  2. PT WILL REQUIRE MODA FOR TF'S  3. PT WILL ' WITH MODA  4. PT WILL DEMO P+ DYNAMIC BALANCE DURING GAIT                    PLAN:    Patient to be seen 5 x/week  to address the above listed problems via gait training, therapeutic activities, therapeutic exercises  Plan of Care expires: 11/23/18  Plan of Care reviewed with: patient, spouse, family    PT G-Codes  Functional Assessment Tool Used: [place]ON AMPAC  Score: 11  Mobility: Walking and Moving Around Current Status (): CK  Mobility: Walking and Moving Around Goal Status (): CHANA Lyn, PT  11/19/2018

## 2018-11-19 NOTE — PT/OT/SLP PROGRESS
"Physical Therapy  Treatment    Eve Long   MRN: [id number]   Admitting Diagnosis: NSTEMI (non-ST elevated myocardial infarction)    PT Received On: 11/19/18  PT Start Time: 0745     PT Stop Time: 0815    PT Total Time (min): 30 min       Billable Minutes:  Gait Training 15 and Therapeutic Activity 15    Treatment Type: Treatment  PT/PTA: PT          General Precautions: Standard, fall, respiratory, sternal  Orthopedic Precautions: N/A   Braces: N/A    Subjective:  Communicated with NURSE MARYSE prior to session.  Pain/Comfort  Pain Rating 1: 0/10    Objective:   Patient found with: telemetry, wound vac, oxygen(EXT. PACER)    Functional Mobility:  Therapeutic Activities and Exercises:  PT FOUND SEATED IN CHAIR UPON ARRIVAL, PT ABLE TO RECITE 1/5 STERNAL PRECAUTIONS SO ALL REVIEWED, MODA FOR SEATED SCOOT TO EDGE OF CHAIR, JONAH FOR SIT>STAND, PT AMB 25' ON ROOM AIR WITH JONAH, SLOW PACED GAIT, VERY QUICK TO FATIGUE, DECLINED FURTHER TX., "I CAN'T GO ANYMORE" SO PT ASSISTED BACK TO ROOM TO CHAIR FOR SEATED REST, PT EDUCATED ON IMPORTANCE OF PROGRESSION IN POC.  PT AMB FOR 2ND TRIAL OF GAIT WITH 3L OF O2 AND CHAIR IN TOW, 55' WITH JONAH, CUES FOR UPRIGHT POSTURE AND DEEP BREATHING, PT EDUCATED IN AND PERFORMED SEATED BLE THEREX X 20 REPS AROM    AM-PAC 6 CLICK MOBILITY  How much help from another person does this patient currently need?   1 = Unable, Total/Dependent Assistance  2 = A lot, Maximum/Moderate Assistance  3 = A little, Minimum/Contact Guard/Supervision  4 = None, Modified [place]/Independent    Turning over in bed (including adjusting bedclothes, sheets and blankets)?: 2  Sitting down on and standing up from a chair with arms (e.g., wheelchair, bedside commode, etc.): 3  Moving from lying on back to sitting on the side of the bed?: 2  Moving to and from a bed to a chair (including a wheelchair)?: 3  Need to walk in hospital room?: 3  Climbing 3-5 steps with a railing?: 1  Basic Mobility Total Score: " 14    AM-PAC Raw Score CMS G-Code Modifier Level of Impairment Assistance   6 % Total / Unable   7 - 9 CM 80 - 100% Maximal Assist   10 - 14 CL 60 - 80% Moderate Assist   15 - 19 CK 40 - 60% Moderate Assist   20 - 22 CJ 20 - 40% Minimal Assist   23 CI 1-20% SBA / CGA   24 CH 0% Independent/ Mod I     Patient left up in chair with all lines intact, call button in reach and NURSE notified.    Assessment:  Eve Long is a 68 y.o. female with a medical diagnosis of NSTEMI (non-ST elevated myocardial infarction) and presents with IMPAIRED FUNCTIONAL MOBILITY. PT WILL BENEFIT FROM CONT. SKILLED P.T. TO ADDRESS IMPAIRMENTS    Rehab identified problem list/impairments: Rehab identified problem list/impairments: weakness, impaired endurance, impaired functional mobilty, gait instability, impaired balance, decreased coordination, decreased safety awareness    Rehab potential is fair.    Activity tolerance: Fair    Discharge recommendations: Discharge Facility/Level Of Care Needs: nursing facility, skilled     Barriers to discharge:      Equipment recommendations: Equipment Needed After Discharge: bath bench     GOALS:   Multidisciplinary Problems     Physical Therapy Goals        Problem: Physical Therapy Goal    Goal Priority Disciplines Outcome Goal Variances Interventions   Physical Therapy Goal     PT, PT/OT Ongoing (interventions implemented as appropriate)     Description:  LTG'S TO BE MET IN 7 DAYS (11-23-18)  1. PT WILL REQUIRE MODA FOR BED MOBILITY  2. PT WILL REQUIRE MODA FOR TF'S  3. PT WILL ' WITH MODA  4. PT WILL DEMO P+ DYNAMIC BALANCE DURING GAIT                    PLAN:    Patient to be seen 5 x/week  to address the above listed problems via gait training, therapeutic activities, therapeutic exercises  Plan of Care expires: 11/23/18  Plan of Care reviewed with: patient    PT ENCOURAGED TO CALL FOR ASSISTANCE WITH ALL NEEDS DUE TO FALL RISK STATUS, PT AGREEABLE    Sharyn Varghese,  PT  11/19/2018

## 2018-11-19 NOTE — PLAN OF CARE
Problem: Patient Care Overview  Goal: Plan of Care Review  Outcome: Ongoing (interventions implemented as appropriate)  PATIENT/FAMILY REVIEWED ON STERNAL PREC, LE EXS TO DO IN BED/CHAIR, FALL PREC AND P.T. POC. PATIENT IMPROVED TO MOD A X 1 FOR MOBILITY.

## 2018-11-19 NOTE — PROGRESS NOTES
[facility] Hospital Medicine  Progress Note    Patient Name: Eve Long  MRN: [id number]  Patient Class: IP- Inpatient   Admission Date: 11/13/2018  Length of Stay: 6 days  Attending Physician: Ricardo Dorsey MD  Primary Care Provider: Chaya Morelos MD        Subjective:     Principal Problem:NSTEMI (non-ST elevated myocardial infarction)    HPI:  Eve Long is a 68 y.o. female patient with a PMHx of anemia, HLD, HTN, pneumonia, sarcoidosis, who presented to the ER for evaluation of constant left sided chest pain which onset suddenly x2-4 days ago. Pt characterizes her pain as a pressure 10/10 at its worse. Pt reports a strong FMHx of cardiac disease (Father with CABG/MI, all materal uncles with CAD) and reports her last stress test was done in 2006. Symptoms are constant and moderate in severity. Exacerbated by nothing and relieved by anti-hypertensive medications. Associated sxs included diaphoresis, left arm pain radiating from chest and lef jaw pain radiating from chest. Patient denied any fever, chills, SOB, leg swelling, palpitations, N/V, dysuria, hematuria, HA, weakness, and all other sxs at this time. In ER, troponin 0.241. Cardiology was consulted and Dr. Rodríguez recommended IV Heparin Drip. Spouse, Bravo Long, is the surrogate decision maker, HOME [phone]. She was admitted to telemetry Dx NSTEMI.         Hospital Course:  Admitted for evaluation and treatment of chest pain with elevated troponin.  Seen by Cardiology and had a left heart angiogram by Dr. Rodríguez 14 November.  Procedure revealed multivessel disease.  IABP placed and post-procedure in the ICU.  Consultation to Cardiac Surgery to plan for CABG.  11/15- pt underwent 5 vessel CABG by Dr. Castellanos today on IABP 1:1, seen post op in the ICU on vent, sedated with Precedex. Getting a dose of Albumin and Ca and is on Insulin drip, remains on IABP along with CT to water seal. Cardiac Index 2.0--  may need a little Epi drip to improve CI.   11/16- doing much better, alert and awake and talking. Extubated around 9 pm last evening. Had to be placed on Epinephrine gtt and Vasopressin to augment CO/CI and both were d/james this am. IABP removed by Dr. Castellanos this am and his Cordis and Chest tubes are coming off soon today. Also got a unit of blood post op. No obvious hemorrhage anywhere, H/H stable. Good urine output.   11/17- looks a little better after all the chest tubes, cordis, chest tubes removed yesterday. Got OT/Pt earlier and then got up by herself and did well. Also has some CHF and getting Lasix, K is being replaced. Also developed Afib w RVR-- getting BB.   11/18- doing much better, sitting up in chair, SOB, leg swelling resolved, still with transvenous pacemaker and occasionally feels the palpitations. Still in apace @90, controlled, now on oral amiodarone.   11/19- sitting up in chair, feels better but still weak overall, needs a little assistance in rising and walking. occasional CP, no SOB, leg swelling improved.     Interval History: sitting up in chair, feels better but still weak overall, needs a little assistance in rising and walking. occasional CP, no SOB, leg swelling improved.    Review of Systems   Constitutional: Negative for chills and fever.   HENT: Negative for ear pain and hearing loss.    Eyes: Negative for pain and discharge.   Respiratory: Negative for cough and shortness of breath.    Cardiovascular: Positive for palpitations. Negative for chest pain.   Gastrointestinal: Negative for abdominal pain and constipation.   Endocrine: Negative for cold intolerance and heat intolerance.   Genitourinary: Negative for decreased urine volume and flank pain.   Musculoskeletal: Positive for back pain. Negative for neck pain.   Neurological: Negative for seizures and headaches.   Hematological: Does not bruise/bleed easily.   Psychiatric/Behavioral: Negative for agitation and confusion.      Objective:     Vital Signs (Most Recent):  Temp: 98 °F (36.7 °C) (11/19/18 1525)  Pulse: 91 (11/19/18 1525)  Resp: 18 (11/19/18 0900)  BP: (!) 125/58 (11/19/18 1525)  SpO2: (!) 92 % (11/19/18 1525) Vital Signs (24h Range):  Temp:  [96.8 °F (36 °C)-99.3 °F (37.4 °C)] 98 °F (36.7 °C)  Pulse:  [90-95] 91  Resp:  [16-18] 18  SpO2:  [90 %-96 %] 92 %  BP: (102-125)/(53-58) 125/58     Weight: 73.6 kg (162 lb 4.1 oz)  Body mass index is 28.74 kg/m².    Intake/Output Summary (Last 24 hours) at 11/19/2018 1740  Last data filed at 11/19/2018 0505  Gross per 24 hour   Intake 358 ml   Output 1450 ml   Net -1092 ml      Physical Exam   Constitutional: She is oriented to person, place, and time. She appears well-developed and well-nourished.   Pleasant, comfortable, sitting up in chair, pale but NAD, appears pale   HENT:   Head: Normocephalic and atraumatic.   Cardiovascular: Normal rate, regular rhythm and normal heart sounds.   Pulmonary/Chest: Effort normal and breath sounds normal.   Abdominal: Soft. Bowel sounds are normal.   Genitourinary:   Genitourinary Comments: deferred   Musculoskeletal: Normal range of motion. She exhibits no edema or deformity.   Neurological: She is alert and oriented to person, place, and time.   Skin: Skin is warm and dry.   Psychiatric: She has a normal mood and affect. Her behavior is normal. Judgment and thought content normal.   Nursing note and vitals reviewed.      Significant Labs:   BMP:   Recent Labs   Lab 11/19/18  0437   *      K 4.1   CL 96   CO2 34*   BUN 36*   CREATININE 0.8   CALCIUM 9.3   MG 2.4     CBC:   Recent Labs   Lab 11/18/18  0514   WBC 14.21*   HGB 9.1*   HCT 27.3*        All pertinent labs within the past 24 hours have been reviewed.    Significant Imaging: I have reviewed all pertinent imaging results/findings within the past 24 hours.    Assessment/Plan:      S/P CABG x 5    Doing well post op on vent and IABP 1:1  Mildly sedated  Continue post op  orders per CVT- may need Epi gtt to augment CI    See above, doing well    Recovering well, may d/c soon.     Acute blood loss anemia    Expected post op blood loss s/p transfusion x 2 units  Will IV venofer and start oral iron and MVI    Stable h/h       Coronary artery disease involving autologous vein coronary bypass graft without angina pectoris    See above       COPD, moderate    PRN Duonebs and supplemental oxygen.  Continue vent support    Extubated, now getting nebs, IS  Doing well     Hypothyroidism    Resume Home Meds.  TSH pending.  Normal TSH     Pulmonary sarcoidosis    stable         VTE Risk Mitigation (From admission, onward)        Ordered     apixaban tablet 5 mg  2 times daily      11/17/18 1434     IP VTE LOW RISK PATIENT  Once      11/14/18 1529     Place LIUDMILA hose  Until discontinued      11/14/18 1529     Place sequential compression device  Until discontinued      11/14/18 1529              Eloise Pacheco MD  Department of Hospital Medicine   [facility]

## 2018-11-19 NOTE — ASSESSMENT & PLAN NOTE
11/16/2018  The patient is postop day 1.  Status post coronary artery bypass grafting x5.  Overall the patient is is doing very well.  Neuro:  The patient is awake alert and oriented x3.  Nonfocal neuro exam.  Pain is controlled with pain medication.  Cardiac:  Patient has been stable with good cardiac indices on low-dose epi.  Patient was in atrial fibrillation earlier now in sinus on a amiodarone drip.  Will discontinue intra-aortic balloon pump.  Wean the epi to off.  Respiratory:  The patient is extubated and good sats on nasal cannula.  Continue pulmonary toilet.  GI:  Advance p.o. intake as tolerated.  Renal:  The patient has good urine output creatinine is 0.8.  Lasix for diuresis.  Heme:  Hematocrit is 21.  Patient transfuse 1 unit packed red blood cells.  Endocrine:  Glucose is controlled with insulin.  Activities:  Patient is currently on bedrest for intra-aortic balloon pump.  Will get out of bed and ambulate once intra-aortic balloon pump is removed.  Lines tubes and drains:  Intra-aortic balloon pump, Udall and Cordis, RAZIA drains in saphenectomy site, and chest tubes will be discontinued.    11/17/2018  The patient is postop day 2.  Status post coronary artery bypass grafting x5.  Overall the patient is is doing very well. Transfer to Telemetry  Neuro:  The patient is awake alert and oriented x3.  Nonfocal neuro exam.  Pain is controlled with pain medication.  Cardiac:  Patient has been stable . Up titrate metoprolol. Start PO amiodarone  Respiratory:  The patient is extubated and good sats on nasal cannula.  Continue pulmonary toilet.  GI:  Advance p.o. intake as tolerated.  Renal:  The patient has good urine output creatinine is 1.0.  Good response to diuresis.  Heme:  Hematocrit is 26. Pit 148  ID: Afebrile. WBC15 Will follow.  Endocrine:  Glucose is controlled with insulin.  Activities: OOB and ambulate as tolerated.   Lines tubes and drains:  D/c fang, Keep mid level line and pacing  wires    11/18/2018  The patient is postop day 3.  Status post coronary artery bypass grafting x5.  Overall the patient is is doing very well.   Neuro:  The patient is awake alert and oriented x3.  Nonfocal neuro exam.  Pain is controlled with pain medication.  Cardiac:  Patient has been stable .  Tolerating metoprolol 100 mg b.i.d.. Start PO amiodarone.  Discontinue amiodarone.  Respiratory:  Good sats on nasal cannula.  Continue pulmonary toilet.  Wean oxygen to room air.  GI:  Advance p.o. intake as tolerated.  Renal:  The patient has good urine output creatinine is 0.8.  Good response to diuresis.  Replace potassium.  Heme:  Hematocrit is 27. Pit 188  ID: Afebrile. WBC 14. Will follow.  Endocrine:  Glucose is controlled with insulin.  Activities: OOB and ambulate as tolerated.   Lines tubes and drains:  Mid level line and pacing wires    11/19/2018  The patient is postop day 4.  Status post coronary artery bypass grafting x5.  Overall the patient is is doing very well. Anticipate discharge home with home health in the next 24-48 hours.  Neuro:  The patient is awake alert and oriented x3.  Nonfocal neuro exam.  Pain is controlled with pain medication.  Cardiac:  Patient has been stable .  Tolerating metoprolol 100 mg b.i.d. and PO amiodarone.  Patient is on apixaban for intermittent episodes of atrial fibrillation.  Respiratory:  Good sats on nasal cannula.  Continue pulmonary toilet.  Wean oxygen to room air.  GI:  Advance p.o. intake as tolerated.  Patient has had bowel movements.  Renal:  The patient has good urine output creatinine is 0.8.  Good response to diuresis.  Replace potassium.  Heme:  Hematocrit is 27. Pit 188 from yesterday.  Recheck CBC in the morning.  ID: Afebrile  Endocrine:  Glucose is controlled with insulin.  Activities: OOB and ambulate as tolerated.   Lines tubes and drains:  Mid level line and pacing wires    11/20/2018  The patient is postop day .  Status post coronary artery bypass  grafting x5.  Overall the patient is is doing very well. Anticipate discharge home with home health today  Neuro:  The patient is awake alert and oriented x3.  Nonfocal neuro exam.  Pain is controlled with pain medication.  Cardiac:  Patient has been stable .  Tolerating metoprolol 50 mg b.i.d. and PO amiodarone.  Patient is on apixaban for intermittent episodes of atrial fibrillation.  Respiratory:  Good sats on nasal cannula.  Continue pulmonary toilet.  Wean oxygen to room air.  GI:  Advance p.o. intake as tolerated.  Patient has had bowel movements.  Renal:  The patient has good urine output creatinine is 0.9.  Good response to diuresis.  Replace potassium.  Heme:  Hematocrit is 28.6. Plt 264 from yesterday.  Recheck CBC in the morning.  ID: Afebrile  Endocrine:  Glucose is controlled with insulin.  Activities: OOB and ambulate as tolerated.   Lines tubes and drains:  Mid level line and pacing wires

## 2018-11-19 NOTE — PT/OT/SLP PROGRESS
Speech Language Pathology Treatment    Patient Name:  Eve Long   MRN:  [id number]  Admitting Diagnosis: NSTEMI (non-ST elevated myocardial infarction)    Recommendations:                 General Recommendations:  Follow-up not indicated  Diet recommendations:  Regular, Liquid Diet Level: Thin   Aspiration Precautions: HOB to 90 degrees and Remain upright 30 minutes post meal   General Precautions: Standard, sternal  Communication strategies:  none    Subjective     Pt was seen at bedside with no family present.   Patient goals: none reported     Pain/Comfort:  · Pain Rating 1: 5/10  · Location 1: sternal  · Pain Addressed 1: Distraction(pt reported taking tylenol)    Objective:     Has the patient been evaluated by SLP for swallowing?   Yes  Keep patient NPO? No   Current Respiratory Status: nasal cannula      Pt completed cognitive tasks, receptive language tasks, expressive language tasks with no assistance required.  Swallowing assessed with no overt signs of dysphagia.      Assessment:     Eve Long is a 68 y.o. female with an SLP diagnosis of normal speech and languge abilities.  She presents with a safe, functional swallow and WFL communication and thought processes.  No further ST warranted at this time.     Goals:   Multidisciplinary Problems     SLP Goals     Not on file          Multidisciplinary Problems (Resolved)        Problem: SLP Goal    Goal Priority Disciplines Outcome   SLP Goal   (Resolved)     SLP Outcome(s) achieved   Description:  LT. Pt to tolerate least restrictive diet consistency safely and efficiently.  2. Pt to exhibit functional communication and cognitive skills.    ST. BSA with meal with further goals as appropriate.  2. Cognitive testing with goals as appropriate.                    Plan:     · Plan of Care reviewed with:  patient   · SLP Follow-Up:  No      Time Tracking:     SLP Treatment Date:   18  Speech Start Time:  1045  Speech Stop Time:   1100     Speech Total Time (min):  15 min    Billable Minutes: Speech Therapy Individual 15    Hemalatha Schreiber CCC-SLP  11/19/2018

## 2018-11-19 NOTE — PLAN OF CARE
Problem: Patient Care Overview  Goal: Plan of Care Review  Outcome: Ongoing (interventions implemented as appropriate)  POC reviewed, verbalized understanding. Pt remained free from falls, fall precautions in place. Pt is paced on monitor, 90s. VSS. 2L O2 NC. Denies pain. PIV intact. Labs monitored, K replaced. Ambulates to bedside commode with assistance. Sternal precautions maintained. IS encouraged. Call bell and personal belongings within reach. Hourly rounding complete. Reminded to call for assistance. Will continue to monitor.

## 2018-11-19 NOTE — PLAN OF CARE
Problem: Occupational Therapy Goal  Goal: Occupational Therapy Goal  Goals to be met by: 11/23/18     Patient will increase functional independence with ADLs by performing:    UE Dressing with Moderate Assistance.  LE Dressing with Maximum Assistance.  Grooming while EOB with Contact Guard Assistance.  Toileting from bedside commode with Moderate Assistance for hygiene and clothing management.   Toilet transfer to bedside commode with Moderate Assistance.  Upper extremity exercise program x10 reps per handout, with assistance as needed.     Outcome: Ongoing (interventions implemented as appropriate)  Reviewed UE dressing and toilet hygiene. Pt had good understanding

## 2018-11-19 NOTE — PLAN OF CARE
CM spoke with patient for D/C planning and spoke with the patient regarding therapyrecommendations for skilled. Patient agreed. CM gave patient a lisiting of [facility] for her and her spouse to chose. CM to f/u for safe transition  1350- CM spoke with patient and preference form signed for [facility] first and the [facility] second. Patient given a copy and the original to the blue folder. CM place referral in [facility]     11/19/18 1301   Discharge Reassessment   Assessment Type Discharge Planning Reassessment   Provided patient/caregiver education on the expected discharge date and the discharge plan No   Do you have any problems affording any of your prescribed medications? No   Discharge Plan A Skilled Nursing Facility   Discharge Plan B Skilled Nursing Facility   Patient choice form signed by patient/caregiver No   Anticipated Discharge Disposition SNF   Can the patient answer the patient profile reliably? Yes, cognitively intact   How does the patient rate their overall health at the present time? Fair   Describe the patient's ability to walk at the present time. No restrictions   How often would a person be available to care for the patient? Whenever needed   Number of comorbid conditions (as recorded on the chart) Five or more   During the past month, has the patient often been bothered by feeling down, depressed or hopeless? No   During the past month, has the patient often been bothered by little interest or pleasure in doing things? No

## 2018-11-19 NOTE — PLAN OF CARE
Problem: Physical Therapy Goal  Goal: Physical Therapy Goal  LTG'S TO BE MET IN 7 DAYS (11-23-18)  1. PT WILL REQUIRE MODA FOR BED MOBILITY  2. PT WILL REQUIRE MODA FOR TF'S  3. PT WILL ' WITH MODA  4. PT WILL DEMO P+ DYNAMIC BALANCE DURING GAIT   Outcome: Ongoing (interventions implemented as appropriate)  PT GT TRAINED WITH O2 IN TOW AND CGA X 200' WITH ONE SEATED REST BREAK.

## 2018-11-19 NOTE — PLAN OF CARE
Problem: SLP Goal  Goal: SLP Goal  LT. Pt to tolerate least restrictive diet consistency safely and efficiently.  2. Pt to exhibit functional communication and cognitive skills.    ST. BSA with meal with further goals as appropriate.  2. Cognitive testing with goals as appropriate.   Outcome: Outcome(s) achieved Date Met: 18  Pt's speech and language abilities are WFL.  No further ST warranted.

## 2018-11-19 NOTE — PROGRESS NOTES
[facility] -   Cardiology  Progress Note    Patient Name: Eve Long  MRN: [id number]  Admission Date: 11/13/2018  Hospital Length of Stay: 6 days  Code Status: Full Code   Attending Physician: Ricardo Dorsey MD   Primary Care Physician: Chaya Morelos MD  Expected Discharge Date:   Principal Problem:NSTEMI (non-ST elevated myocardial infarction)    Subjective:   HPI  Eve Long is a 68 y.o. female patient with a PMHx of anemia, HLD, HTN, TIA,  pneumonia, sarcoidosis, Statin intolerant due to myalgias. Tried Zetia and still had myalgia. Patient presented to the ER for evaluation of constant left sided chest pain which onset approximately 4 days ago. Pt characterizes her pain as a pressure 10/10 at its worse. Pt reports a strong FMHx of cardiac disease (Father with CABG/MI, all materal uncles with CAD) and reports her last stress test was done in 2006.  patient states that she tried to take a walk around her neighborhood and chest pain worsened. Pain associated with diaphoresis, and radiated from chest and left jaw, arm and back.  Patient denied any fever, chills, SOB, leg swelling, palpitations, N/V, dysuria, hematuria, HA, weakness, and all other sxs at this time. In ER, troponin 0.241, 0.297, 0.288. EKG with + anterior changes.  Echo in Aug 2018 showed normal LVF.  Patient has been started on Heparin Drip. Last episode of chest pain was this morning. Had chest pain all night improved with nitro paste.     Hospital Course:   11/16/18- Patient is POD 1 s/p CABG x5. Has been extubated. Remains on IABP 1:1. CT x 3 inplace. RAZIA x2 remains in place. Had A-fib with RVR overnight and started on Amio gtt. Converted to NSR and is A-pacing. Given 1 unit of blood . H/H 7.5/21.9 down from 9.0/ 26.7.  Has about 15-30cc/hr of urine output     11/17/18- POD 2 s/p CABG x5. Chest tubes, RAZIA drains and IABP have all been DC'd. Pacer wires remain in place. A-pacing. Having PAF. CXR shows  bilateral effusion. Has some SOB this morning and is receiving IV Lasix. . Given IV lopressor this morning for rate control. K+ being replaced     11/18/18- POD 3 s/p CABG x5. Patient in A-fib this morning. Has been switched to PO amio. SOB improved today since diuresing. Labs and vitals stable.     11/19/18-Patient seen and examined in room, lying in bed. POD 4 s/p CABG x 5v. Denies chest pain or anginal equivalents today. Labs reviewed, stable.     Interval History: no acute issues o/n    Review of Systems   Constitution: Negative for weakness and malaise/fatigue.   HENT: Negative for hearing loss and hoarse voice.    Eyes: Negative for blurred vision and visual disturbance.   Cardiovascular: Positive for irregular heartbeat. Negative for chest pain, claudication, dyspnea on exertion, leg swelling, near-syncope, orthopnea, palpitations, paroxysmal nocturnal dyspnea and syncope.   Respiratory: Negative for cough, hemoptysis, shortness of breath, sleep disturbances due to breathing, snoring and wheezing.    Endocrine: Negative for cold intolerance and heat intolerance.   Hematologic/Lymphatic: Bruises/bleeds easily.   Skin: Negative for color change, dry skin and nail changes.   Musculoskeletal: Positive for arthritis. Negative for back pain, joint pain and myalgias.   Gastrointestinal: Negative for bloating, abdominal pain, constipation, nausea and vomiting.   Genitourinary: Negative for dysuria, flank pain, hematuria and hesitancy.   Neurological: Negative for headaches, light-headedness, loss of balance, numbness and paresthesias.   Psychiatric/Behavioral: Negative for altered mental status.   Allergic/Immunologic: Negative for environmental allergies.     Objective:     Vital Signs (Most Recent):  Temp: 98 °F (36.7 °C) (11/19/18 1525)  Pulse: 91 (11/19/18 1525)  Resp: 18 (11/19/18 0900)  BP: (!) 125/58 (11/19/18 1525)  SpO2: (!) 92 % (11/19/18 1525) Vital Signs (24h Range):  Temp:  [96.8 °F (36 °C)-99.3 °F (37.4  °C)] 98 °F (36.7 °C)  Pulse:  [90-95] 91  Resp:  [16-20] 18  SpO2:  [90 %-96 %] 92 %  BP: (102-125)/(53-58) 125/58     Weight: 73.6 kg (162 lb 4.1 oz)  Body mass index is 28.74 kg/m².     SpO2: (!) 92 %  O2 Device (Oxygen Therapy): nasal cannula      Intake/Output Summary (Last 24 hours) at 11/19/2018 1539  Last data filed at 11/19/2018 0505  Gross per 24 hour   Intake 358 ml   Output 1450 ml   Net -1092 ml       Lines/Drains/Airways     Line                 Pacer Wires 11/15/18 1600 3 days          Pressure Ulcer                 Negative Pressure Wound Therapy  4 days          Peripheral Intravenous Line                 Peripheral IV - Single Lumen 11/13/18 1635 Left Antecubital 5 days         Midline Catheter Insertion/Assessment  - Double Lumen 11/16/18 1530 Left 3 days                Physical Exam   Constitutional: She is oriented to person, place, and time. She appears well-developed and well-nourished. No distress.   HENT:   Head: Normocephalic and atraumatic.   Eyes: Pupils are equal, round, and reactive to light.   Neck: Normal range of motion and full passive range of motion without pain. Neck supple. No JVD present.   Cardiovascular: Normal rate, regular rhythm, S1 normal, S2 normal and intact distal pulses. PMI is not displaced. Exam reveals no distant heart sounds.   No murmur heard.  Pulses:       Radial pulses are 2+ on the right side, and 2+ on the left side.        Dorsalis pedis pulses are 2+ on the right side, and 2+ on the left side.   Mid sternal CABG incision with wound vac in place    Pacing wires in place, on standby   Pulmonary/Chest: Effort normal and breath sounds normal. No accessory muscle usage. No respiratory distress. She has no decreased breath sounds. She has no wheezes. She has no rales.   Abdominal: Soft. Bowel sounds are normal. She exhibits no distension. There is no tenderness.   Musculoskeletal: Normal range of motion. She exhibits no edema.        Right ankle: She exhibits no  swelling.        Left ankle: She exhibits no swelling.   Neurological: She is alert and oriented to person, place, and time.   Skin: Skin is warm and dry. She is not diaphoretic. No cyanosis. Nails show no clubbing.   Psychiatric: She has a normal mood and affect. Her speech is normal and behavior is normal. Judgment and thought content normal. Cognition and memory are normal.   Nursing note and vitals reviewed.      Significant Labs:   All pertinent lab results from the last 24 hours have been reviewed. and   Recent Lab Results       11/19/18  1129   11/19/18  0457   11/19/18  0437   11/18/18  2103   11/18/18  2053        Anion Gap     8   7     BUN, Bld     36   37     Calcium     9.3   9.1     Chloride     96   97     CO2     34   34     Creatinine     0.8   0.9     eGFR if      >60   >60     eGFR if non      >60  Comment:  Calculation used to obtain the estimated glomerular filtration  rate (eGFR) is the CKD-EPI equation.      >60  Comment:  Calculation used to obtain the estimated glomerular filtration  rate (eGFR) is the CKD-EPI equation.        Glucose     117   153     Magnesium     2.4   2.3     POCT Glucose 164 129   151       Potassium     4.1   3.9     Sodium     138   138                      11/18/18  1703        Anion Gap       BUN, Bld       Calcium       Chloride       CO2       Creatinine       eGFR if        eGFR if non African American       Glucose       Magnesium       POCT Glucose 120     Potassium       Sodium             Significant Imaging: Echocardiogram:   2D echo with color flow doppler:   Results for orders placed or performed during the hospital encounter of 11/13/18   2D echo with color flow doppler   Result Value Ref Range    QEF 40 (A) 55 - 65    Diastolic Dysfunction Yes (A)     and X-Ray: CXR: X-Ray Chest 1 View (CXR): No results found for this visit on 11/13/18.    Assessment and Plan:       S/P CABG x 5    -Patient is POD 1 s/p  CABG x5   -Continue Amio gtt and plan to switch to PO tomorrow  -CT and RAZIA out when ok with CT surgery   -IABP being weaned. Changed to 1:2 by Dr. Rodríguez  -Continue ASA, Plavix   -No statin due to intolerance in the past. Had severe myalgia with statin and Zetia use   -Encourage IS use and begin ambulation when transferred to Tele     11/17/18  -Agree with increasing metoprolol to 50mg BID and use Lopressor 5mg IV PRN  -Needs to diurese with IV Lasix   -K+ being replaced  -Continue ASA and Plavix  --No statin due to intolerance in the past. Had severe myalgia with statin and Zetia use   -Encourage IS use and begin ambulation when transferred to Tele     11/18/18  -Decrease amio to 200mg BID  -Continue Lopressor 100mg BID  -Continue ASA, Lisinopril and Eliqius  -Needs to start Plavix if ok with CT surgery  -No statin due to intolerance in the past. Had severe myalgia with statin and Zetia use    -Encourage IS use and ambulation     11/19/18  -Continue Amio, Lopressor, ASA, ACEi, Eliquis  Start Plavix if ok with CT Surgery  No statin due to severe intolerance  No Zetia due to intolerance  Encourage IS usage every hour  Encourage ambulation  Will continue to follow along.              VTE Risk Mitigation (From admission, onward)        Ordered     apixaban tablet 5 mg  2 times daily      11/17/18 1434     IP VTE LOW RISK PATIENT  Once      11/14/18 1529     Place LIUDMILA hose  Until discontinued      11/14/18 1529     Place sequential compression device  Until discontinued      11/14/18 1529          Alba Lang NP  Cardiology  [facility]

## 2018-11-19 NOTE — PT/OT/SLP PROGRESS
Occupational Therapy  Treatment    Eve Long   MRN: [id number]   Admitting Diagnosis: NSTEMI (non-ST elevated myocardial infarction)    OT Date of Treatment: 11/19/18   OT Start Time: 0750  OT Stop Time: 0815  OT Total Time (min): 25 min    Billable Minutes:  Self Care/Home Management 10 min and Therapeutic Activity 15 min    General Precautions: Standard, fall, sternal  Orthopedic Precautions: N/A  Braces: N/A         Subjective:  Communicated with Nurse Schwartz and Epic chart review prior to session.  Pt found sitting in recliner chair and agreeable to tx at this time.       Objective:  Patient found with: telemetry, oxygen, peripheral IV, wound vac(exteranl pacer)     Functional Mobility:  Bed Mobility: n/a       Transfers: Min A        Functional Ambulation: 25ft with Min A on Room air; 55 ft on 3L O2 with Min A; both without AD        Balance:   Static Sit: FAIR+: Able to take MINIMAL challenges from all directions  Dynamic Sit: FAIR+: Maintains balance through MINIMAL excursions of active trunk motion  Static Stand: FAIR: Maintains without assist but unable to take challenges  Dynamic stand: POOR+: Needs MIN (minimal ) assist during gait    Therapeutic Activities and Exercises:  Therapist reviewed sternal precautions with pt and pt able to recall 2/5. Pt performed forward scooting in chair with Mod A, sit>stand with Min A; functional mobility ~25ft while on room air with Min A, pt became SOB therefore returned back to chair with Min A; ~55ft with 3L O2 with Min A, t/f back to recliner with Min A; reviewed proper techniques for UE dressing and toileting hygiene.     AM-PAC 6 CLICK ADL   How much help from another person does this patient currently need?   1 = Unable, Total/Dependent Assistance  2 = A lot, Maximum/Moderate Assistance  3 = A little, Minimum/Contact Guard/Supervision  4 = None, Modified [place]/Independent    Putting on and taking off regular lower body clothing? : 2  Bathing (including  "washing, rinsing, drying)?: 2  Toileting, which includes using toilet, bedpan, or urinal? : 2  Putting on and taking off regular upper body clothing?: 2  Taking care of personal grooming such as brushing teeth?: 2  Eating meals?: 3  Daily Activity Total Score: 13     AM-PAC Raw Score CMS "G-Code Modifier Level of Impairment Assistance   6 % Total / Unable   7 - 8 CM 80 - 100% Maximal Assist   9-13 CL 60 - 80% Moderate Assist   14 - 19 CK 40 - 60% Moderate Assist   20 - 22 CJ 20 - 40% Minimal Assist   23 CI 1-20% SBA / CGA   24 CH 0% Independent/ Mod I       Patient left up in chair with all lines intact, call button in reach and nurse Efren notified    ASSESSMENT:  Eve Long is a 68 y.o. female with a medical diagnosis of NSTEMI (non-ST elevated myocardial infarction) and presents with impaired functional mobility and ADL's.    Rehab identified problem list/impairments: Rehab identified problem list/impairments: weakness, impaired endurance, impaired self care skills, impaired functional mobilty, impaired balance, gait instability, decreased upper extremity function, impaired coordination, decreased safety awareness, pain    Rehab potential is good.    Activity tolerance: Good    Discharge recommendations: Discharge Facility/Level Of Care Needs: home with home health     Barriers to discharge: Barriers to Discharge: None    Equipment recommendations: bedside commode, bath bench     GOALS:   Multidisciplinary Problems     Occupational Therapy Goals        Problem: Occupational Therapy Goal    Goal Priority Disciplines Outcome Interventions   Occupational Therapy Goal     OT, PT/OT Ongoing (interventions implemented as appropriate)    Description:  Goals to be met by: 11/23/18     Patient will increase functional independence with ADLs by performing:    UE Dressing with Moderate Assistance.  LE Dressing with Maximum Assistance.  Grooming while EOB with Contact Guard Assistance.  Toileting from bedside " commode with Moderate Assistance for hygiene and clothing management.   Toilet transfer to bedside commode with Moderate Assistance.  Upper extremity exercise program x10 reps per handout, with assistance as needed.                      Plan:  Patient to be seen 3 x/week to address the above listed problems via self-care/home management, therapeutic activities, therapeutic exercises  Plan of Care expires: 11/23/18  Plan of Care reviewed with: patient    OT G-codes  Functional Assessment Tool Used: [facility] PAC  Score: 13    Margie Wall, PT/OT  11/19/2018

## 2018-11-19 NOTE — PT/OT/SLP PROGRESS
Physical Therapy  Treatment    Eve Long   MRN: [id number]   Admitting Diagnosis: NSTEMI (non-ST elevated myocardial infarction)    PT Received On: 11/19/18  PT Start Time: 1350     PT Stop Time: 1413    PT Total Time (min): 23 min       Billable Minutes:  Gait Training 13 and Therapeutic Activity 10    Treatment Type: Treatment  PT/PTA: PT     PTA Visit Number: 1       General Precautions: Standard, fall, sternal  Orthopedic Precautions: N/A   Braces: N/A         Subjective:  Communicated with NURSE SERRA AND Epic CHART REVIEW  prior to session.   PT AGREED TO TX     Pain/Comfort  Pain Rating 1: 0/10  Pain Rating Post-Intervention 1: 0/10    Objective:   Patient found with: telemetry, peripheral IV, oxygen, wound vac(EXTERNAL PACER)    Functional Mobility:  PT SUP.SIT EOB WITH MIN A AND SCOOTED TO EOB WITH SBA AND CUES FOR STERNAL PRECAUTIONS. PT STOOD WITH NO AD AND GT TRAINED X 200' WITH ONE SEATED REST BREAK. PT GIVEN CUES FOR PURSED LIP BREATHING. PT RETURNED TO  T/F TO BED WITH CGA. PT SUP IN BED WITH CGA. P.T. REVIEWED ALL STERNAL PRECAUTIONS WITH PT. PT LEFT WITH ALL NEEDS MET AND CALL BELL IN REACH    AM-PAC 6 CLICK MOBILITY  How much help from another person does this patient currently need?   1 = Unable, Total/Dependent Assistance  2 = A lot, Maximum/Moderate Assistance  3 = A little, Minimum/Contact Guard/Supervision  4 = None, Modified [place]/Independent    Turning over in bed (including adjusting bedclothes, sheets and blankets)?: 3  Sitting down on and standing up from a chair with arms (e.g., wheelchair, bedside commode, etc.): 3  Moving from lying on back to sitting on the side of the bed?: 3  Moving to and from a bed to a chair (including a wheelchair)?: 3  Need to walk in hospital room?: 3  Climbing 3-5 steps with a railing?: 1  Basic Mobility Total Score: 16    AM-PAC Raw Score CMS G-Code Modifier Level of Impairment Assistance   6 % Total / Unable   7 - 9 CM 80 - 100%  Maximal Assist   10 - 14 CL 60 - 80% Moderate Assist   15 - 19 CK 40 - 60% Moderate Assist   20 - 22 CJ 20 - 40% Minimal Assist   23 CI 1-20% SBA / CGA   24 CH 0% Independent/ Mod I     Patient left supine with call button in reach.    Assessment:  PT PROGRESSING WITH GT HOWEVER CONT TO NEED CUES FOR SAFETY WITH STERNAL PRECAUTIONS.     Rehab identified problem list/impairments: Rehab identified problem list/impairments: weakness, impaired endurance, impaired functional mobilty, gait instability, impaired balance, impaired self care skills, decreased ROM, decreased upper extremity function, decreased safety awareness    Rehab potential is good.    Activity tolerance: Fair    Discharge recommendations: Discharge Facility/Level Of Care Needs: home health PT, nursing facility, skilled     Barriers to discharge:      Equipment recommendations: Equipment Needed After Discharge: none     GOALS:   Multidisciplinary Problems     Physical Therapy Goals        Problem: Physical Therapy Goal    Goal Priority Disciplines Outcome Goal Variances Interventions   Physical Therapy Goal     PT, PT/OT Ongoing (interventions implemented as appropriate)     Description:  LTG'S TO BE MET IN 7 DAYS (11-23-18)  1. PT WILL REQUIRE MODA FOR BED MOBILITY  2. PT WILL REQUIRE MODA FOR TF'S  3. PT WILL ' WITH MODA  4. PT WILL DEMO P+ DYNAMIC BALANCE DURING GAIT                    PLAN:    Patient to be seen 5 x/week  to address the above listed problems via gait training, therapeutic activities, therapeutic exercises  Plan of Care expires: 11/23/18  Plan of Care reviewed with: patient         Nevaeh Suh, PT  11/19/2018

## 2018-11-19 NOTE — PROGRESS NOTES
[facility] -   Adult Nutrition  Consult Note    SUMMARY     Recommendations    Recommendation/Intervention:   1. Continue current diet order  2. Post-CABG diet education provided to pt and  w/ handouts. Pt and  verbalized understanding, all questions/concerns addressed.  3. Will continue to monitor    Goals: Meet >85% EEN/EPN this admit   Nutrition Goal Status: continues  Communication of RD Recs: POC review  Reason for Assessment    Reason for Assessment: RD f/u  Dx:  1. NSTEMI (non-ST elevated myocardial infarction)    2. Chest pain    3. CAD (coronary artery disease)    4. Pre-operative cardiovascular examination    5. COPD, moderate    6. Coronary artery disease involving native coronary artery of native heart with unstable angina pectoris    7. Essential hypertension    8. Sarcoidosis of lung    9. Post-operative state    10. Acquired hypothyroidism    11. On mechanically assisted ventilation    12. S/P CABG x 5      Past Medical History:   Diagnosis Date    Anemia     Asthma     Basal cell carcinoma     COPD (chronic obstructive pulmonary disease)     Coronary artery disease involving native coronary artery of native heart with unstable angina pectoris 11/14/2018    Hyperlipidemia     Hypertension     Immune deficiency disorder     NSTEMI (non-ST elevated myocardial infarction) 11/13/2018    Pneumonia     Sarcoidosis     TIA (transient ischemic attack)        General Information Comments: RD consulted for post-CABG diet education. RD attempted education x2, both attempts pt was w/ other providers. Will reattempt education, obtain diet hx, complete NFPE if necessary on Monday. Attached written materials to EMR for print at discharge. Pt is POD#1 s/p CABGx5. Chest tube remains in place. On clear liquid diet    11.19.18- Pt downgraded to telemetry unit. Tolerating Cardiac diet well. RD provided diet education for pt s/p CABG. Explained rationale for diet modifications,  "reviewed recommended and not recommended foods. Pt's  frequently uses salt to season foods. RD provided salt-free alternatives. Pt and  verbalized understanding, all questions/concerns addressed. Handouts and RD contact info left at bedside. Pt w/ no s/s of malnutrition. NFPE not needed.  Nutrition Discharge Planning: Cardiac diet    Nutrition Risk Screen    Nutrition Risk Screen: no indicators present    Nutrition/Diet History    Do you have any cultural, spiritual, Hoahaoism conflicts, given your current situation?: no  Food Allergies: NKFA    Anthropometrics    Temp: 98.4 °F (36.9 °C)  Height Method: Stated  Height: 5' 3" (160 cm)  Height (inches): 63 in  Weight Method: Bed Scale  Weight: 73.6 kg (162 lb 4.1 oz)  Weight (lb): 162.26 lb  Ideal Body Weight (IBW), Female: 115 lb  % Ideal Body Weight, Female (lb): 142.63 lb  BMI (Calculated): 29.1  BMI Grade: 25 - 29.9 - overweight       Lab/Procedures/Meds    Pertinent Labs Reviewed: reviewed  BMP  Lab Results   Component Value Date     11/19/2018    K 4.1 11/19/2018    CL 96 11/19/2018    CO2 34 (H) 11/19/2018    BUN 36 (H) 11/19/2018    CREATININE 0.8 11/19/2018    CALCIUM 9.3 11/19/2018    ANIONGAP 8 11/19/2018    ESTGFRAFRICA >60 11/19/2018    EGFRNONAA >60 11/19/2018     Lab Results   Component Value Date    ALBUMIN 3.4 (L) 11/14/2018     Lab Results   Component Value Date    CHOL 202 (H) 10/09/2018    CHOL 190 08/14/2018    CHOL 218 (H) 04/19/2017     Pertinent Medications Reviewed: reviewed  Scheduled Meds:   amiodarone  200 mg Oral BID    apixaban  5 mg Oral BID    aspirin  81 mg Oral Daily    chlorhexidine  10 mL Mouth/Throat BID    docusate sodium  100 mg Oral BID    ferrous sulfate  325 mg Oral Daily    folic acid-vit B6-vit B12 2.5-25-2 mg  1 tablet Oral Daily    furosemide  40 mg Intravenous BID    lisinopril  2.5 mg Oral Daily    metoprolol tartrate  100 mg Oral BID    multivitamin  1 tablet Oral Daily    nozaseptin   " Each Nare BID    pantoprazole  40 mg Oral Daily    polyethylene glycol  17 g Oral Daily    potassium chloride  20 mEq Oral Q12H    thiamine  100 mg Oral Daily     Continuous Infusions:    PRN Meds:.sodium chloride, acetaminophen, albuterol **AND** Inhalation Treatment Q6H PRN, dextrose 50%, dextrose 50%, dextrose 50%, dextrose 50%, glucagon (human recombinant), glucose, glucose, HYDROcodone-acetaminophen, insulin aspart U-100, lactated ringers, metoclopramide HCl, ondansetron, oxyCODONE, oxyCODONE    Physical Findings/Assessment    Overall Physical Appearance: nourished, overweight  Oral/Mouth Cavity: WDL  Skin: (incisions to L leg, chest; Scott=14)    Estimated/Assessed Needs    Weight Used For Calorie Calculations: 74.4 kg (164 lb 0.4 oz)  Energy Calorie Requirements (kcal): 1607  Energy Need Method: [place]-St Jeor(x1.3)  Protein Requirements:   Weight Used For Protein Calculations: 74.4 kg (164 lb 0.4 oz)(x1.2-1.5)     Fluid Need Method: RDA Method(or per MD)  RDA Method (mL): 1607  CHO Requirement: n/a      Nutrition Prescription Ordered    Current Diet Order: Cardiac    Evaluation of Received Nutrient/Fluid Intake     % Intake of Estimated Energy Needs: 75 - 100 %  % Meal Intake: 75 - 100 %    Nutrition Risk    Level of Risk/Frequency of Follow-up: (f/u x1 weekly)     Assessment and Plan    Nutrition Problem  Inadequate energy intake    Related to (etiology):   Inability to consume adequate energy    Signs and Symptoms (as evidenced by):   Current diet order (Clear liquids) only meeting 25-50% EEN    Interventions/Recommendations (treatment strategy):  See above    Nutrition Diagnosis Status:   Resolved         Nutrition Problem  Increased protein needs    Related to (etiology):   Metabolic demands of surgical healing    Signs and Symptoms (as evidenced by):   Calculated EPN    Interventions/Recommendations (treatment strategy):  See above    Nutrition Diagnosis Status:   New      Monitor and  Evaluation    Food and Nutrient Intake: energy intake, food and beverage intake  Food and Nutrient Adminstration: diet order  Anthropometric Measurements: weight  Biochemical Data, Medical Tests and Procedures: electrolyte and renal panel, lipid profile, gastrointestinal profile  Nutrition-Focused Physical Findings: overall appearance     Nutrition Follow-Up    RD Follow-up?: Yes

## 2018-11-19 NOTE — SUBJECTIVE & OBJECTIVE
Interval History: no acute issues o/n    Review of Systems   Constitution: Negative for weakness and malaise/fatigue.   HENT: Negative for hearing loss and hoarse voice.    Eyes: Negative for blurred vision and visual disturbance.   Cardiovascular: Positive for irregular heartbeat. Negative for chest pain, claudication, dyspnea on exertion, leg swelling, near-syncope, orthopnea, palpitations, paroxysmal nocturnal dyspnea and syncope.   Respiratory: Negative for cough, hemoptysis, shortness of breath, sleep disturbances due to breathing, snoring and wheezing.    Endocrine: Negative for cold intolerance and heat intolerance.   Hematologic/Lymphatic: Bruises/bleeds easily.   Skin: Negative for color change, dry skin and nail changes.   Musculoskeletal: Positive for arthritis. Negative for back pain, joint pain and myalgias.   Gastrointestinal: Negative for bloating, abdominal pain, constipation, nausea and vomiting.   Genitourinary: Negative for dysuria, flank pain, hematuria and hesitancy.   Neurological: Negative for headaches, light-headedness, loss of balance, numbness and paresthesias.   Psychiatric/Behavioral: Negative for altered mental status.   Allergic/Immunologic: Negative for environmental allergies.     Objective:     Vital Signs (Most Recent):  Temp: 98 °F (36.7 °C) (11/19/18 1525)  Pulse: 91 (11/19/18 1525)  Resp: 18 (11/19/18 0900)  BP: (!) 125/58 (11/19/18 1525)  SpO2: (!) 92 % (11/19/18 1525) Vital Signs (24h Range):  Temp:  [96.8 °F (36 °C)-99.3 °F (37.4 °C)] 98 °F (36.7 °C)  Pulse:  [90-95] 91  Resp:  [16-20] 18  SpO2:  [90 %-96 %] 92 %  BP: (102-125)/(53-58) 125/58     Weight: 73.6 kg (162 lb 4.1 oz)  Body mass index is 28.74 kg/m².     SpO2: (!) 92 %  O2 Device (Oxygen Therapy): nasal cannula      Intake/Output Summary (Last 24 hours) at 11/19/2018 1539  Last data filed at 11/19/2018 0505  Gross per 24 hour   Intake 358 ml   Output 1450 ml   Net -1092 ml       Lines/Drains/Airways     Line                  Pacer Wires 11/15/18 1600 3 days          Pressure Ulcer                 Negative Pressure Wound Therapy  4 days          Peripheral Intravenous Line                 Peripheral IV - Single Lumen 11/13/18 1635 Left Antecubital 5 days         Midline Catheter Insertion/Assessment  - Double Lumen 11/16/18 1530 Left 3 days                Physical Exam   Constitutional: She is oriented to person, place, and time. She appears well-developed and well-nourished. No distress.   HENT:   Head: Normocephalic and atraumatic.   Eyes: Pupils are equal, round, and reactive to light.   Neck: Normal range of motion and full passive range of motion without pain. Neck supple. No JVD present.   Cardiovascular: Normal rate, regular rhythm, S1 normal, S2 normal and intact distal pulses. PMI is not displaced. Exam reveals no distant heart sounds.   No murmur heard.  Pulses:       Radial pulses are 2+ on the right side, and 2+ on the left side.        Dorsalis pedis pulses are 2+ on the right side, and 2+ on the left side.   Mid sternal CABG incision with wound vac in place    Pacing wires in place, on standby   Pulmonary/Chest: Effort normal and breath sounds normal. No accessory muscle usage. No respiratory distress. She has no decreased breath sounds. She has no wheezes. She has no rales.   Abdominal: Soft. Bowel sounds are normal. She exhibits no distension. There is no tenderness.   Musculoskeletal: Normal range of motion. She exhibits no edema.        Right ankle: She exhibits no swelling.        Left ankle: She exhibits no swelling.   Neurological: She is alert and oriented to person, place, and time.   Skin: Skin is warm and dry. She is not diaphoretic. No cyanosis. Nails show no clubbing.   Psychiatric: She has a normal mood and affect. Her speech is normal and behavior is normal. Judgment and thought content normal. Cognition and memory are normal.   Nursing note and vitals reviewed.      Significant Labs:   All pertinent  lab results from the last 24 hours have been reviewed. and   Recent Lab Results       11/19/18  1129   11/19/18  0457   11/19/18  0437   11/18/18  2103   11/18/18  2053        Anion Gap     8   7     BUN, Bld     36   37     Calcium     9.3   9.1     Chloride     96   97     CO2     34   34     Creatinine     0.8   0.9     eGFR if      >60   >60     eGFR if non      >60  Comment:  Calculation used to obtain the estimated glomerular filtration  rate (eGFR) is the CKD-EPI equation.      >60  Comment:  Calculation used to obtain the estimated glomerular filtration  rate (eGFR) is the CKD-EPI equation.        Glucose     117   153     Magnesium     2.4   2.3     POCT Glucose 164 129   151       Potassium     4.1   3.9     Sodium     138   138                      11/18/18  1703        Anion Gap       BUN, Bld       Calcium       Chloride       CO2       Creatinine       eGFR if        eGFR if non African American       Glucose       Magnesium       POCT Glucose 120     Potassium       Sodium             Significant Imaging: Echocardiogram:   2D echo with color flow doppler:   Results for orders placed or performed during the hospital encounter of 11/13/18   2D echo with color flow doppler   Result Value Ref Range    QEF 40 (A) 55 - 65    Diastolic Dysfunction Yes (A)     and X-Ray: CXR: X-Ray Chest 1 View (CXR): No results found for this visit on 11/13/18.

## 2018-11-19 NOTE — SUBJECTIVE & OBJECTIVE
Interval History:  The patient is postop day 5  Status post coronary artery bypass pre skin grafting x5.  Overall the patient has no complaints.  She is doing well.    ROS  Medications:  Continuous Infusions:  Scheduled Meds:   amiodarone  200 mg Oral BID    apixaban  5 mg Oral BID    aspirin  81 mg Oral Daily    chlorhexidine  10 mL Mouth/Throat BID    docusate sodium  100 mg Oral BID    ferrous sulfate  325 mg Oral Daily    folic acid-vit B6-vit B12 2.5-25-2 mg  1 tablet Oral Daily    furosemide  40 mg Intravenous BID    lisinopril  2.5 mg Oral Daily    metoprolol tartrate  100 mg Oral BID    multivitamin  1 tablet Oral Daily    nozaseptin   Each Nare BID    pantoprazole  40 mg Oral Daily    polyethylene glycol  17 g Oral Daily    potassium chloride  20 mEq Oral Q12H    thiamine  100 mg Oral Daily     PRN Meds:sodium chloride, acetaminophen, albuterol **AND** Inhalation Treatment Q6H PRN, dextrose 50%, dextrose 50%, dextrose 50%, dextrose 50%, glucagon (human recombinant), glucose, glucose, HYDROcodone-acetaminophen, insulin aspart U-100, lactated ringers, metoclopramide HCl, ondansetron, oxyCODONE, oxyCODONE     Objective:     Vital Signs (Most Recent):  Temp: 98.4 °F (36.9 °C) (11/19/18 1119)  Pulse: 93 (11/19/18 1119)  Resp: 18 (11/19/18 0900)  BP: (!) 114/57 (11/19/18 1119)  SpO2: 96 % (11/19/18 1119) Vital Signs (24h Range):  Temp:  [96.8 °F (36 °C)-99.3 °F (37.4 °C)] 98.4 °F (36.9 °C)  Pulse:  [90-95] 93  Resp:  [16-20] 18  SpO2:  [90 %-96 %] 96 %  BP: (102-121)/(53-58) 114/57     Weight: 73.6 kg (162 lb 4.1 oz)  Body mass index is 28.74 kg/m².    SpO2: 96 %  O2 Device (Oxygen Therapy): nasal cannula    Intake/Output - Last 3 Shifts       11/17 0700 - 11/18 0659 11/18 0700 - 11/19 0659 11/19 0700 - 11/20 0659    P.O. 236 358     I.V. (mL/kg) 100.2 (1.3)      Blood       IV Piggyback 100      Total Intake(mL/kg) 436.2 (5.7) 358 (4.9)     Urine (mL/kg/hr) 3230 (1.8) 2250 (1.3)     Drains        Other 0      Stool 0      Chest Tube       Total Output 3230 2250     Net -2793.8 -1892            Urine Occurrence   1 x    Stool Occurrence 0 x  1 x          Lines/Drains/Airways     Line                 Pacer Wires 11/15/18 1600 3 days          Pressure Ulcer                 Negative Pressure Wound Therapy  4 days          Peripheral Intravenous Line                 Peripheral IV - Single Lumen 11/13/18 1635 Left Antecubital 5 days         Midline Catheter Insertion/Assessment  - Double Lumen 11/16/18 1530 Left 2 days                Physical Exam   Constitutional: She is oriented to person, place, and time. She appears well-developed and well-nourished.   HENT:   Head: Normocephalic and atraumatic.   Neck: No JVD present.   Cardiovascular: Normal rate, regular rhythm and normal heart sounds.   Pulmonary/Chest: Effort normal and breath sounds normal.   Abdominal: Soft. Bowel sounds are normal.   Musculoskeletal: She exhibits edema.   Neurological: She is alert and oriented to person, place, and time.   Skin: Skin is warm and dry.       Significant Labs:  All pertinent labs from the last 24 hours have been reviewed.    Significant Diagnostics:  I have reviewed all pertinent imaging results/findings within the past 24 hours.

## 2018-11-19 NOTE — ASSESSMENT & PLAN NOTE
Expected post op blood loss s/p transfusion x 2 units  Will IV venofer and start oral iron and MVI    Stable h/h

## 2018-11-19 NOTE — PLAN OF CARE
Problem: Physical Therapy Goal  Goal: Physical Therapy Goal  LTG'S TO BE MET IN 7 DAYS (11-23-18)  1. PT WILL REQUIRE MODA FOR BED MOBILITY  2. PT WILL REQUIRE MODA FOR TF'S  3. PT WILL ' WITH MODA  4. PT WILL DEMO P+ DYNAMIC BALANCE DURING GAIT   Outcome: Ongoing (interventions implemented as appropriate)  PT WITH VERY LOW ACTIVITY TOLERANCE, ABLE TO AMB 55' MAX AT 1 TIME DESPITE ENCOURAGEMENT

## 2018-11-19 NOTE — ASSESSMENT & PLAN NOTE
Doing well post op on vent and IABP 1:1  Mildly sedated  Continue post op orders per CVT- may need Epi gtt to augment CI    See above, doing well    Recovering well, may d/c soon.

## 2018-11-19 NOTE — PLAN OF CARE
Problem: Patient Care Overview  Goal: Plan of Care Review  Outcome: Ongoing (interventions implemented as appropriate)  Recommendations    Recommendation/Intervention:   1. Continue current diet order  2. Post-CABG diet education provided to pt and  w/ handouts. Pt and  verbalized understanding, all questions/concerns addressed.  3. Will continue to monitor    Goals: Meet >85% EEN/EPN this admit   Nutrition Goal Status: continues  Communication of RD Recs: POC review

## 2018-11-20 VITALS
RESPIRATION RATE: 18 BRPM | DIASTOLIC BLOOD PRESSURE: 58 MMHG | SYSTOLIC BLOOD PRESSURE: 111 MMHG | OXYGEN SATURATION: 93 % | TEMPERATURE: 98 F | HEIGHT: 63 IN | BODY MASS INDEX: 28.44 KG/M2 | HEART RATE: 72 BPM | WEIGHT: 160.5 LBS

## 2018-11-20 PROBLEM — I21.4 NSTEMI (NON-ST ELEVATED MYOCARDIAL INFARCTION): Status: RESOLVED | Noted: 2018-11-13 | Resolved: 2018-11-20

## 2018-11-20 PROBLEM — D62 ACUTE BLOOD LOSS ANEMIA: Status: RESOLVED | Noted: 2018-11-18 | Resolved: 2018-11-20

## 2018-11-20 PROBLEM — Z95.1 S/P CABG X 5: Status: RESOLVED | Noted: 2018-11-15 | Resolved: 2018-11-20

## 2018-11-20 LAB
ANION GAP SERPL CALC-SCNC: 10 MMOL/L
ANION GAP SERPL CALC-SCNC: 9 MMOL/L
BASOPHILS # BLD AUTO: 0.01 K/UL
BASOPHILS NFR BLD: 0.1 %
BUN SERPL-MCNC: 39 MG/DL
BUN SERPL-MCNC: 41 MG/DL
CALCIUM SERPL-MCNC: 8.9 MG/DL
CALCIUM SERPL-MCNC: 9 MG/DL
CHLORIDE SERPL-SCNC: 96 MMOL/L
CHLORIDE SERPL-SCNC: 97 MMOL/L
CO2 SERPL-SCNC: 33 MMOL/L
CO2 SERPL-SCNC: 34 MMOL/L
CREAT SERPL-MCNC: 0.9 MG/DL
CREAT SERPL-MCNC: 0.9 MG/DL
DIFFERENTIAL METHOD: ABNORMAL
EOSINOPHIL # BLD AUTO: 0 K/UL
EOSINOPHIL NFR BLD: 0 %
ERYTHROCYTE [DISTWIDTH] IN BLOOD BY AUTOMATED COUNT: 14.5 %
EST. GFR  (AFRICAN AMERICAN): >60 ML/MIN/1.73 M^2
EST. GFR  (AFRICAN AMERICAN): >60 ML/MIN/1.73 M^2
EST. GFR  (NON AFRICAN AMERICAN): >60 ML/MIN/1.73 M^2
EST. GFR  (NON AFRICAN AMERICAN): >60 ML/MIN/1.73 M^2
GLUCOSE SERPL-MCNC: 124 MG/DL
GLUCOSE SERPL-MCNC: 149 MG/DL
HCT VFR BLD AUTO: 28.6 %
HGB BLD-MCNC: 9.3 G/DL
LYMPHOCYTES # BLD AUTO: 1.3 K/UL
LYMPHOCYTES NFR BLD: 10.1 %
MAGNESIUM SERPL-MCNC: 1.9 MG/DL
MCH RBC QN AUTO: 28.1 PG
MCHC RBC AUTO-ENTMCNC: 32.5 G/DL
MCV RBC AUTO: 86 FL
MONOCYTES # BLD AUTO: 1.6 K/UL
MONOCYTES NFR BLD: 12.3 %
NEUTROPHILS # BLD AUTO: 10.2 K/UL
NEUTROPHILS NFR BLD: 77.5 %
PLATELET # BLD AUTO: 264 K/UL
PMV BLD AUTO: 9.4 FL
POCT GLUCOSE: 122 MG/DL (ref 70–110)
POCT GLUCOSE: 155 MG/DL (ref 70–110)
POCT GLUCOSE: 59 MG/DL (ref 70–110)
POTASSIUM SERPL-SCNC: 3.4 MMOL/L
POTASSIUM SERPL-SCNC: 3.8 MMOL/L
RBC # BLD AUTO: 3.31 M/UL
SODIUM SERPL-SCNC: 139 MMOL/L
SODIUM SERPL-SCNC: 140 MMOL/L
WBC # BLD AUTO: 13.21 K/UL

## 2018-11-20 PROCEDURE — 25000003 PHARM REV CODE 250: Mod: HCNC | Performed by: EMERGENCY MEDICINE

## 2018-11-20 PROCEDURE — 97530 THERAPEUTIC ACTIVITIES: CPT | Mod: HCNC

## 2018-11-20 PROCEDURE — 85025 COMPLETE CBC W/AUTO DIFF WBC: CPT | Mod: HCNC

## 2018-11-20 PROCEDURE — 83735 ASSAY OF MAGNESIUM: CPT | Mod: HCNC

## 2018-11-20 PROCEDURE — 99231 SBSQ HOSP IP/OBS SF/LOW 25: CPT | Mod: HCNC,,, | Performed by: INTERNAL MEDICINE

## 2018-11-20 PROCEDURE — 25000003 PHARM REV CODE 250: Mod: HCNC | Performed by: NURSE PRACTITIONER

## 2018-11-20 PROCEDURE — 80048 BASIC METABOLIC PNL TOTAL CA: CPT | Mod: 91,HCNC

## 2018-11-20 PROCEDURE — 97116 GAIT TRAINING THERAPY: CPT | Mod: HCNC

## 2018-11-20 PROCEDURE — 63600175 PHARM REV CODE 636 W HCPCS: Mod: HCNC | Performed by: THORACIC SURGERY (CARDIOTHORACIC VASCULAR SURGERY)

## 2018-11-20 PROCEDURE — 25000003 PHARM REV CODE 250: Mod: HCNC | Performed by: THORACIC SURGERY (CARDIOTHORACIC VASCULAR SURGERY)

## 2018-11-20 RX ORDER — AMIODARONE HYDROCHLORIDE 200 MG/1
200 TABLET ORAL DAILY
Qty: 30 TABLET | Refills: 2 | Status: SHIPPED | OUTPATIENT
Start: 2018-11-21 | End: 2019-01-29

## 2018-11-20 RX ORDER — PANTOPRAZOLE SODIUM 40 MG/1
40 TABLET, DELAYED RELEASE ORAL DAILY
Qty: 30 TABLET | Refills: 2 | Status: SHIPPED | OUTPATIENT
Start: 2018-11-21 | End: 2018-12-26

## 2018-11-20 RX ORDER — POTASSIUM CHLORIDE 7.45 MG/ML
10 INJECTION INTRAVENOUS
Status: COMPLETED | OUTPATIENT
Start: 2018-11-20 | End: 2018-11-20

## 2018-11-20 RX ORDER — METOPROLOL TARTRATE 50 MG/1
50 TABLET ORAL 2 TIMES DAILY
Qty: 60 TABLET | Refills: 11 | Status: SHIPPED | OUTPATIENT
Start: 2018-11-20 | End: 2019-04-30

## 2018-11-20 RX ORDER — POTASSIUM CHLORIDE 20 MEQ/15ML
40 SOLUTION ORAL ONCE
Status: COMPLETED | OUTPATIENT
Start: 2018-11-20 | End: 2018-11-20

## 2018-11-20 RX ORDER — FUROSEMIDE 40 MG/1
40 TABLET ORAL DAILY
Qty: 7 TABLET | Refills: 0 | Status: SHIPPED | OUTPATIENT
Start: 2018-11-20 | End: 2019-01-29

## 2018-11-20 RX ORDER — LANOLIN ALCOHOL/MO/W.PET/CERES
100 CREAM (GRAM) TOPICAL DAILY
COMMUNITY
Start: 2018-11-21

## 2018-11-20 RX ORDER — MAGNESIUM SULFATE HEPTAHYDRATE 40 MG/ML
2 INJECTION, SOLUTION INTRAVENOUS ONCE
Status: COMPLETED | OUTPATIENT
Start: 2018-11-20 | End: 2018-11-20

## 2018-11-20 RX ORDER — AMIODARONE HYDROCHLORIDE 200 MG/1
200 TABLET ORAL DAILY
Status: DISCONTINUED | OUTPATIENT
Start: 2018-11-21 | End: 2018-11-20

## 2018-11-20 RX ORDER — ASPIRIN 81 MG/1
81 TABLET ORAL DAILY
Refills: 0 | COMMUNITY
Start: 2018-11-21 | End: 2019-07-30

## 2018-11-20 RX ORDER — POLYETHYLENE GLYCOL 3350 17 G/17G
17 POWDER, FOR SOLUTION ORAL DAILY
Qty: 90 PACKET | Refills: 0 | Status: SHIPPED | OUTPATIENT
Start: 2018-11-21 | End: 2018-12-26

## 2018-11-20 RX ORDER — POTASSIUM CHLORIDE 20 MEQ/1
40 TABLET, EXTENDED RELEASE ORAL ONCE
Status: DISCONTINUED | OUTPATIENT
Start: 2018-11-20 | End: 2018-11-20

## 2018-11-20 RX ORDER — FERROUS SULFATE 325(65) MG
325 TABLET, DELAYED RELEASE (ENTERIC COATED) ORAL DAILY
Refills: 0 | COMMUNITY
Start: 2018-11-21 | End: 2019-02-19

## 2018-11-20 RX ORDER — POTASSIUM CHLORIDE 20 MEQ/1
20 TABLET, EXTENDED RELEASE ORAL 2 TIMES DAILY
Qty: 14 TABLET | Refills: 0 | Status: SHIPPED | OUTPATIENT
Start: 2018-11-20 | End: 2018-11-27

## 2018-11-20 RX ORDER — AMIODARONE HYDROCHLORIDE 100 MG/1
100 TABLET ORAL DAILY
Status: DISCONTINUED | OUTPATIENT
Start: 2018-11-21 | End: 2018-11-20 | Stop reason: HOSPADM

## 2018-11-20 RX ORDER — POTASSIUM CHLORIDE 20 MEQ/1
40 TABLET, EXTENDED RELEASE ORAL ONCE
Status: COMPLETED | OUTPATIENT
Start: 2018-11-20 | End: 2018-11-20

## 2018-11-20 RX ORDER — LISINOPRIL 2.5 MG/1
2.5 TABLET ORAL DAILY
Qty: 90 TABLET | Refills: 3 | Status: SHIPPED | OUTPATIENT
Start: 2018-11-21 | End: 2019-01-29

## 2018-11-20 RX ADMIN — POTASSIUM CHLORIDE 40 MEQ: 1500 TABLET, EXTENDED RELEASE ORAL at 06:11

## 2018-11-20 RX ADMIN — FERROUS SULFATE TAB EC 325 MG (65 MG FE EQUIVALENT) 325 MG: 325 (65 FE) TABLET DELAYED RESPONSE at 08:11

## 2018-11-20 RX ADMIN — ASPIRIN 81 MG: 81 TABLET, COATED ORAL at 08:11

## 2018-11-20 RX ADMIN — MAGNESIUM SULFATE IN WATER 2 G: 40 INJECTION, SOLUTION INTRAVENOUS at 06:11

## 2018-11-20 RX ADMIN — METOPROLOL TARTRATE 50 MG: 50 TABLET ORAL at 08:11

## 2018-11-20 RX ADMIN — FUROSEMIDE 40 MG: 10 INJECTION, SOLUTION INTRAMUSCULAR; INTRAVENOUS at 08:11

## 2018-11-20 RX ADMIN — THERA TABS 1 TABLET: TAB at 08:11

## 2018-11-20 RX ADMIN — Medication 100 MG: at 08:11

## 2018-11-20 RX ADMIN — LISINOPRIL 2.5 MG: 2.5 TABLET ORAL at 08:11

## 2018-11-20 RX ADMIN — POTASSIUM CHLORIDE 10 MEQ: 7.46 INJECTION, SOLUTION INTRAVENOUS at 06:11

## 2018-11-20 RX ADMIN — POTASSIUM CHLORIDE 40 MEQ: 20 SOLUTION ORAL at 01:11

## 2018-11-20 RX ADMIN — Medication 1 TABLET: at 08:11

## 2018-11-20 RX ADMIN — APIXABAN 5 MG: 2.5 TABLET, FILM COATED ORAL at 08:11

## 2018-11-20 RX ADMIN — CHLORHEXIDINE GLUCONATE 10 ML: 1.2 RINSE ORAL at 08:11

## 2018-11-20 RX ADMIN — POTASSIUM CHLORIDE 10 MEQ: 7.46 INJECTION, SOLUTION INTRAVENOUS at 08:11

## 2018-11-20 RX ADMIN — AMIODARONE HYDROCHLORIDE 200 MG: 200 TABLET ORAL at 08:11

## 2018-11-20 RX ADMIN — PANTOPRAZOLE SODIUM 40 MG: 40 TABLET, DELAYED RELEASE ORAL at 08:11

## 2018-11-20 NOTE — DISCHARGE SUMMARY
[facility] Hospital Medicine  Discharge Summary      Patient Name: Eve Long  MRN: [id number]  Admission Date: 11/13/2018  Hospital Length of Stay: 7 days  Discharge Date and Time:  11/20/2018 10:50 AM  Attending Physician: Ricardo Dorsey MD   Discharging Provider: Eloise Pacheco MD  Primary Care Provider: Chaya Morelos MD      HPI:   Eve Long is a 68 y.o. female patient with a PMHx of anemia, HLD, HTN, pneumonia, sarcoidosis, who presented to the ER for evaluation of constant left sided chest pain which onset suddenly x2-4 days ago. Pt characterizes her pain as a pressure 10/10 at its worse. Pt reports a strong FMHx of cardiac disease (Father with CABG/MI, all materal uncles with CAD) and reports her last stress test was done in 2006. Symptoms are constant and moderate in severity. Exacerbated by nothing and relieved by anti-hypertensive medications. Associated sxs included diaphoresis, left arm pain radiating from chest and lef jaw pain radiating from chest. Patient denied any fever, chills, SOB, leg swelling, palpitations, N/V, dysuria, hematuria, HA, weakness, and all other sxs at this time. In ER, troponin 0.241. Cardiology was consulted and Dr. Rodríguez recommended IV Heparin Drip. Spouse, Bravo Long, is the surrogate decision maker, HOME [phone]. She was admitted to telemetry Dx NSTEMI.         Procedure(s) (LRB):  CORONARY ARTERY BYPASS GRAFT (CABG) (N/A)  SURGICAL PROCUREMENT, VEIN, ENDOSCOPIC (Left)  ECHOCARDIOGRAM,TRANSESOPHAGEAL (N/A)      Hospital Course:   Admitted for evaluation and treatment of chest pain with elevated troponin.  Seen by Cardiology and had a left heart angiogram by Dr. Rodríguez 14 November.  Procedure revealed multivessel disease.  IABP placed and post-procedure in the ICU.  Consultation to Cardiac Surgery to plan for CABG.  11/15- pt underwent 5 vessel CABG by Dr. Castellanos today on IABP 1:1, seen post op in the ICU on vent,  sedated with Precedex. Getting a dose of Albumin and Ca and is on Insulin drip, remains on IABP along with CT to water seal. Cardiac Index 2.0-- may need a little Epi drip to improve CI.   11/16- doing much better, alert and awake and talking. Extubated around 9 pm last evening. Had to be placed on Epinephrine gtt and Vasopressin to augment CO/CI and both were d/james this am. IABP removed by Dr. Castellanos this am and his Cordis and Chest tubes are coming off soon today. Also got a unit of blood post op. No obvious hemorrhage anywhere, H/H stable. Good urine output.   11/17- looks a little better after all the chest tubes, cordis, chest tubes removed yesterday. Got OT/Pt earlier and then got up by herself and did well. Also has some CHF and getting Lasix, K is being replaced. Also developed Afib w RVR-- getting BB.   11/18- doing much better, sitting up in chair, SOB, leg swelling resolved, still with transvenous pacemaker and occasionally feels the palpitations. Still in apace @90, controlled, now on oral amiodarone.   11/19- sitting up in chair, feels better but still weak overall, needs a little assistance in rising and walking. occasional CP, no SOB, leg swelling improved.   11/20- she has continued to improved, walked with me unassisted in the hallway yesterday and did well, eating drinking well. This am her pacer wire and chest drain were removed by Dr. Montana and her chest is clear she is NSR @79 and looks good. She was offered SNF vs Home with Health and she prefers to go home with  which has been arranged. She was seen and examined and deemed stable for discharge today.      Consults:   Consults (From admission, onward)        Status Ordering Provider     Consult Case Management/Social Work  Once     Provider:  (Not yet assigned)    ANDERS Siegel     Consult to Case Management/Social Work  Once     Provider:  (Not yet assigned)    Completed ANDERS MONTANA     Inpatient consult to Cardiology   Once     Provider:  Judy Rodríguez MD    Completed JOSEPHINE DUBON     Inpatient consult to Pulmonology  Once     Provider:  Ankit Keenan MD    Completed JUDY RODRÍGUEZ     Inpatient consult to Registered Dietitian/Nutritionist  Once     Provider:  (Not yet assigned)    ANDERS Siegel          No new Assessment & Plan notes have been filed under this hospital service since the last note was generated.  Service: Hospital Medicine    Final Active Diagnoses:    Diagnosis Date Noted POA    Coronary artery disease involving autologous vein coronary bypass graft without angina pectoris [I25.810] 11/17/2018 No    HTN (hypertension) [I10] 06/05/2018 Yes    COPD, moderate [J44.9] 07/21/2017 Yes    Hypothyroidism [E03.9]  Yes    Pulmonary sarcoidosis [D86.0]  Yes      Problems Resolved During this Admission:    Diagnosis Date Noted Date Resolved POA    PRINCIPAL PROBLEM:  NSTEMI (non-ST elevated myocardial infarction) on Intraaortic Baloon pump [I21.4] 11/13/2018 11/20/2018 Yes    S/P CABG x 5 [Z95.1] 11/15/2018 11/20/2018 Not Applicable    On mechanically assisted ventilation [Z99.11] 11/15/2018 11/16/2018 Not Applicable    Acute blood loss anemia [D62] 11/18/2018 11/20/2018 No       Discharged Condition: stable    Disposition: Home-Health Care [facility]    Follow Up:  Follow-up Information     Chaya Morelos MD In 1 week.    Specialty:  Family Medicine  Contact information:  [address] [facility] [address] [phone]             Anders Dorsey MD.    Specialty:  Cardiothoracic Surgery  Contact information:  [id number] [place] [address] [address] [place] [address] [phone]             Marco Rodríguez MD In 1 week.    Specialty:  Cardiology  Contact information:  [address] [facility] [place] [address] [phone]             Chaya Morelos MD. Schedule an appointment as soon as possible for a visit in 1 week.    Specialty:  Family Medicine  Why:  Hospital follow  up  Contact information:  [address] [facility] [address]  [phone]                 Patient Instructions:      Referral to Home health   Referral Priority: Routine Referral Type: Home Health   Referral Reason: Specialty Services Required   Requested Specialty: Home Health Services   Number of Visits Requested: 1     Ambulatory referral to Home Health   Referral Priority: Routine Referral Type: Home Health   Referral Reason: Specialty Services Required   Requested Specialty: Home Health Services   Number of Visits Requested: 1     Diet Cardiac     Notify your health care provider if you experience any of the following:  temperature >100.4     Notify your health care provider if you experience any of the following:  persistent nausea and vomiting or diarrhea     Notify your health care provider if you experience any of the following:  severe uncontrolled pain     Notify your health care provider if you experience any of the following:  redness, tenderness, or signs of infection (pain, swelling, redness, odor or green/yellow discharge around incision site)     Notify your health care provider if you experience any of the following:  difficulty breathing or increased cough     Notify your health care provider if you experience any of the following:  severe persistent headache     Notify your health care provider if you experience any of the following:  worsening rash     Notify your health care provider if you experience any of the following:  persistent dizziness, light-headedness, or visual disturbances     Notify your health care provider if you experience any of the following:  increased confusion or weakness     Activity as tolerated       Significant Diagnostic Studies: Labs:   BMP:   Recent Labs   Lab 11/18/18  2053 11/19/18  0437 11/19/18  1650 11/20/18  0311   * 117* 116* 124*    138 140 140   K 3.9 4.1 4.1 3.4*   CL 97 96 99 97   CO2 34* 34* 33* 33*   BUN 37* 36* 40* 41*    CREATININE 0.9 0.8 0.9 0.9   CALCIUM 9.1 9.3 9.2 8.9   MG 2.3 2.4  --  1.9   , CMP   Recent Labs   Lab 11/19/18  0437 11/19/18  1650 11/20/18  0311    140 140   K 4.1 4.1 3.4*   CL 96 99 97   CO2 34* 33* 33*   * 116* 124*   BUN 36* 40* 41*   CREATININE 0.8 0.9 0.9   CALCIUM 9.3 9.2 8.9   ANIONGAP 8 8 10   ESTGFRAFRICA >60 >60 >60   EGFRNONAA >60 >60 >60   , CBC   Recent Labs   Lab 11/20/18  0311   WBC 13.21*   HGB 9.3*   HCT 28.6*      , INR   Lab Results   Component Value Date    INR 1.1 11/16/2018    INR 1.1 11/15/2018    INR 2.1 (H) 11/15/2018   , Lipid Panel   Lab Results   Component Value Date    CHOL 202 (H) 10/09/2018    HDL 62 10/09/2018    LDLCALC 112.8 10/09/2018    TRIG 136 10/09/2018    CHOLHDL 30.7 10/09/2018   , Troponin   Recent Labs   Lab 11/14/18  0507   TROPONINI 0.288*   , A1C:   Recent Labs   Lab 11/13/18  1600   HGBA1C 5.5   All labs within the past 24 hours have been reviewed  Radiology:   Imaging Results          X-Ray Chest PA And Lateral (Final result)  Result time 11/13/18 16:13:43    Final result by Jose Alberto Lang MD (11/13/18 16:13:43)                 Impression:      No acute findings.      Electronically signed by: Jose Alberto Lang MD  Date:    11/13/2018  Time:    16:13             Narrative:    EXAMINATION:  XR CHEST PA AND LATERAL    CLINICAL HISTORY:  Chest pain, unspecified    TECHNIQUE:  PA and lateral views of the chest were performed.    COMPARISON:  08/29/2018    FINDINGS:  The cardiac and mediastinal silhouettes appear within normal limits. Stable calcified granuloma in the left lung base.  Stable pleural parenchymal scarring in the right lower lobe.  Cholecystectomy clips in the right upper quadrant.  No acute osseous findings demonstrated.                              Cardiac Graphics: Echocardiogram:   2D echo with color flow doppler:   Results for orders placed or performed during the hospital encounter of 11/13/18   2D echo with color flow doppler   Result  Value Ref Range    QEF 40 (A) 55 - 65    Diastolic Dysfunction Yes (A)        Pending Diagnostic Studies:     Procedure Component Value Units Date/Time    IR Heart Cath Images [[id number]]     Order Status:  Sent Lab Status:  No result          Medications:  Reconciled Home Medications:      Medication List      START taking these medications    amiodarone 200 MG Tab  Commonly known as:  PACERONE  Take 1 tablet (200 mg total) by mouth once daily.  Start taking on:  11/21/2018     apixaban 5 mg Tab  Commonly known as:  ELIQUIS  Take 1 tablet (5 mg total) by mouth 2 (two) times daily.     aspirin 81 MG EC tablet  Commonly known as:  ECOTRIN  Take 1 tablet (81 mg total) by mouth once daily.  Start taking on:  11/21/2018  Replaces:  aspirin 81 MG Chew     ferrous sulfate 325 (65 FE) MG EC tablet  Take 1 tablet (325 mg total) by mouth once daily.  Start taking on:  11/21/2018     folic acid-vit B6-vit B12 2.5-25-2 mg 2.5-25-2 mg Tab  Commonly known as:  FOLBIC or Equiv  Take 1 tablet by mouth once daily.  Start taking on:  11/21/2018     furosemide 40 MG tablet  Commonly known as:  LASIX  Take 1 tablet (40 mg total) by mouth once daily. for 7 days     lisinopril 2.5 MG tablet  Commonly known as:  PRINIVIL,ZESTRIL  Take 1 tablet (2.5 mg total) by mouth once daily.  Start taking on:  11/21/2018     metoprolol tartrate 50 MG tablet  Commonly known as:  LOPRESSOR  Take 1 tablet (50 mg total) by mouth 2 (two) times daily.     multivitamin tablet  Commonly known as:  THERAGRAN  Take 1 tablet by mouth once daily.  Start taking on:  11/21/2018     pantoprazole 40 MG tablet  Commonly known as:  PROTONIX  Take 1 tablet (40 mg total) by mouth once daily.  Start taking on:  11/21/2018     polyethylene glycol 17 gram Pwpk  Commonly known as:  GLYCOLAX  Take 17 g by mouth once daily.  Start taking on:  11/21/2018     potassium chloride SA 20 MEQ tablet  Commonly known as:  K-DUR,KLOR-CON  Take 1 tablet (20 mEq total) by mouth 2 (two)  times daily. for 7 days     thiamine 100 MG tablet  Take 1 tablet (100 mg total) by mouth once daily.  Start taking on:  11/21/2018        CHANGE how you take these medications    cyanocobalamin 1,000 mcg/mL injection  Inject 1 mL (1,000 mcg total) into the skin every 28 days.  What changed:  Another medication with the same name was removed. Continue taking this medication, and follow the directions you see here.        CONTINUE taking these medications    * albuterol 2.5 mg /3 mL (0.083 %) nebulizer solution  Commonly known as:  PROVENTIL  2.5 mg daily as needed. Every 6 hours inhale 2 puffs as needed for  wheezing     * albuterol 90 mcg/actuation inhaler  Commonly known as:  PROVENTIL/VENTOLIN HFA  Inhale 2 puffs into the lungs every 6 (six) hours as needed.     clonazePAM 0.5 MG tablet  Commonly known as:  KLONOPIN  Take 0.5 mg by mouth.     cyclobenzaprine 10 MG tablet  Commonly known as:  FLEXERIL  Take 1 tablet (10 mg total) by mouth 3 (three) times daily as needed for Muscle spasms.     promethazine 12.5 MG Tab  Commonly known as:  PHENERGAN  Take 1 tablet (12.5 mg total) by mouth every 6 (six) hours as needed.         * This list has 2 medication(s) that are the same as other medications prescribed for you. Read the directions carefully, and ask your doctor or other care provider to review them with you.            STOP taking these medications    amLODIPine 5 MG tablet  Commonly known as:  NORVASC     aspirin 81 MG Chew  Replaced by:  aspirin 81 MG EC tablet     aspirin-acetaminophen-caffeine 250-250-65 mg 250-250-65 mg per tablet  Commonly known as:  EXCEDRIN MIGRAINE     umeclidinium-vilanterol 62.5-25 mcg/actuation Dsdv  Commonly known as:  ANORO ELLIPTA            Indwelling Lines/Drains at time of discharge:   Lines/Drains/Airways     Line                 Pacer Wires 11/15/18 1600 4 days          Pressure Ulcer                 Negative Pressure Wound Therapy  4 days                Time spent on the  discharge of patient: 53 minutes  Patient was seen and examined on the date of discharge and determined to be suitable for discharge.         Eloise Pacheco MD  Department of Hospital Medicine  [facility]

## 2018-11-20 NOTE — PLAN OF CARE
11/20/18 1608   Medicare Message   Important Message from Medicare regarding Discharge Appeal Rights Given to patient/caregiver;Signed/date by patient/caregiver;Explained to patient/caregiver   Date IMM was signed 11/20/18   Time IMM was signed 0900

## 2018-11-20 NOTE — PROGRESS NOTES
Home Oxygen Evaluation    Date Performed: 2018    1) Patient's Home O2 Sat on room air, while at rest: 94        If O2 sats on room air at rest are 88% or below, patient qualifies. No additional testing needed. Document N/A in steps 2 and 3. If 89% or above, complete steps 2.      2) Patient's O2 Sat on room air while exercisin          If O2 sats on room air while exercising remain 89% or above patient does not qualify, no further testing needed Document N/A in step 3. If O2 sats on room air while exercising are 88% or below, continue to step 3.      3) Patient's O2 Sat while exercising on O2: na at na LPM         (Must show improvement from #2 for patients to qualify)    If O2 sats improve on oxygen, patient qualifies for portable oxygen. If not, the patient does not qualify.

## 2018-11-20 NOTE — PLAN OF CARE
11/20/18 1152   Post-Acute Status   Post-Acute Authorization Home Health/Hospice   Home Health/Hospice Status Discharge Plan Changed

## 2018-11-20 NOTE — PROGRESS NOTES
[facility] -   Cardiology  Progress Note    Patient Name: Eve Long  MRN: [id number]  Admission Date: 11/13/2018  Hospital Length of Stay: 7 days  Code Status: Full Code   Attending Physician: No att. providers found   Primary Care Physician: Chaya Morelos MD  Expected Discharge Date: 11/20/2018  Principal Problem:NSTEMI (non-ST elevated myocardial infarction)    Subjective:   HPI   Eve Long is a 68 y.o. female patient with a PMHx of anemia, HLD, HTN, TIA,  pneumonia, sarcoidosis, Statin intolerant due to myalgias. Tried Zetia and still had myalgia. Patient presented to the ER for evaluation of constant left sided chest pain which onset approximately 4 days ago. Pt characterizes her pain as a pressure 10/10 at its worse. Pt reports a strong FMHx of cardiac disease (Father with CABG/MI, all materal uncles with CAD) and reports her last stress test was done in 2006.  patient states that she tried to take a walk around her neighborhood and chest pain worsened. Pain associated with diaphoresis, and radiated from chest and left jaw, arm and back.  Patient denied any fever, chills, SOB, leg swelling, palpitations, N/V, dysuria, hematuria, HA, weakness, and all other sxs at this time. In ER, troponin 0.241, 0.297, 0.288. EKG with + anterior changes.  Echo in Aug 2018 showed normal LVF.  Patient has been started on Heparin Drip. Last episode of chest pain was this morning. Had chest pain all night improved with nitro paste.       Hospital Course:   11/16/18- Patient is POD 1 s/p CABG x5. Has been extubated. Remains on IABP 1:1. CT x 3 inplace. RAZIA x2 remains in place. Had A-fib with RVR overnight and started on Amio gtt. Converted to NSR and is A-pacing. Given 1 unit of blood . H/H 7.5/21.9 down from 9.0/ 26.7.  Has about 15-30cc/hr of urine output     11/17/18- POD 2 s/p CABG x5. Chest tubes, RAZIA drains and IABP have all been DC'd. Pacer wires remain in place. A-pacing. Having  PAF. CXR shows bilateral effusion. Has some SOB this morning and is receiving IV Lasix. . Given IV lopressor this morning for rate control. K+ being replaced     11/18/18- POD 3 s/p CABG x5. Patient in A-fib this morning. Has been switched to PO amio. SOB improved today since diuresing. Labs and vitals stable.     11/19/18-Patient seen and examined in room, lying in bed. POD 4 s/p CABG x 5v. Denies chest pain or anginal equivalents today. Labs reviewed, stable.     11/20/18-Patient seen and examined in room, sitting in bedside chair. POD 5 s/p CABG x 5 v. Denies chest pain or anginal equivalents today. Pacer wires removed this AM. Labs reviewed, stable.     Interval History: no acute issues o/n    Review of Systems   Constitution: Negative for weakness and malaise/fatigue.   HENT: Negative for hearing loss and hoarse voice.    Eyes: Negative for blurred vision and visual disturbance.   Cardiovascular: Positive for irregular heartbeat. Negative for chest pain, claudication, dyspnea on exertion, leg swelling, near-syncope, orthopnea, palpitations, paroxysmal nocturnal dyspnea and syncope.   Respiratory: Negative for cough, hemoptysis, shortness of breath, sleep disturbances due to breathing, snoring and wheezing.    Endocrine: Negative for cold intolerance and heat intolerance.   Hematologic/Lymphatic: Bruises/bleeds easily.   Skin: Negative for color change, dry skin and nail changes.   Musculoskeletal: Positive for arthritis. Negative for back pain, joint pain and myalgias.   Gastrointestinal: Negative for bloating, abdominal pain, constipation, nausea and vomiting.   Genitourinary: Negative for dysuria, flank pain, hematuria and hesitancy.   Neurological: Negative for headaches, light-headedness, loss of balance, numbness and paresthesias.   Psychiatric/Behavioral: Negative for altered mental status.   Allergic/Immunologic: Negative for environmental allergies.     Objective:     Vital Signs (Most Recent):  Temp:  98.3 °F (36.8 °C) (11/20/18 0840)  Pulse: 72 (11/20/18 1155)  Resp: 18 (11/20/18 0840)  BP: (!) 111/58 (11/20/18 0840)  SpO2: (!) 93 % (11/20/18 1108) Vital Signs (24h Range):  Temp:  [97.5 °F (36.4 °C)-98.3 °F (36.8 °C)] 98.3 °F (36.8 °C)  Pulse:  [72-98] 72  Resp:  [18] 18  SpO2:  [91 %-95 %] 93 %  BP: ()/(44-73) 111/58     Weight: 72.8 kg (160 lb 7.9 oz)  Body mass index is 28.43 kg/m².     SpO2: (!) 93 %  O2 Device (Oxygen Therapy): (S) room air      Intake/Output Summary (Last 24 hours) at 11/20/2018 1516  Last data filed at 11/20/2018 0840  Gross per 24 hour   Intake 390 ml   Output 350 ml   Net 40 ml       Lines/Drains/Airways          None          Physical Exam   Constitutional: She is oriented to person, place, and time. She appears well-developed and well-nourished. No distress.   HENT:   Head: Normocephalic and atraumatic.   Eyes: Pupils are equal, round, and reactive to light.   Neck: Normal range of motion and full passive range of motion without pain. Neck supple. No JVD present.   Cardiovascular: Normal rate, regular rhythm, S1 normal, S2 normal and intact distal pulses. PMI is not displaced. Exam reveals no distant heart sounds.   No murmur heard.  Pulses:       Radial pulses are 2+ on the right side, and 2+ on the left side.        Dorsalis pedis pulses are 2+ on the right side, and 2+ on the left side.   Mid sternal CABG incision with wound vac in place       Pulmonary/Chest: Effort normal and breath sounds normal. No accessory muscle usage. No respiratory distress. She has no decreased breath sounds. She has no wheezes. She has no rales.   Abdominal: Soft. Bowel sounds are normal. She exhibits no distension. There is no tenderness.   Musculoskeletal: Normal range of motion. She exhibits no edema.        Right ankle: She exhibits no swelling.        Left ankle: She exhibits no swelling.   Neurological: She is alert and oriented to person, place, and time.   Skin: Skin is warm and dry. She is  not diaphoretic. No cyanosis. Nails show no clubbing.   Psychiatric: She has a normal mood and affect. Her speech is normal and behavior is normal. Judgment and thought content normal. Cognition and memory are normal.   Nursing note and vitals reviewed.      Significant Labs:   All pertinent lab results from the last 24 hours have been reviewed. and   Recent Lab Results       11/20/18  1158   11/20/18  1151   11/20/18  0519   11/20/18  0516   11/20/18  0311        Anion Gap   9     10     Baso #         0.01     Basophil%         0.1     BUN, Bld   39     41     Calcium   9.0     8.9     Chloride   96     97     CO2   34     33     Creatinine   0.9     0.9     Differential Method         Automated     eGFR if    >60     >60     eGFR if non    >60  Comment:  Calculation used to obtain the estimated glomerular filtration  rate (eGFR) is the CKD-EPI equation.        >60  Comment:  Calculation used to obtain the estimated glomerular filtration  rate (eGFR) is the CKD-EPI equation.        Eos #         0.0     Eosinophil%         0.0     Glucose   149     124     Gran # (ANC)         10.2     Gran%         77.5     Hematocrit         28.6     Hemoglobin         9.3     Lymph #         1.3     Lymph%         10.1     Magnesium         1.9     MCH         28.1     MCHC         32.5     MCV         86     Mono #         1.6     Mono%         12.3     MPV         9.4     Platelets         264     POCT Glucose 155   122 59       Potassium   3.8     3.4     RBC         3.31     RDW         14.5     Sodium   139     140     WBC         13.21                      11/19/18  2047   11/19/18  1650        Anion Gap   8     Baso #         Basophil%         BUN, Bld   40     Calcium   9.2     Chloride   99     CO2   33     Creatinine   0.9     Differential Method         eGFR if    >60     eGFR if non    >60  Comment:  Calculation used to obtain the estimated glomerular  filtration  rate (eGFR) is the CKD-EPI equation.        Eos #         Eosinophil%         Glucose   116     Gran # (ANC)         Gran%         Hematocrit         Hemoglobin         Lymph #         Lymph%         Magnesium         MCH         MCHC         MCV         Mono #         Mono%         MPV         Platelets         POCT Glucose 252       Potassium   4.1     RBC         RDW         Sodium   140     WBC               Significant Imaging: Echocardiogram:   2D echo with color flow doppler:   Results for orders placed or performed during the hospital encounter of 11/13/18   2D echo with color flow doppler   Result Value Ref Range    QEF 40 (A) 55 - 65    Diastolic Dysfunction Yes (A)     and X-Ray: CXR: X-Ray Chest 1 View (CXR): No results found for this visit on 11/13/18.    Assessment and Plan:       S/P CABG x 5    -Patient is POD 1 s/p CABG x5   -Continue Amio gtt and plan to switch to PO tomorrow  -CT and RAZIA out when ok with CT surgery   -IABP being weaned. Changed to 1:2 by Dr. Rodríguez  -Continue ASA, Plavix   -No statin due to intolerance in the past. Had severe myalgia with statin and Zetia use   -Encourage IS use and begin ambulation when transferred to Tele     11/17/18  -Agree with increasing metoprolol to 50mg BID and use Lopressor 5mg IV PRN  -Needs to diurese with IV Lasix   -K+ being replaced  -Continue ASA and Plavix  --No statin due to intolerance in the past. Had severe myalgia with statin and Zetia use   -Encourage IS use and begin ambulation when transferred to Tele     11/18/18  -Decrease amio to 200mg BID  -Continue Lopressor 100mg BID  -Continue ASA, Lisinopril and Eliqius  -Needs to start Plavix if ok with CT surgery  -No statin due to intolerance in the past. Had severe myalgia with statin and Zetia use    -Encourage IS use and ambulation     11/19/18  -Continue Amio, Lopressor, ASA, ACEi, Eliquis  Start Plavix if ok with CT Surgery  No statin due to severe intolerance  No Zetia due to  intolerance  Encourage IS usage every hour  Encourage ambulation  Will continue to follow along.     11/20  -Continue ASA, Eliquis, Amiodarone, Lasix, BB, ACEi  -No statin due to intolerance  -No Zetia due to intolerance  -Encourage IS usage every hour  -Encourage frequent ambulation  -Discharge home today per CT surgery  -OP Cardiology follow up in 2-3 weeks           HTN (hypertension)    -Continue amlodipine and metoprolol          VTE Risk Mitigation (From admission, onward)        Ordered     apixaban tablet 5 mg  2 times daily      11/17/18 1434     IP VTE LOW RISK PATIENT  Once      11/14/18 1529     Place LIUDMILA hose  Until discontinued      11/14/18 1529     Place sequential compression device  Until discontinued      11/14/18 1529          Alba Lang NP  Cardiology  [facility] [place]

## 2018-11-20 NOTE — PROGRESS NOTES
[facility] -   Cardiothoracic Surgery  Progress Note    Patient Name: Eve Long  MRN: [id number]  Admission Date: 11/13/2018  Hospital Length of Stay: 7 days  Code Status: Full Code   Attending Physician: Ricardo Dorsey MD   Referring Provider: Self, Aaareferral  Principal Problem:NSTEMI (non-ST elevated myocardial infarction)            Subjective:     Post-Op Info:  Procedure(s) (LRB):  CORONARY ARTERY BYPASS GRAFT (CABG) (N/A)  SURGICAL PROCUREMENT, VEIN, ENDOSCOPIC (Left)  ECHOCARDIOGRAM,TRANSESOPHAGEAL (N/A)   5 Days Post-Op     Interval History:  The patient is postop day 5  Status post coronary artery bypass pre skin grafting x5.  Overall the patient has no complaints.  She is doing well.    ROS  Medications:  Continuous Infusions:  Scheduled Meds:   amiodarone  200 mg Oral BID    apixaban  5 mg Oral BID    aspirin  81 mg Oral Daily    chlorhexidine  10 mL Mouth/Throat BID    docusate sodium  100 mg Oral BID    ferrous sulfate  325 mg Oral Daily    folic acid-vit B6-vit B12 2.5-25-2 mg  1 tablet Oral Daily    furosemide  40 mg Intravenous BID    lisinopril  2.5 mg Oral Daily    metoprolol tartrate  100 mg Oral BID    multivitamin  1 tablet Oral Daily    nozaseptin   Each Nare BID    pantoprazole  40 mg Oral Daily    polyethylene glycol  17 g Oral Daily    potassium chloride  20 mEq Oral Q12H    thiamine  100 mg Oral Daily     PRN Meds:sodium chloride, acetaminophen, albuterol **AND** Inhalation Treatment Q6H PRN, dextrose 50%, dextrose 50%, dextrose 50%, dextrose 50%, glucagon (human recombinant), glucose, glucose, HYDROcodone-acetaminophen, insulin aspart U-100, lactated ringers, metoclopramide HCl, ondansetron, oxyCODONE, oxyCODONE     Objective:     Vital Signs (Most Recent):  Temp: 98.4 °F (36.9 °C) (11/19/18 1119)  Pulse: 93 (11/19/18 1119)  Resp: 18 (11/19/18 0900)  BP: (!) 114/57 (11/19/18 1119)  SpO2: 96 % (11/19/18 1119) Vital Signs (24h Range):  Temp:  [96.8  °F (36 °C)-99.3 °F (37.4 °C)] 98.4 °F (36.9 °C)  Pulse:  [90-95] 93  Resp:  [16-20] 18  SpO2:  [90 %-96 %] 96 %  BP: (102-121)/(53-58) 114/57     Weight: 73.6 kg (162 lb 4.1 oz)  Body mass index is 28.74 kg/m².    SpO2: 96 %  O2 Device (Oxygen Therapy): nasal cannula    Intake/Output - Last 3 Shifts       11/17 0700 - 11/18 0659 11/18 0700 - 11/19 0659 11/19 0700 - 11/20 0659    P.O. 236 358     I.V. (mL/kg) 100.2 (1.3)      Blood       IV Piggyback 100      Total Intake(mL/kg) 436.2 (5.7) 358 (4.9)     Urine (mL/kg/hr) 3230 (1.8) 2250 (1.3)     Drains       Other 0      Stool 0      Chest Tube       Total Output 3230 2250     Net -2793.8 -1892            Urine Occurrence   1 x    Stool Occurrence 0 x  1 x          Lines/Drains/Airways     Line                 Pacer Wires 11/15/18 1600 3 days          Pressure Ulcer                 Negative Pressure Wound Therapy  4 days          Peripheral Intravenous Line                 Peripheral IV - Single Lumen 11/13/18 1635 Left Antecubital 5 days         Midline Catheter Insertion/Assessment  - Double Lumen 11/16/18 1530 Left 2 days                Physical Exam   Constitutional: She is oriented to person, place, and time. She appears well-developed and well-nourished.   HENT:   Head: Normocephalic and atraumatic.   Neck: No JVD present.   Cardiovascular: Normal rate, regular rhythm and normal heart sounds.   Pulmonary/Chest: Effort normal and breath sounds normal.   Abdominal: Soft. Bowel sounds are normal.   Musculoskeletal: She exhibits edema.   Neurological: She is alert and oriented to person, place, and time.   Skin: Skin is warm and dry.       Significant Labs:  All pertinent labs from the last 24 hours have been reviewed.    Significant Diagnostics:  I have reviewed all pertinent imaging results/findings within the past 24 hours.    Assessment/Plan:     S/P CABG x 5    11/16/2018  The patient is postop day 1.  Status post coronary artery bypass grafting x5.  Overall  the patient is is doing very well.  Neuro:  The patient is awake alert and oriented x3.  Nonfocal neuro exam.  Pain is controlled with pain medication.  Cardiac:  Patient has been stable with good cardiac indices on low-dose epi.  Patient was in atrial fibrillation earlier now in sinus on a amiodarone drip.  Will discontinue intra-aortic balloon pump.  Wean the epi to off.  Respiratory:  The patient is extubated and good sats on nasal cannula.  Continue pulmonary toilet.  GI:  Advance p.o. intake as tolerated.  Renal:  The patient has good urine output creatinine is 0.8.  Lasix for diuresis.  Heme:  Hematocrit is 21.  Patient transfuse 1 unit packed red blood cells.  Endocrine:  Glucose is controlled with insulin.  Activities:  Patient is currently on bedrest for intra-aortic balloon pump.  Will get out of bed and ambulate once intra-aortic balloon pump is removed.  Lines tubes and drains:  Intra-aortic balloon pump, [place] and [place], RAZIA drains in saphenectomy site, and chest tubes will be discontinued.    11/17/2018  The patient is postop day 2.  Status post coronary artery bypass grafting x5.  Overall the patient is is doing very well. Transfer to Telemetry  Neuro:  The patient is awake alert and oriented x3.  Nonfocal neuro exam.  Pain is controlled with pain medication.  Cardiac:  Patient has been stable . Up titrate metoprolol. Start PO amiodarone  Respiratory:  The patient is extubated and good sats on nasal cannula.  Continue pulmonary toilet.  GI:  Advance p.o. intake as tolerated.  Renal:  The patient has good urine output creatinine is 1.0.  Good response to diuresis.  Heme:  Hematocrit is 26. Pit 148  ID: Afebrile. WBC15 Will follow.  Endocrine:  Glucose is controlled with insulin.  Activities: OOB and ambulate as tolerated.   Lines tubes and drains:  D/c fang, Keep mid level line and pacing wires    11/18/2018  The patient is postop day 3.  Status post coronary artery bypass grafting x5.  Overall the  patient is is doing very well.   Neuro:  The patient is awake alert and oriented x3.  Nonfocal neuro exam.  Pain is controlled with pain medication.  Cardiac:  Patient has been stable .  Tolerating metoprolol 100 mg b.i.d.. Start PO amiodarone.  Discontinue amiodarone.  Respiratory:  Good sats on nasal cannula.  Continue pulmonary toilet.  Wean oxygen to room air.  GI:  Advance p.o. intake as tolerated.  Renal:  The patient has good urine output creatinine is 0.8.  Good response to diuresis.  Replace potassium.  Heme:  Hematocrit is 27. Pit 188  ID: Afebrile. WBC 14. Will follow.  Endocrine:  Glucose is controlled with insulin.  Activities: OOB and ambulate as tolerated.   Lines tubes and drains:  Mid level line and pacing wires    11/19/2018  The patient is postop day 4.  Status post coronary artery bypass grafting x5.  Overall the patient is is doing very well. Anticipate discharge home with home health in the next 24-48 hours.  Neuro:  The patient is awake alert and oriented x3.  Nonfocal neuro exam.  Pain is controlled with pain medication.  Cardiac:  Patient has been stable .  Tolerating metoprolol 100 mg b.i.d. and PO amiodarone.  Patient is on apixaban for intermittent episodes of atrial fibrillation.  Respiratory:  Good sats on nasal cannula.  Continue pulmonary toilet.  Wean oxygen to room air.  GI:  Advance p.o. intake as tolerated.  Patient has had bowel movements.  Renal:  The patient has good urine output creatinine is 0.8.  Good response to diuresis.  Replace potassium.  Heme:  Hematocrit is 27. Pit 188 from yesterday.  Recheck CBC in the morning.  ID: Afebrile  Endocrine:  Glucose is controlled with insulin.  Activities: OOB and ambulate as tolerated.   Lines tubes and drains:  Mid level line and pacing wires    11/20/2018  The patient is postop day .  Status post coronary artery bypass grafting x5.  Overall the patient is is doing very well. Anticipate discharge home with home health today  Neuro:   The patient is awake alert and oriented x3.  Nonfocal neuro exam.  Pain is controlled with pain medication.  Cardiac:  Patient has been stable .  Tolerating metoprolol 50 mg b.i.d. and PO amiodarone.  Patient is on apixaban for intermittent episodes of atrial fibrillation.  Respiratory:  Good sats on nasal cannula.  Continue pulmonary toilet.  Wean oxygen to room air.  GI:  Advance p.o. intake as tolerated.  Patient has had bowel movements.  Renal:  The patient has good urine output creatinine is 0.9.  Good response to diuresis.  Replace potassium.  Heme:  Hematocrit is 28.6. Plt 264 from yesterday.  Recheck CBC in the morning.  ID: Afebrile  Endocrine:  Glucose is controlled with insulin.  Activities: OOB and ambulate as tolerated.   Lines tubes and drains:  Mid level line and pacing wires                 Ricardo Dorsey MD  Cardiothoracic Surgery  [facility]

## 2018-11-20 NOTE — PLAN OF CARE
Problem: Patient Care Overview  Goal: Plan of Care Review  Outcome: Ongoing (interventions implemented as appropriate)  POC reviewed, verbalized understanding. Pt remained free from falls, fall precautions in place. Pt is paced on monitor, 90-92. 2L O2 NC. BP monitored. Denies pain at this time. PIV & midline intact. Sternal precautions maintained. IS encouraged. Call bell and personal belongings within reach. Hourly rounding complete. Reminded to call for assistance. Will continue to monitor.

## 2018-11-20 NOTE — NURSING
Patient discharged per MD order. Discharge instructions and handout given to patient. Stressed the importance of making and keeping all follow up appointments. Patient verbalizes understanding and has no questions at this time. IVs discontinued, telemetry removed and returned to monitor tech. Patient awaiting transportation home with family.

## 2018-11-20 NOTE — PLAN OF CARE
Problem: Patient Care Overview  Goal: Plan of Care Review  Outcome: Ongoing (interventions implemented as appropriate)  Plan of care reviewed with patient. No questions expressed. No injury throughout shift. Pain medication and pain control done per patient request. Will continue to monitor for any changes.

## 2018-11-20 NOTE — PT/OT/SLP PROGRESS
Physical Therapy  Treatment    Eve Long   MRN: [id number]   Admitting Diagnosis: NSTEMI (non-ST elevated myocardial infarction)    PT Received On: 11/20/18  PT Start Time: 0730     PT Stop Time: 0755    PT Total Time (min): 25 min       Billable Minutes:  Gait Training 15 and Therapeutic Activity 10    Treatment Type: Treatment  PT/PTA: PT         General Precautions: Standard, fall, sternal  Orthopedic Precautions: N/A   Braces: N/A    Subjective:  Communicated with NURSE ROSAS prior to session.  Pain/Comfort  Pain Rating 1: 0/10    Objective:   Patient found with: telemetry, wound vac, oxygen    Functional Mobility:  Therapeutic Activities and Exercises:  PT FOUND SEATED IN CHAIR UPON ARRIVAL, CGA FOR SEATED SCOOT AND SIT>STAND TF, PT REQUEST TO USE BATHROOM SO AMB TO BATHROOM WITH CGA, STAND TO SIT WITH CGA ONTO TOILET, PT RECITE 2/5 STERNAL PRECAUTIONS SO ALL REVIEWED WITH PT, NO ASSIST FOR CLEANING, PT AMB 60' X 2 TRIALS WITH JONAH, CUES FOR UPRIGHT POSTURE AND DEEP BREATHING, PT DECLINED FURTHER GAIT SO RETURN TO ROOM TO BEDSIDE CHAIR, REVIEW BLE THEREX TO PERFORM WHILE SEATED IN CHAIR     AM-PAC 6 CLICK MOBILITY  How much help from another person does this patient currently need?   1 = Unable, Total/Dependent Assistance  2 = A lot, Maximum/Moderate Assistance  3 = A little, Minimum/Contact Guard/Supervision  4 = None, Modified Teller/Independent    Turning over in bed (including adjusting bedclothes, sheets and blankets)?: 3  Sitting down on and standing up from a chair with arms (e.g., wheelchair, bedside commode, etc.): 3  Moving from lying on back to sitting on the side of the bed?: 3  Moving to and from a bed to a chair (including a wheelchair)?: 3  Need to walk in hospital room?: 3  Climbing 3-5 steps with a railing?: 1  Basic Mobility Total Score: 16    AM-PAC Raw Score CMS G-Code Modifier Level of Impairment Assistance   6 % Total / Unable   7 - 9 CM 80 - 100% Maximal Assist   10  - 14 CL 60 - 80% Moderate Assist   15 - 19 CK 40 - 60% Moderate Assist   20 - 22 CJ 20 - 40% Minimal Assist   23 CI 1-20% SBA / CGA   24 CH 0% Independent/ Mod I     Patient left up in chair with all lines intact, call button in reach and NURSE notified.    Assessment:  Eve Long is a 68 y.o. female with a medical diagnosis of NSTEMI (non-ST elevated myocardial infarction) and presents with IMPAIRED FUNCTIONAL MOBILITY. PT WILL BENEFIT FROM CONT. SKILLED P.T. TO ADDRESS IMPAIRMENTS    Rehab identified problem list/impairments: Rehab identified problem list/impairments: weakness, impaired endurance, impaired functional mobilty, gait instability, impaired balance, decreased coordination    Rehab potential is good.    Activity tolerance: Fair    Discharge recommendations: Discharge Facility/Level Of Care Needs: home health PT     Barriers to discharge:      Equipment recommendations: Equipment Needed After Discharge: bath bench     GOALS:   Multidisciplinary Problems     Physical Therapy Goals     Not on file          Multidisciplinary Problems (Resolved)        Problem: Physical Therapy Goal    Goal Priority Disciplines Outcome Goal Variances Interventions   Physical Therapy Goal   (Resolved)     PT, PT/OT Outcome(s) achieved     Description:  LTG'S TO BE MET IN 7 DAYS (11-23-18)  1. PT WILL REQUIRE MODA FOR BED MOBILITY  2. PT WILL REQUIRE MODA FOR TF'S  3. PT WILL ' WITH MODA  4. PT WILL DEMO P+ DYNAMIC BALANCE DURING GAIT                    PLAN:    Patient to be seen 5 x/week  to address the above listed problems via gait training, therapeutic activities, therapeutic exercises  Plan of Care expires: 11/23/18  Plan of Care reviewed with: patient    PT ENCOURAGED TO CALL FOR ASSISTANCE WITH ALL NEEDS DUE TO FALL RISK STATUS, PT AGREEABLE    Sharyn Varghese, PT  11/20/2018

## 2018-11-20 NOTE — PROGRESS NOTES
Disregard blood glucose result of 59 at 0516. Rechecked BG - was 112 at 0519. Confirmed with AM labs.

## 2018-11-20 NOTE — PROGRESS NOTES
BP 94/44, second time was 85/41. MD Dorsey notified & ordered to go ahead and give 50mg of metoprolol for the 2100 dose instead of 100mg. Will continue to monitor pt.

## 2018-11-20 NOTE — SUBJECTIVE & OBJECTIVE
Interval History: no acute issues o/n    Review of Systems   Constitution: Negative for weakness and malaise/fatigue.   HENT: Negative for hearing loss and hoarse voice.    Eyes: Negative for blurred vision and visual disturbance.   Cardiovascular: Positive for irregular heartbeat. Negative for chest pain, claudication, dyspnea on exertion, leg swelling, near-syncope, orthopnea, palpitations, paroxysmal nocturnal dyspnea and syncope.   Respiratory: Negative for cough, hemoptysis, shortness of breath, sleep disturbances due to breathing, snoring and wheezing.    Endocrine: Negative for cold intolerance and heat intolerance.   Hematologic/Lymphatic: Bruises/bleeds easily.   Skin: Negative for color change, dry skin and nail changes.   Musculoskeletal: Positive for arthritis. Negative for back pain, joint pain and myalgias.   Gastrointestinal: Negative for bloating, abdominal pain, constipation, nausea and vomiting.   Genitourinary: Negative for dysuria, flank pain, hematuria and hesitancy.   Neurological: Negative for headaches, light-headedness, loss of balance, numbness and paresthesias.   Psychiatric/Behavioral: Negative for altered mental status.   Allergic/Immunologic: Negative for environmental allergies.     Objective:     Vital Signs (Most Recent):  Temp: 98.3 °F (36.8 °C) (11/20/18 0840)  Pulse: 72 (11/20/18 1155)  Resp: 18 (11/20/18 0840)  BP: (!) 111/58 (11/20/18 0840)  SpO2: (!) 93 % (11/20/18 1108) Vital Signs (24h Range):  Temp:  [97.5 °F (36.4 °C)-98.3 °F (36.8 °C)] 98.3 °F (36.8 °C)  Pulse:  [72-98] 72  Resp:  [18] 18  SpO2:  [91 %-95 %] 93 %  BP: ()/(44-73) 111/58     Weight: 72.8 kg (160 lb 7.9 oz)  Body mass index is 28.43 kg/m².     SpO2: (!) 93 %  O2 Device (Oxygen Therapy): (S) room air      Intake/Output Summary (Last 24 hours) at 11/20/2018 1516  Last data filed at 11/20/2018 0840  Gross per 24 hour   Intake 390 ml   Output 350 ml   Net 40 ml       Lines/Drains/Airways          None           Physical Exam   Constitutional: She is oriented to person, place, and time. She appears well-developed and well-nourished. No distress.   HENT:   Head: Normocephalic and atraumatic.   Eyes: Pupils are equal, round, and reactive to light.   Neck: Normal range of motion and full passive range of motion without pain. Neck supple. No JVD present.   Cardiovascular: Normal rate, regular rhythm, S1 normal, S2 normal and intact distal pulses. PMI is not displaced. Exam reveals no distant heart sounds.   No murmur heard.  Pulses:       Radial pulses are 2+ on the right side, and 2+ on the left side.        Dorsalis pedis pulses are 2+ on the right side, and 2+ on the left side.   Mid sternal CABG incision with wound vac in place       Pulmonary/Chest: Effort normal and breath sounds normal. No accessory muscle usage. No respiratory distress. She has no decreased breath sounds. She has no wheezes. She has no rales.   Abdominal: Soft. Bowel sounds are normal. She exhibits no distension. There is no tenderness.   Musculoskeletal: Normal range of motion. She exhibits no edema.        Right ankle: She exhibits no swelling.        Left ankle: She exhibits no swelling.   Neurological: She is alert and oriented to person, place, and time.   Skin: Skin is warm and dry. She is not diaphoretic. No cyanosis. Nails show no clubbing.   Psychiatric: She has a normal mood and affect. Her speech is normal and behavior is normal. Judgment and thought content normal. Cognition and memory are normal.   Nursing note and vitals reviewed.      Significant Labs:   All pertinent lab results from the last 24 hours have been reviewed. and   Recent Lab Results       11/20/18  1158   11/20/18  1151   11/20/18  0519   11/20/18  0516   11/20/18  0311        Anion Gap   9     10     Baso #         0.01     Basophil%         0.1     BUN, Bld   39     41     Calcium   9.0     8.9     Chloride   96     97     CO2   34     33     Creatinine   0.9     0.9      Differential Method         Automated     eGFR if    >60     >60     eGFR if non    >60  Comment:  Calculation used to obtain the estimated glomerular filtration  rate (eGFR) is the CKD-EPI equation.        >60  Comment:  Calculation used to obtain the estimated glomerular filtration  rate (eGFR) is the CKD-EPI equation.        Eos #         0.0     Eosinophil%         0.0     Glucose   149     124     Gran # (ANC)         10.2     Gran%         77.5     Hematocrit         28.6     Hemoglobin         9.3     Lymph #         1.3     Lymph%         10.1     Magnesium         1.9     MCH         28.1     MCHC         32.5     MCV         86     Mono #         1.6     Mono%         12.3     MPV         9.4     Platelets         264     POCT Glucose 155   122 59       Potassium   3.8     3.4     RBC         3.31     RDW         14.5     Sodium   139     140     WBC         13.21                      11/19/18  2047   11/19/18  1650        Anion Gap   8     Baso #         Basophil%         BUN, Bld   40     Calcium   9.2     Chloride   99     CO2   33     Creatinine   0.9     Differential Method         eGFR if    >60     eGFR if non    >60  Comment:  Calculation used to obtain the estimated glomerular filtration  rate (eGFR) is the CKD-EPI equation.        Eos #         Eosinophil%         Glucose   116     Gran # (ANC)         Gran%         Hematocrit         Hemoglobin         Lymph #         Lymph%         Magnesium         MCH         MCHC         MCV         Mono #         Mono%         MPV         Platelets         POCT Glucose 252       Potassium   4.1     RBC         RDW         Sodium   140     WBC               Significant Imaging: Echocardiogram:   2D echo with color flow doppler:   Results for orders placed or performed during the hospital encounter of 11/13/18   2D echo with color flow doppler   Result Value Ref Range    QEF 40 (A) 55 - 65     Diastolic Dysfunction Yes (A)     and X-Ray: CXR: X-Ray Chest 1 View (CXR): No results found for this visit on 11/13/18.

## 2018-11-20 NOTE — ASSESSMENT & PLAN NOTE
-Patient is POD 1 s/p CABG x5   -Continue Amio gtt and plan to switch to PO tomorrow  -CT and RAZIA out when ok with CT surgery   -IABP being weaned. Changed to 1:2 by Dr. Rodríguez  -Continue ASA, Plavix   -No statin due to intolerance in the past. Had severe myalgia with statin and Zetia use   -Encourage IS use and begin ambulation when transferred to Tele     11/17/18  -Agree with increasing metoprolol to 50mg BID and use Lopressor 5mg IV PRN  -Needs to diurese with IV Lasix   -K+ being replaced  -Continue ASA and Plavix  --No statin due to intolerance in the past. Had severe myalgia with statin and Zetia use   -Encourage IS use and begin ambulation when transferred to Tele     11/18/18  -Decrease amio to 200mg BID  -Continue Lopressor 100mg BID  -Continue ASA, Lisinopril and Eliqius  -Needs to start Plavix if ok with CT surgery  -No statin due to intolerance in the past. Had severe myalgia with statin and Zetia use    -Encourage IS use and ambulation     11/19/18  -Continue Amio, Lopressor, ASA, ACEi, Eliquis  Start Plavix if ok with CT Surgery  No statin due to severe intolerance  No Zetia due to intolerance  Encourage IS usage every hour  Encourage ambulation  Will continue to follow along.     11/20  -Continue ASA, Eliquis, Amiodarone, Lasix, BB, ACEi  -No statin due to intolerance  -No Zetia due to intolerance  -Encourage IS usage every hour  -Encourage frequent ambulation  -Discharge home today per CT surgery  -OP Cardiology follow up in 2-3 weeks

## 2018-11-20 NOTE — PLAN OF CARE
Patient d/c home per her request ( now refusing SNF placement ).  Preference form signed for [facility], copy given to the patient and the original to the blue folder.  place referral in [facility]. Patient d/c today     11/20/18 3911   Final Note   Assessment Type Final Discharge Note   Anticipated Discharge Disposition Home-Health  ([facility] )   Discharge plans and expectations educations in teach back method with documentation complete? Yes   Right Care Referral Info   Post Acute Recommendation Home-care  ([facility] )

## 2018-11-21 ENCOUNTER — PATIENT OUTREACH (OUTPATIENT)
Dept: ADMINISTRATIVE | Facility: CLINIC | Age: 68
End: 2018-11-21

## 2018-11-21 NOTE — PROGRESS NOTES
C3 nurse attempted to contact patient. The following occurred:   C3 nurse attempted to contact Eve Long for a TCC post hospital discharge follow up call. The patient is unable to conduct the call @ this time. The patient requested a callback.    The patient does not have a scheduled HOSFU appointment within 7 days post hospital discharge date 11\20. Message sent to Physician staff to assist with HOSFU appointment scheduling.

## 2018-11-21 NOTE — PROGRESS NOTES
Spoke with pt. Hospital follow up with Dr. Brandon scheduled for Monday 11/26/18 at 3:00 p.m. Pt verbalized understanding.

## 2018-11-26 ENCOUNTER — LAB VISIT (OUTPATIENT)
Dept: LAB | Facility: HOSPITAL | Age: 68
End: 2018-11-26
Attending: FAMILY MEDICINE
Payer: MEDICARE

## 2018-11-26 ENCOUNTER — OFFICE VISIT (OUTPATIENT)
Dept: FAMILY MEDICINE | Facility: CLINIC | Age: 68
End: 2018-11-26
Payer: MEDICARE

## 2018-11-26 VITALS
SYSTOLIC BLOOD PRESSURE: 110 MMHG | HEART RATE: 91 BPM | DIASTOLIC BLOOD PRESSURE: 70 MMHG | WEIGHT: 160.19 LBS | OXYGEN SATURATION: 98 % | TEMPERATURE: 98 F | BODY MASS INDEX: 28.38 KG/M2 | HEIGHT: 63 IN

## 2018-11-26 DIAGNOSIS — E87.6 HYPOKALEMIA: ICD-10-CM

## 2018-11-26 DIAGNOSIS — I25.10 CORONARY ARTERY DISEASE, ANGINA PRESENCE UNSPECIFIED, UNSPECIFIED VESSEL OR LESION TYPE, UNSPECIFIED WHETHER NATIVE OR TRANSPLANTED HEART: ICD-10-CM

## 2018-11-26 DIAGNOSIS — I50.9 CONGESTIVE HEART FAILURE, UNSPECIFIED HF CHRONICITY, UNSPECIFIED HEART FAILURE TYPE: Primary | ICD-10-CM

## 2018-11-26 DIAGNOSIS — I50.9 CONGESTIVE HEART FAILURE, UNSPECIFIED HF CHRONICITY, UNSPECIFIED HEART FAILURE TYPE: ICD-10-CM

## 2018-11-26 LAB
ANION GAP SERPL CALC-SCNC: 8 MMOL/L
BNP SERPL-MCNC: 194 PG/ML
BUN SERPL-MCNC: 15 MG/DL
CALCIUM SERPL-MCNC: 9 MG/DL
CHLORIDE SERPL-SCNC: 101 MMOL/L
CO2 SERPL-SCNC: 27 MMOL/L
CREAT SERPL-MCNC: 0.8 MG/DL
EST. GFR  (AFRICAN AMERICAN): >60 ML/MIN/1.73 M^2
EST. GFR  (NON AFRICAN AMERICAN): >60 ML/MIN/1.73 M^2
GLUCOSE SERPL-MCNC: 118 MG/DL
POTASSIUM SERPL-SCNC: 4.1 MMOL/L
SODIUM SERPL-SCNC: 136 MMOL/L

## 2018-11-26 PROCEDURE — 83880 ASSAY OF NATRIURETIC PEPTIDE: CPT | Mod: HCNC

## 2018-11-26 PROCEDURE — 36415 COLL VENOUS BLD VENIPUNCTURE: CPT | Mod: HCNC,PO

## 2018-11-26 PROCEDURE — 80048 BASIC METABOLIC PNL TOTAL CA: CPT | Mod: HCNC

## 2018-11-26 PROCEDURE — 99999 PR PBB SHADOW E&M-EST. PATIENT-LVL III: CPT | Mod: PBBFAC,HCNC,, | Performed by: FAMILY MEDICINE

## 2018-11-26 PROCEDURE — 99495 TRANSJ CARE MGMT MOD F2F 14D: CPT | Mod: HCNC,S$GLB,, | Performed by: FAMILY MEDICINE

## 2018-11-26 NOTE — PROGRESS NOTES
Subjective:       Patient ID: Eve Long is a 68 y.o. female.    Chief Complaint: Hospital Follow Up      Transitional Care Note    Family and/or Caretaker present at visit? Yes   Diagnostic tests reviewed/disposition: yes   Disease/illness education: yes   Home health/community services discussion/referrals: yes   Establishment or re-establishment of referral orders for community resources:yes   Discussion with other health care providers: No       68  Old female with COPD, HTN , TIA, sarcoidosis here for f.u on hosp on 11/20 due  to CP . She was found to have an NSTEMI . Cath reveled multivessel non occlusive disease and diffused LAD . S/p  5 vessel CABG . Post op course was complicated  with A fib and Combined HF . Started on eliquis, amiodarone , metoprolol, aspirin and lisinopril . Intolerant to statin . Feels great . She has not seen card yet . Getting h/ h. No leg edema, cp , sob . On her  last furosemide tab              Review of Systems   Constitutional: Negative.    HENT: Negative.    Eyes: Negative.    Respiratory: Negative.    Cardiovascular: Negative.    Gastrointestinal: Negative.    Genitourinary: Negative.    Musculoskeletal: Negative.    Skin: Negative.    Hematological: Negative.        Objective:      Physical Exam   Constitutional: She is oriented to person, place, and time. She appears well-developed and well-nourished. No distress.   HENT:   Head: Normocephalic and atraumatic.   Right Ear: External ear normal.   Left Ear: External ear normal.   Mouth/Throat: No oropharyngeal exudate.   Eyes: Conjunctivae and EOM are normal. Pupils are equal, round, and reactive to light. Right eye exhibits no discharge. Left eye exhibits no discharge. No scleral icterus.   Neck: Normal range of motion. Neck supple. No JVD present. No tracheal deviation present. No thyromegaly present.   Cardiovascular: Normal rate, regular rhythm and normal heart sounds. Exam reveals no gallop and no friction rub.    No murmur heard.  Pulmonary/Chest: Effort normal and breath sounds normal. No stridor. No respiratory distress. She has no wheezes. She has no rales. She exhibits no tenderness.   Abdominal: Soft. Bowel sounds are normal. She exhibits no distension. There is no tenderness. There is no rebound and no guarding.   Musculoskeletal: Normal range of motion.   Lymphadenopathy:     She has no cervical adenopathy.   Neurological: She is alert and oriented to person, place, and time.   Skin: Skin is warm and dry. She is not diaphoretic.   Psychiatric: She has a normal mood and affect. Her behavior is normal. Judgment and thought content normal.       Assessment:       Eve was seen today for hospital follow up.    Diagnoses and all orders for this visit:    Congestive heart failure, unspecified HF chronicity, unspecified heart failure type  -     Brain natriuretic peptide; Future  -     Basic metabolic panel; Future    Hypokalemia  -     Brain natriuretic peptide; Future  -     Basic metabolic panel; Future    Coronary artery disease, angina presence unspecified, unspecified vessel or lesion type, unspecified whether native or transplanted heart      Plan:     Eve was seen today for hospital follow up.    Diagnoses and all orders for this visit:    Congestive heart failure, unspecified HF chronicity, unspecified heart failure type  -     Brain natriuretic peptide; Future  -     Basic metabolic panel; Future    Hypokalemia  -     Brain natriuretic peptide; Future  -     Basic metabolic panel; Future     Na restriction   Labs   Cont meds. Keep  Card rinku

## 2018-11-29 NOTE — PHYSICIAN QUERY
"PT Name: Eve Long  MR #: [id number]    Physician Query Form - Heart  Condition Clarification     CDS/: Telma Lobo               Contact information: [email or web address]    This form is a permanent document in the medical record.     Query Date: November 29, 2018    By submitting this query, we are merely seeking further clarification of documentation. Please utilize your independent clinical judgment when addressing the question(s) below.    The medical record contains the following   Indicators     Supporting Clinical Findings Location in Medical Record   x  - 194  Lab 11/14 &11 /26    x EF EF 40-45% 2 D Echo 11/14     Radiology findings     x Echo Results   1 - Concentric hypertrophy.     2 - Wall motion abnormalities.     3 - Mildly to moderately depressed left ventricular systolic function (EF 40-45%).     4 - Impaired LV relaxation, normal LAP (grade 1 diastolic dysfunction).     5 - Normal right ventricular systolic function . 2 D Echo 11/14     "Ascites" documented      "SOB" or "LYLES" documented      "Hypoxia" documented     X Heart Failure documented  Also has some CHF and getting Lasix, K is being replaced. Also developed Afib w RVR-- getting BB     Discharge summary     "Edema" documented     x Diuretics/Meds furosemide injection 40 mg   Dose: 40 mg  Freq: 2 times daily Route: IV  Start: 11/16/18 0900 End: 11/20/18 1707 MAR     Treatment:      Other:      Heart failure (HF) can be acute, chronic or both. It is generally further specificed as systolic, diastolic, or combined. Lastly, it is important to identify an underlying etiology if known or suspected.     Common clues to acute exacerbation:  Rapidly progressive symptoms (w/in 2 weeks of presentation), using IV diuretics to treat, using supplemental O2, pulmonary edema on Xray, MI w/in 4 weeks, and/or BNP >500    Systolic Heart Failure: is defined as chart documentation of a left ventricular ejection fraction (LVEF) less than " 40%     Diastolic Heart Failure: is defined as a left ventricular ejection fraction (LVEF) greater than 40%   +      Evidence of diastolic dysfunction on echocardiography OR    Right heart catheterization wedge pressure above 12 mm Hg OR    Left heart catheterization left ventricular end diastolic pressure 18 mm Hg or above.    References: *American Heart Association    The clinical guidelines noted below are only system guidelines, and do not replace the providers clinical judgment.     Provider, please specify the diagnosis associated with above clinical findings  [ X ] Acute Systolic Heart Failure - New diagnosis.  EF < 40%  and acute HF symptoms documented  [   ] Acute on Chronic Systolic Heart Failure- Pre-existing systolic HF diagnosis.  EF < 40%  and acute HF symptoms documented  [   ] Chronic Systolic Heart Failure - Pre-existing systolic HF diagnosis.  EF < 40%  without  acute HF symptoms documented  [   ] Acute Diastolic Heart Failure - New diagnosis.  EF > 40%  and acute HF symptoms documented  [   ] Acute on Chronic Diastolic Heart Failure -    Pre-existing diastoic HF diagnosis.  EF > 40%  and acute HF symptoms documented                                 [   ] Chronic Diastolic Heart Failure - Pre-existing diastolic HF diagnosis.  EF > 40%  without  acute HF symptoms documented  [   ] Acute Combined Systolic and Diastolic Heart Failure   [   ] Acute on Chronic Combined Systolic and Diastolic Heart Failure                   [   ] Chronic Combined Systolic and Diastolic Heart Failure  [   ] Other Type of Heart Failure (please specify type): _________________________  [   ] Heart Failure Ruled Out  [   ] Other (please specify): ___________________________________  [   ] Clinically Undetermined                          Please document in your progress notes daily for the duration of treatment until resolved and include in your discharge summary.

## 2018-12-03 ENCOUNTER — LAB VISIT (OUTPATIENT)
Dept: LAB | Facility: HOSPITAL | Age: 68
End: 2018-12-03
Attending: FAMILY MEDICINE
Payer: MEDICARE

## 2018-12-03 DIAGNOSIS — Z95.1 POSTSURGICAL AORTOCORONARY BYPASS STATUS: Primary | ICD-10-CM

## 2018-12-03 LAB
ANION GAP SERPL CALC-SCNC: 9 MMOL/L
BUN SERPL-MCNC: 16 MG/DL
CALCIUM SERPL-MCNC: 8.8 MG/DL
CHLORIDE SERPL-SCNC: 106 MMOL/L
CO2 SERPL-SCNC: 24 MMOL/L
CREAT SERPL-MCNC: 0.8 MG/DL
EST. GFR  (AFRICAN AMERICAN): >60 ML/MIN/1.73 M^2
EST. GFR  (NON AFRICAN AMERICAN): >60 ML/MIN/1.73 M^2
GLUCOSE SERPL-MCNC: 91 MG/DL
POTASSIUM SERPL-SCNC: 4.9 MMOL/L
SODIUM SERPL-SCNC: 139 MMOL/L

## 2018-12-03 PROCEDURE — 80048 BASIC METABOLIC PNL TOTAL CA: CPT | Mod: HCNC

## 2018-12-12 ENCOUNTER — TELEPHONE (OUTPATIENT)
Dept: ADMINISTRATIVE | Facility: CLINIC | Age: 68
End: 2018-12-12

## 2018-12-12 NOTE — TELEPHONE ENCOUNTER
Home Health SOC 11/21/2018 - 01/19/2019 with [facility] ([facility]) - Dr. Ricardo Dorsey. Patient received SN and PT services.

## 2018-12-14 ENCOUNTER — CLINICAL SUPPORT (OUTPATIENT)
Dept: CARDIOLOGY | Facility: CLINIC | Age: 68
End: 2018-12-14
Payer: MEDICARE

## 2018-12-14 ENCOUNTER — OFFICE VISIT (OUTPATIENT)
Dept: CARDIOLOGY | Facility: CLINIC | Age: 68
End: 2018-12-14
Payer: MEDICARE

## 2018-12-14 VITALS
DIASTOLIC BLOOD PRESSURE: 70 MMHG | SYSTOLIC BLOOD PRESSURE: 122 MMHG | HEIGHT: 63 IN | WEIGHT: 161.81 LBS | BODY MASS INDEX: 28.67 KG/M2 | HEART RATE: 72 BPM

## 2018-12-14 DIAGNOSIS — I48.0 PAF (PAROXYSMAL ATRIAL FIBRILLATION): ICD-10-CM

## 2018-12-14 DIAGNOSIS — I25.810 CORONARY ARTERY DISEASE INVOLVING AUTOLOGOUS VEIN CORONARY BYPASS GRAFT WITHOUT ANGINA PECTORIS: ICD-10-CM

## 2018-12-14 DIAGNOSIS — J84.10 CALCIFIED GRANULOMA OF LUNG: ICD-10-CM

## 2018-12-14 DIAGNOSIS — J44.9 COPD, MODERATE: ICD-10-CM

## 2018-12-14 DIAGNOSIS — Z95.1 S/P CABG X 5: Primary | ICD-10-CM

## 2018-12-14 DIAGNOSIS — I10 ESSENTIAL HYPERTENSION: ICD-10-CM

## 2018-12-14 DIAGNOSIS — D86.0 PULMONARY SARCOIDOSIS: ICD-10-CM

## 2018-12-14 DIAGNOSIS — E78.2 MIXED HYPERLIPIDEMIA: ICD-10-CM

## 2018-12-14 PROCEDURE — 93000 ELECTROCARDIOGRAM COMPLETE: CPT | Mod: HCNC,S$GLB,, | Performed by: INTERNAL MEDICINE

## 2018-12-14 PROCEDURE — 99214 OFFICE O/P EST MOD 30 MIN: CPT | Mod: HCNC,S$GLB,, | Performed by: PHYSICIAN ASSISTANT

## 2018-12-14 PROCEDURE — 99999 PR PBB SHADOW E&M-EST. PATIENT-LVL III: CPT | Mod: PBBFAC,HCNC,, | Performed by: PHYSICIAN ASSISTANT

## 2018-12-14 PROCEDURE — 1101F PT FALLS ASSESS-DOCD LE1/YR: CPT | Mod: CPTII,HCNC,S$GLB, | Performed by: PHYSICIAN ASSISTANT

## 2018-12-14 NOTE — PROGRESS NOTES
Subjective:    Patient ID:  Eev Long is a 68 y.o. female who presents for follow-up of hospital follow-up    HPI   Ms. Long is a 68 year old female patient with a PMHx of anemia, hyperlipidemia, HTN, history of TIA, sarcoidosis, and statin intolerance who presents today for hospital follow-up. Patient recently admitted to [facility] with NSTEMI. LHC subsequently revealed multivessel disease and patient underwent CABG x 5. Her medications were optimized and she was subsequently discharged. She returns today and states she is doing well. Her only compliant is fatigue. States she gets more winded than she used to but this seems to be slowly improving. Denies any chest pain, SOB, or other anginal signs or symptoms. No palpitations, lightheadedness, dizziness, near syncope, or syncope. No s/s of CHF. BP stable and well-controlled on current meds. Remains on Eliquis for CVA prophylaxis. No bleeding issues. No s/s of TIA/CVA. EKG today shows NSR.    Review of Systems   Constitution: Positive for weakness and malaise/fatigue. Negative for chills, decreased appetite and fever.   HENT: Negative for congestion, hoarse voice and sore throat.    Eyes: Negative for blurred vision and discharge.   Cardiovascular: Negative for chest pain, claudication, cyanosis, dyspnea on exertion, irregular heartbeat, leg swelling, near-syncope, orthopnea, palpitations and paroxysmal nocturnal dyspnea.   Respiratory: Negative for cough, hemoptysis, shortness of breath, snoring, sputum production and wheezing.    Endocrine: Negative for cold intolerance and heat intolerance.   Hematologic/Lymphatic: Negative for bleeding problem. Does not bruise/bleed easily.   Skin: Negative for rash.   Musculoskeletal: Negative for arthritis, back pain, joint pain, joint swelling, muscle cramps, muscle weakness and myalgias.   Gastrointestinal: Negative for abdominal pain, constipation, diarrhea, heartburn, melena and nausea.   Genitourinary: Negative  "for hematuria.   Neurological: Negative for dizziness, focal weakness, headaches, light-headedness, loss of balance, numbness, paresthesias and seizures.   Psychiatric/Behavioral: Negative for memory loss. The patient does not have insomnia.    Allergic/Immunologic: Negative for hives.       /70 (BP Location: Left arm, Patient Position: Sitting, BP Method: Medium (Manual))   Pulse 72   Ht 5' 3" (1.6 m)   Wt 73.4 kg (161 lb 13.1 oz)   BMI 28.66 kg/m²     Objective:    Physical Exam   Constitutional: She is oriented to person, place, and time. She appears well-developed and well-nourished. No distress.   HENT:   Head: Normocephalic and atraumatic.   Eyes: Pupils are equal, round, and reactive to light. Right eye exhibits no discharge. Left eye exhibits no discharge.   Neck: Neck supple. No JVD present. No thyromegaly present.   Cardiovascular: Normal rate, regular rhythm, S1 normal, S2 normal and normal heart sounds.   No murmur heard.  Pulmonary/Chest: Effort normal and breath sounds normal. No respiratory distress. She has no wheezes. She has no rales.   CABG site healing well; no redness, erythema or drainage   Abdominal: She exhibits no distension. There is no tenderness. There is no rebound.   Musculoskeletal: She exhibits no edema.   Neurological: She is alert and oriented to person, place, and time.   Skin: Skin is warm and dry. She is not diaphoretic. No erythema.   Psychiatric: She has a normal mood and affect. Her behavior is normal. Thought content normal.   Nursing note and vitals reviewed.      CONCLUSIONS     1 - Concentric hypertrophy.     2 - Wall motion abnormalities.     3 - Mildly to moderately depressed left ventricular systolic function (EF 40-45%).     4 - Impaired LV relaxation, normal LAP (grade 1 diastolic dysfunction).     5 - Normal right ventricular systolic function .       Chemistry        Component Value Date/Time     12/03/2018 1348    K 4.9 12/03/2018 1348     " 12/03/2018 1348    CO2 24 12/03/2018 1348    BUN 16 12/03/2018 1348    CREATININE 0.8 12/03/2018 1348    GLU 91 12/03/2018 1348        Component Value Date/Time    CALCIUM 8.8 12/03/2018 1348    ALKPHOS 74 11/14/2018 1621    AST 21 11/14/2018 1621    ALT 15 11/14/2018 1621    BILITOT 0.3 11/14/2018 1621    ESTGFRAFRICA >60.0 12/03/2018 1348    EGFRNONAA >60.0 12/03/2018 1348          Assessment:       1. S/P CABG x 5    2. Mixed hyperlipidemia    3. Essential hypertension    4. Coronary artery disease involving autologous vein coronary bypass graft without angina pectoris    5. COPD, moderate    6. Calcified granuloma of lung    7. Pulmonary sarcoidosis       Doing clinically well s/p CABG x 5. No angina or equivalent. In SR. Continue same mgmt. Counseled on risk factor modification. Would suggest starting Repatha at f/u. Needs CVT apt.   Plan:   -Appt with Dr. Dorsey in next 1-2 weeks at [facility]  -Continue same meds  -Cardiac low salt diet  -Consider addition of Repatha  -Keep follow-up with Dr. Jorge    Chart reviewed. Dr. Jorge agrees with plan as outlined above.

## 2018-12-26 ENCOUNTER — OFFICE VISIT (OUTPATIENT)
Dept: CARDIOTHORACIC SURGERY | Facility: CLINIC | Age: 68
End: 2018-12-26
Payer: MEDICARE

## 2018-12-26 VITALS
BODY MASS INDEX: 29.09 KG/M2 | HEART RATE: 59 BPM | TEMPERATURE: 98 F | DIASTOLIC BLOOD PRESSURE: 66 MMHG | SYSTOLIC BLOOD PRESSURE: 124 MMHG | WEIGHT: 164.25 LBS

## 2018-12-26 DIAGNOSIS — Z95.1 S/P CABG X 5: Primary | ICD-10-CM

## 2018-12-26 PROCEDURE — 99999 PR PBB SHADOW E&M-EST. PATIENT-LVL III: CPT | Mod: PBBFAC,HCNC,, | Performed by: THORACIC SURGERY (CARDIOTHORACIC VASCULAR SURGERY)

## 2018-12-26 PROCEDURE — 99024 POSTOP FOLLOW-UP VISIT: CPT | Mod: HCNC,S$GLB,, | Performed by: THORACIC SURGERY (CARDIOTHORACIC VASCULAR SURGERY)

## 2018-12-26 NOTE — LETTER
December 26, 2018      Nina Starks PA-C  [address] [place] [address] [address] [id number]           [facility]  [address] [place] [address]  [address]nd Floor  [place] [place] [address]  Phone: [phone]  Fax: [phone]          Patient: Eve Long   MR Number: [id number]   YOB: 1950   Date of Visit: 12/26/2018       Dear Nina Starks:    Thank you for referring Eve Long to me for evaluation. Attached you will find relevant portions of my assessment and plan of care.    If you have questions, please do not hesitate to call me. I look forward to following Eve Long along with you.    Sincerely,    Ricardo Dorsey MD    Enclosure  CC:  No Recipients    If you would like to receive this communication electronically, please contact [email or web address] or [phone] to request more information on [facility] [facility] access.    For providers and/or their staff who would like to refer a patient to [facility], please contact us through our one-stop-shop provider referral line, [facility], at [phone].    If you feel you have received this communication in error or would no longer like to receive these types of communications, please e-mail [email or web address]

## 2018-12-26 NOTE — PROGRESS NOTES
Subjective:      Patient ID: Eve Long is a 68 y.o. female.    Chief Complaint: Consult (Referred by Raudel)    HPI:   The patient is status post coronary artery bypass grafting on 11/15/2018.  The patient has no complaints.  She denies any fever or chills, ankle swelling, constipation, diarrhea, shortness of breath, palpitations or dizziness.  Review of patient's allergies indicates:   Allergen Reactions    Beclomethasone dipropionate      Causes mouth Sores    Danazol Hives    Doxycycline Hives, Itching and Swelling    Fluticasone-salmeterol      Elevates Blood Pressure    Statins-hmg-coa reductase inhibitors      Muscle weakness    Umeclidinium-vilanterol      bronchitis    Levothyroxine Rash    Red dye Rash            Objective:   /66   Pulse (!) 59   Temp 97.9 °F (36.6 °C) (Oral)   Wt 74.5 kg (164 lb 3.9 oz)   BMI 29.09 kg/m²     FL PICC Line Placement (xpd)  Addendum: Total fluoro time was 0.1 min and 1 image was obtained.     Electronically signed by: Eric Ventura MD   Date:    12/11/2018   Time:    14:50  Narrative: EXAMINATION:  FL PICC LINE PLACEMENT (XPD)    CLINICAL HISTORY:  Midline;    TECHNIQUE:  After the risks, benefits and options of the planned procedure were explained to the  patient  in full detail,    expressed complete understanding and gave    informed consent.  Alistair Figueroa PA-C, assisted in this procedure.   The procedure was performed by Alistair Figueroa PA-C under the supervision of Dr. Eric Ventura left upper arm was prepped and draped in the usual sterile fashion. All elements of maximal sterile barrier technique including cap and mask and sterile gown and sterile gloves and sterile full-body draped and hand hygiene and 2% chlorhexidine for cutaneous and sepsis.  Sterile ultrasound technique with sterile gel in sterile probe were also utilized. A timeout was performed. 1% lidocaine was used as a local anesthetic, and under ultrasound guidance, a  micropuncture set was used to gain access to the basilic vein. Through a peel-away sheath, a 5  French dual-lumen PICC trimmed to 27 cm was placed. Fluoroscopy was used to determine the position of the PICC , and the tip is within the left subclavian vein.  The PICC was then anchored into place, capped and flushed. A sterile dressing was applied.    Seizure was performed at the bedside using single portable x-ray.    FINDINGS:  The patient tolerated the procedure well without complication.  Impression: 1. Successful placement of a left upper extremity PICC, using ultrasound for access and fluoroscopy to determine position of the PICC.  Tip of the PICC line is within left subclavian vein.  PICC is ready for use.    Electronically signed by: Eric Ventura MD  Date:    11/16/2018  Time:    16:10         Physical Exam   Constitutional: She is oriented to person, place, and time. She appears well-developed and well-nourished. No distress.   Neck: No JVD present.   Cardiovascular: Normal rate, regular rhythm and normal heart sounds.   Pulmonary/Chest: Effort normal and breath sounds normal.   Abdominal: Soft. Bowel sounds are normal.   Musculoskeletal: She exhibits no edema or deformity.   Neurological: She is alert and oriented to person, place, and time.   Skin: Skin is warm and dry. She is not diaphoretic.   Psychiatric: She has a normal mood and affect.       Assessment:      The patient is status post coronary artery bypass grafting on 11/15/2018.  Overall the patient is doing well.    Plan     Discharge patient from cardiac surgery Clinic.  Return to clinic as needed.        Mark Awolesi, [facility]

## 2019-01-29 ENCOUNTER — PATIENT MESSAGE (OUTPATIENT)
Dept: CARDIOLOGY | Facility: CLINIC | Age: 69
End: 2019-01-29

## 2019-01-29 ENCOUNTER — OFFICE VISIT (OUTPATIENT)
Dept: CARDIOLOGY | Facility: CLINIC | Age: 69
End: 2019-01-29
Payer: MEDICARE

## 2019-01-29 VITALS
WEIGHT: 163.56 LBS | DIASTOLIC BLOOD PRESSURE: 76 MMHG | BODY MASS INDEX: 28.98 KG/M2 | SYSTOLIC BLOOD PRESSURE: 146 MMHG | HEART RATE: 56 BPM | HEIGHT: 63 IN

## 2019-01-29 DIAGNOSIS — E03.9 ACQUIRED HYPOTHYROIDISM: ICD-10-CM

## 2019-01-29 DIAGNOSIS — I25.118 CORONARY ARTERY DISEASE OF NATIVE ARTERY OF NATIVE HEART WITH STABLE ANGINA PECTORIS: Primary | ICD-10-CM

## 2019-01-29 DIAGNOSIS — Z95.1 S/P CABG X 5: ICD-10-CM

## 2019-01-29 DIAGNOSIS — I10 ESSENTIAL HYPERTENSION: ICD-10-CM

## 2019-01-29 DIAGNOSIS — D86.0 PULMONARY SARCOIDOSIS: ICD-10-CM

## 2019-01-29 DIAGNOSIS — I50.42 CHRONIC COMBINED SYSTOLIC AND DIASTOLIC CONGESTIVE HEART FAILURE: ICD-10-CM

## 2019-01-29 DIAGNOSIS — I48.0 PAF (PAROXYSMAL ATRIAL FIBRILLATION): ICD-10-CM

## 2019-01-29 DIAGNOSIS — E78.2 MIXED HYPERLIPIDEMIA: ICD-10-CM

## 2019-01-29 PROCEDURE — 1101F PR PT FALLS ASSESS DOC 0-1 FALLS W/OUT INJ PAST YR: ICD-10-PCS | Mod: HCNC,CPTII,S$GLB, | Performed by: INTERNAL MEDICINE

## 2019-01-29 PROCEDURE — 1101F PT FALLS ASSESS-DOCD LE1/YR: CPT | Mod: HCNC,CPTII,S$GLB, | Performed by: INTERNAL MEDICINE

## 2019-01-29 PROCEDURE — 3078F DIAST BP <80 MM HG: CPT | Mod: HCNC,CPTII,S$GLB, | Performed by: INTERNAL MEDICINE

## 2019-01-29 PROCEDURE — 99214 PR OFFICE/OUTPT VISIT, EST, LEVL IV, 30-39 MIN: ICD-10-PCS | Mod: HCNC,S$GLB,, | Performed by: INTERNAL MEDICINE

## 2019-01-29 PROCEDURE — 3077F PR MOST RECENT SYSTOLIC BLOOD PRESSURE >= 140 MM HG: ICD-10-PCS | Mod: HCNC,CPTII,S$GLB, | Performed by: INTERNAL MEDICINE

## 2019-01-29 PROCEDURE — 3077F SYST BP >= 140 MM HG: CPT | Mod: HCNC,CPTII,S$GLB, | Performed by: INTERNAL MEDICINE

## 2019-01-29 PROCEDURE — 99999 PR PBB SHADOW E&M-EST. PATIENT-LVL III: CPT | Mod: PBBFAC,HCNC,, | Performed by: INTERNAL MEDICINE

## 2019-01-29 PROCEDURE — 99999 PR PBB SHADOW E&M-EST. PATIENT-LVL III: ICD-10-PCS | Mod: PBBFAC,HCNC,, | Performed by: INTERNAL MEDICINE

## 2019-01-29 PROCEDURE — 3078F PR MOST RECENT DIASTOLIC BLOOD PRESSURE < 80 MM HG: ICD-10-PCS | Mod: HCNC,CPTII,S$GLB, | Performed by: INTERNAL MEDICINE

## 2019-01-29 PROCEDURE — 99214 OFFICE O/P EST MOD 30 MIN: CPT | Mod: HCNC,S$GLB,, | Performed by: INTERNAL MEDICINE

## 2019-01-29 RX ORDER — LOSARTAN POTASSIUM 25 MG/1
25 TABLET ORAL DAILY
Qty: 30 TABLET | Refills: 1 | Status: SHIPPED | OUTPATIENT
Start: 2019-01-29 | End: 2019-03-12

## 2019-01-29 NOTE — PROGRESS NOTES
"Subjective:   Patient ID:  Eve Long is a 68 y.o. female who presents for cardiac consult of Hypertension (discuss meds); Hyperlipidemia; and Coronary Artery Disease      HPI  The patient came in today for cardiac consult of Hypertension (discuss meds); Hyperlipidemia; and Coronary Artery Disease    Referring provider: Vianey Marsh NP  Reason for consult: TIA, CV workup    This is a 68 year old female pt with CAD s/p CABG x 5 11/2018, TIA x 2, HTN, HLD, COPD, pulm sarcoid presents for follow up CV evaluation.     8/16/18  68-year old patient with a histoy of hypertension and significant family history of CVA who woke up yesterday morning feeling foggy with poor vision in the right eye and left forearm weakness about 7am. She states she went back to sleep after the symptoms persisted for about 20 minutes and woke up about 8:30am by which time the symptoms of visual fogginess and left forearm weakness had resolved. Patient states that four months ago she had symptoms related to extremely high blood pressure with an occipital headache and vomiting and [place] [facility] [place] to be 230/100+. She had a cardiac evaluation at the time that was negative.     She presented to ED for episode she had after waking up in am with right eye pain, imbalance, and left arm "feeling funny"-no obvious weakness. Symptoms lasted approximately 20 minutes and resolved. Pt reports during episode, she checked her BP and noted . Pt notified PCP of symptoms and was told to go to ED. Pt arrived to ED at approx 8pm. Symptoms were resolved. CT head showed nothing acute. BP in the /81. The pt was placed in observation on ASA. Differential diagnosis was TIA, Near syncope, Hypertensive encephalopathy, or migraine symptoms. CTA head and neck showed  bilateral common carotid artery calcified plaque, more extensive on the right than left-no significant stenosis. ICA calcified plaque.  No intracranial vascular occlusion or large " vessel occlusion.Pt restarted on amlodipine. BP improved. Lipid panel done, however, pt reports she can not take Statins due to allergy. Instructed pt to follow up with Neurology. She also needs to follow up with Pulmonology for outpatient sleep study.Pt had a 2D ECHO which revealed normal Bi V function. She will need a Holter/Zio patch to r/o Afib.   This is the second time she had TIA episodes. Bp at times 99/55 but now mildly elevated, overall -140/79-80.     10/18/18  PT had event monitor which was neg for arrhythmias, no AFib detected. Added Zetia after last visit - could not tolerate it due to muscle spasm in neck. Today Bp was 120s/70 around 11 AM. In evenings a bit higher 142/81. Occ BP has been low 104/71. Has been doing ok with low salt intake. Has intermittent chest pain, dull pain, once a month, pain is tolerable. Discussed stress test if symptoms worse.     1/29/19  She was admitted in 11/2018 [facility]- with NSTEMI. LHC subsequently revealed multivessel disease and patient underwent CABG x 5. She had mild chest pain for a few days before admit and went in after about 3 days of intermittent pain. Her medications were optimized and she was subsequently discharged. She had postop AFib, was started on Amio and Eliquis for A/C. Pt cannot sleep. [place] like she was hyperventilating last night. Pt has cough, wants to stop Lisinopril. BP elevated at home as well.     Patient feels no sob, no leg swelling, no PND, no palpitation.     Patient has fairly good exercise tolerance.    Patient is compliant with medications.    Event monitor 10/09/2018   CONCLUSIONS   30 DAY EVENT MONITOR 9/4/18 - 10/4/18:    Baseline:  NSR, PVCs.    No activations noted during study.      2D ECHO   CONCLUSIONS     1 - Concentric hypertrophy.     2 - Wall motion abnormalities.     3 - Mildly to moderately depressed left ventricular systolic function (EF 40-45%).     4 - Impaired LV relaxation, normal LAP (grade 1 diastolic  dysfunction).     5 - Normal right ventricular systolic function .       This document has been electronically    SIGNED BY: Judy Rodríguez MD On: 11/14/2018 18:02      Past Medical History:   Diagnosis Date    Anemia     Asthma     Basal cell carcinoma     Chronic combined systolic and diastolic congestive heart failure 1/29/2019    COPD (chronic obstructive pulmonary disease)     Coronary artery disease involving native coronary artery of native heart with unstable angina pectoris 11/14/2018    Hyperlipidemia     Hypertension     Immune deficiency disorder     NSTEMI (non-ST elevated myocardial infarction) 11/13/2018    PAF (paroxysmal atrial fibrillation) 1/29/2019    Pneumonia     Sarcoidosis     TIA (transient ischemic attack)        Past Surgical History:   Procedure Laterality Date    CATHETERIZATION, HEART, LEFT Left 11/14/2018    Per[facility] at [facility] CATH LAB    CHOLECYSTECTOMY      CORONARY ARTERY BYPASS GRAFT      CORONARY ARTERY BYPASS GRAFT (CABG) N/A 11/15/2018    Per[facility] by [facility] Dorsey [facility] at [facility] OR    COSMETIC SURGERY      blepharplasty    ECHOCARDIOGRAM,TRANSESOPHAGEAL N/A 11/15/2018    [facility] at [facility] OR    FRACTURE SURGERY Left     wrist ORIF    HYSTERECTOMY      LUNG BIOPSY      OOPHORECTOMY      SKIN BIOPSY      SURGICAL PROCUREMENT, VEIN, ENDOSCOPIC Left 11/15/2018    [facility] at [facility] OR    TONSILLECTOMY         Social History     Tobacco Use    Smoking status: Never Smoker    Smokeless tobacco: Never Used   Substance Use Topics    Alcohol use: Yes     Alcohol/week: 0.6 oz     Types: 1 Glasses of wine per week     Comment: yearly    Drug use: No       Family History   Problem Relation Age of Onset    Myasthenia gravis Brother     Stroke Mother     Heart disease Father         CHF    Stroke Sister     Thyroid disease Sister           Medication List           Accurate as of 1/29/19 10:29  AM. If you have any questions, ask your nurse or doctor.               START taking these medications    evolocumab 140 mg/mL Pnij  Commonly known as:  REPATHA SURECLICK  Inject 1 mL (140 mg total) into the skin every 14 (fourteen) days.  Started by:  Lebron Jorge Md, MD     losartan 25 MG tablet  Commonly known as:  COZAAR  Take 1 tablet (25 mg total) by mouth once daily.  Started by:  Lebron Jorge Md, MD        CONTINUE taking these medications    * albuterol 2.5 mg /3 mL (0.083 %) nebulizer solution  Commonly known as:  PROVENTIL     * albuterol 90 mcg/actuation inhaler  Commonly known as:  PROVENTIL/VENTOLIN HFA     apixaban 5 mg Tab  Commonly known as:  ELIQUIS  Take 1 tablet (5 mg total) by mouth 2 (two) times daily.     aspirin 81 MG EC tablet  Commonly known as:  ECOTRIN  Take 1 tablet (81 mg total) by mouth once daily.     cyanocobalamin 1,000 mcg/mL injection  Inject 1 mL (1,000 mcg total) into the skin every 28 days.     ferrous sulfate 325 (65 FE) MG EC tablet  Take 1 tablet (325 mg total) by mouth once daily.     metoprolol tartrate 50 MG tablet  Commonly known as:  LOPRESSOR  Take 1 tablet (50 mg total) by mouth 2 (two) times daily.     multivitamin tablet  Commonly known as:  THERAGRAN  Take 1 tablet by mouth once daily.     promethazine 12.5 MG Tab  Commonly known as:  PHENERGAN  Take 1 tablet (12.5 mg total) by mouth every 6 (six) hours as needed.     thiamine 100 MG tablet  Take 1 tablet (100 mg total) by mouth once daily.         * This list has 2 medication(s) that are the same as other medications prescribed for you. Read the directions carefully, and ask your doctor or other care provider to review them with you.            STOP taking these medications    amiodarone 200 MG Tab  Commonly known as:  PACERONE  Stopped by:  Lebron Jorge Md, MD     folic acid-vit B6-vit B12 2.5-25-2 mg 2.5-25-2 mg Tab  Commonly known as:  FOLBIC or Equiv  Stopped by:  Lebron Jorge Md, MD     furosemide 40 MG  "tablet  Commonly known as:  LASIX  Stopped by:  Lebron Jorge Md, MD     lisinopril 2.5 MG tablet  Commonly known as:  PRINIVIL,ZESTRIL  Stopped by:  Lebron Jorge Md, MD           Where to Get Your Medications      These medications were sent to [facility]  [address] [facility] [place] [address]    Hours:  Mon-Fri, 7a-7p Phone:  [phone]   · evolocumab 140 mg/mL Pnij     These medications were sent to [facility] [address] [facility] [address] [place]  [address] [place] [address]    Phone:  [phone]   · apixaban 5 mg Tab  · losartan 25 MG tablet         Review of Systems   Constitutional: Negative for malaise/fatigue.   HENT: Negative.    Eyes: Negative.    Respiratory: Negative.  Negative for shortness of breath.    Cardiovascular: Negative.  Negative for chest pain and palpitations.   Gastrointestinal: Negative.    Genitourinary: Negative.    Musculoskeletal: Negative.    Skin: Negative.    Neurological: Positive for weakness. Negative for tingling, sensory change and focal weakness.   Endo/Heme/Allergies: Negative.    Psychiatric/Behavioral: Negative.    All 12 systems otherwise negative.      Wt Readings from Last 3 Encounters:   01/29/19 74.2 kg (163 lb 9.3 oz)   12/26/18 74.5 kg (164 lb 3.9 oz)   12/14/18 73.4 kg (161 lb 13.1 oz)     Temp Readings from Last 3 Encounters:   12/26/18 97.9 °F (36.6 °C) (Oral)   11/26/18 98.1 °F (36.7 °C) (Tympanic)   11/20/18 98.3 °F (36.8 °C) (Oral)     BP Readings from Last 3 Encounters:   01/29/19 (!) 146/76   12/26/18 124/66   12/14/18 122/70     Pulse Readings from Last 3 Encounters:   01/29/19 (!) 56   12/26/18 (!) 59   12/14/18 72       BP (!) 146/76 (BP Method: Large (Manual))   Pulse (!) 56   Ht 5' 3" (1.6 m)   Wt 74.2 kg (163 lb 9.3 oz)   BMI 28.98 kg/m²     Objective:   Physical Exam   Constitutional: She is oriented to person, place, and time. She appears well-developed and well-nourished. No distress.   HENT:   Head: " Normocephalic and atraumatic.   Nose: Nose normal.   Mouth/Throat: Oropharynx is clear and moist.   Eyes: Conjunctivae and EOM are normal. No scleral icterus.   Neck: Normal range of motion. Neck supple. No JVD present. No thyromegaly present.   Cardiovascular: Normal rate, regular rhythm, S1 normal and S2 normal. Exam reveals no gallop, no S3, no S4 and no friction rub.   No murmur heard.  Pulmonary/Chest: Effort normal and breath sounds normal. No stridor. No respiratory distress. She has no wheezes. She has no rales. She exhibits no tenderness.   Abdominal: Soft. Bowel sounds are normal. She exhibits no distension and no mass. There is no tenderness. There is no rebound.   Genitourinary:   Genitourinary Comments: Deferred   Musculoskeletal: Normal range of motion. She exhibits no edema, tenderness or deformity.   Lymphadenopathy:     She has no cervical adenopathy.   Neurological: She is alert and oriented to person, place, and time. She exhibits normal muscle tone. Coordination normal.   Skin: Skin is warm and dry. No rash noted. She is not diaphoretic. No erythema. No pallor.   Psychiatric: She has a normal mood and affect. Her behavior is normal. Judgment and thought content normal.   Nursing note and vitals reviewed.      Lab Results   Component Value Date     12/03/2018    K 4.9 12/03/2018     12/03/2018    CO2 24 12/03/2018    BUN 16 12/03/2018    CREATININE 0.8 12/03/2018    GLU 91 12/03/2018    HGBA1C 5.5 11/13/2018    MG 1.9 11/20/2018    AST 21 11/14/2018    ALT 15 11/14/2018    ALBUMIN 3.4 (L) 11/14/2018    PROT 6.3 11/14/2018    BILITOT 0.3 11/14/2018    WBC 13.21 (H) 11/20/2018    HGB 9.3 (L) 11/20/2018    HCT 28.6 (L) 11/20/2018    HCT 22 (L) 11/15/2018    MCV 86 11/20/2018     11/20/2018    INR 1.1 11/16/2018    TSH 3.340 11/14/2018    CHOL 202 (H) 10/09/2018    HDL 62 10/09/2018    HDL 57 04/19/2017    LDLCALC 112.8 10/09/2018    TRIG 136 10/09/2018    TRIG 190 (H) 04/19/2017      (H) 11/26/2018     Assessment:      1. Coronary artery disease of native artery of native heart with stable angina pectoris    2. S/P CABG x 5    3. Essential hypertension    4. Mixed hyperlipidemia    5. Pulmonary sarcoidosis    6. Acquired hypothyroidism    7. PAF (paroxysmal atrial fibrillation)    8. Chronic combined systolic and diastolic congestive heart failure        Plan:   1. CAD s/p CABG x 5 11/2018 with EF 40-45%  - cont asa, statin, bb  - change ACE-I to Losartan  - euvolemic, stopped lasix    2. TIA x 2  - Event monitor x 30 days - negative  - cont asa and Eliquis    3. HTN   - cont meds    4. HLD  - low aga diet  - cannot tolerate statin due to myopathy  - cant tolerate Zetia  - ordered Repatha    5. COPD/Sarcoid  - cont meds per PCP/Pulm    6. Hypothyroidism  - cont synthroid     7. PAF (post op AFib)  - cont Eliquis, amio DC'd  - repeat Zio patch     Thank you for allowing me to participate in this patient's care. Please do not hesitate to contact me with any questions or concerns. Consult note has been forwarded to the referral physician.

## 2019-01-30 ENCOUNTER — TELEPHONE (OUTPATIENT)
Dept: PHARMACY | Facility: CLINIC | Age: 69
End: 2019-01-30

## 2019-01-31 ENCOUNTER — PATIENT MESSAGE (OUTPATIENT)
Dept: CARDIOLOGY | Facility: CLINIC | Age: 69
End: 2019-01-31

## 2019-02-02 ENCOUNTER — PATIENT MESSAGE (OUTPATIENT)
Dept: CARDIOLOGY | Facility: CLINIC | Age: 69
End: 2019-02-02

## 2019-02-04 RX ORDER — NITROGLYCERIN 0.4 MG/1
0.4 TABLET SUBLINGUAL EVERY 5 MIN PRN
Qty: 30 TABLET | Refills: 0 | Status: SHIPPED | OUTPATIENT
Start: 2019-02-04 | End: 2020-07-01

## 2019-02-07 DIAGNOSIS — I48.0 PAF (PAROXYSMAL ATRIAL FIBRILLATION): Primary | ICD-10-CM

## 2019-02-19 NOTE — TELEPHONE ENCOUNTER
Repatha is approved by the patient's insurance with a high co pay. We will be assisting the patient in applying to the  for assistance. Sending Dr Lebron Jorge a staff message regarding approval and faxing assistance application for his review and signature.

## 2019-03-11 ENCOUNTER — CLINICAL SUPPORT (OUTPATIENT)
Dept: CARDIOLOGY | Facility: CLINIC | Age: 69
End: 2019-03-11
Attending: INTERNAL MEDICINE
Payer: MEDICARE

## 2019-03-11 DIAGNOSIS — I48.0 PAF (PAROXYSMAL ATRIAL FIBRILLATION): ICD-10-CM

## 2019-03-11 PROCEDURE — 93224 XTRNL ECG REC UP TO 48 HRS: CPT | Mod: S$GLB,,, | Performed by: INTERNAL MEDICINE

## 2019-03-11 PROCEDURE — 93224 HOLTER MONITOR - 48 HOUR: ICD-10-PCS | Mod: S$GLB,,, | Performed by: INTERNAL MEDICINE

## 2019-03-12 ENCOUNTER — PATIENT MESSAGE (OUTPATIENT)
Dept: CARDIOLOGY | Facility: CLINIC | Age: 69
End: 2019-03-12

## 2019-03-12 RX ORDER — CANDESARTAN 8 MG/1
8 TABLET ORAL DAILY
Qty: 30 TABLET | Refills: 3 | Status: SHIPPED | OUTPATIENT
Start: 2019-03-12 | End: 2019-03-13

## 2019-03-13 RX ORDER — LOSARTAN POTASSIUM 25 MG/1
25 TABLET ORAL DAILY
Qty: 90 TABLET | Refills: 3 | OUTPATIENT
Start: 2019-03-13 | End: 2020-03-12

## 2019-03-13 RX ORDER — LOSARTAN POTASSIUM 25 MG/1
25 TABLET ORAL DAILY
Qty: 90 TABLET | Refills: 3 | Status: SHIPPED | OUTPATIENT
Start: 2019-03-13 | End: 2019-04-04 | Stop reason: SDUPTHER

## 2019-03-21 ENCOUNTER — PATIENT MESSAGE (OUTPATIENT)
Dept: CARDIOLOGY | Facility: CLINIC | Age: 69
End: 2019-03-21

## 2019-03-27 ENCOUNTER — PATIENT MESSAGE (OUTPATIENT)
Dept: CARDIOLOGY | Facility: CLINIC | Age: 69
End: 2019-03-27

## 2019-03-27 NOTE — TELEPHONE ENCOUNTER
FOR DOCUMENTATION ONLY: Financial Assistance for Repatha is approved from 3/26/19 to 12/31/19  Source [id number] .Sending a staff message to Dr Lebron Jorge regarding assistance approval

## 2019-03-28 ENCOUNTER — OFFICE VISIT (OUTPATIENT)
Dept: CARDIOLOGY | Facility: CLINIC | Age: 69
End: 2019-03-28
Payer: MEDICARE

## 2019-03-28 VITALS — HEIGHT: 63 IN | BODY MASS INDEX: 29.49 KG/M2 | WEIGHT: 166.44 LBS | HEART RATE: 53 BPM

## 2019-03-28 DIAGNOSIS — I48.0 PAF (PAROXYSMAL ATRIAL FIBRILLATION): ICD-10-CM

## 2019-03-28 DIAGNOSIS — I50.42 CHRONIC COMBINED SYSTOLIC AND DIASTOLIC CONGESTIVE HEART FAILURE: Primary | ICD-10-CM

## 2019-03-28 DIAGNOSIS — I10 ESSENTIAL HYPERTENSION: ICD-10-CM

## 2019-03-28 DIAGNOSIS — E78.2 MIXED HYPERLIPIDEMIA: ICD-10-CM

## 2019-03-28 DIAGNOSIS — Z95.1 S/P CABG X 5: ICD-10-CM

## 2019-03-28 DIAGNOSIS — I25.118 CORONARY ARTERY DISEASE OF NATIVE ARTERY OF NATIVE HEART WITH STABLE ANGINA PECTORIS: ICD-10-CM

## 2019-03-28 PROCEDURE — 99999 PR PBB SHADOW E&M-EST. PATIENT-LVL III: CPT | Mod: PBBFAC,HCNC,, | Performed by: INTERNAL MEDICINE

## 2019-03-28 PROCEDURE — 99214 PR OFFICE/OUTPT VISIT, EST, LEVL IV, 30-39 MIN: ICD-10-PCS | Mod: HCNC,S$GLB,, | Performed by: INTERNAL MEDICINE

## 2019-03-28 PROCEDURE — 99999 PR PBB SHADOW E&M-EST. PATIENT-LVL III: ICD-10-PCS | Mod: PBBFAC,HCNC,, | Performed by: INTERNAL MEDICINE

## 2019-03-28 PROCEDURE — 1101F PT FALLS ASSESS-DOCD LE1/YR: CPT | Mod: HCNC,CPTII,S$GLB, | Performed by: INTERNAL MEDICINE

## 2019-03-28 PROCEDURE — 1101F PR PT FALLS ASSESS DOC 0-1 FALLS W/OUT INJ PAST YR: ICD-10-PCS | Mod: HCNC,CPTII,S$GLB, | Performed by: INTERNAL MEDICINE

## 2019-03-28 PROCEDURE — 99214 OFFICE O/P EST MOD 30 MIN: CPT | Mod: HCNC,S$GLB,, | Performed by: INTERNAL MEDICINE

## 2019-03-28 RX ORDER — FUROSEMIDE 20 MG/1
20 TABLET ORAL DAILY
Qty: 90 TABLET | Refills: 1 | Status: SHIPPED | OUTPATIENT
Start: 2019-03-28 | End: 2019-04-30

## 2019-03-28 NOTE — PROGRESS NOTES
"Subjective:   Patient ID:  Eve Long is a 68 y.o. female who presents for cardiac consult of Carotid Artery Disease      HPI  The patient came in today for cardiac consult of Carotid Artery Disease    Referring provider: Vianey Marsh NP  Reason for initial consult: TIA, CV workup    Eve Long is a 68 y.o. female pt with CAD s/p CABG x 5 11/2018, TIA x 2, HTN, HLD, COPD, pulm sarcoid presents for follow up CV evaluation.     8/16/18  68-year old patient with a histoy of hypertension and significant family history of CVA who woke up yesterday morning feeling foggy with poor vision in the right eye and left forearm weakness about 7am. She states she went back to sleep after the symptoms persisted for about 20 minutes and woke up about 8:30am by which time the symptoms of visual fogginess and left forearm weakness had resolved. Patient states that four months ago she had symptoms related to extremely high blood pressure with an occipital headache and vomiting and [place] 230/100+. She had a cardiac evaluation at the time that was negative.     She presented to ED for episode she had after waking up in am with right eye pain, imbalance, and left arm "feeling funny"-no obvious weakness. Symptoms lasted approximately 20 minutes and resolved. Pt reports during episode, she checked her BP and noted . Pt notified PCP of symptoms and was told to go to ED. Pt arrived to ED at approx 8pm. Symptoms were resolved. CT head showed nothing acute. BP in the /81. The pt was placed in observation on ASA. Differential diagnosis was TIA, Near syncope, Hypertensive encephalopathy, or migraine symptoms. CTA head and neck showed  bilateral common carotid artery calcified plaque, more extensive on the right than left-no significant stenosis. ICA calcified plaque.  No intracranial vascular occlusion or large vessel occlusion.Pt restarted on amlodipine. BP improved. Lipid panel done, " however, pt reports she can not take Statins due to allergy. Instructed pt to follow up with Neurology. She also needs to follow up with Pulmonology for outpatient sleep study.Pt had a 2D ECHO which revealed normal Bi V function. She will need a Holter/Zio patch to r/o Afib.   This is the second time she had TIA episodes. Bp at times 99/55 but now mildly elevated, overall -140/79-80.     10/18/18  PT had event monitor which was neg for arrhythmias, no AFib detected. Added Zetia after last visit - could not tolerate it due to muscle spasm in neck. Today Bp was 120s/70 around 11 AM. In evenings a bit higher 142/81. Occ BP has been low 104/71. Has been doing ok with low salt intake. Has intermittent chest pain, dull pain, once a month, pain is tolerable. Discussed stress test if symptoms worse.     1/29/19  [place] [facility] [place]. LHC subsequently revealed multivessel disease and patient underwent CABG x 5. She had mild chest pain for a few days before admit and went in after about 3 days of intermittent pain. Her medications were optimized and she was subsequently discharged. She had postop AFib, was started on Amio and Eliquis for A/C. Pt cannot sleep. [place] like she was hyperventilating last night. Pt has cough, wants to stop Lisinopril. BP elevated at home as well.     3/28/19  She has been having more fluid retention issues lately. Last visit stopped lasix due to lower BP last visit. Was taking 40mg lasix daily. Discussed will restart 20mg. Has more bruising with Eliquis but no bleeding. No CP. Occ low BP at home but overall well controlled.     Patient feels no sob, no leg swelling, no PND, no palpitation.     Patient has fairly good exercise tolerance.    Patient is compliant with medications.    Event monitor 10/09/2018   CONCLUSIONS   30 DAY EVENT MONITOR 9/4/18 - 10/4/18:    Baseline:  NSR, PVCs.    No activations noted during study.      2D ECHO 11/14/2018  CONCLUSIONS     1 -  Concentric hypertrophy.     2 - Wall motion abnormalities.     3 - Mildly to moderately depressed left ventricular systolic function (EF 40-45%).     4 - Impaired LV relaxation, normal LAP (grade 1 diastolic dysfunction).     5 - Normal right ventricular systolic function .           Past Medical History:   Diagnosis Date    Anemia     Asthma     Basal cell carcinoma     Chronic combined systolic and diastolic congestive heart failure 1/29/2019    COPD (chronic obstructive pulmonary disease)     Coronary artery disease involving native coronary artery of native heart with unstable angina pectoris 11/14/2018    Hyperlipidemia     Hypertension     Immune deficiency disorder     NSTEMI (non-ST elevated myocardial infarction) 11/13/2018    PAF (paroxysmal atrial fibrillation) 1/29/2019    Pneumonia     Sarcoidosis     TIA (transient ischemic attack)        Past Surgical History:   Procedure Laterality Date    CATHETERIZATION, HEART, LEFT Left 11/14/2018    Performed by Judy Rod[facility]guez [facility] at [facility] CATH LAB    CHOLECYSTECTOMY      CORONARY ARTERY BYPASS GRAFT      CORONARY ARTERY BYPASS GRAFT (CABG) N/A 11/15/2018    Performed by [facility]ardo Dorsey [facility] at [facility] OR    COSMETIC SURGERY      blepharplasty    ECHOCARDIOGRAM,TRANSESOPHAGEAL N/A 11/15/2018    Performed by [facility] at [facility] OR    FRACTURE SURGERY Left     wrist ORIF    HYSTERECTOMY      LUNG BIOPSY      OOPHORECTOMY      SKIN BIOPSY      SURGICAL PROCUREMENT, VEIN, ENDOSCOPIC Left 11/15/2018    Per[facility] at [facility] OR    TONSILLECTOMY         Social History     Tobacco Use    Smoking status: Never Smoker    Smokeless tobacco: Never Used   Substance Use Topics    Alcohol use: Yes     Alcohol/week: 0.6 oz     Types: 1 Glasses of wine per week     Comment: yearly    Drug use: No       Family History   Problem Relation Age of Onset    Myasthenia gravis Brother     Stroke Mother     Heart disease Father          CHF    Stroke Sister     Thyroid disease Sister        Patient's Medications   New Prescriptions    FUROSEMIDE (LASIX) 20 MG TABLET    Take 1 tablet (20 mg total) by mouth once daily. For leg swelling, hold if BP low   Previous Medications    ALBUTEROL (PROVENTIL) 2.5 MG /3 ML (0.083 %) NEBULIZER SOLUTION    2.5 mg daily as needed. Every 6 hours inhale 2 puffs as needed for  wheezing    ALBUTEROL 90 MCG/ACTUATION INHALER    Inhale 2 puffs into the lungs every 6 (six) hours as needed.     APIXABAN (ELIQUIS) 5 MG TAB    Take 1 tablet (5 mg total) by mouth 2 (two) times daily.    ASPIRIN (ECOTRIN) 81 MG EC TABLET    Take 1 tablet (81 mg total) by mouth once daily.    CYANOCOBALAMIN 1,000 MCG/ML INJECTION    Inject 1 mL (1,000 mcg total) into the skin every 28 days.    EVOLOCUMAB (REPATHA SURECLICK) 140 MG/ML PNIJ    Inject 1 mL (140 mg total) into the skin every 14 (fourteen) days.    LOSARTAN (COZAAR) 25 MG TABLET    Take 1 tablet (25 mg total) by mouth once daily.    METOPROLOL TARTRATE (LOPRESSOR) 50 MG TABLET    Take 1 tablet (50 mg total) by mouth 2 (two) times daily.    MULTIVITAMIN (THERAGRAN) TABLET    Take 1 tablet by mouth once daily.    NITROGLYCERIN (NITROSTAT) 0.4 MG SL TABLET    Place 1 tablet (0.4 mg total) under the tongue every 5 (five) minutes as needed.    PROMETHAZINE (PHENERGAN) 12.5 MG TAB    Take 1 tablet (12.5 mg total) by mouth every 6 (six) hours as needed.    THIAMINE 100 MG TABLET    Take 1 tablet (100 mg total) by mouth once daily.   Modified Medications    No medications on file   Discontinued Medications    No medications on file       Review of Systems   Constitutional: Negative for malaise/fatigue.   HENT: Negative.    Eyes: Negative.    Respiratory: Negative.  Negative for shortness of breath.    Cardiovascular: Negative.  Negative for chest pain and palpitations.   Gastrointestinal: Negative.    Genitourinary: Negative.    Musculoskeletal: Negative.    Skin: Negative.   "  Neurological: Positive for weakness. Negative for tingling, sensory change and focal weakness.   Endo/Heme/Allergies: Negative.    Psychiatric/Behavioral: Negative.    All 12 systems otherwise negative.      Wt Readings from Last 3 Encounters:   03/28/19 75.5 kg (166 lb 7.2 oz)   01/29/19 74.2 kg (163 lb 9.3 oz)   12/26/18 74.5 kg (164 lb 3.9 oz)     Temp Readings from Last 3 Encounters:   12/26/18 97.9 °F (36.6 °C) (Oral)   11/26/18 98.1 °F (36.7 °C) (Tympanic)   11/20/18 98.3 °F (36.8 °C) (Oral)     BP Readings from Last 3 Encounters:   01/29/19 (!) 146/76   12/26/18 124/66   12/14/18 122/70     Pulse Readings from Last 3 Encounters:   03/28/19 (!) 53   01/29/19 (!) 56   12/26/18 (!) 59       Pulse (!) 53   Ht 5' 3" (1.6 m)   Wt 75.5 kg (166 lb 7.2 oz)   BMI 29.48 kg/m²  /78 and 132/72    Objective:   Physical Exam   Constitutional: She is oriented to person, place, and time. She appears well-developed and well-nourished. No distress.   HENT:   Head: Normocephalic and atraumatic.   Nose: Nose normal.   Mouth/Throat: Oropharynx is clear and moist.   Eyes: Conjunctivae and EOM are normal. No scleral icterus.   Neck: Normal range of motion. Neck supple. No JVD present. No thyromegaly present.   Cardiovascular: Normal rate, regular rhythm, S1 normal and S2 normal. Exam reveals no gallop, no S3, no S4 and no friction rub.   No murmur heard.  Pulmonary/Chest: Effort normal and breath sounds normal. No stridor. No respiratory distress. She has no wheezes. She has no rales. She exhibits no tenderness.   Abdominal: Soft. Bowel sounds are normal. She exhibits no distension and no mass. There is no tenderness. There is no rebound.   Genitourinary:   Genitourinary Comments: Deferred   Musculoskeletal: Normal range of motion. She exhibits no edema, tenderness or deformity.   Lymphadenopathy:     She has no cervical adenopathy.   Neurological: She is alert and oriented to person, place, and time. She exhibits normal " muscle tone. Coordination normal.   Skin: Skin is warm and dry. No rash noted. She is not diaphoretic. No erythema. No pallor.   Psychiatric: She has a normal mood and affect. Her behavior is normal. Judgment and thought content normal.   Nursing note and vitals reviewed.      Lab Results   Component Value Date     12/03/2018    K 4.9 12/03/2018     12/03/2018    CO2 24 12/03/2018    BUN 16 12/03/2018    CREATININE 0.8 12/03/2018    GLU 91 12/03/2018    HGBA1C 5.5 11/13/2018    MG 1.9 11/20/2018    AST 21 11/14/2018    ALT 15 11/14/2018    ALBUMIN 3.4 (L) 11/14/2018    PROT 6.3 11/14/2018    BILITOT 0.3 11/14/2018    WBC 13.21 (H) 11/20/2018    HGB 9.3 (L) 11/20/2018    HCT 28.6 (L) 11/20/2018    HCT 22 (L) 11/15/2018    MCV 86 11/20/2018     11/20/2018    INR 1.1 11/16/2018    TSH 3.340 11/14/2018    CHOL 202 (H) 10/09/2018    HDL 62 10/09/2018    HDL 57 04/19/2017    LDLCALC 112.8 10/09/2018    TRIG 136 10/09/2018    TRIG 190 (H) 04/19/2017     (H) 11/26/2018     Assessment:      1. Chronic combined systolic and diastolic congestive heart failure    2. PAF (paroxysmal atrial fibrillation)    3. Coronary artery disease of native artery of native heart with stable angina pectoris    4. S/P CABG x 5    5. Essential hypertension    6. Mixed hyperlipidemia        Plan:   1. CAD s/p CABG x 5 11/2018 with EF 40-45%  - cont asa, statin, bb  -restart lasix 20mg daily    2. TIA x 2  - Event monitor x 30 days - negative  - cont asa and Eliquis    3. HTN   - cont meds    4. HLD  - low aga diet  - cannot tolerate statin due to myopathy  - cont Repatha    5. COPD/Sarcoid  - cont meds per PCP/Pulm    6. Hypothyroidism  - cont synthroid     7. PAF (post op AFib)  - cont Eliquis and BB    Thank you for allowing me to participate in this patient's care. Please do not hesitate to contact me with any questions or concerns. Consult note has been forwarded to the referral physician.

## 2019-03-28 NOTE — PATIENT INSTRUCTIONS
Left-Sided Congestive Heart Failure (CHF)    The heart is a large muscle that pumps blood throughout the body. Blood carries oxygen to all the organs (including the brain), muscles, and skin of your body. After the body takes the oxygen out of the blood, the blood returns to the heart. The right side of the heart collects that blood and pumps it to the lungs to receive fresh oxygen. This oxygen-rich blood from the lungs then returns to the left side of the heart, where it is pumped back out to the brain and rest of the body, starting the process all over.   Heart failure occurs when the heart muscle is weakened. This can occur after a heart attack or with significant coronary artery disease. This affects the pumping action of the heart.   When the left side of the heart is weakened, it cant handle the blood it is getting from the lungs. Pressure then builds up in the veins of the lungs, causing fluid to leak into the lung tissues. This may be referred to as congestive heart failure. This causes you to feel short of breath, weak, or dizzy. These symptoms are often worse with exertion, such as climbing stairs or walking up hills. Lying flat is uncomfortable and can make your breathing worse. This may make sleeping difficult and force you to use extra pillows to elevate your upper body to help you sleep well.  Causes of heart failure include:  · Coronary artery disease  · Heart attack in the past (also known as acute myocardial infarction, or AMI)  · High blood pressure  · Damaged heart valve  · Diabetes  · Obesity  · Cigarette smoking  · Alcohol abuse  Treatment  Heart failure is a chronic condition. There is no cure. The purpose of medical treatment is to improve the pumping action of the heart, and remove excess water and fluids from the body. A number of medicines can help reach this goal, improve symptoms and keep the heart from becoming weaker. In some cases of severe heart failure, a mechanical device will be  placed in the heart to help the heart pump. Another major goal is to better treat the causes of heart failure, such as diabetes, high blood pressure, and your lifestyle.  Home care  · Check your weight every day. A sudden increase in weight gain could mean worsening heart failure.  ¨ Use the same scale every day.  ¨ Weigh yourself at the same time every day.  ¨ Make sure the scale is on the floor, not on a rug.  ¨ Keep a record of your weight every day, so your healthcare provider can see it. If you are not given a log sheet for this, keep a separate journal for this purpose.   · Cut back on how much salt (sodium) you eat:  ¨ Your provider will tell you how much salt to have daily, usually 2,000 mg or less.  ¨ Avoid high-salt foods. These include olives, pickles, smoked meats, processed foods, and salted potato chips.  ¨ Don't add salt to your food at the table. Use only small amounts of salt when cooking.  ¨ Avoid binging on salt-heavy meals.  · Follow your healthcare provider's recommendations about how much fluid you should have.  · Stop smoking.  · Cut back on the amount of alcohol you drink.  · Lose weight if you are overweight. The excess weight adds a lot of stress on the workload of the heart.  · Stay active. Talk to your provider about an exercise program that is safe for your heart.  · Keep your feet elevated to reduce swelling. Ask your provider about support hose as a preventive treatment for daytime leg swelling.  · Follow your healthcare provider's instructions closely.  Besides taking your medicine as instructed, an important part of treatment includes lifestyle changes. These include diet, physical activity, stopping smoking, and weight control.  Improve your diet. Often in the hospital, people are given a heart healthy diet. This includes more fresh foods, lower saturated fat, less processed foods, and lower salt.  Follow-up care  Follow up with your healthcare provider, or as advised. Make sure to  keep any appointments that were made for you. This can help better control heart failure.  If an X-ray was done, you will be told of any new findings that may affect your care.  Call 911  Call 911 if you:  · Become severely short of breath  · Feel lightheaded, or feel like you might pass out or faint  · Have chest pain or discomfort that is different than usual, the medicines your provider told you to use for this don't help, or the pain lasts longer than 10 to 15 minutes  · Develop a rapid heart rate suddenly  When to seek medical advice  Call your healthcare provider right away if you have any of these signs of worsening heart failure:  · Sudden weight gain --  more than 2 pounds in 1 day, or 5 pounds in 1 week, or whatever weight gain you were told to report by your provider  · Trouble breathing not related to being active  · New or increased swelling of your legs or ankles  · Swelling or pain in your abdomen  · Breathing trouble at night, waking up short of breath or needing more pillows to elevate your upper body to help you breathe  · Frequent coughing that doesnt go away  · Feeling much more tired than usual  Date Last Reviewed: 1/4/2016  © 4613-2579 [employer] [address] [place] [address], [place] [address]. All rights reserved. This information is not intended as a substitute for professional medical care. Always follow your healthcare professional's instructions.

## 2019-04-04 ENCOUNTER — PATIENT MESSAGE (OUTPATIENT)
Dept: CARDIOLOGY | Facility: CLINIC | Age: 69
End: 2019-04-04

## 2019-04-04 RX ORDER — LOSARTAN POTASSIUM 25 MG/1
25 TABLET ORAL DAILY
Qty: 90 TABLET | Refills: 3 | Status: SHIPPED | OUTPATIENT
Start: 2019-04-04 | End: 2019-06-03 | Stop reason: SDUPTHER

## 2019-04-24 ENCOUNTER — PATIENT MESSAGE (OUTPATIENT)
Dept: CARDIOLOGY | Facility: CLINIC | Age: 69
End: 2019-04-24

## 2019-04-25 ENCOUNTER — PATIENT MESSAGE (OUTPATIENT)
Dept: CARDIOLOGY | Facility: CLINIC | Age: 69
End: 2019-04-25

## 2019-04-29 ENCOUNTER — PATIENT MESSAGE (OUTPATIENT)
Dept: CARDIOLOGY | Facility: CLINIC | Age: 69
End: 2019-04-29

## 2019-04-30 ENCOUNTER — OFFICE VISIT (OUTPATIENT)
Dept: CARDIOLOGY | Facility: CLINIC | Age: 69
End: 2019-04-30
Payer: MEDICARE

## 2019-04-30 VITALS
DIASTOLIC BLOOD PRESSURE: 80 MMHG | BODY MASS INDEX: 30.31 KG/M2 | HEIGHT: 63 IN | SYSTOLIC BLOOD PRESSURE: 140 MMHG | WEIGHT: 171.06 LBS | HEART RATE: 64 BPM

## 2019-04-30 DIAGNOSIS — I48.0 PAF (PAROXYSMAL ATRIAL FIBRILLATION): ICD-10-CM

## 2019-04-30 DIAGNOSIS — I25.118 CORONARY ARTERY DISEASE OF NATIVE ARTERY OF NATIVE HEART WITH STABLE ANGINA PECTORIS: ICD-10-CM

## 2019-04-30 DIAGNOSIS — J44.9 COPD, MODERATE: ICD-10-CM

## 2019-04-30 DIAGNOSIS — I10 ESSENTIAL HYPERTENSION: ICD-10-CM

## 2019-04-30 DIAGNOSIS — I50.42 CHRONIC COMBINED SYSTOLIC AND DIASTOLIC CONGESTIVE HEART FAILURE: ICD-10-CM

## 2019-04-30 DIAGNOSIS — Z95.1 S/P CABG X 5: Primary | ICD-10-CM

## 2019-04-30 DIAGNOSIS — E78.2 MIXED HYPERLIPIDEMIA: ICD-10-CM

## 2019-04-30 DIAGNOSIS — D86.0 PULMONARY SARCOIDOSIS: ICD-10-CM

## 2019-04-30 PROCEDURE — 3077F SYST BP >= 140 MM HG: CPT | Mod: HCNC,CPTII,S$GLB, | Performed by: INTERNAL MEDICINE

## 2019-04-30 PROCEDURE — 99999 PR PBB SHADOW E&M-EST. PATIENT-LVL III: CPT | Mod: PBBFAC,HCNC,, | Performed by: INTERNAL MEDICINE

## 2019-04-30 PROCEDURE — 99214 PR OFFICE/OUTPT VISIT, EST, LEVL IV, 30-39 MIN: ICD-10-PCS | Mod: HCNC,S$GLB,, | Performed by: INTERNAL MEDICINE

## 2019-04-30 PROCEDURE — 3077F PR MOST RECENT SYSTOLIC BLOOD PRESSURE >= 140 MM HG: ICD-10-PCS | Mod: HCNC,CPTII,S$GLB, | Performed by: INTERNAL MEDICINE

## 2019-04-30 PROCEDURE — 1101F PT FALLS ASSESS-DOCD LE1/YR: CPT | Mod: HCNC,CPTII,S$GLB, | Performed by: INTERNAL MEDICINE

## 2019-04-30 PROCEDURE — 3079F PR MOST RECENT DIASTOLIC BLOOD PRESSURE 80-89 MM HG: ICD-10-PCS | Mod: HCNC,CPTII,S$GLB, | Performed by: INTERNAL MEDICINE

## 2019-04-30 PROCEDURE — 1101F PR PT FALLS ASSESS DOC 0-1 FALLS W/OUT INJ PAST YR: ICD-10-PCS | Mod: HCNC,CPTII,S$GLB, | Performed by: INTERNAL MEDICINE

## 2019-04-30 PROCEDURE — 99999 PR PBB SHADOW E&M-EST. PATIENT-LVL III: ICD-10-PCS | Mod: PBBFAC,HCNC,, | Performed by: INTERNAL MEDICINE

## 2019-04-30 PROCEDURE — 99214 OFFICE O/P EST MOD 30 MIN: CPT | Mod: HCNC,S$GLB,, | Performed by: INTERNAL MEDICINE

## 2019-04-30 PROCEDURE — 3079F DIAST BP 80-89 MM HG: CPT | Mod: HCNC,CPTII,S$GLB, | Performed by: INTERNAL MEDICINE

## 2019-04-30 RX ORDER — FUROSEMIDE 20 MG/1
20 TABLET ORAL DAILY PRN
Qty: 90 TABLET | Refills: 1 | Status: SHIPPED | OUTPATIENT
Start: 2019-04-30 | End: 2020-07-01

## 2019-04-30 RX ORDER — METOPROLOL TARTRATE 25 MG/1
25 TABLET, FILM COATED ORAL 2 TIMES DAILY
Qty: 60 TABLET | Refills: 3 | Status: SHIPPED | OUTPATIENT
Start: 2019-04-30 | End: 2019-06-26

## 2019-04-30 NOTE — PROGRESS NOTES
"Subjective:   Patient ID:  Eve Long is a 68 y.o. female who presents for cardiac consult of Atrial Fibrillation (discuss meds and results of holter); Hypertension; and Hyperlipidemia      Atrial Fibrillation   Symptoms include weakness. Symptoms are negative for chest pain, palpitations and shortness of breath. Past medical history includes atrial fibrillation and hyperlipidemia.   Hypertension   Pertinent negatives include no chest pain, malaise/fatigue, palpitations or shortness of breath.   Hyperlipidemia   Pertinent negatives include no chest pain, focal weakness or shortness of breath.     The patient came in today for cardiac consult of Atrial Fibrillation (discuss meds and results of holter); Hypertension; and Hyperlipidemia    Referring provider: Vianey Marsh NP  Reason for initial consult: TIA, CV workup    Eve Long is a 68 y.o. female pt with CAD s/p CABG x 5 11/2018, PAF on Eliquis, CHF, TIA x 2, HTN, HLD, COPD, pulm sarcoid presents for follow up CV evaluation.     8/16/18  68-year old patient with a histoy of hypertension and significant family history of CVA who woke up yesterday morning feeling foggy with poor vision in the right eye and left forearm weakness about 7am. She states she went back to sleep after the symptoms persisted for about 20 minutes and woke up about 8:30am by which time the symptoms of visual fogginess and left forearm weakness had resolved. Patient states that four months ago she had symptoms related to extremely high blood pressure with an occipital headache and vomiting and [place] 230/100+. She had a cardiac evaluation at the time that was negative.     She presented to ED for episode she had after waking up in am with right eye pain, imbalance, and left arm "feeling funny"-no obvious weakness. Symptoms lasted approximately 20 minutes and resolved. Pt reports during episode, she checked her BP and noted . Pt notified PCP of " symptoms and was told to go to ED. Pt arrived to ED at approx 8pm. Symptoms were resolved. CT head showed nothing acute. BP in the /81. The pt was placed in observation on ASA. Differential diagnosis was TIA, Near syncope, Hypertensive encephalopathy, or migraine symptoms. CTA head and neck showed  bilateral common carotid artery calcified plaque, more extensive on the right than left-no significant stenosis. ICA calcified plaque.  No intracranial vascular occlusion or large vessel occlusion.Pt restarted on amlodipine. BP improved. Lipid panel done, however, pt reports she can not take Statins due to allergy. Instructed pt to follow up with Neurology. She also needs to follow up with Pulmonology for outpatient sleep study.Pt had a 2D ECHO which revealed normal Bi V function. She will need a Holter/Zio patch to r/o Afib.   This is the second time she had TIA episodes. Bp at times 99/55 but now mildly elevated, overall -140/79-80.     10/18/18  PT had event monitor which was neg for arrhythmias, no AFib detected. Added Zetia after last visit - could not tolerate it due to muscle spasm in neck. Today Bp was 120s/70 around 11 AM. In evenings a bit higher 142/81. Occ BP has been low 104/71. Has been doing ok with low salt intake. Has intermittent chest pain, dull pain, once a month, pain is tolerable. Discussed stress test if symptoms worse.     1/29/19  She was admitted in 11/2018 [facility] with NSTEMI. LHC subsequently revealed multivessel disease and patient underwent CABG x 5. She had mild chest pain for a few days before admit and went in after about 3 days of intermittent pain. Her medications were optimized and she was subsequently discharged. She had postop AFib, was started on Amio and Eliquis for A/C. Pt cannot sleep. [place] like she was hyperventilating last night. Pt has cough, wants to stop Lisinopril. BP elevated at home as well.     3/28/19  She has been having more fluid retention issues lately.  Last visit stopped lasix due to lower BP last visit. Was taking 40mg lasix daily. Discussed will restart 20mg. Has more bruising with Eliquis but no bleeding. No CP. Occ low BP at home but overall well controlled.     4/30/19  Recent Holter with occ PACs, rare PVCs; avg HR 56 bpm. Could not tolerate Repatha due to muscle pain/fatigue. She started to have symptoms 4-5 days after. She also complains of fatigue, with low HR, blurry vision at times when BP gets lower. Also has trouble sleeping since surgery, sleeps a few hours.     Patient feels no sob, no leg swelling, no PND, no palpitation.     Patient has fairly good exercise tolerance.    Patient is compliant with medications.    HOLTER 3/11/19  TEST DESCRIPTION   PREDOMINANT RHYTHM  1. Sinus rhythm with heart rates varying between 44 and 98 bpm with an average of 56 bpm.     VENTRICULAR ARRHYTHMIAS  1. There were rare PVCs totalling 155 and averaging 3 per hour.   2. There were no episodes of ventricular tachycardia.    SUPRA VENTRICULAR ARRHYTHMIAS  1. There were occasional PACs totalling 955 and averaging 19 per hour.  There were 3 couplets.  2. onw 4 beat run of PAT  3. There were no episodes of sustained supraventricular tachycardia.        Event monitor 10/09/2018   CONCLUSIONS   30 DAY EVENT MONITOR 9/4/18 - 10/4/18:  Baseline:  NSR, PVCs.  No activations noted during study.      2D ECHO 11/14/2018  CONCLUSIONS     1 - Concentric hypertrophy.     2 - Wall motion abnormalities.     3 - Mildly to moderately depressed left ventricular systolic function (EF 40-45%).     4 - Impaired LV relaxation, normal LAP (grade 1 diastolic dysfunction).     5 - Normal right ventricular systolic function .           Past Medical History:   Diagnosis Date    Anemia     Asthma     Basal cell carcinoma     Chronic combined systolic and diastolic congestive heart failure 1/29/2019    COPD (chronic obstructive pulmonary disease)     Coronary artery disease involving native  coronary artery of native heart with unstable angina pectoris 11/14/2018    Hyperlipidemia     Hypertension     Immune deficiency disorder     NSTEMI (non-ST elevated myocardial infarction) 11/13/2018    PAF (paroxysmal atrial fibrillation) 1/29/2019    Pneumonia     Sarcoidosis     TIA (transient ischemic attack)        Past Surgical History:   Procedure Laterality Date    CATHETERIZATION, HEART, LEFT Left 11/14/2018    Per[facility] by Judy [facility] at [facility] CATH LAB    CHOLECYSTECTOMY      CORONARY ARTERY BYPASS GRAFT      CORONARY ARTERY BYPASS GRAFT (CABG) N/A 11/15/2018    Performed by [facility] Dorsey [facility] at [facility] OR    COSMETIC SURGERY      blepharplasty    ECHOCARDIOGRAM,TRANSESOPHAGEAL N/A 11/15/2018    Per[facility] by [facility] at [facility] OR    FRACTURE SURGERY Left     wrist ORIF    HYSTERECTOMY      LUNG BIOPSY      OOPHORECTOMY      SKIN BIOPSY      SURGICAL PROCUREMENT, VEIN, ENDOSCOPIC Left 11/15/2018    Performed [facility] at [facility] OR    TONSILLECTOMY         Social History     Tobacco Use    Smoking status: Never Smoker    Smokeless tobacco: Never Used   Substance Use Topics    Alcohol use: Yes     Alcohol/week: 0.6 oz     Types: 1 Glasses of wine per week     Comment: yearly    Drug use: No       Family History   Problem Relation Age of Onset    Myasthenia gravis Brother     Stroke Mother     Heart disease Father         CHF    Stroke Sister     Thyroid disease Sister        Patient's Medications   New Prescriptions    No medications on file   Previous Medications    ALBUTEROL (PROVENTIL) 2.5 MG /3 ML (0.083 %) NEBULIZER SOLUTION    2.5 mg daily as needed. Every 6 hours inhale 2 puffs as needed for  wheezing    ALBUTEROL 90 MCG/ACTUATION INHALER    Inhale 2 puffs into the lungs every 6 (six) hours as needed.     APIXABAN (ELIQUIS) 5 MG TAB    Take 1 tablet (5 mg total) by mouth 2 (two) times daily.    ASPIRIN (ECOTRIN) 81 MG EC TABLET    Take 1 tablet  (81 mg total) by mouth once daily.    CYANOCOBALAMIN 1,000 MCG/ML INJECTION    Inject 1 mL (1,000 mcg total) into the skin every 28 days.    LOSARTAN (COZAAR) 25 MG TABLET    Take 1 tablet (25 mg total) by mouth once daily.    MULTIVITAMIN (THERAGRAN) TABLET    Take 1 tablet by mouth once daily.    NITROGLYCERIN (NITROSTAT) 0.4 MG SL TABLET    Place 1 tablet (0.4 mg total) under the tongue every 5 (five) minutes as needed.    PROMETHAZINE (PHENERGAN) 12.5 MG TAB    Take 1 tablet (12.5 mg total) by mouth every 6 (six) hours as needed.    THIAMINE 100 MG TABLET    Take 1 tablet (100 mg total) by mouth once daily.   Modified Medications    Modified Medication Previous Medication    FUROSEMIDE (LASIX) 20 MG TABLET furosemide (LASIX) 20 MG tablet       Take 1 tablet (20 mg total) by mouth daily as needed. For leg swelling, hold if BP low    Take 1 tablet (20 mg total) by mouth once daily. For leg swelling, hold if BP low    METOPROLOL TARTRATE (LOPRESSOR) 25 MG TABLET metoprolol tartrate (LOPRESSOR) 50 MG tablet       Take 1 tablet (25 mg total) by mouth 2 (two) times daily.    Take 1 tablet (50 mg total) by mouth 2 (two) times daily.   Discontinued Medications    EVOLOCUMAB (REPATHANANT LUICK) 140 MG/ML PNIJ    Inject 1 mL (140 mg total) into the skin every 14 (fourteen) days.       Review of Systems   Constitutional: Negative for malaise/fatigue.   HENT: Negative.    Eyes: Negative.    Respiratory: Negative.  Negative for shortness of breath.    Cardiovascular: Negative.  Negative for chest pain and palpitations.   Gastrointestinal: Negative.    Genitourinary: Negative.    Musculoskeletal: Negative.    Skin: Negative.    Neurological: Positive for weakness. Negative for tingling, sensory change and focal weakness.   Endo/Heme/Allergies: Negative.    Psychiatric/Behavioral: Negative.    All 12 systems otherwise negative.      Wt Readings from Last 3 Encounters:   04/30/19 77.6 kg (171 lb 1.2 oz)   03/28/19 75.5 kg (166  "lb 7.2 oz)   01/29/19 74.2 kg (163 lb 9.3 oz)     Temp Readings from Last 3 Encounters:   12/26/18 97.9 °F (36.6 °C) (Oral)   11/26/18 98.1 °F (36.7 °C) (Tympanic)   11/20/18 98.3 °F (36.8 °C) (Oral)     BP Readings from Last 3 Encounters:   04/30/19 (!) 140/80   01/29/19 (!) 146/76   12/26/18 124/66     Pulse Readings from Last 3 Encounters:   04/30/19 64   03/28/19 (!) 53   01/29/19 (!) 56       BP (!) 140/80 (BP Method: Large (Manual))   Pulse 64   Ht 5' 3" (1.6 m)   Wt 77.6 kg (171 lb 1.2 oz)   BMI 30.30 kg/m²  /78 and 132/72    Objective:   Physical Exam   Constitutional: She is oriented to person, place, and time. She appears well-developed and well-nourished. No distress.   HENT:   Head: Normocephalic and atraumatic.   Nose: Nose normal.   Mouth/Throat: Oropharynx is clear and moist.   Eyes: Conjunctivae and EOM are normal. No scleral icterus.   Neck: Normal range of motion. Neck supple. No JVD present. No thyromegaly present.   Cardiovascular: Normal rate, regular rhythm, S1 normal and S2 normal. Exam reveals no gallop, no S3, no S4 and no friction rub.   No murmur heard.  Pulmonary/Chest: Effort normal and breath sounds normal. No stridor. No respiratory distress. She has no wheezes. She has no rales. She exhibits no tenderness.   Abdominal: Soft. Bowel sounds are normal. She exhibits no distension and no mass. There is no tenderness. There is no rebound.   Genitourinary:   Genitourinary Comments: Deferred   Musculoskeletal: Normal range of motion. She exhibits no edema, tenderness or deformity.   Lymphadenopathy:     She has no cervical adenopathy.   Neurological: She is alert and oriented to person, place, and time. She exhibits normal muscle tone. Coordination normal.   Skin: Skin is warm and dry. No rash noted. She is not diaphoretic. No erythema. No pallor.   Psychiatric: She has a normal mood and affect. Her behavior is normal. Judgment and thought content normal.   Nursing note and vitals " reviewed.      Lab Results   Component Value Date     12/03/2018    K 4.9 12/03/2018     12/03/2018    CO2 24 12/03/2018    BUN 16 12/03/2018    CREATININE 0.8 12/03/2018    GLU 91 12/03/2018    HGBA1C 5.5 11/13/2018    MG 1.9 11/20/2018    AST 21 11/14/2018    ALT 15 11/14/2018    ALBUMIN 3.4 (L) 11/14/2018    PROT 6.3 11/14/2018    BILITOT 0.3 11/14/2018    WBC 13.21 (H) 11/20/2018    HGB 9.3 (L) 11/20/2018    HCT 28.6 (L) 11/20/2018    HCT 22 (L) 11/15/2018    MCV 86 11/20/2018     11/20/2018    INR 1.1 11/16/2018    TSH 3.340 11/14/2018    CHOL 202 (H) 10/09/2018    HDL 62 10/09/2018    HDL 57 04/19/2017    LDLCALC 112.8 10/09/2018    TRIG 136 10/09/2018    TRIG 190 (H) 04/19/2017     (H) 11/26/2018     Assessment:      1. S/P CABG x 5    2. Coronary artery disease of native artery of native heart with stable angina pectoris    3. Chronic combined systolic and diastolic congestive heart failure    4. PAF (paroxysmal atrial fibrillation)    5. Mixed hyperlipidemia    6. Essential hypertension    7. COPD, moderate    8. Pulmonary sarcoidosis        Plan:   1. CAD s/p CABG x 5 11/2018 with EF 40-45%  - cont asa, statin, bb  - cont lasix 20mg PRN    2. TIA x 2  - Event monitor x 30 days - negative  - cont asa and Eliquis  - Holter - neg    3. HTN   - cont meds    4. HLD  - low aga diet  - cannot tolerate statin due to myopathy  - cannot tolerate Repatha    5. COPD/Sarcoid  - cont meds per PCP/Pulm    6. Hypothyroidism  - cont synthroid     7. PAF (post op AFib)  - cont Eliquis and BB  - Holter neg   - dec BB to 25 mg    Thank you for allowing me to participate in this patient's care. Please do not hesitate to contact me with any questions or concerns. Consult note has been forwarded to the referral physician.

## 2019-05-08 ENCOUNTER — OFFICE VISIT (OUTPATIENT)
Dept: FAMILY MEDICINE | Facility: CLINIC | Age: 69
End: 2019-05-08
Attending: FAMILY MEDICINE
Payer: MEDICARE

## 2019-05-08 ENCOUNTER — PATIENT MESSAGE (OUTPATIENT)
Dept: CARDIOLOGY | Facility: CLINIC | Age: 69
End: 2019-05-08

## 2019-05-08 ENCOUNTER — HOSPITAL ENCOUNTER (OUTPATIENT)
Dept: RADIOLOGY | Facility: HOSPITAL | Age: 69
Discharge: HOME | End: 2019-05-08
Attending: FAMILY MEDICINE
Payer: MEDICARE

## 2019-05-08 ENCOUNTER — PATIENT MESSAGE (OUTPATIENT)
Dept: FAMILY MEDICINE | Facility: CLINIC | Age: 69
End: 2019-05-08

## 2019-05-08 VITALS
BODY MASS INDEX: 30.23 KG/M2 | HEIGHT: 63 IN | SYSTOLIC BLOOD PRESSURE: 130 MMHG | TEMPERATURE: 98 F | HEART RATE: 67 BPM | DIASTOLIC BLOOD PRESSURE: 80 MMHG | WEIGHT: 170.63 LBS | OXYGEN SATURATION: 95 %

## 2019-05-08 DIAGNOSIS — D51.0 PERNICIOUS ANEMIA: ICD-10-CM

## 2019-05-08 DIAGNOSIS — M54.9 BACK PAIN, UNSPECIFIED BACK LOCATION, UNSPECIFIED BACK PAIN LATERALITY, UNSPECIFIED CHRONICITY: Primary | ICD-10-CM

## 2019-05-08 DIAGNOSIS — M54.9 BACK PAIN, UNSPECIFIED BACK LOCATION, UNSPECIFIED BACK PAIN LATERALITY, UNSPECIFIED CHRONICITY: ICD-10-CM

## 2019-05-08 DIAGNOSIS — D51.8 OTHER VITAMIN B12 DEFICIENCY ANEMIAS: ICD-10-CM

## 2019-05-08 PROCEDURE — 3075F SYST BP GE 130 - 139MM HG: CPT | Mod: HCNC,CPTII,S$GLB, | Performed by: FAMILY MEDICINE

## 2019-05-08 PROCEDURE — 99999 PR PBB SHADOW E&M-EST. PATIENT-LVL III: ICD-10-PCS | Mod: PBBFAC,HCNC,, | Performed by: FAMILY MEDICINE

## 2019-05-08 PROCEDURE — 99214 OFFICE O/P EST MOD 30 MIN: CPT | Mod: HCNC,S$GLB,, | Performed by: FAMILY MEDICINE

## 2019-05-08 PROCEDURE — 93005 EKG 12-LEAD: ICD-10-PCS | Mod: HCNC,S$GLB,, | Performed by: FAMILY MEDICINE

## 2019-05-08 PROCEDURE — 71046 XR CHEST PA AND LATERAL: ICD-10-PCS | Mod: 26,HCNC,, | Performed by: RADIOLOGY

## 2019-05-08 PROCEDURE — 99999 PR PBB SHADOW E&M-EST. PATIENT-LVL III: CPT | Mod: PBBFAC,HCNC,, | Performed by: FAMILY MEDICINE

## 2019-05-08 PROCEDURE — 93010 ELECTROCARDIOGRAM REPORT: CPT | Mod: HCNC,S$GLB,, | Performed by: INTERNAL MEDICINE

## 2019-05-08 PROCEDURE — 3079F PR MOST RECENT DIASTOLIC BLOOD PRESSURE 80-89 MM HG: ICD-10-PCS | Mod: HCNC,CPTII,S$GLB, | Performed by: FAMILY MEDICINE

## 2019-05-08 PROCEDURE — 1101F PT FALLS ASSESS-DOCD LE1/YR: CPT | Mod: HCNC,CPTII,S$GLB, | Performed by: FAMILY MEDICINE

## 2019-05-08 PROCEDURE — 71046 X-RAY EXAM CHEST 2 VIEWS: CPT | Mod: 26,HCNC,, | Performed by: RADIOLOGY

## 2019-05-08 PROCEDURE — 99214 PR OFFICE/OUTPT VISIT, EST, LEVL IV, 30-39 MIN: ICD-10-PCS | Mod: HCNC,S$GLB,, | Performed by: FAMILY MEDICINE

## 2019-05-08 PROCEDURE — 3079F DIAST BP 80-89 MM HG: CPT | Mod: HCNC,CPTII,S$GLB, | Performed by: FAMILY MEDICINE

## 2019-05-08 PROCEDURE — 71046 X-RAY EXAM CHEST 2 VIEWS: CPT | Mod: TC,HCNC,PO

## 2019-05-08 PROCEDURE — 93005 ELECTROCARDIOGRAM TRACING: CPT | Mod: HCNC,S$GLB,, | Performed by: FAMILY MEDICINE

## 2019-05-08 PROCEDURE — 93010 EKG 12-LEAD: ICD-10-PCS | Mod: HCNC,S$GLB,, | Performed by: INTERNAL MEDICINE

## 2019-05-08 PROCEDURE — 1101F PR PT FALLS ASSESS DOC 0-1 FALLS W/OUT INJ PAST YR: ICD-10-PCS | Mod: HCNC,CPTII,S$GLB, | Performed by: FAMILY MEDICINE

## 2019-05-08 PROCEDURE — 3075F PR MOST RECENT SYSTOLIC BLOOD PRESS GE 130-139MM HG: ICD-10-PCS | Mod: HCNC,CPTII,S$GLB, | Performed by: FAMILY MEDICINE

## 2019-05-08 RX ORDER — DICLOFENAC SODIUM 10 MG/G
2 GEL TOPICAL 2 TIMES DAILY
Qty: 100 G | Refills: 0 | Status: SHIPPED | OUTPATIENT
Start: 2019-05-08 | End: 2019-10-18

## 2019-05-08 NOTE — PROGRESS NOTES
Subjective:       Patient ID: Eve Long is a 68 y.o. female.    Chief Complaint: Back Pain (upper, left shoulder blade x 2 days)    68 y old female with CAD , HTN,sarcoidosis,  cervical ddd with Pain on Left scapula over the last 2 days . He has seen Dr Luisito van who  Lowered dose of metoprolol due to swelling in ankle . She has noted a  Bruise in  Area ,burning , no rashes, no cough , no sob , no cp . She has used albuterol inhaler with no Relief      Back Pain   This is a new problem. The current episode started yesterday. The problem occurs constantly. The problem has been waxing and waning since onset. The pain is present in the thoracic spine. The quality of the pain is described as aching and burning. The pain does not radiate. The pain is at a severity of 5/10. The pain is moderate. The pain is worse during the night. The symptoms are aggravated by lying down, sitting and standing. Stiffness is present all day. Associated symptoms include chest pain. Pertinent negatives include no abdominal pain, bladder incontinence, bowel incontinence, dysuria, fever, headaches, leg pain, numbness, paresis, paresthesias, pelvic pain, perianal numbness, tingling, weakness or weight loss. She has tried NSAIDs and heat for the symptoms. The treatment provided no relief.     Review of Systems   Constitutional: Negative.  Negative for fever and weight loss.   HENT: Negative.    Eyes: Negative.    Respiratory: Negative.    Cardiovascular: Positive for chest pain.   Gastrointestinal: Negative.  Negative for abdominal pain and bowel incontinence.   Genitourinary: Negative.  Negative for bladder incontinence, dysuria and pelvic pain.   Musculoskeletal: Positive for back pain.   Skin: Negative.    Neurological: Negative for tingling, weakness, numbness, headaches and paresthesias.   Hematological: Negative.        Objective:      Physical Exam   Constitutional: She is oriented to person, place, and time. She appears  well-developed and well-nourished. No distress.   HENT:   Head: Normocephalic and atraumatic.   Right Ear: External ear normal.   Left Ear: External ear normal.   Mouth/Throat: No oropharyngeal exudate.   Eyes: Pupils are equal, round, and reactive to light. Conjunctivae and EOM are normal. Right eye exhibits no discharge. Left eye exhibits no discharge. No scleral icterus.   Neck: Normal range of motion. Neck supple. No JVD present. No tracheal deviation present. No thyromegaly present.   Cardiovascular: Normal rate, regular rhythm and normal heart sounds. Exam reveals no gallop and no friction rub.   No murmur heard.  Pulmonary/Chest: Effort normal and breath sounds normal. No stridor. No respiratory distress. She has no wheezes. She has no rales. She exhibits tenderness, edema and swelling.   10 cm in diam  fading hematoma    Abdominal: Soft. Bowel sounds are normal. She exhibits no distension. There is no tenderness. There is no rebound and no guarding.   Musculoskeletal: Normal range of motion.   Lymphadenopathy:     She has no cervical adenopathy.   Neurological: She is alert and oriented to person, place, and time.   Skin: Skin is warm and dry. She is not diaphoretic.        Psychiatric: She has a normal mood and affect. Her behavior is normal. Judgment and thought content normal.       Assessment:      Eve was seen today for back pain.    Diagnoses and all orders for this visit:    Back pain, unspecified back location, unspecified back pain laterality, unspecified chronicity  -     X-Ray Chest PA And Lateral; Future  -     Vitamin B12; Future    Pernicious anemia  -     IN OFFICE EKG 12-LEAD (to Muse)    Other vitamin B12 deficiency anemias   -     Vitamin B12; Future    Other orders  -     diclofenac sodium (VOLTAREN) 1 % Gel; Apply 2 g topically 2 (two) times daily.      Plan:     We will cont to f.u .   WS discussed.      182.9

## 2019-05-10 ENCOUNTER — PATIENT MESSAGE (OUTPATIENT)
Dept: CARDIOLOGY | Facility: CLINIC | Age: 69
End: 2019-05-10

## 2019-05-10 ENCOUNTER — OFFICE VISIT (OUTPATIENT)
Dept: FAMILY MEDICINE | Facility: CLINIC | Age: 69
End: 2019-05-10
Attending: FAMILY MEDICINE
Payer: MEDICARE

## 2019-05-10 VITALS
BODY MASS INDEX: 30.32 KG/M2 | WEIGHT: 171.19 LBS | OXYGEN SATURATION: 95 % | HEART RATE: 80 BPM | DIASTOLIC BLOOD PRESSURE: 73 MMHG | SYSTOLIC BLOOD PRESSURE: 119 MMHG | TEMPERATURE: 97 F

## 2019-05-10 DIAGNOSIS — M54.9 BACK PAIN, UNSPECIFIED BACK LOCATION, UNSPECIFIED BACK PAIN LATERALITY, UNSPECIFIED CHRONICITY: Primary | ICD-10-CM

## 2019-05-10 DIAGNOSIS — I10 HYPERTENSION, UNSPECIFIED TYPE: ICD-10-CM

## 2019-05-10 DIAGNOSIS — R60.0 LEG EDEMA: ICD-10-CM

## 2019-05-10 PROCEDURE — 3078F PR MOST RECENT DIASTOLIC BLOOD PRESSURE < 80 MM HG: ICD-10-PCS | Mod: HCNC,CPTII,S$GLB, | Performed by: FAMILY MEDICINE

## 2019-05-10 PROCEDURE — 99214 OFFICE O/P EST MOD 30 MIN: CPT | Mod: HCNC,S$GLB,, | Performed by: FAMILY MEDICINE

## 2019-05-10 PROCEDURE — 99999 PR PBB SHADOW E&M-EST. PATIENT-LVL III: ICD-10-PCS | Mod: PBBFAC,HCNC,, | Performed by: FAMILY MEDICINE

## 2019-05-10 PROCEDURE — 1101F PT FALLS ASSESS-DOCD LE1/YR: CPT | Mod: HCNC,CPTII,S$GLB, | Performed by: FAMILY MEDICINE

## 2019-05-10 PROCEDURE — 99999 PR PBB SHADOW E&M-EST. PATIENT-LVL III: CPT | Mod: PBBFAC,HCNC,, | Performed by: FAMILY MEDICINE

## 2019-05-10 PROCEDURE — 99214 PR OFFICE/OUTPT VISIT, EST, LEVL IV, 30-39 MIN: ICD-10-PCS | Mod: HCNC,S$GLB,, | Performed by: FAMILY MEDICINE

## 2019-05-10 PROCEDURE — 1101F PR PT FALLS ASSESS DOC 0-1 FALLS W/OUT INJ PAST YR: ICD-10-PCS | Mod: HCNC,CPTII,S$GLB, | Performed by: FAMILY MEDICINE

## 2019-05-10 PROCEDURE — 3074F PR MOST RECENT SYSTOLIC BLOOD PRESSURE < 130 MM HG: ICD-10-PCS | Mod: HCNC,CPTII,S$GLB, | Performed by: FAMILY MEDICINE

## 2019-05-10 PROCEDURE — 3074F SYST BP LT 130 MM HG: CPT | Mod: HCNC,CPTII,S$GLB, | Performed by: FAMILY MEDICINE

## 2019-05-10 PROCEDURE — 3078F DIAST BP <80 MM HG: CPT | Mod: HCNC,CPTII,S$GLB, | Performed by: FAMILY MEDICINE

## 2019-05-10 RX ORDER — HYDROCODONE BITARTRATE AND ACETAMINOPHEN 5; 325 MG/1; MG/1
1 TABLET ORAL EVERY 6 HOURS PRN
Qty: 20 TABLET | Refills: 0 | Status: SHIPPED | OUTPATIENT
Start: 2019-05-10 | End: 2019-09-10

## 2019-05-10 NOTE — PROGRESS NOTES
Subjective:       Patient ID: Eve Long is a 68 y.o. female.    Chief Complaint: Shoulder Pain    68 y old female with HTN , CAD here for f.u on L upper mid back pain . She had a fading hematoma 2 w ago , Had normal EKG and CXR . Started on topical diclofenac which helped initially . Pain has recur . Taking Old script for hydrocodone with relief . Symptoms did not start until Metoprolol dose was reduced due to bilateral  leg edema. She would like an alternatives  to metoprolol      Back Pain   This is a new problem. The current episode started in the past 7 days. The problem occurs constantly. The problem is unchanged. The pain is present in the lumbar spine. The quality of the pain is described as burning. The pain is at a severity of 5/10. The pain is moderate. The pain is the same all the time. The symptoms are aggravated by bending, position, lying down, sitting, standing and twisting. Associated symptoms include a fever. Pertinent negatives include no abdominal pain, bladder incontinence, bowel incontinence, chest pain, dysuria, headaches, leg pain, numbness, paresis, paresthesias, pelvic pain, perianal numbness, tingling, weakness or weight loss. She has tried analgesics for the symptoms. The treatment provided moderate relief.     Review of Systems   Constitutional: Positive for fever. Negative for weight loss.   Cardiovascular: Negative for chest pain.   Gastrointestinal: Negative for abdominal pain and bowel incontinence.   Genitourinary: Negative for bladder incontinence, dysuria, hematuria and pelvic pain.   Musculoskeletal: Positive for back pain.   Neurological: Negative for tingling, weakness, numbness, headaches and paresthesias.       Objective:      Physical Exam   Constitutional: She is oriented to person, place, and time. No distress.   HENT:   Head: Normocephalic and atraumatic.   Right Ear: External ear normal.   Left Ear: External ear normal.   Mouth/Throat: No oropharyngeal exudate.    Eyes: Pupils are equal, round, and reactive to light. Conjunctivae and EOM are normal. Right eye exhibits no discharge. Left eye exhibits no discharge. No scleral icterus.   Neck: Normal range of motion. Neck supple. No JVD present. No tracheal deviation present. No thyromegaly present.   Cardiovascular: Normal rate, regular rhythm and normal heart sounds. Exam reveals no gallop and no friction rub.   No murmur heard.  Pulmonary/Chest: Effort normal and breath sounds normal. No stridor. No respiratory distress. She has no wheezes. She has no rales. She exhibits no tenderness.   Abdominal: Soft. Bowel sounds are normal. She exhibits no distension. There is no tenderness. There is no rebound and no guarding.   Musculoskeletal:        Thoracic back: She exhibits decreased range of motion, tenderness and bony tenderness.   Lymphadenopathy:     She has no cervical adenopathy.   Neurological: She is alert and oriented to person, place, and time.   Skin: Skin is warm and dry. She is not diaphoretic.   Psychiatric: She has a normal mood and affect. Her behavior is normal. Judgment and thought content normal.       Assessment:       1. Back pain, unspecified back location, unspecified back pain laterality, unspecified chronicity    2. Hypertension, unspecified type    3. Leg edema        Plan:     Eve was seen today for shoulder pain.    Diagnoses and all orders for this visit:    Back pain, unspecified back location, unspecified back pain laterality, unspecified chronicity    Hypertension, unspecified type    Leg edema    Other orders  -     HYDROcodone-acetaminophen (NORCO) 5-325 mg per tablet; Take 1 tablet by mouth every 6 (six) hours as needed for Pain.       Answers for HPI/ROS submitted by the patient on 5/10/2019   Back pain  genital pain: No  Physical exam is also suggestive of spondylosis .   Controlled. Discus  alternative with Card  Discourage form d/ c metoprolol abruptly

## 2019-05-13 ENCOUNTER — PATIENT MESSAGE (OUTPATIENT)
Dept: CARDIOLOGY | Facility: CLINIC | Age: 69
End: 2019-05-13

## 2019-05-15 ENCOUNTER — PATIENT MESSAGE (OUTPATIENT)
Dept: FAMILY MEDICINE | Facility: CLINIC | Age: 69
End: 2019-05-15

## 2019-05-16 ENCOUNTER — PATIENT MESSAGE (OUTPATIENT)
Dept: CARDIOLOGY | Facility: CLINIC | Age: 69
End: 2019-05-16

## 2019-05-17 ENCOUNTER — PATIENT MESSAGE (OUTPATIENT)
Dept: CARDIOLOGY | Facility: CLINIC | Age: 69
End: 2019-05-17

## 2019-05-17 ENCOUNTER — OFFICE VISIT (OUTPATIENT)
Dept: CARDIOLOGY | Facility: CLINIC | Age: 69
End: 2019-05-17
Payer: MEDICARE

## 2019-05-17 VITALS
DIASTOLIC BLOOD PRESSURE: 72 MMHG | BODY MASS INDEX: 30.08 KG/M2 | HEART RATE: 60 BPM | WEIGHT: 169.75 LBS | HEIGHT: 63 IN | SYSTOLIC BLOOD PRESSURE: 130 MMHG

## 2019-05-17 DIAGNOSIS — I10 ESSENTIAL HYPERTENSION: ICD-10-CM

## 2019-05-17 DIAGNOSIS — J44.9 COPD, MODERATE: ICD-10-CM

## 2019-05-17 DIAGNOSIS — G90.519 COMPLEX REGIONAL PAIN SYNDROME TYPE 1 OF UPPER EXTREMITY, UNSPECIFIED LATERALITY: ICD-10-CM

## 2019-05-17 DIAGNOSIS — Z95.1 S/P CABG X 5: ICD-10-CM

## 2019-05-17 DIAGNOSIS — I25.118 CORONARY ARTERY DISEASE OF NATIVE ARTERY OF NATIVE HEART WITH STABLE ANGINA PECTORIS: Primary | ICD-10-CM

## 2019-05-17 DIAGNOSIS — E78.2 MIXED HYPERLIPIDEMIA: ICD-10-CM

## 2019-05-17 DIAGNOSIS — I48.0 PAF (PAROXYSMAL ATRIAL FIBRILLATION): ICD-10-CM

## 2019-05-17 DIAGNOSIS — I50.42 CHRONIC COMBINED SYSTOLIC AND DIASTOLIC CONGESTIVE HEART FAILURE: ICD-10-CM

## 2019-05-17 PROCEDURE — 3075F PR MOST RECENT SYSTOLIC BLOOD PRESS GE 130-139MM HG: ICD-10-PCS | Mod: HCNC,CPTII,S$GLB, | Performed by: INTERNAL MEDICINE

## 2019-05-17 PROCEDURE — 1101F PR PT FALLS ASSESS DOC 0-1 FALLS W/OUT INJ PAST YR: ICD-10-PCS | Mod: HCNC,CPTII,S$GLB, | Performed by: INTERNAL MEDICINE

## 2019-05-17 PROCEDURE — 99214 PR OFFICE/OUTPT VISIT, EST, LEVL IV, 30-39 MIN: ICD-10-PCS | Mod: HCNC,S$GLB,, | Performed by: INTERNAL MEDICINE

## 2019-05-17 PROCEDURE — 1101F PT FALLS ASSESS-DOCD LE1/YR: CPT | Mod: HCNC,CPTII,S$GLB, | Performed by: INTERNAL MEDICINE

## 2019-05-17 PROCEDURE — 99214 OFFICE O/P EST MOD 30 MIN: CPT | Mod: HCNC,S$GLB,, | Performed by: INTERNAL MEDICINE

## 2019-05-17 PROCEDURE — 3078F DIAST BP <80 MM HG: CPT | Mod: HCNC,CPTII,S$GLB, | Performed by: INTERNAL MEDICINE

## 2019-05-17 PROCEDURE — 99999 PR PBB SHADOW E&M-EST. PATIENT-LVL III: CPT | Mod: PBBFAC,HCNC,, | Performed by: INTERNAL MEDICINE

## 2019-05-17 PROCEDURE — 3075F SYST BP GE 130 - 139MM HG: CPT | Mod: HCNC,CPTII,S$GLB, | Performed by: INTERNAL MEDICINE

## 2019-05-17 PROCEDURE — 3078F PR MOST RECENT DIASTOLIC BLOOD PRESSURE < 80 MM HG: ICD-10-PCS | Mod: HCNC,CPTII,S$GLB, | Performed by: INTERNAL MEDICINE

## 2019-05-17 PROCEDURE — 99999 PR PBB SHADOW E&M-EST. PATIENT-LVL III: ICD-10-PCS | Mod: PBBFAC,HCNC,, | Performed by: INTERNAL MEDICINE

## 2019-05-17 RX ORDER — CHLORZOXAZONE 500 MG/1
500 TABLET ORAL EVERY 8 HOURS PRN
COMMUNITY
End: 2019-10-18

## 2019-05-17 NOTE — PROGRESS NOTES
"Subjective:   Patient ID:  Eve Long is a 69 y.o. female who presents for cardiac consult of Hypertension (discuss meds); Hyperlipidemia; and Congestive Heart Failure      Atrial Fibrillation   Symptoms include weakness. Symptoms are negative for chest pain, palpitations and shortness of breath. Past medical history includes atrial fibrillation, CHF and hyperlipidemia.   Hypertension   Pertinent negatives include no chest pain, malaise/fatigue, palpitations or shortness of breath.   Hyperlipidemia   Pertinent negatives include no chest pain, focal weakness or shortness of breath.   Congestive Heart Failure   Pertinent negatives include no chest pain, palpitations or shortness of breath.     The patient came in today for cardiac consult of Hypertension (discuss meds); Hyperlipidemia; and Congestive Heart Failure    Referring provider: Vianey Marsh NP  Reason for initial consult: TIA, CV workup    Eve Long is a 69 y.o. female pt with CAD s/p CABG x 5 11/2018, PAF on Eliquis, CHF, TIA x 2, HTN, HLD, COPD, pulm sarcoid presents for follow up CV evaluation.     8/16/18  68-year old patient with a histoy of hypertension and significant family history of CVA who woke up yesterday morning feeling foggy with poor vision in the right eye and left forearm weakness about 7am. She states she went back to sleep after the symptoms persisted for about 20 minutes and woke up about 8:30am by which time the symptoms of visual fogginess and left forearm weakness had resolved. Patient states that four months ago she had symptoms related to extremely high blood pressure with an occipital headache and vomiting and [place] 230/100+. She had a cardiac evaluation at the time that was negative.     She presented to ED for episode she had after waking up in am with right eye pain, imbalance, and left arm "feeling funny"-no obvious weakness. Symptoms lasted approximately 20 minutes and resolved. Pt " reports during episode, she checked her BP and noted . Pt notified PCP of symptoms and was told to go to ED. Pt arrived to ED at approx 8pm. Symptoms were resolved. CT head showed nothing acute. BP in the /81. The pt was placed in observation on ASA. Differential diagnosis was TIA, Near syncope, Hypertensive encephalopathy, or migraine symptoms. CTA head and neck showed  bilateral common carotid artery calcified plaque, more extensive on the right than left-no significant stenosis. ICA calcified plaque.  No intracranial vascular occlusion or large vessel occlusion.Pt restarted on amlodipine. BP improved. Lipid panel done, however, pt reports she can not take Statins due to allergy. Instructed pt to follow up with Neurology. She also needs to follow up with Pulmonology for outpatient sleep study.Pt had a 2D ECHO which revealed normal Bi V function. She will need a Holter/Zio patch to r/o Afib.   This is the second time she had TIA episodes. Bp at times 99/55 but now mildly elevated, overall -140/79-80.     10/18/18  PT had event monitor which was neg for arrhythmias, no AFib detected. Added Zetia after last visit - could not tolerate it due to muscle spasm in neck. Today Bp was 120s/70 around 11 AM. In evenings a bit higher 142/81. Occ BP has been low 104/71. Has been doing ok with low salt intake. Has intermittent chest pain, dull pain, once a month, pain is tolerable. Discussed stress test if symptoms worse.     1/29/19  She was admitted in 11/2018 [facility] [place]- with NSTEMI. LHC subsequently revealed multivessel disease and patient underwent CABG x 5. She had mild chest pain for a few days before admit and went in after about 3 days of intermittent pain. Her medications were optimized and she was subsequently discharged. She had postop AFib, was started on Amio and Eliquis for A/C. Pt cannot sleep. [place] like she was hyperventilating last night. Pt has cough, wants to stop Lisinopril. BP elevated at home  as well.     3/28/19  She has been having more fluid retention issues lately. Last visit stopped lasix due to lower BP last visit. Was taking 40mg lasix daily. Discussed will restart 20mg. Has more bruising with Eliquis but no bleeding. No CP. Occ low BP at home but overall well controlled.     4/30/19  Recent Holter with occ PACs, rare PVCs; avg HR 56 bpm. Could not tolerate Repatha due to muscle pain/fatigue. She started to have symptoms 4-5 days after. She also complains of fatigue, with low HR, blurry vision at times when BP gets lower. Also has trouble sleeping since surgery, sleeps a few hours.     5/17/19  Has been getting random brusing, worse at left shoulder blade and started having burning sensation. Thought due to be Eliquis. Had some strange nerve feeling from neck to ear and inflammation and had Shingles. She stopped metoprolol due to blurry vision and stopped but had tachycardia so now back on it. Had leg swelling/ankle swelling improved with lasix. Pain seems complicated, may have CRPS.     Patient feels no sob, no leg swelling, no PND, no palpitation.     Patient has fairly good exercise tolerance.    Patient is compliant with medications.    HOLTER 3/11/19  TEST DESCRIPTION   PREDOMINANT RHYTHM  1. Sinus rhythm with heart rates varying between 44 and 98 bpm with an average of 56 bpm.     VENTRICULAR ARRHYTHMIAS  1. There were rare PVCs totalling 155 and averaging 3 per hour.   2. There were no episodes of ventricular tachycardia.    SUPRA VENTRICULAR ARRHYTHMIAS  1. There were occasional PACs totalling 955 and averaging 19 per hour.  There were 3 couplets.  2. onw 4 beat run of PAT  3. There were no episodes of sustained supraventricular tachycardia.        Event monitor 10/09/2018   CONCLUSIONS   30 DAY EVENT MONITOR 9/4/18 - 10/4/18:  Baseline:  NSR, PVCs.  No activations noted during study.      2D ECHO 11/14/2018  CONCLUSIONS     1 - Concentric hypertrophy.     2 - Wall motion abnormalities.      3 - Mildly to moderately depressed left ventricular systolic function (EF 40-45%).     4 - Impaired LV relaxation, normal LAP (grade 1 diastolic dysfunction).     5 - Normal right ventricular systolic function .           Past Medical History:   Diagnosis Date    Anemia     Asthma     Basal cell carcinoma     Chronic combined systolic and diastolic congestive heart failure 1/29/2019    COPD (chronic obstructive pulmonary disease)     Coronary artery disease involving native coronary artery of native heart with unstable angina pectoris 11/14/2018    Hyperlipidemia     Hypertension     Immune deficiency disorder     NSTEMI (non-ST elevated myocardial infarction) 11/13/2018    PAF (paroxysmal atrial fibrillation) 1/29/2019    Pneumonia     Sarcoidosis     TIA (transient ischemic attack)        Past Surgical History:   Procedure Laterality Date    CATHETERIZATION, HEART, LEFT Left 11/14/2018    Performed by Judy [facility]guez [facility] at [facility] CATH LAB    CHOLECYSTECTOMY      CORONARY ARTERY BYPASS GRAFT      CORONARY ARTERY BYPASS GRAFT (CABG) N/A 11/15/2018    Performed by Ricardo Dorsey [facility] at [facility] OR    COSMETIC SURGERY      blepharplasty    ECHOCARDIOGRAM,TRANSESOPHAGEAL N/A 11/15/2018    Performed by [facility] at [facility] OR    FRACTURE SURGERY Left     wrist ORIF    HYSTERECTOMY      LUNG BIOPSY      OOPHORECTOMY      SKIN BIOPSY      SURGICAL PROCUREMENT, VEIN, ENDOSCOPIC Left 11/15/2018    Performed by [facility] at [facility] OR    TONSILLECTOMY         Social History     Tobacco Use    Smoking status: Never Smoker    Smokeless tobacco: Never Used   Substance Use Topics    Alcohol use: Yes     Alcohol/week: 0.6 oz     Types: 1 Glasses of wine per week     Frequency: Never     Binge frequency: Never     Comment: yearly    Drug use: No       Family History   Problem Relation Age of Onset    Myasthenia gravis Brother     Stroke Mother     Heart disease Father         CHF     Stroke Sister     Thyroid disease Sister        Patient's Medications   New Prescriptions    No medications on file   Previous Medications    ALBUTEROL (PROVENTIL) 2.5 MG /3 ML (0.083 %) NEBULIZER SOLUTION    2.5 mg daily as needed. Every 6 hours inhale 2 puffs as needed for  wheezing    ALBUTEROL 90 MCG/ACTUATION INHALER    Inhale 2 puffs into the lungs every 6 (six) hours as needed.     APIXABAN (ELIQUIS) 5 MG TAB    Take 5 mg by mouth 2 (two) times daily.    ASPIRIN (ECOTRIN) 81 MG EC TABLET    Take 1 tablet (81 mg total) by mouth once daily.    CHLORZOXAZONE (PARAFON FORTE) 500 MG TAB    Take 500 mg by mouth every 8 (eight) hours as needed.    CYANOCOBALAMIN 1,000 MCG/ML INJECTION    Inject 1 mL (1,000 mcg total) into the skin every 28 days.    DICLOFENAC SODIUM (VOLTAREN) 1 % GEL    Apply 2 g topically 2 (two) times daily.    FUROSEMIDE (LASIX) 20 MG TABLET    Take 1 tablet (20 mg total) by mouth daily as needed. For leg swelling, hold if BP low    HYDROCODONE-ACETAMINOPHEN (NORCO) 5-325 MG PER TABLET    Take 1 tablet by mouth every 6 (six) hours as needed for Pain.    LOSARTAN (COZAAR) 25 MG TABLET    Take 1 tablet (25 mg total) by mouth once daily.    METOPROLOL TARTRATE (LOPRESSOR) 25 MG TABLET    Take 1 tablet (25 mg total) by mouth 2 (two) times daily.    MULTIVITAMIN (THERAGRAN) TABLET    Take 1 tablet by mouth once daily.    NITROGLYCERIN (NITROSTAT) 0.4 MG SL TABLET    Place 1 tablet (0.4 mg total) under the tongue every 5 (five) minutes as needed.    PROMETHAZINE (PHENERGAN) 12.5 MG TAB    Take 1 tablet (12.5 mg total) by mouth every 6 (six) hours as needed.    THIAMINE 100 MG TABLET    Take 1 tablet (100 mg total) by mouth once daily.   Modified Medications    No medications on file   Discontinued Medications    No medications on file       Review of Systems   Constitutional: Negative for malaise/fatigue.   HENT: Negative.    Eyes: Negative.    Respiratory: Negative.  Negative for shortness of  "breath.    Cardiovascular: Negative.  Negative for chest pain and palpitations.   Gastrointestinal: Negative.    Genitourinary: Negative.    Musculoskeletal: Negative.    Skin: Negative.    Neurological: Positive for weakness. Negative for tingling, sensory change and focal weakness.   Endo/Heme/Allergies: Negative.    Psychiatric/Behavioral: Negative.    All 12 systems otherwise negative.      Wt Readings from Last 3 Encounters:   05/17/19 77 kg (169 lb 12.1 oz)   05/10/19 77.7 kg (171 lb 3 oz)   05/08/19 77.4 kg (170 lb 10.2 oz)     Temp Readings from Last 3 Encounters:   05/10/19 96.9 °F (36.1 °C) (Tympanic)   05/08/19 97.5 °F (36.4 °C) (Tympanic)   12/26/18 97.9 °F (36.6 °C) (Oral)     BP Readings from Last 3 Encounters:   05/17/19 130/72   05/10/19 119/73   05/08/19 130/80     Pulse Readings from Last 3 Encounters:   05/17/19 60   05/10/19 80   05/08/19 67       /72 (BP Method: Small (Manual))   Pulse 60   Ht 5' 3" (1.6 m)   Wt 77 kg (169 lb 12.1 oz)   BMI 30.07 kg/m²  /78 and 132/72    Objective:   Physical Exam   Constitutional: She is oriented to person, place, and time. She appears well-developed and well-nourished. No distress.   HENT:   Head: Normocephalic and atraumatic.   Nose: Nose normal.   Mouth/Throat: Oropharynx is clear and moist.   Eyes: Conjunctivae and EOM are normal. No scleral icterus.   Neck: Normal range of motion. Neck supple. No JVD present. No thyromegaly present.   Cardiovascular: Normal rate, regular rhythm, S1 normal and S2 normal. Exam reveals no gallop, no S3, no S4 and no friction rub.   No murmur heard.  Pulmonary/Chest: Effort normal and breath sounds normal. No stridor. No respiratory distress. She has no wheezes. She has no rales. She exhibits no tenderness.   Abdominal: Soft. Bowel sounds are normal. She exhibits no distension and no mass. There is no tenderness. There is no rebound.   Genitourinary:   Genitourinary Comments: Deferred   Musculoskeletal: Normal " range of motion. She exhibits no edema, tenderness or deformity.   Lymphadenopathy:     She has no cervical adenopathy.   Neurological: She is alert and oriented to person, place, and time. She exhibits normal muscle tone. Coordination normal.   Skin: Skin is warm and dry. No rash noted. She is not diaphoretic. No erythema. No pallor.   Psychiatric: She has a normal mood and affect. Her behavior is normal. Judgment and thought content normal.   Nursing note and vitals reviewed.      Lab Results   Component Value Date     12/03/2018    K 4.9 12/03/2018     12/03/2018    CO2 24 12/03/2018    BUN 16 12/03/2018    CREATININE 0.8 12/03/2018    GLU 91 12/03/2018    HGBA1C 5.5 11/13/2018    MG 1.9 11/20/2018    AST 21 11/14/2018    ALT 15 11/14/2018    ALBUMIN 3.4 (L) 11/14/2018    PROT 6.3 11/14/2018    BILITOT 0.3 11/14/2018    WBC 13.21 (H) 11/20/2018    HGB 9.3 (L) 11/20/2018    HCT 28.6 (L) 11/20/2018    HCT 22 (L) 11/15/2018    MCV 86 11/20/2018     11/20/2018    INR 1.1 11/16/2018    TSH 3.340 11/14/2018    CHOL 202 (H) 10/09/2018    HDL 62 10/09/2018    HDL 57 04/19/2017    LDLCALC 112.8 10/09/2018    TRIG 136 10/09/2018    TRIG 190 (H) 04/19/2017     (H) 11/26/2018     Assessment:      1. Coronary artery disease of native artery of native heart with stable angina pectoris    2. Complex regional pain syndrome type 1 of upper extremity, unspecified laterality    3. Mixed hyperlipidemia    4. Essential hypertension    5. S/P CABG x 5    6. PAF (paroxysmal atrial fibrillation)    7. Chronic combined systolic and diastolic congestive heart failure    8. COPD, moderate        Plan:   1. CAD s/p CABG x 5 11/2018 with EF 40-45%  - cont asa, statin, bb  - cont lasix 20mg PRN    2. TIA x 2  - Event monitor x 30 days - negative  - cont asa and Eliquis  - Holter - neg    3. HTN   - cont meds    4. HLD  - low aga diet  - cannot tolerate statin due to myopathy  - cannot tolerate Repatha    5.  COPD/Sarcoid  - cont meds per PCP/Pulm    6. Hypothyroidism  - cont synthroid     7. PAF (post op AFib)  - cont Eliquis and BB  - Holter neg     8. Pain - CRPS?  - refer to pain management     Thank you for allowing me to participate in this patient's care. Please do not hesitate to contact me with any questions or concerns. Consult note has been forwarded to the referral physician.

## 2019-05-23 ENCOUNTER — PATIENT MESSAGE (OUTPATIENT)
Dept: CARDIOLOGY | Facility: CLINIC | Age: 69
End: 2019-05-23

## 2019-05-24 ENCOUNTER — PATIENT MESSAGE (OUTPATIENT)
Dept: CARDIOLOGY | Facility: CLINIC | Age: 69
End: 2019-05-24

## 2019-05-25 ENCOUNTER — PATIENT MESSAGE (OUTPATIENT)
Dept: FAMILY MEDICINE | Facility: CLINIC | Age: 69
End: 2019-05-25

## 2019-05-30 ENCOUNTER — PATIENT MESSAGE (OUTPATIENT)
Dept: CARDIOLOGY | Facility: CLINIC | Age: 69
End: 2019-05-30

## 2019-05-30 DIAGNOSIS — I10 ESSENTIAL HYPERTENSION: Primary | ICD-10-CM

## 2019-06-04 DIAGNOSIS — I10 ESSENTIAL HYPERTENSION: Primary | ICD-10-CM

## 2019-06-04 RX ORDER — LOSARTAN POTASSIUM 25 MG/1
25 TABLET ORAL DAILY
Qty: 90 TABLET | Refills: 3 | Status: SHIPPED | OUTPATIENT
Start: 2019-06-04 | End: 2019-06-04 | Stop reason: SDUPTHER

## 2019-06-04 RX ORDER — LOSARTAN POTASSIUM 25 MG/1
25 TABLET ORAL DAILY
Qty: 90 TABLET | Refills: 3 | Status: SHIPPED | OUTPATIENT
Start: 2019-06-04 | End: 2019-07-30

## 2019-06-05 NOTE — TELEPHONE ENCOUNTER
S/W Pt and notified pt that RX was refilled/approved and sent to [facility]. Pt verbalized of all understanding.//rf

## 2019-06-10 RX ORDER — LOSARTAN POTASSIUM 25 MG/1
25 TABLET ORAL DAILY
Qty: 90 TABLET | Refills: 3 | Status: SHIPPED | OUTPATIENT
Start: 2019-06-10 | End: 2019-06-26 | Stop reason: SDUPTHER

## 2019-06-25 ENCOUNTER — PATIENT MESSAGE (OUTPATIENT)
Dept: FAMILY MEDICINE | Facility: CLINIC | Age: 69
End: 2019-06-25

## 2019-06-26 ENCOUNTER — OFFICE VISIT (OUTPATIENT)
Dept: FAMILY MEDICINE | Facility: CLINIC | Age: 69
End: 2019-06-26
Attending: FAMILY MEDICINE
Payer: MEDICARE

## 2019-06-26 ENCOUNTER — LAB VISIT (OUTPATIENT)
Dept: LAB | Facility: HOSPITAL | Age: 69
End: 2019-06-26
Attending: FAMILY MEDICINE
Payer: MEDICARE

## 2019-06-26 VITALS
HEIGHT: 63 IN | SYSTOLIC BLOOD PRESSURE: 134 MMHG | BODY MASS INDEX: 30.9 KG/M2 | DIASTOLIC BLOOD PRESSURE: 78 MMHG | HEART RATE: 74 BPM | OXYGEN SATURATION: 97 % | TEMPERATURE: 97 F | WEIGHT: 174.38 LBS

## 2019-06-26 DIAGNOSIS — E53.8 VITAMIN B 12 DEFICIENCY: ICD-10-CM

## 2019-06-26 DIAGNOSIS — E03.9 HYPOTHYROIDISM, UNSPECIFIED TYPE: ICD-10-CM

## 2019-06-26 DIAGNOSIS — R53.83 FATIGUE, UNSPECIFIED TYPE: Primary | ICD-10-CM

## 2019-06-26 DIAGNOSIS — M79.10 MYALGIA: ICD-10-CM

## 2019-06-26 DIAGNOSIS — R53.83 FATIGUE, UNSPECIFIED TYPE: ICD-10-CM

## 2019-06-26 DIAGNOSIS — I25.10 CORONARY ARTERY DISEASE, ANGINA PRESENCE UNSPECIFIED, UNSPECIFIED VESSEL OR LESION TYPE, UNSPECIFIED WHETHER NATIVE OR TRANSPLANTED HEART: ICD-10-CM

## 2019-06-26 LAB
ALBUMIN SERPL BCP-MCNC: 3.9 G/DL (ref 3.5–5.2)
ALP SERPL-CCNC: 74 U/L (ref 55–135)
ALT SERPL W/O P-5'-P-CCNC: 14 U/L (ref 10–44)
ANION GAP SERPL CALC-SCNC: 6 MMOL/L (ref 8–16)
AST SERPL-CCNC: 17 U/L (ref 10–40)
BASOPHILS # BLD AUTO: 0.04 K/UL (ref 0–0.2)
BASOPHILS NFR BLD: 0.5 % (ref 0–1.9)
BILIRUB SERPL-MCNC: 0.3 MG/DL (ref 0.1–1)
BUN SERPL-MCNC: 24 MG/DL (ref 8–23)
CALCIUM SERPL-MCNC: 9.5 MG/DL (ref 8.7–10.5)
CHLORIDE SERPL-SCNC: 106 MMOL/L (ref 95–110)
CO2 SERPL-SCNC: 27 MMOL/L (ref 23–29)
CREAT SERPL-MCNC: 0.9 MG/DL (ref 0.5–1.4)
DIFFERENTIAL METHOD: ABNORMAL
EOSINOPHIL # BLD AUTO: 0 K/UL (ref 0–0.5)
EOSINOPHIL NFR BLD: 0 % (ref 0–8)
ERYTHROCYTE [DISTWIDTH] IN BLOOD BY AUTOMATED COUNT: 13.6 % (ref 11.5–14.5)
ERYTHROCYTE [SEDIMENTATION RATE] IN BLOOD BY WESTERGREN METHOD: 18 MM/HR (ref 0–36)
EST. GFR  (AFRICAN AMERICAN): >60 ML/MIN/1.73 M^2
EST. GFR  (NON AFRICAN AMERICAN): >60 ML/MIN/1.73 M^2
GLUCOSE SERPL-MCNC: 90 MG/DL (ref 70–110)
HCT VFR BLD AUTO: 35.7 % (ref 37–48.5)
HGB BLD-MCNC: 11.4 G/DL (ref 12–16)
IMM GRANULOCYTES # BLD AUTO: 0.04 K/UL (ref 0–0.04)
IMM GRANULOCYTES NFR BLD AUTO: 0.5 % (ref 0–0.5)
LYMPHOCYTES # BLD AUTO: 1.7 K/UL (ref 1–4.8)
LYMPHOCYTES NFR BLD: 21.4 % (ref 18–48)
MCH RBC QN AUTO: 29 PG (ref 27–31)
MCHC RBC AUTO-ENTMCNC: 31.9 G/DL (ref 32–36)
MCV RBC AUTO: 91 FL (ref 82–98)
MONOCYTES # BLD AUTO: 0.7 K/UL (ref 0.3–1)
MONOCYTES NFR BLD: 8.1 % (ref 4–15)
NEUTROPHILS # BLD AUTO: 5.5 K/UL (ref 1.8–7.7)
NEUTROPHILS NFR BLD: 69.5 % (ref 38–73)
NRBC BLD-RTO: 0 /100 WBC
PLATELET # BLD AUTO: 280 K/UL (ref 150–350)
PMV BLD AUTO: 12.1 FL (ref 9.2–12.9)
POTASSIUM SERPL-SCNC: 4.6 MMOL/L (ref 3.5–5.1)
PROT SERPL-MCNC: 7 G/DL (ref 6–8.4)
RBC # BLD AUTO: 3.93 M/UL (ref 4–5.4)
SODIUM SERPL-SCNC: 139 MMOL/L (ref 136–145)
T4 FREE SERPL-MCNC: 0.62 NG/DL (ref 0.71–1.51)
TSH SERPL DL<=0.005 MIU/L-ACNC: 27.06 UIU/ML (ref 0.4–4)
WBC # BLD AUTO: 7.98 K/UL (ref 3.9–12.7)

## 2019-06-26 PROCEDURE — 85025 COMPLETE CBC W/AUTO DIFF WBC: CPT | Mod: HCNC

## 2019-06-26 PROCEDURE — 3075F PR MOST RECENT SYSTOLIC BLOOD PRESS GE 130-139MM HG: ICD-10-PCS | Mod: HCNC,CPTII,S$GLB, | Performed by: FAMILY MEDICINE

## 2019-06-26 PROCEDURE — 84439 ASSAY OF FREE THYROXINE: CPT | Mod: HCNC

## 2019-06-26 PROCEDURE — 36415 COLL VENOUS BLD VENIPUNCTURE: CPT | Mod: HCNC,PO

## 2019-06-26 PROCEDURE — 1101F PT FALLS ASSESS-DOCD LE1/YR: CPT | Mod: HCNC,CPTII,S$GLB, | Performed by: FAMILY MEDICINE

## 2019-06-26 PROCEDURE — 82607 VITAMIN B-12: CPT | Mod: HCNC

## 2019-06-26 PROCEDURE — 84443 ASSAY THYROID STIM HORMONE: CPT | Mod: HCNC

## 2019-06-26 PROCEDURE — 3075F SYST BP GE 130 - 139MM HG: CPT | Mod: HCNC,CPTII,S$GLB, | Performed by: FAMILY MEDICINE

## 2019-06-26 PROCEDURE — 80053 COMPREHEN METABOLIC PANEL: CPT | Mod: HCNC

## 2019-06-26 PROCEDURE — 99214 PR OFFICE/OUTPT VISIT, EST, LEVL IV, 30-39 MIN: ICD-10-PCS | Mod: HCNC,S$GLB,, | Performed by: FAMILY MEDICINE

## 2019-06-26 PROCEDURE — 99999 PR PBB SHADOW E&M-EST. PATIENT-LVL III: ICD-10-PCS | Mod: PBBFAC,HCNC,, | Performed by: FAMILY MEDICINE

## 2019-06-26 PROCEDURE — 85652 RBC SED RATE AUTOMATED: CPT | Mod: HCNC

## 2019-06-26 PROCEDURE — 1101F PR PT FALLS ASSESS DOC 0-1 FALLS W/OUT INJ PAST YR: ICD-10-PCS | Mod: HCNC,CPTII,S$GLB, | Performed by: FAMILY MEDICINE

## 2019-06-26 PROCEDURE — 99214 OFFICE O/P EST MOD 30 MIN: CPT | Mod: HCNC,S$GLB,, | Performed by: FAMILY MEDICINE

## 2019-06-26 PROCEDURE — 3078F DIAST BP <80 MM HG: CPT | Mod: HCNC,CPTII,S$GLB, | Performed by: FAMILY MEDICINE

## 2019-06-26 PROCEDURE — 99999 PR PBB SHADOW E&M-EST. PATIENT-LVL III: CPT | Mod: PBBFAC,HCNC,, | Performed by: FAMILY MEDICINE

## 2019-06-26 PROCEDURE — 3078F PR MOST RECENT DIASTOLIC BLOOD PRESSURE < 80 MM HG: ICD-10-PCS | Mod: HCNC,CPTII,S$GLB, | Performed by: FAMILY MEDICINE

## 2019-06-26 RX ORDER — PROMETHAZINE HYDROCHLORIDE 12.5 MG/1
12.5 TABLET ORAL EVERY 6 HOURS PRN
Qty: 12 TABLET | Refills: 0 | Status: SHIPPED | OUTPATIENT
Start: 2019-06-26

## 2019-06-26 RX ORDER — PROPRANOLOL HYDROCHLORIDE 10 MG/1
10 TABLET ORAL 3 TIMES DAILY
Qty: 90 TABLET | Refills: 0 | Status: SHIPPED | OUTPATIENT
Start: 2019-06-26 | End: 2019-07-30

## 2019-06-26 NOTE — PROGRESS NOTES
Subjective:       Patient ID: Eve Long is a 69 y.o. female.    Chief Complaint: Medication Problem (patient states she has side effects from Metoprolol (fatigue, psoriasis, enlarged breasts))    69 y old female with CAD , copd , Hypothyroidism, B12 def with Fatigue , enlarged breast  and psoriasis like rash  in elbows since taking metoprolol. Home BP readings average 110/70. No depression , no sob . No cp . S/p CABG last fall     Review of Systems   Constitutional: Positive for activity change and fatigue. Negative for unexpected weight change.   HENT: Negative.  Negative for hearing loss, rhinorrhea and trouble swallowing.    Eyes: Negative.  Negative for discharge and visual disturbance.   Respiratory: Negative.  Negative for chest tightness and wheezing.    Cardiovascular: Negative.  Negative for chest pain and palpitations.   Gastrointestinal: Negative.  Negative for blood in stool, constipation, diarrhea and vomiting.   Endocrine: Negative for polydipsia and polyuria.   Genitourinary: Negative.  Negative for difficulty urinating, dysuria, hematuria and menstrual problem.   Musculoskeletal: Negative.  Negative for arthralgias, joint swelling and neck pain.   Skin: Negative.    Neurological: Negative for weakness and headaches.   Hematological: Negative.    Psychiatric/Behavioral: Negative for confusion and dysphoric mood.       Objective:      Physical Exam   Constitutional: She is oriented to person, place, and time. No distress.   HENT:   Head: Normocephalic and atraumatic.   Right Ear: External ear normal.   Left Ear: External ear normal.   Mouth/Throat: No oropharyngeal exudate.   Eyes: Pupils are equal, round, and reactive to light. Conjunctivae and EOM are normal. Right eye exhibits no discharge. Left eye exhibits no discharge. No scleral icterus.   Neck: Normal range of motion. Neck supple. No JVD present. No tracheal deviation present. No thyromegaly present.   Cardiovascular: Normal rate,  regular rhythm and normal heart sounds. Exam reveals no gallop and no friction rub.   No murmur heard.  Pulmonary/Chest: Effort normal and breath sounds normal. No stridor. No respiratory distress. She has no wheezes. She has no rales. She exhibits no tenderness.   Abdominal: Soft. Bowel sounds are normal. She exhibits no distension. There is no tenderness. There is no rebound and no guarding.   Musculoskeletal: Normal range of motion.   Lymphadenopathy:     She has no cervical adenopathy.   Neurological: She is alert and oriented to person, place, and time.   Skin: Skin is warm and dry. She is not diaphoretic.   Psychiatric: She has a normal mood and affect. Her behavior is normal. Judgment and thought content normal.       Assessment:       1. Fatigue, unspecified type    2. Hypothyroidism, unspecified type    3. Vitamin B 12 deficiency    4. Coronary artery disease, angina presence unspecified, unspecified vessel or lesion type, unspecified whether native or transplanted heart    5. Myalgia        Plan:     Eve was seen today for medication problem.    Diagnoses and all orders for this visit:    Fatigue, unspecified type  -     CBC auto differential; Future  -     Comprehensive metabolic panel; Future  -     TSH; Future  -     Sedimentation rate; Future    Hypothyroidism, unspecified type  -     CBC auto differential; Future  -     Comprehensive metabolic panel; Future  -     TSH; Future  -     Sedimentation rate; Future    Vitamin B 12 deficiency  -     CBC auto differential; Future  -     Comprehensive metabolic panel; Future  -     TSH; Future  -     Sedimentation rate; Future  -     Vitamin B12; Future    Coronary artery disease, angina presence unspecified, unspecified vessel or lesion type, unspecified whether native or transplanted heart    Myalgia    Other orders  -     propranolol (INDERAL) 10 MG tablet; Take 1 tablet (10 mg total) by mouth 3 (three) times daily.     Labs '  Labs   Switch to low dose  of Propanolol . email progress in 1 w

## 2019-06-27 ENCOUNTER — PATIENT MESSAGE (OUTPATIENT)
Dept: FAMILY MEDICINE | Facility: CLINIC | Age: 69
End: 2019-06-27

## 2019-06-27 LAB — VIT B12 SERPL-MCNC: 992 PG/ML (ref 210–950)

## 2019-07-01 ENCOUNTER — PATIENT MESSAGE (OUTPATIENT)
Dept: FAMILY MEDICINE | Facility: CLINIC | Age: 69
End: 2019-07-01

## 2019-07-01 ENCOUNTER — TELEPHONE (OUTPATIENT)
Dept: FAMILY MEDICINE | Facility: CLINIC | Age: 69
End: 2019-07-01

## 2019-07-01 DIAGNOSIS — E03.9 HYPOTHYROIDISM, UNSPECIFIED TYPE: Primary | ICD-10-CM

## 2019-07-06 ENCOUNTER — PATIENT MESSAGE (OUTPATIENT)
Dept: FAMILY MEDICINE | Facility: CLINIC | Age: 69
End: 2019-07-06

## 2019-07-08 DIAGNOSIS — M85.80 OSTEOPENIA, UNSPECIFIED LOCATION: Primary | ICD-10-CM

## 2019-07-08 DIAGNOSIS — M94.9 DISORDER OF CARTILAGE: ICD-10-CM

## 2019-07-19 ENCOUNTER — APPOINTMENT (OUTPATIENT)
Dept: RADIOLOGY | Facility: HOSPITAL | Age: 69
End: 2019-07-19
Attending: FAMILY MEDICINE
Payer: MEDICARE

## 2019-07-19 ENCOUNTER — PATIENT MESSAGE (OUTPATIENT)
Dept: FAMILY MEDICINE | Facility: CLINIC | Age: 69
End: 2019-07-19

## 2019-07-19 DIAGNOSIS — M94.9 DISORDER OF CARTILAGE: ICD-10-CM

## 2019-07-19 DIAGNOSIS — M85.80 OSTEOPENIA, UNSPECIFIED LOCATION: ICD-10-CM

## 2019-07-19 PROCEDURE — 77080 DXA BONE DENSITY AXIAL: CPT | Mod: 26,HCNC,, | Performed by: RADIOLOGY

## 2019-07-19 PROCEDURE — 77080 DEXA BONE DENSITY SPINE HIP: ICD-10-PCS | Mod: 26,HCNC,, | Performed by: RADIOLOGY

## 2019-07-19 PROCEDURE — 77080 DXA BONE DENSITY AXIAL: CPT | Mod: TC,HCNC

## 2019-07-24 ENCOUNTER — PATIENT MESSAGE (OUTPATIENT)
Dept: FAMILY MEDICINE | Facility: CLINIC | Age: 69
End: 2019-07-24

## 2019-07-24 ENCOUNTER — PATIENT MESSAGE (OUTPATIENT)
Dept: HEMATOLOGY/ONCOLOGY | Facility: CLINIC | Age: 69
End: 2019-07-24

## 2019-07-26 DIAGNOSIS — E53.8 VITAMIN B12 DEFICIENCY WITHOUT ANEMIA: ICD-10-CM

## 2019-07-26 RX ORDER — CYANOCOBALAMIN 1000 UG/ML
1000 INJECTION, SOLUTION INTRAMUSCULAR; SUBCUTANEOUS
Qty: 3 ML | Refills: 4 | Status: SHIPPED | OUTPATIENT
Start: 2019-07-26 | End: 2020-11-10 | Stop reason: SDUPTHER

## 2019-07-30 ENCOUNTER — HOSPITAL ENCOUNTER (OUTPATIENT)
Dept: RADIOLOGY | Facility: HOSPITAL | Age: 69
Discharge: HOME | End: 2019-07-30
Attending: FAMILY MEDICINE
Payer: MEDICARE

## 2019-07-30 ENCOUNTER — OFFICE VISIT (OUTPATIENT)
Dept: FAMILY MEDICINE | Facility: CLINIC | Age: 69
End: 2019-07-30
Attending: FAMILY MEDICINE
Payer: MEDICARE

## 2019-07-30 VITALS
BODY MASS INDEX: 31 KG/M2 | WEIGHT: 174.94 LBS | TEMPERATURE: 97 F | DIASTOLIC BLOOD PRESSURE: 80 MMHG | HEART RATE: 87 BPM | HEIGHT: 63 IN | OXYGEN SATURATION: 96 % | SYSTOLIC BLOOD PRESSURE: 126 MMHG

## 2019-07-30 DIAGNOSIS — M25.521 ELBOW PAIN, RIGHT: ICD-10-CM

## 2019-07-30 DIAGNOSIS — M25.551 HIP PAIN, RIGHT: Primary | ICD-10-CM

## 2019-07-30 DIAGNOSIS — I10 ESSENTIAL HYPERTENSION: ICD-10-CM

## 2019-07-30 DIAGNOSIS — M25.551 HIP PAIN, RIGHT: ICD-10-CM

## 2019-07-30 PROCEDURE — 3079F DIAST BP 80-89 MM HG: CPT | Mod: HCNC,CPTII,S$GLB, | Performed by: FAMILY MEDICINE

## 2019-07-30 PROCEDURE — 99214 OFFICE O/P EST MOD 30 MIN: CPT | Mod: HCNC,S$GLB,, | Performed by: FAMILY MEDICINE

## 2019-07-30 PROCEDURE — 99214 PR OFFICE/OUTPT VISIT, EST, LEVL IV, 30-39 MIN: ICD-10-PCS | Mod: HCNC,S$GLB,, | Performed by: FAMILY MEDICINE

## 2019-07-30 PROCEDURE — 73502 XR HIP 2 VIEW RIGHT: ICD-10-PCS | Mod: 26,HCNC,RT, | Performed by: RADIOLOGY

## 2019-07-30 PROCEDURE — 73502 X-RAY EXAM HIP UNI 2-3 VIEWS: CPT | Mod: 26,HCNC,RT, | Performed by: RADIOLOGY

## 2019-07-30 PROCEDURE — 1101F PT FALLS ASSESS-DOCD LE1/YR: CPT | Mod: HCNC,CPTII,S$GLB, | Performed by: FAMILY MEDICINE

## 2019-07-30 PROCEDURE — 99999 PR PBB SHADOW E&M-EST. PATIENT-LVL IV: ICD-10-PCS | Mod: PBBFAC,HCNC,, | Performed by: FAMILY MEDICINE

## 2019-07-30 PROCEDURE — 3079F PR MOST RECENT DIASTOLIC BLOOD PRESSURE 80-89 MM HG: ICD-10-PCS | Mod: HCNC,CPTII,S$GLB, | Performed by: FAMILY MEDICINE

## 2019-07-30 PROCEDURE — 73080 XR ELBOW COMPLETE 3 VIEW RIGHT: ICD-10-PCS | Mod: 26,HCNC,RT, | Performed by: RADIOLOGY

## 2019-07-30 PROCEDURE — 73080 X-RAY EXAM OF ELBOW: CPT | Mod: 26,HCNC,RT, | Performed by: RADIOLOGY

## 2019-07-30 PROCEDURE — 3074F PR MOST RECENT SYSTOLIC BLOOD PRESSURE < 130 MM HG: ICD-10-PCS | Mod: HCNC,CPTII,S$GLB, | Performed by: FAMILY MEDICINE

## 2019-07-30 PROCEDURE — 3074F SYST BP LT 130 MM HG: CPT | Mod: HCNC,CPTII,S$GLB, | Performed by: FAMILY MEDICINE

## 2019-07-30 PROCEDURE — 1101F PR PT FALLS ASSESS DOC 0-1 FALLS W/OUT INJ PAST YR: ICD-10-PCS | Mod: HCNC,CPTII,S$GLB, | Performed by: FAMILY MEDICINE

## 2019-07-30 PROCEDURE — 99999 PR PBB SHADOW E&M-EST. PATIENT-LVL IV: CPT | Mod: PBBFAC,HCNC,, | Performed by: FAMILY MEDICINE

## 2019-07-30 PROCEDURE — 73502 X-RAY EXAM HIP UNI 2-3 VIEWS: CPT | Mod: TC,HCNC,PO,RT

## 2019-07-30 PROCEDURE — 73080 X-RAY EXAM OF ELBOW: CPT | Mod: TC,HCNC,PO,RT

## 2019-07-30 RX ORDER — LOSARTAN POTASSIUM 50 MG/1
50 TABLET ORAL DAILY
Qty: 90 TABLET | Refills: 3 | Status: SHIPPED | OUTPATIENT
Start: 2019-07-30 | End: 2020-07-01 | Stop reason: SINTOL

## 2019-07-30 NOTE — PROGRESS NOTES
Subjective:       Patient ID: Eve Long is a 69 y.o. female.    Chief Complaint: Elbow Injury (fluid in elbow) and Hip Pain (right)    69 y old female with osteopenia who sustained  fall around 6/17 . She landed on R hip and r elbow on tile floor  . NOW it pain since . She has also  noted Bulge on R elbow 2 w ago  . Applying topical diclofenac with some relief     Review of Systems   Constitutional: Positive for activity change and unexpected weight change.   HENT: Negative.  Negative for hearing loss, rhinorrhea and trouble swallowing.    Eyes: Negative.  Negative for discharge and visual disturbance.   Respiratory: Negative.  Negative for chest tightness and wheezing.    Cardiovascular: Negative.  Negative for chest pain and palpitations.   Gastrointestinal: Negative.  Negative for blood in stool, constipation, diarrhea and vomiting.   Endocrine: Negative for polydipsia and polyuria.   Genitourinary: Negative.  Negative for difficulty urinating, dysuria, hematuria and menstrual problem.   Musculoskeletal: Positive for arthralgias and joint swelling. Negative for neck pain.   Skin: Negative.    Neurological: Negative for weakness and headaches.   Hematological: Negative.    Psychiatric/Behavioral: Negative for confusion and dysphoric mood.       Objective:      Physical Exam   Constitutional: She is oriented to person, place, and time. No distress.   HENT:   Head: Normocephalic and atraumatic.   Right Ear: External ear normal.   Left Ear: External ear normal.   Mouth/Throat: No oropharyngeal exudate.   Eyes: Pupils are equal, round, and reactive to light. Conjunctivae and EOM are normal. Right eye exhibits no discharge. Left eye exhibits no discharge. No scleral icterus.   Neck: Normal range of motion. Neck supple. No JVD present. No tracheal deviation present. No thyromegaly present.   Cardiovascular: Normal rate, regular rhythm and normal heart sounds. Exam reveals no gallop and no friction rub.   No  murmur heard.  Pulmonary/Chest: Effort normal and breath sounds normal. No stridor. No respiratory distress. She has no wheezes. She has no rales. She exhibits no tenderness.   Abdominal: Soft. Bowel sounds are normal. She exhibits no distension. There is no tenderness. There is no rebound and no guarding.   Musculoskeletal:        Right elbow: She exhibits decreased range of motion, swelling and effusion.        Right hip: She exhibits decreased range of motion, decreased strength and tenderness.   Lymphadenopathy:     She has no cervical adenopathy.   Neurological: She is alert and oriented to person, place, and time.   Skin: Skin is warm and dry. She is not diaphoretic.   Psychiatric: She has a normal mood and affect. Her behavior is normal. Judgment and thought content normal.       Assessment:     Eve was seen today for elbow injury and hip pain.    Diagnoses and all orders for this visit:    Hip pain, right  -     X-Ray Hip 2 or 3 views Right; Future    Essential hypertension  -     losartan (COZAAR) 50 MG tablet; Take 1 tablet (50 mg total) by mouth once daily.    Elbow pain, right  -     CBC auto differential; Future  -     C-reactive protein; Future  -     X-Ray Elbow Complete 3 view Right; Future          Plan:     Eve was seen today for elbow injury and hip pain.    Diagnoses and all orders for this visit:    Essential hypertension  -     losartan (COZAAR) 50 MG tablet; Take 1 tablet (50 mg total) by mouth once daily.

## 2019-08-17 ENCOUNTER — PATIENT MESSAGE (OUTPATIENT)
Dept: HEMATOLOGY/ONCOLOGY | Facility: CLINIC | Age: 69
End: 2019-08-17

## 2019-08-19 ENCOUNTER — PATIENT MESSAGE (OUTPATIENT)
Dept: HEMATOLOGY/ONCOLOGY | Facility: CLINIC | Age: 69
End: 2019-08-19

## 2019-08-19 ENCOUNTER — TELEPHONE (OUTPATIENT)
Dept: HEMATOLOGY/ONCOLOGY | Facility: CLINIC | Age: 69
End: 2019-08-19

## 2019-09-10 ENCOUNTER — CLINICAL SUPPORT (OUTPATIENT)
Dept: PULMONOLOGY | Facility: CLINIC | Age: 69
End: 2019-09-10
Payer: MEDICARE

## 2019-09-10 ENCOUNTER — HOSPITAL ENCOUNTER (OUTPATIENT)
Dept: RADIOLOGY | Facility: HOSPITAL | Age: 69
Discharge: HOME | End: 2019-09-10
Attending: INTERNAL MEDICINE
Payer: MEDICARE

## 2019-09-10 ENCOUNTER — OFFICE VISIT (OUTPATIENT)
Dept: PULMONOLOGY | Facility: CLINIC | Age: 69
End: 2019-09-10
Payer: MEDICARE

## 2019-09-10 VITALS
RESPIRATION RATE: 20 BRPM | SYSTOLIC BLOOD PRESSURE: 128 MMHG | WEIGHT: 177 LBS | BODY MASS INDEX: 31.36 KG/M2 | DIASTOLIC BLOOD PRESSURE: 70 MMHG | OXYGEN SATURATION: 96 % | HEIGHT: 63 IN | HEART RATE: 55 BPM

## 2019-09-10 DIAGNOSIS — J44.9 COPD, MODERATE: Primary | Chronic | ICD-10-CM

## 2019-09-10 DIAGNOSIS — J44.9 COPD, MODERATE: ICD-10-CM

## 2019-09-10 DIAGNOSIS — J84.10 CALCIFIED GRANULOMA OF LUNG: Chronic | ICD-10-CM

## 2019-09-10 DIAGNOSIS — D86.0 PULMONARY SARCOIDOSIS: Chronic | ICD-10-CM

## 2019-09-10 PROCEDURE — 3074F SYST BP LT 130 MM HG: CPT | Mod: HCNC,CPTII,S$GLB, | Performed by: INTERNAL MEDICINE

## 2019-09-10 PROCEDURE — 71046 X-RAY EXAM CHEST 2 VIEWS: CPT | Mod: TC,HCNC

## 2019-09-10 PROCEDURE — 99499 RISK ADDL DX/OHS AUDIT: ICD-10-PCS | Mod: HCNC,S$GLB,, | Performed by: INTERNAL MEDICINE

## 2019-09-10 PROCEDURE — 99215 OFFICE O/P EST HI 40 MIN: CPT | Mod: 25,HCNC,S$GLB, | Performed by: INTERNAL MEDICINE

## 2019-09-10 PROCEDURE — 99215 PR OFFICE/OUTPT VISIT, EST, LEVL V, 40-54 MIN: ICD-10-PCS | Mod: 25,HCNC,S$GLB, | Performed by: INTERNAL MEDICINE

## 2019-09-10 PROCEDURE — 94726 PLETHYSMOGRAPHY LUNG VOLUMES: CPT | Mod: HCNC,S$GLB,, | Performed by: INTERNAL MEDICINE

## 2019-09-10 PROCEDURE — 1101F PT FALLS ASSESS-DOCD LE1/YR: CPT | Mod: HCNC,CPTII,S$GLB, | Performed by: INTERNAL MEDICINE

## 2019-09-10 PROCEDURE — 3078F PR MOST RECENT DIASTOLIC BLOOD PRESSURE < 80 MM HG: ICD-10-PCS | Mod: HCNC,CPTII,S$GLB, | Performed by: INTERNAL MEDICINE

## 2019-09-10 PROCEDURE — 94060 PR EVAL OF BRONCHOSPASM: ICD-10-PCS | Mod: HCNC,S$GLB,, | Performed by: INTERNAL MEDICINE

## 2019-09-10 PROCEDURE — 94060 EVALUATION OF WHEEZING: CPT | Mod: HCNC,S$GLB,, | Performed by: INTERNAL MEDICINE

## 2019-09-10 PROCEDURE — 3078F DIAST BP <80 MM HG: CPT | Mod: HCNC,CPTII,S$GLB, | Performed by: INTERNAL MEDICINE

## 2019-09-10 PROCEDURE — 1101F PR PT FALLS ASSESS DOC 0-1 FALLS W/OUT INJ PAST YR: ICD-10-PCS | Mod: HCNC,CPTII,S$GLB, | Performed by: INTERNAL MEDICINE

## 2019-09-10 PROCEDURE — 94729 DIFFUSING CAPACITY: CPT | Mod: HCNC,S$GLB,, | Performed by: INTERNAL MEDICINE

## 2019-09-10 PROCEDURE — 94729 PR C02/MEMBANE DIFFUSE CAPACITY: ICD-10-PCS | Mod: HCNC,S$GLB,, | Performed by: INTERNAL MEDICINE

## 2019-09-10 PROCEDURE — 3074F PR MOST RECENT SYSTOLIC BLOOD PRESSURE < 130 MM HG: ICD-10-PCS | Mod: HCNC,CPTII,S$GLB, | Performed by: INTERNAL MEDICINE

## 2019-09-10 PROCEDURE — 99999 PR PBB SHADOW E&M-EST. PATIENT-LVL III: ICD-10-PCS | Mod: PBBFAC,HCNC,, | Performed by: INTERNAL MEDICINE

## 2019-09-10 PROCEDURE — 71046 X-RAY EXAM CHEST 2 VIEWS: CPT | Mod: 26,HCNC,, | Performed by: RADIOLOGY

## 2019-09-10 PROCEDURE — 94726 PULM FUNCT TST PLETHYSMOGRAP: ICD-10-PCS | Mod: HCNC,S$GLB,, | Performed by: INTERNAL MEDICINE

## 2019-09-10 PROCEDURE — 71046 XR CHEST PA AND LATERAL: ICD-10-PCS | Mod: 26,HCNC,, | Performed by: RADIOLOGY

## 2019-09-10 PROCEDURE — 99999 PR PBB SHADOW E&M-EST. PATIENT-LVL III: CPT | Mod: PBBFAC,HCNC,, | Performed by: INTERNAL MEDICINE

## 2019-09-10 PROCEDURE — 99499 UNLISTED E&M SERVICE: CPT | Mod: HCNC,S$GLB,, | Performed by: INTERNAL MEDICINE

## 2019-09-10 NOTE — PROGRESS NOTES
Subjective:      Established patient    Patient ID: Eve Long is a 69 y.o. female.    Patient Active Problem List   Diagnosis    Pulmonary sarcoidosis    Hypothyroidism    COPD, moderate    Vitamin B12 deficiency without anemia    Calcified granuloma of lung    Mixed hyperlipidemia    HTN (hypertension)    Imbalance    TIA (transient ischemic attack)    S/P CABG x 5    Coronary artery disease of native artery with stable angina pectoris    PAF (paroxysmal atrial fibrillation)    Chronic combined systolic and diastolic congestive heart failure    Myalgia       Problem list has been reviewed.      Group Spirometric Classification Exacerbations / year mMRC CAT   Group A: Low risk; less symptom   GOLD 1- 2  < = 1 0 - 1 <10     FEV1 1.34 L ( 63% predicted )     GOLD 1 - mild: FEV1? 80% predicted   GOLD 2 - moderate: 50% ?FEV1 <80% predicted   GOLD 3 - severe: 30% ?FEV1 <50% predicted   GOLD 4 - very severe: FEV1 <30% predicted.      Chief Complaint: COPD and Sarcoidosis      HPI    CXR and PFT reviewed with patient who expressed and voiced understanding.     Patients reports NYHA II dyspnea    The patient does not have currently have symptoms / an exacerbation.       No recent change in breathing.     Her current respiratory therapy regimen is albuterol inhaler or albuterol nebulizer which provides  relief. She is  adherent with her regimen. She rarely uses her inhaler.      COPD QUESTIONNAIRE  How often do you cough?: (P) Some of the time  How often do you have phlegm (mucus) in your chest?: (P) Never  How often does your chest feel tight?: (P) Never  When you walk up a hill or one flight of stairs, how often are you breathless?: (P) Almost never  How often are you limited doing any activities at home?: (P) Never  How often are you confident leaving the house despite your lung condition?: (P) All of the time  How often do you sleep soundly?: (P) Most of the time  How often do you have energy?:  (P) Most of the time  Total score: (P) 6       A full  review of systems, past , family  and social histories was performed except as mentioned in the note above, these are non contributory to the main issues discussed today.       Previous Report Reviewed: office notes     The following portions of the patient's history were reviewed and updated as appropriate: She  has a past medical history of Anemia, Asthma, Basal cell carcinoma, Chronic combined systolic and diastolic congestive heart failure (1/29/2019), COPD (chronic obstructive pulmonary disease), Coronary artery disease involving native coronary artery of native heart with unstable angina pectoris (11/14/2018), Hyperlipidemia, Hypertension, Immune deficiency disorder, NSTEMI (non-ST elevated myocardial infarction) (11/13/2018), PAF (paroxysmal atrial fibrillation) (1/29/2019), Pneumonia, Sarcoidosis, and TIA (transient ischemic attack).  She  has a past surgical history that includes Hysterectomy; Cholecystectomy; Lung biopsy; Oophorectomy; Cosmetic surgery; Fracture surgery (Left); Tonsillectomy; Skin biopsy; Coronary Artery Bypass Graft (CABG) (N/A, 11/15/2018); Endoscopic harvest of vein (Left, 11/15/2018); Left heart catheterization (Left, 11/14/2018); and Coronary artery bypass graft.  Her family history includes Heart disease in her father; Myasthenia gravis in her brother; Stroke in her mother and sister; Thyroid disease in her sister.  She  reports that she has never smoked. She has never used smokeless tobacco. She reports that she drinks about 0.6 oz of alcohol per week. She reports that she does not use drugs.  She has a current medication list which includes the following prescription(s): albuterol, albuterol, apixaban, cyanocobalamin, furosemide, losartan, multivitamin, nitroglycerin, chlorzoxazone, diclofenac sodium, fluzone high-dose 2019-20 (pf), promethazine, and thiamine.  She is allergic to metoprolol; repatha pushtronex [evolocumab];  "beclomethasone dipropionate; danazol; doxycycline; fluticasone propion-salmeterol; propranolol; statins-hmg-coa reductase inhibitors; umeclidinium-vilanterol; and red dye..    Review of Systems   Constitutional: Positive for fatigue. Negative for fever, chills and night sweats.   HENT: Negative for nosebleeds, rhinorrhea, sinus pressure, sore throat, congestion and ear pain.    Eyes: Positive for itching.   Respiratory: Positive for cough, wheezing and somnolence. Negative for snoring, choking, chest tightness, orthopnea, use of rescue inhaler and Paroxysmal Nocturnal Dyspnea.    Cardiovascular: Negative for chest pain, palpitations and leg swelling.   Genitourinary: Negative for difficulty urinating.   Endocrine: Positive for heat intolerance and polyuria. Negative for polydipsia and cold intolerance.    Musculoskeletal: Positive for arthralgias. Negative for back pain.   Skin: Negative for rash.   Gastrointestinal: Negative for nausea, vomiting, abdominal pain, abdominal distention and acid reflux.   Neurological: Negative for dizziness, syncope, light-headedness and headaches.   Hematological: Excessive bruising. Does not bruise/bleed easily.   Psychiatric/Behavioral: The patient is not nervous/anxious.    All other systems reviewed and are negative.       Objective:     /70   Pulse (!) 55   Resp 20   Ht 5' 3" (1.6 m)   Wt 80.3 kg (177 lb)   SpO2 96%   BMI 31.35 kg/m²   Body mass index is 31.35 kg/m².     Physical Exam   Constitutional: She is oriented to person, place, and time. She appears well-developed and well-nourished.   HENT:   Head: Normocephalic and atraumatic.   Eyes: Pupils are equal, round, and reactive to light. EOM are normal.   Neck: Normal range of motion. Neck supple.   Cardiovascular: Normal rate and regular rhythm.   Pulmonary/Chest: Effort normal and breath sounds normal.   Abdominal: Soft. Bowel sounds are normal.   Musculoskeletal: Normal range of motion.   Neurological: She is " alert and oriented to person, place, and time.   Skin: Skin is warm.   Psychiatric: She has a normal mood and affect.   Nursing note and vitals reviewed.      Personal Diagnostic Review        Pulmonary function tests: 09/10/19: FEV1: 1.34 (63 % predicted), FVC:  2.19 (80 % predicted), FEV1/FVC:  61%.  T.54(95 % predicted), RV/TLVC: 47 (109.8 % predicted), DLCO: 11.76 (56.9 % predicted)  Moderate obstruction. Normal lung volumes. Diffusion capacity is moderately reduced.       CXR: 09/10/19: No significant change from prior exam.  Status post sternotomy and cholecystectomy.  Stable calcification posterolateral margin left lower lobe.  Minimal scarring and/or subsegmental atelectasis within the left base without significant change.  Remainder of the lungs free of acute infiltrate and effusion.  Trachea is midline.  Heart size within normal limits in the aorta minimally tortuous.  Pulmonary vasculatures stable and symmetric.  There is osteopenia and minimal spondylosis.    Assessment / plan:       Discussed diagnosis, its evaluation, treatment and usual course. All questions answered.    Problem List Items Addressed This Visit        Pulmonary    COPD, moderate - Primary    Current Assessment & Plan     COPD ROS: taking medications as instructed, no medication side effects noted, no significant ongoing wheezing or shortness of breath, using bronchodilator MDI less than twice a week.   New concerns: None.   Exam: appears well, vitals normal, no respiratory distress, acyanotic, normal RR, chest clear, no wheezing, crepitations, rhonchi, normal symmetric air entry.   Assessment:  COPD well controlled.   Plan: ALBUTEROL PFT AND NEBS. current treatment plan is effective, no change in therapy.          Calcified granuloma of lung    Overview     calcified granuloma in the left lower lobe.         Current Assessment & Plan     Stable and controlled. Continued radiologic monitoring.               Immunology/Multi System     Pulmonary sarcoidosis    Current Assessment & Plan     BURNT OUT PULMONARY SARCOIDOSIS. Stable and controlled. No changes to current therapeutic plan. Continue all previously prescribed medications.                  TIME SPENT WITH PATIENT: Time spent: 35 minutes in face to face  discussion concerning diagnosis, prognosis, review of lab and test results, benefits of treatment as well as management of disease, counseling of patient and coordination of care between various health  care providers . Greater than half the time spent was used for coordination of care and counseling of patient.        Follow up if symptoms worsen or fail to improve, for COPD, Lung Nodule, Sarcoidosis.

## 2019-09-10 NOTE — ASSESSMENT & PLAN NOTE
COPD ROS: taking medications as instructed, no medication side effects noted, no significant ongoing wheezing or shortness of breath, using bronchodilator MDI less than twice a week.   New concerns: None.   Exam: appears well, vitals normal, no respiratory distress, acyanotic, normal RR, chest clear, no wheezing, crepitations, rhonchi, normal symmetric air entry.   Assessment:  COPD well controlled.   Plan: ALBUTEROL PFT AND NEBS. current treatment plan is effective, no change in therapy.

## 2019-09-10 NOTE — PATIENT INSTRUCTIONS
Lung Anatomy  Your lungs take air in to give your body oxygen, which the body needs to work. Your lungs, like all the tissues in your body, are made up of billions of tiny specialized cells. Old lung cells die and are replaced by new, identical lung cells. This natural process helps ensure healthy lungs.    Date Last Reviewed: 11/1/2016  © 8406-2110 [email or web address]. [address] [place] [address], [place] [address]. All rights reserved. This information is not intended as a substitute for professional medical care. Always follow your healthcare professional's instructions.

## 2019-09-11 LAB
BRPFT: ABNORMAL
DLCO ADJ PRE: 12.28 ML/(MIN*MMHG) (ref 14.92–26.39)
DLCO SINGLE BREATH LLN: 14.92
DLCO SINGLE BREATH PRE REF: 56.9 %
DLCO SINGLE BREATH REF: 20.66
DLCOC SBVA LLN: 2.83
DLCOC SBVA PRE REF: 93.9 %
DLCOC SBVA REF: 4.33
DLCOC SINGLE BREATH LLN: 14.92
DLCOC SINGLE BREATH PRE REF: 59.5 %
DLCOC SINGLE BREATH REF: 20.66
DLCOVA LLN: 2.83
DLCOVA PRE REF: 90 %
DLCOVA PRE: 3.9 ML/(MIN*MMHG*L) (ref 2.83–5.83)
DLCOVA REF: 4.33
DLVAADJ PRE: 4.07 ML/(MIN*MMHG*L) (ref 2.83–5.83)
ERV LLN: 0.64
ERV PRE REF: 28.2 %
ERV REF: 0.64
FEF 25 75 CHG: -1 %
FEF 25 75 LLN: 0.84
FEF 25 75 POST REF: 34.3 %
FEF 25 75 PRE REF: 34.6 %
FEF 25 75 REF: 1.82
FET100 CHG: -2.6 %
FEV1 CHG: -2.1 %
FEV1 FVC CHG: 3.1 %
FEV1 FVC LLN: 65
FEV1 FVC POST REF: 80.5 %
FEV1 FVC PRE REF: 78.1 %
FEV1 FVC REF: 78
FEV1 LLN: 1.55
FEV1 POST REF: 61.7 %
FEV1 PRE REF: 63 %
FEV1 REF: 2.13
FRCPLETH LLN: 1.83
FRCPLETH PREREF: 89 %
FRCPLETH REF: 2.65
FVC CHG: -5 %
FVC LLN: 2
FVC POST REF: 76 %
FVC PRE REF: 80 %
FVC REF: 2.74
IVC PRE: 1.79 L (ref 2–3.48)
IVC SINGLE BREATH LLN: 2
IVC SINGLE BREATH PRE REF: 65.5 %
IVC SINGLE BREATH REF: 2.74
MVV LLN: 67
MVV PRE REF: 62.2 %
MVV REF: 79
PEF CHG: 21.1 %
PEF LLN: 3.83
PEF POST REF: 84.1 %
PEF PRE REF: 69.5 %
PEF REF: 5.49
POST FEF 25 75: 0.62 L/S (ref 0.84–2.8)
POST FET 100: 6.82 SEC
POST FEV1 FVC: 62.97 % (ref 64.89–91.56)
POST FEV1: 1.31 L (ref 1.55–2.71)
POST FVC: 2.08 L (ref 2–3.48)
POST PEF: 4.62 L/S (ref 3.83–7.15)
PRE DLCO: 11.76 ML/(MIN*MMHG) (ref 14.92–26.39)
PRE ERV: 0.18 L (ref 0.64–0.64)
PRE FEF 25 75: 0.63 L/S (ref 0.84–2.8)
PRE FET 100: 7 SEC
PRE FEV1 FVC: 61.09 % (ref 64.89–91.56)
PRE FEV1: 1.34 L (ref 1.55–2.71)
PRE FRC PL: 2.36 L
PRE FVC: 2.19 L (ref 2–3.48)
PRE MVV: 49 L/MIN (ref 66.98–90.62)
PRE PEF: 3.82 L/S (ref 3.83–7.15)
PRE RV: 2.13 L (ref 1.44–2.59)
PRE TLC: 4.54 L (ref 3.78–5.76)
RAW LLN: 3.06
RAW PRE REF: 170.7 %
RAW PRE: 5.22 CMH2O*S/L (ref 3.06–3.06)
RAW REF: 3.06
RV LLN: 1.44
RV PRE REF: 105.8 %
RV REF: 2.02
RVTLC LLN: 33
RVTLC PRE REF: 109.8 %
RVTLC PRE: 46.95 % (ref 33.17–52.35)
RVTLC REF: 43
TLC LLN: 3.78
TLC PRE REF: 95.2 %
TLC REF: 4.77
VA PRE: 3.02 L (ref 4.62–4.62)
VA SINGLE BREATH LLN: 4.62
VA SINGLE BREATH PRE REF: 65.4 %
VA SINGLE BREATH REF: 4.62
VC LLN: 2
VC PRE REF: 88 %
VC PRE: 2.41 L (ref 2–3.48)
VC REF: 2.74
VTGRAWPRE: 2.85 L

## 2019-10-18 ENCOUNTER — TELEPHONE (OUTPATIENT)
Dept: FAMILY MEDICINE | Facility: CLINIC | Age: 69
End: 2019-10-18

## 2019-10-18 ENCOUNTER — OFFICE VISIT (OUTPATIENT)
Dept: FAMILY MEDICINE | Facility: CLINIC | Age: 69
End: 2019-10-18
Attending: FAMILY MEDICINE
Payer: MEDICARE

## 2019-10-18 VITALS
DIASTOLIC BLOOD PRESSURE: 78 MMHG | SYSTOLIC BLOOD PRESSURE: 130 MMHG | OXYGEN SATURATION: 98 % | TEMPERATURE: 97 F | HEART RATE: 50 BPM | HEIGHT: 63 IN | WEIGHT: 178.44 LBS | BODY MASS INDEX: 31.62 KG/M2

## 2019-10-18 DIAGNOSIS — J44.1 COPD EXACERBATION: Primary | ICD-10-CM

## 2019-10-18 PROCEDURE — 3078F DIAST BP <80 MM HG: CPT | Mod: HCNC,CPTII,S$GLB, | Performed by: FAMILY MEDICINE

## 2019-10-18 PROCEDURE — 99999 PR PBB SHADOW E&M-EST. PATIENT-LVL III: CPT | Mod: PBBFAC,HCNC,, | Performed by: FAMILY MEDICINE

## 2019-10-18 PROCEDURE — 3075F PR MOST RECENT SYSTOLIC BLOOD PRESS GE 130-139MM HG: ICD-10-PCS | Mod: HCNC,CPTII,S$GLB, | Performed by: FAMILY MEDICINE

## 2019-10-18 PROCEDURE — 99213 PR OFFICE/OUTPT VISIT, EST, LEVL III, 20-29 MIN: ICD-10-PCS | Mod: HCNC,S$GLB,, | Performed by: FAMILY MEDICINE

## 2019-10-18 PROCEDURE — 3078F PR MOST RECENT DIASTOLIC BLOOD PRESSURE < 80 MM HG: ICD-10-PCS | Mod: HCNC,CPTII,S$GLB, | Performed by: FAMILY MEDICINE

## 2019-10-18 PROCEDURE — 3075F SYST BP GE 130 - 139MM HG: CPT | Mod: HCNC,CPTII,S$GLB, | Performed by: FAMILY MEDICINE

## 2019-10-18 PROCEDURE — 1101F PT FALLS ASSESS-DOCD LE1/YR: CPT | Mod: HCNC,CPTII,S$GLB, | Performed by: FAMILY MEDICINE

## 2019-10-18 PROCEDURE — 99999 PR PBB SHADOW E&M-EST. PATIENT-LVL III: ICD-10-PCS | Mod: PBBFAC,HCNC,, | Performed by: FAMILY MEDICINE

## 2019-10-18 PROCEDURE — 1101F PR PT FALLS ASSESS DOC 0-1 FALLS W/OUT INJ PAST YR: ICD-10-PCS | Mod: HCNC,CPTII,S$GLB, | Performed by: FAMILY MEDICINE

## 2019-10-18 PROCEDURE — 99213 OFFICE O/P EST LOW 20 MIN: CPT | Mod: HCNC,S$GLB,, | Performed by: FAMILY MEDICINE

## 2019-10-18 RX ORDER — ALBUTEROL SULFATE 0.83 MG/ML
2.5 SOLUTION RESPIRATORY (INHALATION) DAILY PRN
Qty: 1 BOX | Refills: 0 | Status: SHIPPED | OUTPATIENT
Start: 2019-10-18 | End: 2019-10-18 | Stop reason: SDUPTHER

## 2019-10-18 RX ORDER — ALBUTEROL SULFATE 0.83 MG/ML
2.5 SOLUTION RESPIRATORY (INHALATION)
Qty: 1 BOX | Refills: 0 | Status: SHIPPED | OUTPATIENT
Start: 2019-10-18

## 2019-10-18 RX ORDER — VIT C/E/ZN/COPPR/LUTEIN/ZEAXAN 250MG-90MG
1000 CAPSULE ORAL DAILY
COMMUNITY

## 2019-10-18 RX ORDER — PANTOPRAZOLE SODIUM 40 MG/1
40 TABLET, DELAYED RELEASE ORAL DAILY
COMMUNITY

## 2019-10-18 RX ORDER — LEVOTHYROXINE SODIUM 50 UG/1
50 TABLET ORAL
COMMUNITY

## 2019-10-18 RX ORDER — PREDNISONE 20 MG/1
TABLET ORAL
Qty: 20 TABLET | Refills: 0 | Status: SHIPPED | OUTPATIENT
Start: 2019-10-18 | End: 2019-11-04 | Stop reason: ALTCHOICE

## 2019-10-18 NOTE — PROGRESS NOTES
Subjective:       Patient ID: Eve Long is a 69 y.o. female.    Chief Complaint: Wheezing and Chest Congestion    69 y old female with pulm sarcoidosis , COPD with wheezes since yesterday  . No fever , no cough , no sob . Using albuterol neb which has helped    Review of Systems   Constitutional: Negative.  Negative for activity change and unexpected weight change.   HENT: Negative.  Negative for hearing loss, rhinorrhea and trouble swallowing.    Eyes: Negative.  Negative for discharge and visual disturbance.   Respiratory: Positive for wheezing. Negative for chest tightness.    Cardiovascular: Negative.  Negative for chest pain and palpitations.   Gastrointestinal: Negative.  Negative for blood in stool, constipation, diarrhea and vomiting.   Endocrine: Negative for polydipsia and polyuria.   Genitourinary: Negative.  Negative for difficulty urinating, dysuria, hematuria and menstrual problem.   Musculoskeletal: Negative.  Negative for arthralgias, joint swelling and neck pain.   Skin: Negative.    Neurological: Negative for weakness and headaches.   Hematological: Negative.    Psychiatric/Behavioral: Negative for confusion and dysphoric mood.       Objective:      Physical Exam   Constitutional: She is oriented to person, place, and time. No distress.   HENT:   Head: Normocephalic and atraumatic.   Right Ear: External ear normal.   Left Ear: External ear normal.   Mouth/Throat: No oropharyngeal exudate.   Eyes: Pupils are equal, round, and reactive to light. Conjunctivae and EOM are normal. Right eye exhibits no discharge. Left eye exhibits no discharge. No scleral icterus.   Neck: Normal range of motion. Neck supple. No JVD present. No tracheal deviation present. No thyromegaly present.   Cardiovascular: Normal rate, regular rhythm and normal heart sounds. Exam reveals no gallop and no friction rub.   No murmur heard.  Pulmonary/Chest: Effort normal and breath sounds normal. No stridor. No respiratory  distress. She has no wheezes. She has no rales. She exhibits no tenderness.   Abdominal: Soft. Bowel sounds are normal. She exhibits no distension. There is no tenderness. There is no rebound and no guarding.   Musculoskeletal: Normal range of motion.   Lymphadenopathy:     She has no cervical adenopathy.   Neurological: She is alert and oriented to person, place, and time.   Skin: Skin is warm and dry. She is not diaphoretic.   Psychiatric: She has a normal mood and affect. Her behavior is normal. Judgment and thought content normal.       Assessment:       1. COPD exacerbation        Plan:     Eve was seen today for wheezing and chest congestion.    Diagnoses and all orders for this visit:    COPD exacerbation  -     NEBULIZER FOR HOME USE    Other orders  -     albuterol (PROVENTIL) 2.5 mg /3 mL (0.083 %) nebulizer solution; Take 3 mLs (2.5 mg total) by nebulization daily as needed. Every 6 hours inhale 2 puffs as needed for  wheezing  -     predniSONE (DELTASONE) 20 MG tablet; Take 3 pills daily for 3 days, then 2 pills daily for 3 days, then 1 pill daily for 3 days, then 1/2 pill daily for 4 days.     refused X rays .   WS discussed.   Prn f.u

## 2019-10-18 NOTE — TELEPHONE ENCOUNTER
----- Message from Deborah Hansen sent at 10/18/2019 12:40 PM CDT -----  Contact: [facility]   Type:  Pharmacy Calling to Clarify an RX    Name of Caller: Ms Warner  Pharmacy Name: [facility]  Prescription Name: predniSONE (DELTASONE) 20 MG tablet  What do they need to clarify?: allergic to steroids  Best Call Back Number: [phone]  Additional Information:

## 2019-10-18 NOTE — TELEPHONE ENCOUNTER
Spoke with Florence franklin [facility]. She was calling to clarify directions of Albuterol nebulizer prescription. Please verify?

## 2019-10-19 ENCOUNTER — PATIENT MESSAGE (OUTPATIENT)
Dept: FAMILY MEDICINE | Facility: CLINIC | Age: 69
End: 2019-10-19

## 2019-11-04 ENCOUNTER — OFFICE VISIT (OUTPATIENT)
Dept: FAMILY MEDICINE | Facility: CLINIC | Age: 69
End: 2019-11-04
Attending: FAMILY MEDICINE
Payer: MEDICARE

## 2019-11-04 VITALS
HEART RATE: 76 BPM | WEIGHT: 177.5 LBS | DIASTOLIC BLOOD PRESSURE: 78 MMHG | BODY MASS INDEX: 31.45 KG/M2 | TEMPERATURE: 97 F | SYSTOLIC BLOOD PRESSURE: 132 MMHG | OXYGEN SATURATION: 95 % | HEIGHT: 63 IN

## 2019-11-04 DIAGNOSIS — H61.22 IMPACTED CERUMEN OF LEFT EAR: Primary | ICD-10-CM

## 2019-11-04 PROCEDURE — 99999 PR PBB SHADOW E&M-EST. PATIENT-LVL III: ICD-10-PCS | Mod: PBBFAC,HCNC,, | Performed by: FAMILY MEDICINE

## 2019-11-04 PROCEDURE — 1101F PR PT FALLS ASSESS DOC 0-1 FALLS W/OUT INJ PAST YR: ICD-10-PCS | Mod: HCNC,CPTII,S$GLB, | Performed by: FAMILY MEDICINE

## 2019-11-04 PROCEDURE — 3078F PR MOST RECENT DIASTOLIC BLOOD PRESSURE < 80 MM HG: ICD-10-PCS | Mod: HCNC,CPTII,S$GLB, | Performed by: FAMILY MEDICINE

## 2019-11-04 PROCEDURE — 3078F DIAST BP <80 MM HG: CPT | Mod: HCNC,CPTII,S$GLB, | Performed by: FAMILY MEDICINE

## 2019-11-04 PROCEDURE — 1101F PT FALLS ASSESS-DOCD LE1/YR: CPT | Mod: HCNC,CPTII,S$GLB, | Performed by: FAMILY MEDICINE

## 2019-11-04 PROCEDURE — 3075F SYST BP GE 130 - 139MM HG: CPT | Mod: HCNC,CPTII,S$GLB, | Performed by: FAMILY MEDICINE

## 2019-11-04 PROCEDURE — 99213 OFFICE O/P EST LOW 20 MIN: CPT | Mod: HCNC,S$GLB,, | Performed by: FAMILY MEDICINE

## 2019-11-04 PROCEDURE — 99999 PR PBB SHADOW E&M-EST. PATIENT-LVL III: CPT | Mod: PBBFAC,HCNC,, | Performed by: FAMILY MEDICINE

## 2019-11-04 PROCEDURE — 99213 PR OFFICE/OUTPT VISIT, EST, LEVL III, 20-29 MIN: ICD-10-PCS | Mod: HCNC,S$GLB,, | Performed by: FAMILY MEDICINE

## 2019-11-04 PROCEDURE — 3075F PR MOST RECENT SYSTOLIC BLOOD PRESS GE 130-139MM HG: ICD-10-PCS | Mod: HCNC,CPTII,S$GLB, | Performed by: FAMILY MEDICINE

## 2019-11-04 RX ORDER — CIPROFLOXACIN AND DEXAMETHASONE 3; 1 MG/ML; MG/ML
4 SUSPENSION/ DROPS AURICULAR (OTIC) 2 TIMES DAILY
Qty: 7.5 ML | Refills: 0 | Status: SHIPPED | OUTPATIENT
Start: 2019-11-04 | End: 2020-07-01

## 2019-11-04 NOTE — PROGRESS NOTES
Subjective:       Patient ID: Eve Long is a 69 y.o. female.    Chief Complaint: Ear Fullness (with gland swelling on left side)    69 y old female with L ear pressure and difficulty hearing  for 1 w after water got in left ear . She also feels inflamation  below L ear lobe    Otalgia    This is a new problem. The current episode started in the past 7 days. The problem has been waxing and waning. There has been no fever. The fever has been present for 1 to 2 days. The pain is at a severity of 0/10. The patient is experiencing no pain. Associated symptoms include hearing loss and rhinorrhea. Pertinent negatives include no abdominal pain, coughing, diarrhea, drainage, ear discharge, headaches, neck pain, rash, sore throat or vomiting. She has tried NSAIDs, cold packs and heat packs for the symptoms. The treatment provided no relief. There is no history of a chronic ear infection, hearing loss or a tympanostomy tube.     Review of Systems   Constitutional: Negative.    HENT: Positive for ear pain, hearing loss and rhinorrhea. Negative for ear discharge and sore throat.    Eyes: Negative.    Respiratory: Negative.  Negative for cough.    Cardiovascular: Negative.    Gastrointestinal: Negative.  Negative for abdominal pain, diarrhea and vomiting.   Genitourinary: Negative.    Musculoskeletal: Negative.  Negative for neck pain.   Skin: Negative.  Negative for rash.   Neurological: Negative for headaches.   Hematological: Negative.        Objective:      Physical Exam   Constitutional: She is oriented to person, place, and time. No distress.   HENT:   Head: Normocephalic and atraumatic.   Right Ear: External ear normal.   Left Ear: External ear normal.   Mouth/Throat: No oropharyngeal exudate.   Left ear : cerumne impaction    Eyes: Pupils are equal, round, and reactive to light. Conjunctivae and EOM are normal. Right eye exhibits no discharge. Left eye exhibits no discharge. No scleral icterus.   Neck: Normal  range of motion. Neck supple. No JVD present. No tracheal deviation present. No thyromegaly present.   Cardiovascular: Normal rate, regular rhythm and normal heart sounds. Exam reveals no gallop and no friction rub.   No murmur heard.  Pulmonary/Chest: Effort normal and breath sounds normal. No stridor. No respiratory distress. She has no wheezes. She has no rales. She exhibits no tenderness.   Abdominal: Soft. Bowel sounds are normal. She exhibits no distension. There is no tenderness. There is no rebound and no guarding.   Musculoskeletal: Normal range of motion.   Lymphadenopathy:     She has no cervical adenopathy.   Neurological: She is alert and oriented to person, place, and time.   Skin: Skin is warm and dry. She is not diaphoretic.   Psychiatric: She has a normal mood and affect. Her behavior is normal. Judgment and thought content normal.       Assessment:      Eve was seen today for ear fullness.    Diagnoses and all orders for this visit:    Impacted cerumen of left ear    Other orders  -     ciprofloxacin-dexamethasone 0.3-0.1% (CIPRODEX) 0.3-0.1 % DrpS; Place 4 drops into both ears 2 (two) times daily.      Plan:    Ear washed done . Successful    Try weekly preventive mineral  oil   Prn follow up

## 2019-11-07 ENCOUNTER — PATIENT MESSAGE (OUTPATIENT)
Dept: FAMILY MEDICINE | Facility: CLINIC | Age: 69
End: 2019-11-07

## 2019-11-07 ENCOUNTER — TELEPHONE (OUTPATIENT)
Dept: FAMILY MEDICINE | Facility: CLINIC | Age: 69
End: 2019-11-07

## 2019-11-07 RX ORDER — AMOXICILLIN AND CLAVULANATE POTASSIUM 875; 125 MG/1; MG/1
1 TABLET, FILM COATED ORAL 2 TIMES DAILY
Qty: 20 TABLET | Refills: 0 | Status: SHIPPED | OUTPATIENT
Start: 2019-11-07 | End: 2019-11-17

## 2019-12-10 ENCOUNTER — PATIENT MESSAGE (OUTPATIENT)
Dept: FAMILY MEDICINE | Facility: CLINIC | Age: 69
End: 2019-12-10

## 2019-12-17 ENCOUNTER — PATIENT MESSAGE (OUTPATIENT)
Dept: PULMONOLOGY | Facility: CLINIC | Age: 69
End: 2019-12-17

## 2019-12-17 ENCOUNTER — TELEPHONE (OUTPATIENT)
Dept: PULMONOLOGY | Facility: CLINIC | Age: 69
End: 2019-12-17

## 2019-12-17 DIAGNOSIS — R06.02 SOB (SHORTNESS OF BREATH): Primary | ICD-10-CM

## 2019-12-17 NOTE — TELEPHONE ENCOUNTER
----- Message from Bia Yancey sent at 12/17/2019 12:28 PM CST -----  Contact: Pt   Pt is calling .Type:  Patient Returning Call    Who Called: Pt   Who Left Message for Patient: Nurse   Does the patient know what this is regarding?: returning missed   Would the patient rather a call back or a response via [facility]?  Call back   Best Call Back Number:[phone]           .Thank You  Bia Yancey

## 2019-12-20 ENCOUNTER — CLINICAL SUPPORT (OUTPATIENT)
Dept: PULMONOLOGY | Facility: CLINIC | Age: 69
End: 2019-12-20
Payer: MEDICARE

## 2019-12-20 ENCOUNTER — OFFICE VISIT (OUTPATIENT)
Dept: PULMONOLOGY | Facility: CLINIC | Age: 69
End: 2019-12-20
Payer: MEDICARE

## 2019-12-20 VITALS
RESPIRATION RATE: 20 BRPM | BODY MASS INDEX: 31.33 KG/M2 | WEIGHT: 176.81 LBS | OXYGEN SATURATION: 97 % | HEIGHT: 63 IN | HEART RATE: 71 BPM | SYSTOLIC BLOOD PRESSURE: 145 MMHG | DIASTOLIC BLOOD PRESSURE: 71 MMHG

## 2019-12-20 VITALS — WEIGHT: 176.81 LBS | HEIGHT: 63 IN | BODY MASS INDEX: 31.33 KG/M2

## 2019-12-20 DIAGNOSIS — D86.0 PULMONARY SARCOIDOSIS: Chronic | ICD-10-CM

## 2019-12-20 DIAGNOSIS — J44.9 COPD, GROUP B, BY GOLD 2017 CLASSIFICATION: Primary | Chronic | ICD-10-CM

## 2019-12-20 DIAGNOSIS — J84.10 CALCIFIED GRANULOMA OF LUNG: Chronic | ICD-10-CM

## 2019-12-20 DIAGNOSIS — R06.02 SOB (SHORTNESS OF BREATH): ICD-10-CM

## 2019-12-20 PROCEDURE — 3077F SYST BP >= 140 MM HG: CPT | Mod: HCNC,CPTII,S$GLB, | Performed by: INTERNAL MEDICINE

## 2019-12-20 PROCEDURE — 94618 PULMONARY STRESS TESTING: ICD-10-PCS | Mod: HCNC,S$GLB,, | Performed by: INTERNAL MEDICINE

## 2019-12-20 PROCEDURE — 99999 PR PBB SHADOW E&M-EST. PATIENT-LVL III: CPT | Mod: PBBFAC,HCNC,, | Performed by: INTERNAL MEDICINE

## 2019-12-20 PROCEDURE — 1101F PR PT FALLS ASSESS DOC 0-1 FALLS W/OUT INJ PAST YR: ICD-10-PCS | Mod: HCNC,CPTII,S$GLB, | Performed by: INTERNAL MEDICINE

## 2019-12-20 PROCEDURE — 99499 UNLISTED E&M SERVICE: CPT | Mod: S$GLB,,, | Performed by: INTERNAL MEDICINE

## 2019-12-20 PROCEDURE — 99999 PR PBB SHADOW E&M-EST. PATIENT-LVL I: CPT | Mod: PBBFAC,HCNC,,

## 2019-12-20 PROCEDURE — 3077F PR MOST RECENT SYSTOLIC BLOOD PRESSURE >= 140 MM HG: ICD-10-PCS | Mod: HCNC,CPTII,S$GLB, | Performed by: INTERNAL MEDICINE

## 2019-12-20 PROCEDURE — 1101F PT FALLS ASSESS-DOCD LE1/YR: CPT | Mod: HCNC,CPTII,S$GLB, | Performed by: INTERNAL MEDICINE

## 2019-12-20 PROCEDURE — 3078F PR MOST RECENT DIASTOLIC BLOOD PRESSURE < 80 MM HG: ICD-10-PCS | Mod: HCNC,CPTII,S$GLB, | Performed by: INTERNAL MEDICINE

## 2019-12-20 PROCEDURE — 99214 PR OFFICE/OUTPT VISIT, EST, LEVL IV, 30-39 MIN: ICD-10-PCS | Mod: 25,HCNC,S$GLB, | Performed by: INTERNAL MEDICINE

## 2019-12-20 PROCEDURE — 99214 OFFICE O/P EST MOD 30 MIN: CPT | Mod: 25,HCNC,S$GLB, | Performed by: INTERNAL MEDICINE

## 2019-12-20 PROCEDURE — 99999 PR PBB SHADOW E&M-EST. PATIENT-LVL III: ICD-10-PCS | Mod: PBBFAC,HCNC,, | Performed by: INTERNAL MEDICINE

## 2019-12-20 PROCEDURE — 3078F DIAST BP <80 MM HG: CPT | Mod: HCNC,CPTII,S$GLB, | Performed by: INTERNAL MEDICINE

## 2019-12-20 PROCEDURE — 1159F PR MEDICATION LIST DOCUMENTED IN MEDICAL RECORD: ICD-10-PCS | Mod: HCNC,S$GLB,, | Performed by: INTERNAL MEDICINE

## 2019-12-20 PROCEDURE — 1159F MED LIST DOCD IN RCRD: CPT | Mod: HCNC,S$GLB,, | Performed by: INTERNAL MEDICINE

## 2019-12-20 PROCEDURE — 99999 PR PBB SHADOW E&M-EST. PATIENT-LVL I: ICD-10-PCS | Mod: PBBFAC,HCNC,,

## 2019-12-20 PROCEDURE — 94618 PULMONARY STRESS TESTING: CPT | Mod: HCNC,S$GLB,, | Performed by: INTERNAL MEDICINE

## 2019-12-20 PROCEDURE — 99499 RISK ADDL DX/OHS AUDIT: ICD-10-PCS | Mod: S$GLB,,, | Performed by: INTERNAL MEDICINE

## 2019-12-20 RX ORDER — SYRINGE AND NEEDLE,INSULIN,1ML 25GX1"
SYRINGE, EMPTY DISPOSABLE MISCELLANEOUS
COMMUNITY
Start: 2019-12-19 | End: 2020-11-10 | Stop reason: SDUPTHER

## 2019-12-20 NOTE — PROCEDURES
"[facility]  Six Minute Walk     SUMMARY     Ordering Provider: Dr. Keenan   Interpreting Provider: Dr. Keenan  Performing nurse/tech/RT: DARINEL Gu  Diagnosis: Shortness of Breath  Height: 5' 3" (160 cm)  Weight: 80.2 kg (176 lb 12.9 oz)  BMI (Calculated): 31.3   Patient Race:             Phase Oxygen Assessment Supplemental O2 Heart   Rate Blood Pressure Gabrielle Dyspnea Scale Rating   Resting 97 % Room Air 71 bpm 145/71 2   Exercise        Minute        1 96 % Room Air 112 bpm     2 95 % Room Air 122 bpm     3 96 % Room Air 118 bpm     4 96 % Room Air 121 bpm     5 96 % Room Air 133 bpm     6  96 % Room Air 140 bpm 178/79 3   Recovery        Minute        1 97 % Room Air 123 bpm     2 97 % Room Air 90 bpm     3 97 % Room Air 76 bpm     4 97 % Room Air 70 bpm 148/68 2     Six Minute Walk Summary  6MWT Status: completed without stopping  Patient Reported: Dyspnea     Interpretation:  Did the patient stop or pause?: No                                         Total Time Walked (Calculated): 360 seconds  Final Partial Lap Distance (feet): 125 feet  Total Distance Meters (Calculated): 342.9 meters  Predicted Distance Meters (Calculated): 416.34 meters  Percentage of Predicted (Calculated): 82.36  Peak VO2 (Calculated): 14.27  Mets: 4.08  Has The Patient Had a Previous Six Minute Walk Test?: Yes       Previous 6MWT Results  Has The Patient Had a Previous Six Minute Walk Test?: Yes  Date of Previous Test: 06/17/16  Total Time Walked: 360 seconds  Total Distance (meters): 426.72  Predicted Distance (meters): 437.8 meters  Percentage of Predicted: 97.47  Percent Change (Calculated): 0.2          PHYSICIAN INTERPRETATION:      Six minute walk distance is 342.9 / 416.34 meters (82.36 % predicted) with light dyspnea.   Peak VO2 during walking was 14.27  Ml/min/kg [ 4.08 METS].   During exercise, there was no significant desaturation while breathing room air.  Both blood pressure and heart rate " increased significantly with walking.  Hypertension was present prior to exercise.  This may represent a normal cardiovascular response to exercise.  The patient did not report non-pulmonary symptoms during exercise.  The patient did complete the study, walking 360 seconds of the 360 second test.  Since the previous study in 06/17/19, exercise capacity may be somewhat worse.  Based upon age and body mass index, exercise capacity is normal.

## 2019-12-20 NOTE — PROGRESS NOTES
Subjective:      Established patient    Patient ID: Eve Long is a 69 y.o. female.    Patient Active Problem List   Diagnosis    Pulmonary sarcoidosis    Hypothyroidism    COPD, group B, by GOLD 2017 classification    Vitamin B12 deficiency without anemia    Calcified granuloma of lung    Mixed hyperlipidemia    HTN (hypertension)    Imbalance    TIA (transient ischemic attack)    S/P CABG x 5    Coronary artery disease of native artery with stable angina pectoris    PAF (paroxysmal atrial fibrillation)    Chronic combined systolic and diastolic congestive heart failure    Myalgia       Problem list has been reviewed.    Group Spirometric Classification Exacerbations / year mMRC CAT   Group B: Low risk; more symptoms  GOLD 1- 2  < = 1 >= 2 >=10     FEV1 1.34 L ( 63% predicted )     GOLD 1 - mild: FEV1? 80% predicted   GOLD 2 - moderate: 50% ?FEV1 <80% predicted   GOLD 3 - severe: 30% ?FEV1 <50% predicted   GOLD 4 - very severe: FEV1 <30% predicted.      Chief Complaint: Shortness of Breath (REV WALK)      HPI    6MWT reviewed with patient who expressed and voiced understanding.     Patients reports STABLE NYHA II dyspnea    The patient does not have currently have symptoms / an exacerbation.       No recent change in breathing.     Her current respiratory therapy regimen is albuterol inhaler or albuterol nebulizer which provides  relief. She is  adherent with her regimen. She rarely uses her inhaler.      COPD Questionnaire  How often do you cough?: (P) Some of the time  How often do you have phlegm (mucus) in your chest?: (P) Never  How often does your chest feel tight?: (P) Some of the time  When you walk up a hill or one flight of stairs, how often are you breathless?: (P) Most of the time  How often are you limited doing any activities at home?: (P) Some of the time  How often are you confident leaving the house despite your lung condition?: (P) All of the time  How often do you sleep  soundly?: (P) A little of the time  How often do you have energy?: (P) Almost never  Total score: (P) 20       A full  review of systems, past , family  and social histories was performed except as mentioned in the note above, these are non contributory to the main issues discussed today.       Previous Report Reviewed: office notes     The following portions of the patient's history were reviewed and updated as appropriate: She  has a past medical history of Anemia, Asthma, Basal cell carcinoma, Chronic combined systolic and diastolic congestive heart failure (1/29/2019), COPD (chronic obstructive pulmonary disease), Coronary artery disease involving native coronary artery of native heart with unstable angina pectoris (11/14/2018), Hyperlipidemia, Hypertension, Immune deficiency disorder, NSTEMI (non-ST elevated myocardial infarction) (11/13/2018), PAF (paroxysmal atrial fibrillation) (1/29/2019), Pneumonia, Sarcoidosis, and TIA (transient ischemic attack).  She  has a past surgical history that includes Hysterectomy; Cholecystectomy; Lung biopsy; Oophorectomy; Cosmetic surgery; Fracture surgery (Left); Tonsillectomy; Skin biopsy; Coronary Artery Bypass Graft (CABG) (N/A, 11/15/2018); Endoscopic harvest of vein (Left, 11/15/2018); Left heart catheterization (Left, 11/14/2018); and Coronary artery bypass graft.  Her family history includes Heart disease in her father; Myasthenia gravis in her brother; Stroke in her mother and sister; Thyroid disease in her sister.  She  reports that she has never smoked. She has never used smokeless tobacco. She reports that she drinks about 1.0 standard drinks of alcohol per week. She reports that she does not use drugs.  She has a current medication list which includes the following prescription(s): albuterol, albuterol, apixaban, cholecalciferol (vitamin d3), ciprofloxacin-dexamethasone 0.3-0.1%, cyanocobalamin, furosemide, levothyroxine, losartan, multivitamin, nitroglycerin,  "pantoprazole, promethazine, thiamine, and bd insulin syringe.  She is allergic to metoprolol; repatha pushtronex [evolocumab]; beclomethasone dipropionate; danazol; doxycycline; fluticasone propion-salmeterol; propranolol; statins-hmg-coa reductase inhibitors; umeclidinium-vilanterol; and red dye..    Review of Systems   Constitutional: Positive for fatigue. Negative for fever, chills and night sweats.   HENT: Negative for nosebleeds, rhinorrhea, sinus pressure, sore throat, congestion and ear pain.    Eyes: Positive for itching.   Respiratory: Positive for cough, shortness of breath, wheezing and somnolence. Negative for snoring, choking, chest tightness, orthopnea, use of rescue inhaler and Paroxysmal Nocturnal Dyspnea.    Cardiovascular: Negative for chest pain, palpitations and leg swelling.   Genitourinary: Negative for difficulty urinating.   Endocrine: Positive for heat intolerance and polyuria. Negative for polydipsia and cold intolerance.    Musculoskeletal: Positive for arthralgias. Negative for back pain.   Skin: Negative for rash.   Gastrointestinal: Negative for nausea, vomiting, abdominal pain, abdominal distention and acid reflux.   Neurological: Negative for dizziness, syncope, light-headedness and headaches.   Hematological: Excessive bruising. Does not bruise/bleed easily.   Psychiatric/Behavioral: The patient is not nervous/anxious.    All other systems reviewed and are negative.       Objective:     BP (!) 145/71   Pulse 71   Resp 20   Ht 5' 3" (1.6 m)   Wt 80.2 kg (176 lb 12.9 oz)   SpO2 97%   BMI 31.32 kg/m²   Body mass index is 31.32 kg/m².     Physical Exam   Constitutional: She is oriented to person, place, and time. She appears well-developed and well-nourished.   HENT:   Head: Normocephalic and atraumatic.   Eyes: Pupils are equal, round, and reactive to light. EOM are normal.   Neck: Normal range of motion. Neck supple.   Cardiovascular: Normal rate and regular rhythm. "   Pulmonary/Chest: Effort normal and breath sounds normal.   Abdominal: Soft. Bowel sounds are normal.   Musculoskeletal: Normal range of motion.   Neurological: She is alert and oriented to person, place, and time.   Skin: Skin is warm.   Psychiatric: She has a normal mood and affect.   Nursing note and vitals reviewed.      Personal Diagnostic Review    Six minute walk test: 19:   Six minute walk distance is 342.9 / 416.34 meters (82.36 % predicted) with light dyspnea.   Peak VO2 during walking was 14.27  Ml/min/kg [ 4.08 METS].   During exercise, there was no significant desaturation while breathing room air.  Both blood pressure and heart rate increased significantly with walking.  Hypertension was present prior to exercise.  This may represent a normal cardiovascular response to exercise.  The patient did not report non-pulmonary symptoms during exercise.  The patient did complete the study, walking 360 seconds of the 360 second test.  Since the previous study in 19, exercise capacity may be somewhat worse.  Based upon age and body mass index, exercise capacity is normal.    Pulmonary function tests: 09/10/19: FEV1: 1.34 (63 % predicted), FVC:  2.19 (80 % predicted), FEV1/FVC:  61%.  T.54(95 % predicted), RV/TLVC: 47 (109.8 % predicted), DLCO: 11.76 (56.9 % predicted)  Moderate obstruction. Normal lung volumes. Diffusion capacity is moderately reduced.       CXR: 09/10/19: No significant change from prior exam.  Status post sternotomy and cholecystectomy.  Stable calcification posterolateral margin left lower lobe.  Minimal scarring and/or subsegmental atelectasis within the left base without significant change.  Remainder of the lungs free of acute infiltrate and effusion.  Trachea is midline.  Heart size within normal limits in the aorta minimally tortuous.  Pulmonary vasculatures stable and symmetric.  There is osteopenia and minimal spondylosis.    Assessment / plan:       Discussed  diagnosis, its evaluation, treatment and usual course. All questions answered.    Problem List Items Addressed This Visit        Pulmonary    COPD, group B, by GOLD 2017 classification - Primary (Chronic)    Current Assessment & Plan     COPD ROS: taking medications as instructed, no medication side effects noted, no significant ongoing wheezing or shortness of breath, using bronchodilator MDI less than twice a week.   New concerns: None.   Exam: appears well, vitals normal, no respiratory distress, acyanotic, normal RR, chest clear, no wheezing, crepitations, rhonchi, normal symmetric air entry.   Assessment:  COPD well controlled.   Plan: ALBUTEROL PFT AND NEBS. current treatment plan is effective, no change in therapy.          Relevant Orders    Complete PFT with bronchodilator    Calcified granuloma of lung    Overview     calcified granuloma in the left lower lobe.         Current Assessment & Plan     Stable and controlled. Continued radiologic monitoring.            Relevant Orders    X-Ray Chest PA And Lateral       Immunology/Multi System    Pulmonary sarcoidosis    Current Assessment & Plan     BURNT OUT PULMONARY SARCOIDOSIS. Stable and controlled. No changes to current therapeutic plan. Continue all previously prescribed medications.                  TIME SPENT WITH PATIENT: Time spent: 35 minutes in face to face  discussion concerning diagnosis, prognosis, review of lab and test results, benefits of treatment as well as management of disease, counseling of patient and coordination of care between various health  care providers . Greater than half the time spent was used for coordination of care and counseling of patient.        Follow up in about 1 year (around 12/20/2020) for COPD, Lung Nodule.

## 2019-12-20 NOTE — PATIENT INSTRUCTIONS
Lung Anatomy  Your lungs take air in to give your body oxygen, which the body needs to work. Your lungs, like all the tissues in your body, are made up of billions of tiny specialized cells. Old lung cells die and are replaced by new, identical lung cells. This natural process helps ensure healthy lungs.    Date Last Reviewed: 11/1/2016  © 2701-1671 [email or web address]. [address] [place] [address], [place] [address]. All rights reserved. This information is not intended as a substitute for professional medical care. Always follow your healthcare professional's instructions.

## 2020-02-27 ENCOUNTER — PATIENT MESSAGE (OUTPATIENT)
Dept: PULMONOLOGY | Facility: CLINIC | Age: 70
End: 2020-02-27

## 2020-07-01 ENCOUNTER — OFFICE VISIT (OUTPATIENT)
Dept: FAMILY MEDICINE | Facility: CLINIC | Age: 70
End: 2020-07-01
Attending: FAMILY MEDICINE
Payer: MEDICARE

## 2020-07-01 ENCOUNTER — LAB VISIT (OUTPATIENT)
Dept: LAB | Facility: HOSPITAL | Age: 70
End: 2020-07-01
Attending: FAMILY MEDICINE
Payer: MEDICARE

## 2020-07-01 VITALS
HEART RATE: 75 BPM | SYSTOLIC BLOOD PRESSURE: 122 MMHG | OXYGEN SATURATION: 95 % | BODY MASS INDEX: 31.29 KG/M2 | DIASTOLIC BLOOD PRESSURE: 74 MMHG | WEIGHT: 176.56 LBS | TEMPERATURE: 98 F | HEIGHT: 63 IN

## 2020-07-01 DIAGNOSIS — D51.0 PERNICIOUS ANEMIA: ICD-10-CM

## 2020-07-01 DIAGNOSIS — Z12.31 ENCOUNTER FOR SCREENING MAMMOGRAM FOR MALIGNANT NEOPLASM OF BREAST: ICD-10-CM

## 2020-07-01 DIAGNOSIS — I48.21 PERMANENT ATRIAL FIBRILLATION: Primary | ICD-10-CM

## 2020-07-01 DIAGNOSIS — E03.9 HYPOTHYROIDISM, UNSPECIFIED TYPE: ICD-10-CM

## 2020-07-01 DIAGNOSIS — I10 HYPERTENSION, UNSPECIFIED TYPE: ICD-10-CM

## 2020-07-01 DIAGNOSIS — Z12.39 BREAST CANCER SCREENING: ICD-10-CM

## 2020-07-01 DIAGNOSIS — I25.10 CORONARY ARTERY DISEASE, ANGINA PRESENCE UNSPECIFIED, UNSPECIFIED VESSEL OR LESION TYPE, UNSPECIFIED WHETHER NATIVE OR TRANSPLANTED HEART: ICD-10-CM

## 2020-07-01 LAB
ALBUMIN SERPL BCP-MCNC: 4.1 G/DL (ref 3.5–5.2)
ALP SERPL-CCNC: 89 U/L (ref 55–135)
ALT SERPL W/O P-5'-P-CCNC: 12 U/L (ref 10–44)
ANION GAP SERPL CALC-SCNC: 8 MMOL/L (ref 8–16)
AST SERPL-CCNC: 16 U/L (ref 10–40)
BASOPHILS # BLD AUTO: 0.02 K/UL (ref 0–0.2)
BASOPHILS NFR BLD: 0.3 % (ref 0–1.9)
BILIRUB SERPL-MCNC: 0.2 MG/DL (ref 0.1–1)
BUN SERPL-MCNC: 22 MG/DL (ref 8–23)
CALCIUM SERPL-MCNC: 10.2 MG/DL (ref 8.7–10.5)
CHLORIDE SERPL-SCNC: 104 MMOL/L (ref 95–110)
CO2 SERPL-SCNC: 27 MMOL/L (ref 23–29)
CREAT SERPL-MCNC: 1 MG/DL (ref 0.5–1.4)
DIFFERENTIAL METHOD: ABNORMAL
EOSINOPHIL # BLD AUTO: 0 K/UL (ref 0–0.5)
EOSINOPHIL NFR BLD: 0 % (ref 0–8)
ERYTHROCYTE [DISTWIDTH] IN BLOOD BY AUTOMATED COUNT: 14.6 % (ref 11.5–14.5)
EST. GFR  (AFRICAN AMERICAN): >60 ML/MIN/1.73 M^2
EST. GFR  (NON AFRICAN AMERICAN): 56.8 ML/MIN/1.73 M^2
GLUCOSE SERPL-MCNC: 79 MG/DL (ref 70–110)
HCT VFR BLD AUTO: 40.6 % (ref 37–48.5)
HGB BLD-MCNC: 12.7 G/DL (ref 12–16)
IMM GRANULOCYTES # BLD AUTO: 0.03 K/UL (ref 0–0.04)
IMM GRANULOCYTES NFR BLD AUTO: 0.4 % (ref 0–0.5)
LYMPHOCYTES # BLD AUTO: 1.7 K/UL (ref 1–4.8)
LYMPHOCYTES NFR BLD: 22 % (ref 18–48)
MCH RBC QN AUTO: 27.6 PG (ref 27–31)
MCHC RBC AUTO-ENTMCNC: 31.3 G/DL (ref 32–36)
MCV RBC AUTO: 88 FL (ref 82–98)
MONOCYTES # BLD AUTO: 0.7 K/UL (ref 0.3–1)
MONOCYTES NFR BLD: 9.5 % (ref 4–15)
NEUTROPHILS # BLD AUTO: 5.1 K/UL (ref 1.8–7.7)
NEUTROPHILS NFR BLD: 67.8 % (ref 38–73)
NRBC BLD-RTO: 0 /100 WBC
PLATELET # BLD AUTO: 155 K/UL (ref 150–350)
PMV BLD AUTO: 12.2 FL (ref 9.2–12.9)
POTASSIUM SERPL-SCNC: 5 MMOL/L (ref 3.5–5.1)
PROT SERPL-MCNC: 7.8 G/DL (ref 6–8.4)
RBC # BLD AUTO: 4.6 M/UL (ref 4–5.4)
SODIUM SERPL-SCNC: 139 MMOL/L (ref 136–145)
T4 FREE SERPL-MCNC: 0.95 NG/DL (ref 0.71–1.51)
TSH SERPL DL<=0.005 MIU/L-ACNC: 6.37 UIU/ML (ref 0.4–4)
WBC # BLD AUTO: 7.55 K/UL (ref 3.9–12.7)

## 2020-07-01 PROCEDURE — 1101F PT FALLS ASSESS-DOCD LE1/YR: CPT | Mod: HCNC,CPTII,S$GLB, | Performed by: FAMILY MEDICINE

## 2020-07-01 PROCEDURE — 1126F AMNT PAIN NOTED NONE PRSNT: CPT | Mod: HCNC,S$GLB,, | Performed by: FAMILY MEDICINE

## 2020-07-01 PROCEDURE — 99214 PR OFFICE/OUTPT VISIT, EST, LEVL IV, 30-39 MIN: ICD-10-PCS | Mod: HCNC,S$GLB,, | Performed by: FAMILY MEDICINE

## 2020-07-01 PROCEDURE — 99999 PR PBB SHADOW E&M-EST. PATIENT-LVL V: CPT | Mod: PBBFAC,HCNC,, | Performed by: FAMILY MEDICINE

## 2020-07-01 PROCEDURE — 3074F PR MOST RECENT SYSTOLIC BLOOD PRESSURE < 130 MM HG: ICD-10-PCS | Mod: HCNC,CPTII,S$GLB, | Performed by: FAMILY MEDICINE

## 2020-07-01 PROCEDURE — 82607 VITAMIN B-12: CPT | Mod: HCNC

## 2020-07-01 PROCEDURE — 93005 EKG 12-LEAD: ICD-10-PCS | Mod: HCNC,S$GLB,, | Performed by: FAMILY MEDICINE

## 2020-07-01 PROCEDURE — 93010 EKG 12-LEAD: ICD-10-PCS | Mod: HCNC,S$GLB,, | Performed by: INTERNAL MEDICINE

## 2020-07-01 PROCEDURE — 1101F PR PT FALLS ASSESS DOC 0-1 FALLS W/OUT INJ PAST YR: ICD-10-PCS | Mod: HCNC,CPTII,S$GLB, | Performed by: FAMILY MEDICINE

## 2020-07-01 PROCEDURE — 1159F MED LIST DOCD IN RCRD: CPT | Mod: HCNC,S$GLB,, | Performed by: FAMILY MEDICINE

## 2020-07-01 PROCEDURE — 36415 COLL VENOUS BLD VENIPUNCTURE: CPT | Mod: HCNC,PO

## 2020-07-01 PROCEDURE — 3078F DIAST BP <80 MM HG: CPT | Mod: HCNC,CPTII,S$GLB, | Performed by: FAMILY MEDICINE

## 2020-07-01 PROCEDURE — 99214 OFFICE O/P EST MOD 30 MIN: CPT | Mod: HCNC,S$GLB,, | Performed by: FAMILY MEDICINE

## 2020-07-01 PROCEDURE — 80053 COMPREHEN METABOLIC PANEL: CPT | Mod: HCNC

## 2020-07-01 PROCEDURE — 1159F PR MEDICATION LIST DOCUMENTED IN MEDICAL RECORD: ICD-10-PCS | Mod: HCNC,S$GLB,, | Performed by: FAMILY MEDICINE

## 2020-07-01 PROCEDURE — 99499 UNLISTED E&M SERVICE: CPT | Mod: S$GLB,,, | Performed by: FAMILY MEDICINE

## 2020-07-01 PROCEDURE — 99499 RISK ADDL DX/OHS AUDIT: ICD-10-PCS | Mod: S$GLB,,, | Performed by: FAMILY MEDICINE

## 2020-07-01 PROCEDURE — 84439 ASSAY OF FREE THYROXINE: CPT | Mod: HCNC

## 2020-07-01 PROCEDURE — 3078F PR MOST RECENT DIASTOLIC BLOOD PRESSURE < 80 MM HG: ICD-10-PCS | Mod: HCNC,CPTII,S$GLB, | Performed by: FAMILY MEDICINE

## 2020-07-01 PROCEDURE — 3008F PR BODY MASS INDEX (BMI) DOCUMENTED: ICD-10-PCS | Mod: HCNC,CPTII,S$GLB, | Performed by: FAMILY MEDICINE

## 2020-07-01 PROCEDURE — 3008F BODY MASS INDEX DOCD: CPT | Mod: HCNC,CPTII,S$GLB, | Performed by: FAMILY MEDICINE

## 2020-07-01 PROCEDURE — 99999 PR PBB SHADOW E&M-EST. PATIENT-LVL V: ICD-10-PCS | Mod: PBBFAC,HCNC,, | Performed by: FAMILY MEDICINE

## 2020-07-01 PROCEDURE — 93005 ELECTROCARDIOGRAM TRACING: CPT | Mod: HCNC,S$GLB,, | Performed by: FAMILY MEDICINE

## 2020-07-01 PROCEDURE — 3074F SYST BP LT 130 MM HG: CPT | Mod: HCNC,CPTII,S$GLB, | Performed by: FAMILY MEDICINE

## 2020-07-01 PROCEDURE — 93010 ELECTROCARDIOGRAM REPORT: CPT | Mod: HCNC,S$GLB,, | Performed by: INTERNAL MEDICINE

## 2020-07-01 PROCEDURE — 84443 ASSAY THYROID STIM HORMONE: CPT | Mod: HCNC

## 2020-07-01 PROCEDURE — 1126F PR PAIN SEVERITY QUANTIFIED, NO PAIN PRESENT: ICD-10-PCS | Mod: HCNC,S$GLB,, | Performed by: FAMILY MEDICINE

## 2020-07-01 PROCEDURE — 85025 COMPLETE CBC W/AUTO DIFF WBC: CPT | Mod: HCNC

## 2020-07-01 RX ORDER — VERAPAMIL HYDROCHLORIDE 240 MG/1
240 CAPSULE, EXTENDED RELEASE ORAL DAILY
COMMUNITY

## 2020-07-01 NOTE — PROGRESS NOTES
Subjective:       Patient ID: Eve Long is a 70 y.o. female.    Chief Complaint: Follow-up    70 y old female with hx of pernicious anemia, hypothyroidism , cad , htn , Afib  F.u today on b12 def . Doing monthly b12 injections . Sees Dr tian , had  [facility] stress test in feb which was normal . + short lasting episode of palpitations yesterday , no dizziness, cp , sob .Compliant with low dose of Eliquis and cardizem . Also sees Dr Acuna for hypothyroidism . She was recommended to start taking levothyroxine 6 d out of the week  Last month    Follow-up      Review of Systems   Constitutional: Negative.    HENT: Negative.    Eyes: Negative.    Respiratory: Negative.    Cardiovascular: Negative.    Gastrointestinal: Negative.    Genitourinary: Negative.    Musculoskeletal: Negative.    Skin: Negative.    Hematological: Negative.        Objective:      Physical Exam  Constitutional:       General: She is not in acute distress.     Appearance: She is well-developed. She is obese. She is not diaphoretic.   HENT:      Head: Normocephalic and atraumatic.      Right Ear: External ear normal.      Left Ear: External ear normal.      Mouth/Throat:      Pharynx: No oropharyngeal exudate.   Eyes:      General: No scleral icterus.        Right eye: No discharge.         Left eye: No discharge.      Conjunctiva/sclera: Conjunctivae normal.      Pupils: Pupils are equal, round, and reactive to light.   Neck:      Musculoskeletal: Normal range of motion and neck supple.      Thyroid: No thyromegaly.      Vascular: No JVD.      Trachea: No tracheal deviation.   Cardiovascular:      Rate and Rhythm: Normal rate and regular rhythm.      Heart sounds: Normal heart sounds. No murmur. No friction rub. No gallop.    Pulmonary:      Effort: Pulmonary effort is normal. No respiratory distress.      Breath sounds: Normal breath sounds. No stridor. No wheezing or rales.   Chest:      Chest wall: No tenderness.   Abdominal:       General: Bowel sounds are normal. There is no distension.      Palpations: Abdomen is soft.      Tenderness: There is no abdominal tenderness. There is no guarding or rebound.   Musculoskeletal: Normal range of motion.   Lymphadenopathy:      Cervical: No cervical adenopathy.   Skin:     General: Skin is warm and dry.   Neurological:      Mental Status: She is alert and oriented to person, place, and time.   Psychiatric:         Behavior: Behavior normal.         Thought Content: Thought content normal.         Judgment: Judgment normal.         Assessment:       1. Permanent atrial fibrillation    2. Coronary artery disease, angina presence unspecified, unspecified vessel or lesion type, unspecified whether native or transplanted heart    3. Hypothyroidism, unspecified type    4. Pernicious anemia    5. Breast cancer screening    6. Hypertension, unspecified type    7. Encounter for screening mammogram for malignant neoplasm of breast         Plan:     Eve was seen today for follow-up.    Diagnoses and all orders for this visit:    Permanent atrial fibrillation  -     IN OFFICE EKG 12-LEAD (to [place])    Coronary artery disease, angina presence unspecified, unspecified vessel or lesion type, unspecified whether native or transplanted heart    Hypothyroidism, unspecified type  -     TSH; Future    Pernicious anemia  -     CBC auto differential; Future  -     Comprehensive metabolic panel; Future  -     Vitamin B12; Future    Breast cancer screening  -     Mammo Digital Screening Bilateral With CAD; Future    Hypertension, unspecified type    Encounter for screening mammogram for malignant neoplasm of breast   -     Mammo Digital Screening Bilateral With CAD; Future     Cont eliquis , bradycardia noted on ekg   F.u with card   B12 level   Controlled. Cont med

## 2020-07-02 LAB — VIT B12 SERPL-MCNC: 896 PG/ML (ref 210–950)

## 2020-09-01 ENCOUNTER — HOSPITAL ENCOUNTER (OUTPATIENT)
Dept: RADIOLOGY | Facility: HOSPITAL | Age: 70
Discharge: HOME | End: 2020-09-01
Attending: FAMILY MEDICINE
Payer: MEDICARE

## 2020-09-01 DIAGNOSIS — Z12.31 ENCOUNTER FOR SCREENING MAMMOGRAM FOR MALIGNANT NEOPLASM OF BREAST: ICD-10-CM

## 2020-09-01 DIAGNOSIS — Z12.39 BREAST CANCER SCREENING: ICD-10-CM

## 2020-09-01 PROCEDURE — 77063 MAMMO DIGITAL SCREENING BILAT WITH TOMOSYNTHESIS_CAD: ICD-10-PCS | Mod: 26,HCNC,, | Performed by: RADIOLOGY

## 2020-09-01 PROCEDURE — 77067 SCR MAMMO BI INCL CAD: CPT | Mod: 26,HCNC,, | Performed by: RADIOLOGY

## 2020-09-01 PROCEDURE — 77067 MAMMO DIGITAL SCREENING BILAT WITH TOMOSYNTHESIS_CAD: ICD-10-PCS | Mod: 26,HCNC,, | Performed by: RADIOLOGY

## 2020-09-01 PROCEDURE — 77063 BREAST TOMOSYNTHESIS BI: CPT | Mod: 26,HCNC,, | Performed by: RADIOLOGY

## 2020-09-01 PROCEDURE — 77067 SCR MAMMO BI INCL CAD: CPT | Mod: TC,HCNC

## 2020-11-10 ENCOUNTER — PATIENT MESSAGE (OUTPATIENT)
Dept: FAMILY MEDICINE | Facility: CLINIC | Age: 70
End: 2020-11-10

## 2020-11-10 DIAGNOSIS — E53.8 VITAMIN B12 DEFICIENCY WITHOUT ANEMIA: ICD-10-CM

## 2020-11-10 RX ORDER — CYANOCOBALAMIN 1000 UG/ML
1000 INJECTION, SOLUTION INTRAMUSCULAR; SUBCUTANEOUS
Qty: 3 ML | Refills: 4 | Status: SHIPPED | OUTPATIENT
Start: 2020-11-10

## 2020-11-10 RX ORDER — SYRINGE AND NEEDLE,INSULIN,1ML 25GX1"
SYRINGE, EMPTY DISPOSABLE MISCELLANEOUS
Qty: 3 SYRINGE | Refills: 4 | Status: SHIPPED | OUTPATIENT
Start: 2020-11-10

## 2020-12-09 ENCOUNTER — PATIENT OUTREACH (OUTPATIENT)
Dept: ADMINISTRATIVE | Facility: HOSPITAL | Age: 70
End: 2020-12-09

## 2020-12-09 ENCOUNTER — TELEPHONE (OUTPATIENT)
Dept: ADMINISTRATIVE | Facility: HOSPITAL | Age: 70
End: 2020-12-09

## 2020-12-09 DIAGNOSIS — G72.0 DRUG-INDUCED MYOPATHY: ICD-10-CM

## 2020-12-09 NOTE — TELEPHONE ENCOUNTER
I will send a message to Dr Morelos to ask her if she can put pt's allergy Dx in the problem list. Per allergy list it is due to muscle weakness.    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

## 2020-12-09 NOTE — PROGRESS NOTES
Statin excluded last year report:  I will send a message to Dr Morelos to ask her if she can put pt's allergy Dx in the problem list. Per allergy list it is due to muscle weakness.     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

## 2021-03-23 ENCOUNTER — PES CALL (OUTPATIENT)
Dept: ADMINISTRATIVE | Facility: CLINIC | Age: 71
End: 2021-03-23

## 2021-04-14 ENCOUNTER — TELEPHONE (OUTPATIENT)
Dept: ADMINISTRATIVE | Facility: HOSPITAL | Age: 71
End: 2021-04-14

## (undated) DEVICE — EVACUATOR WOUND BULB 100CC

## (undated) DEVICE — SUT STEEL 7 MONO B&S18 CCS

## (undated) DEVICE — SET ARTERIAL CATH 20GX4.25IN

## (undated) DEVICE — SOL NS 1000CC

## (undated) DEVICE — DRESSING MEPORE ADH 3.5X12

## (undated) DEVICE — KIT NEEDLE GUIDE 18G

## (undated) DEVICE — CLIP LIGATING MEDIUM

## (undated) DEVICE — Device

## (undated) DEVICE — KIT PREVENA PLUS

## (undated) DEVICE — SUT VICRYL 2-0 36 CT-1

## (undated) DEVICE — SOL ISOLYTE S PH 7.4 1000ML

## (undated) DEVICE — GOWN SURG 2XL DISP TIE BACK

## (undated) DEVICE — KIT ANTIFOG

## (undated) DEVICE — SOL 9P NACL IRR PIC IL

## (undated) DEVICE — SEE MEDLINE ITEM 146347

## (undated) DEVICE — SUT 8/0 24IN PROLENE BL MON

## (undated) DEVICE — SOL WATER STRL IRR 1000ML

## (undated) DEVICE — SYR 30CC LUER LOCK

## (undated) DEVICE — SUT VICRYL 1 OB 36 CTX

## (undated) DEVICE — TUBING HEATED INSUFFLATOR

## (undated) DEVICE — SEE MEDLINE ITEM 152739

## (undated) DEVICE — ORGNZR TUBING CLR W/CLIPS

## (undated) DEVICE — BLADE KNIFE UNITOME 4.0MM

## (undated) DEVICE — BRA MAMMARY SUPPORT XLARGE

## (undated) DEVICE — TAPE MEDIPORE 4IN X 2YDS

## (undated) DEVICE — CABLE PACEMAKER CARD 6FT BLUE

## (undated) DEVICE — DRESSING MEPORE ISLAND 31/2X4

## (undated) DEVICE — INSERTS STEALTH FIBRA SIZE 5

## (undated) DEVICE — STOPCOCK 1 WAY PLASTIC

## (undated) DEVICE — CANNULA AG CARDPLG 14G FLANGE

## (undated) DEVICE — SEE MEDLINE ITEM 146231

## (undated) DEVICE — SEE MEDLINE ITEM 152622

## (undated) DEVICE — SUT PROLENE 4-0 SH BLU 36IN

## (undated) DEVICE — SEE MEDLINE ITEM 157117

## (undated) DEVICE — STOCKINETTE TUBULAR 2PL 6 X 4

## (undated) DEVICE — SUT 7/0 24IN PROLENE BL MO

## (undated) DEVICE — SUT PROLENE 6-0 C-1 30IN BL

## (undated) DEVICE — SHEET DRAPE FAN-FOLDED 3/4

## (undated) DEVICE — DRAPE INCISE IOBAN 2 23X17IN

## (undated) DEVICE — FILTER FASTSTART KIT 40 MICRON

## (undated) DEVICE — ELECTRODE REM PLYHSV RETURN 9

## (undated) DEVICE — SEE MEDLINE ITEM 146417

## (undated) DEVICE — BLOWER MISTER

## (undated) DEVICE — CONNECTOR CHEST DRN TRPL

## (undated) DEVICE — SYR 20ML BLUE LUER LOCK

## (undated) DEVICE — SUT SILK 2-0 SH 18IN BLACK

## (undated) DEVICE — DRESSING TRANS 4X4 TEGADERM

## (undated) DEVICE — SET DECANTER MEDICHOICE

## (undated) DEVICE — DRAPE SLUSH WARMER WITH DISC

## (undated) DEVICE — SUT PLEDGET LG SOFT

## (undated) DEVICE — DRAIN CHANNEL ROUND 19FR

## (undated) DEVICE — CONTAINER SPECIMEN STRL 4OZ

## (undated) DEVICE — PUNCH AORTIC 4.0MM 6/CASE

## (undated) DEVICE — DRESSING TELFA N ADH 3X8

## (undated) DEVICE — TRAY FOLEY SURESTEP 16FR

## (undated) DEVICE — ELECTRODE BLADE E-Z CLEAN 4IN

## (undated) DEVICE — COUNTER BDL BLADEGUARD LI DBL

## (undated) DEVICE — SUT PERMA HAND SILK BLK 0-0

## (undated) DEVICE — CLOSURE SKIN 1/4INX1-1/2IN

## (undated) DEVICE — DEV-O-LOOPS MAXI RED

## (undated) DEVICE — BLADE SCALP OPHTL BEVEL STR

## (undated) DEVICE — CLIPS LIGATING TITANIUM WDE SM

## (undated) DEVICE — DRESSING TRANS 2X2 TEGADERM

## (undated) DEVICE — SUT MONOCRYL 4-0 PS-1 UND

## (undated) DEVICE — SUT SILK 1 6-30 LABYRINTH

## (undated) DEVICE — COVER SURG LIGHT HANDLE

## (undated) DEVICE — CATH 16FR URETHRL RED RUB

## (undated) DEVICE — GAUZE SPONGE 4X4 12PLY

## (undated) DEVICE — TUBING PRESSURE MALE/FEMALE

## (undated) DEVICE — WIRE TEMP PACING 0 SH SKS-3

## (undated) DEVICE — CANNULA 21FR 7MM SOFT FLOW EXT

## (undated) DEVICE — SEE MEDLINE ITEM 146308

## (undated) DEVICE — TUBING INSUFFLATION 10FT STRYK

## (undated) DEVICE — SUT SILK 2-0 STRANDS 30IN

## (undated) DEVICE — SYS VEIN HARVESTING

## (undated) DEVICE — BLADE 4IN EDGE INSULATED

## (undated) DEVICE — DRAIN CHEST DRY SUCTION

## (undated) DEVICE — DRAIN CHAN RND HUBLS 8MM 24FR

## (undated) DEVICE — SPONGE LAP 18X18 PREWASHED

## (undated) DEVICE — LIGATING CLIP LARGE

## (undated) DEVICE — BOOT SUTURE AID

## (undated) DEVICE — CANNULA PERF RCSP 15FR SINUS

## (undated) DEVICE — RETRACTOR OCTOBASE INSERT HOLD

## (undated) DEVICE — CANNULA IMA 1MM

## (undated) DEVICE — SUT PROLENE 4-0 RB-1 BL MO

## (undated) DEVICE — CANNULA VENOUS MC2X 29FR

## (undated) DEVICE — ADHESIVE MASTISOL VIAL 48/BX

## (undated) DEVICE — SPONGE DERMACEA GAUZE 4X4

## (undated) DEVICE — PACK OPEN HEART BR

## (undated) DEVICE — SET PERFUSION

## (undated) DEVICE — CANNULA VSL W/DUCKBILL